# Patient Record
Sex: FEMALE | Race: WHITE | NOT HISPANIC OR LATINO | Employment: OTHER | URBAN - METROPOLITAN AREA
[De-identification: names, ages, dates, MRNs, and addresses within clinical notes are randomized per-mention and may not be internally consistent; named-entity substitution may affect disease eponyms.]

---

## 2017-01-02 ENCOUNTER — TELEPHONE (OUTPATIENT)
Dept: FAMILY MEDICINE | Facility: CLINIC | Age: 67
End: 2017-01-02

## 2017-01-02 DIAGNOSIS — G89.29 CHRONIC MIDLINE LOW BACK PAIN, WITH SCIATICA PRESENCE UNSPECIFIED: ICD-10-CM

## 2017-01-02 DIAGNOSIS — M54.5 CHRONIC MIDLINE LOW BACK PAIN, WITH SCIATICA PRESENCE UNSPECIFIED: ICD-10-CM

## 2017-01-02 DIAGNOSIS — M54.50 MIDLINE LOW BACK PAIN WITHOUT SCIATICA: Primary | Chronic | ICD-10-CM

## 2017-01-02 NOTE — TELEPHONE ENCOUNTER
Per [facility] pt needs appt to complete for.  No mgs left busy when called.    Karo Irwin,TC

## 2017-01-02 NOTE — TELEPHONE ENCOUNTER
Patient called requesting transfer for all her medications to [facility].  Had used [facility] while on [employer]. Now need to change to [facility] b/c on [employer].   Patient was transferred to scheduling to update insurance information .  Nurse called [facility]  to have them request transfer of medication.  Karo pharmacist from [facility] [employer] took message above. [phone]  Message will be sent to provider for controlled substance. Controlled medications will not be transferred.    Controlled Substance Refill Request for traMADol (ULTRAM) 50 MG tablet  Problem List Complete:  No     PROVIDER TO CONSIDER COMPLETION OF PROBLEM LIST AND OVERVIEW/CONTROLLED SUBSTANCE AGREEMENT    Last Written Prescription Date:  11/12/2015  Last Fill Quantity: 240,   # refills: 1    Last Office Visit with [facility] primary care provider: 09/22/2016    Future Office visit:     Controlled substance agreement on file: No.     Processing:  Fax Rx to [facility] pharmacy   checked in past 6 months?  Yes 01/02/2017   [place] reviewed:  reviewed- recommend provider review unable to fine records    [place]  [email or web address]

## 2017-01-04 DIAGNOSIS — I10 ESSENTIAL HYPERTENSION WITH GOAL BLOOD PRESSURE LESS THAN 140/90: Primary | Chronic | ICD-10-CM

## 2017-01-04 DIAGNOSIS — M17.0 PRIMARY OSTEOARTHRITIS OF BOTH KNEES: ICD-10-CM

## 2017-01-04 DIAGNOSIS — M35.00 SJOGREN'S SYNDROME (H): Chronic | ICD-10-CM

## 2017-01-04 DIAGNOSIS — K21.9 GASTROESOPHAGEAL REFLUX DISEASE WITHOUT ESOPHAGITIS: Chronic | ICD-10-CM

## 2017-01-04 DIAGNOSIS — R09.82 PND (POST-NASAL DRIP): Primary | ICD-10-CM

## 2017-01-04 DIAGNOSIS — M54.40 BILATERAL LOW BACK PAIN WITH SCIATICA, SCIATICA LATERALITY UNSPECIFIED, UNSPECIFIED CHRONICITY: Chronic | ICD-10-CM

## 2017-01-04 NOTE — TELEPHONE ENCOUNTER
Flonase 50 mcg       Last Written Prescription Date: 1/8/16  Last Fill Quantity: 1 g,  # refills: 3   Last Office Visit with [facility], [facility] or  [facility] prescribing provider: 9/22/16    Lidocaine 5 % ointment      Last Written Prescription Date: 1/8/16  Last Fill Quantity: 213 g, # refills: 3  Last Office Visit with [facility], [facility] or  [facility] prescribing provider: 9/22/16       CREATININE   Date Value Ref Range Status   09/13/2016 1.25* 0.52 - 1.04 mg/dL Final                                                Gabapentin 100 mg       Last Written Prescription Date:  1/8/16  Last Fill Quantity: 270,   # refills: 3  Last Office Visit with [facility], [facility] or  [facility] prescribing provider: 9/22/16  Future Office visit:       Routing refill request to provider for review/approval because:  Drug not on the [facility], [facility] or  [facility] refill protocol or controlled substance

## 2017-01-04 NOTE — TELEPHONE ENCOUNTER
Losratan/hctz 100/12.5 mg       Last Written Prescription Date: 9/16/16  Last Fill Quantity: 90, # refills: 3  Last Office Visit with [facility], [facility] or [facility] prescribing provider: 9/22/16     Now [facility]         POTASSIUM   Date Value Ref Range Status   09/13/2016 4.0 3.4 - 5.3 mmol/L Final     CREATININE   Date Value Ref Range Status   09/13/2016 1.25* 0.52 - 1.04 mg/dL Final     BP Readings from Last 3 Encounters:   09/22/16 118/64   09/13/16 144/76   05/27/16 130/56     Famotidine 20 mg       Last Written Prescription Date: 9/16/16  Last Fill Quantity: 180,  # refills: 3   [facility] or [facility] [facility] prescribing provider: 9/22/16     Now [facility]                                             Hydroxychloroquine 200 mg       Last Written Prescription Date:  9/16/16  Last Fill Quantity: 180,   # refills: 3  Last Office Visit with [facility], [facility] or [facility] prescribing provider: 9/22/16    Now [facility]   Future Office visit:       Routing refill request to provider for review/approval because:  Drug not on the [facility] or [facility] [facility] refill protocol or controlled substance

## 2017-01-05 RX ORDER — PILOCARPINE HYDROCHLORIDE 5 MG/1
5 TABLET, FILM COATED ORAL
Qty: 360 TABLET | Refills: 2 | Status: SHIPPED | OUTPATIENT
Start: 2017-01-05 | End: 2018-01-09

## 2017-01-05 RX ORDER — LIDOCAINE 50 MG/G
OINTMENT TOPICAL
Qty: 213 G | Refills: 3 | Status: SHIPPED | OUTPATIENT
Start: 2017-01-05 | End: 2019-09-27

## 2017-01-05 RX ORDER — FLUTICASONE PROPIONATE 50 MCG
2 SPRAY, SUSPENSION (ML) NASAL DAILY
Qty: 48 G | Refills: 2 | Status: SHIPPED | OUTPATIENT
Start: 2017-01-05 | End: 2021-09-24

## 2017-01-05 RX ORDER — LOSARTAN POTASSIUM AND HYDROCHLOROTHIAZIDE 12.5; 1 MG/1; MG/1
TABLET ORAL
Qty: 90 TABLET | Refills: 2 | Status: SHIPPED | OUTPATIENT
Start: 2017-01-05 | End: 2017-09-20

## 2017-01-05 RX ORDER — TRAMADOL HYDROCHLORIDE 50 MG/1
100 TABLET ORAL EVERY 6 HOURS PRN
Qty: 240 TABLET | Refills: 1 | Status: SHIPPED | OUTPATIENT
Start: 2017-01-05 | End: 2017-08-29

## 2017-01-05 RX ORDER — FAMOTIDINE 20 MG/1
TABLET, FILM COATED ORAL
Qty: 180 TABLET | Refills: 2 | Status: SHIPPED | OUTPATIENT
Start: 2017-01-05 | End: 2019-09-27

## 2017-01-05 RX ORDER — HYDROXYCHLOROQUINE SULFATE 200 MG/1
TABLET, FILM COATED ORAL
Qty: 180 TABLET | Refills: 2 | Status: SHIPPED | OUTPATIENT
Start: 2017-01-05 | End: 2018-01-11

## 2017-01-05 RX ORDER — METOPROLOL SUCCINATE 50 MG/1
TABLET, EXTENDED RELEASE ORAL
Qty: 90 TABLET | Refills: 2 | Status: SHIPPED | OUTPATIENT
Start: 2017-01-05 | End: 2018-01-09

## 2017-01-05 RX ORDER — GABAPENTIN 100 MG/1
CAPSULE ORAL
Qty: 270 CAPSULE | Refills: 2 | Status: SHIPPED | OUTPATIENT
Start: 2017-01-05 | End: 2019-09-27

## 2017-01-05 RX ORDER — PANTOPRAZOLE SODIUM 40 MG/1
TABLET, DELAYED RELEASE ORAL
Qty: 90 TABLET | Refills: 2 | Status: SHIPPED | OUTPATIENT
Start: 2017-01-05 | End: 2018-01-04

## 2017-01-05 NOTE — TELEPHONE ENCOUNTER
Changing to mail order    Flonase:  Prescription approved per [facility] Refill Protocol.     gabapentin (NEURONTIN) 100 MG capsule -   Last  check - 1/2/17  To primary care provider      Lidocaine ointment:  Prescription approved per [facility] Refill Protocol.

## 2017-01-05 NOTE — TELEPHONE ENCOUNTER
Changing to mail order:    Losartan/HCTZ:  Prescription approved per [facility] Refill Protocol.     Pepcid:  Prescription approved per [facility] Refill Protocol.     Plaquenil:  Routing refill request to provider for review/approval because:  Drug not on the [facility] refill protocol

## 2017-01-14 PROBLEM — M17.0 PRIMARY OSTEOARTHRITIS OF BOTH KNEES: Status: ACTIVE | Noted: 2017-01-14

## 2017-01-14 PROBLEM — M54.40 BILATERAL LOW BACK PAIN WITH SCIATICA, SCIATICA LATERALITY UNSPECIFIED, UNSPECIFIED CHRONICITY: Chronic | Status: ACTIVE | Noted: 2017-01-14

## 2017-03-09 ENCOUNTER — OFFICE VISIT (OUTPATIENT)
Dept: FAMILY MEDICINE | Facility: CLINIC | Age: 67
End: 2017-03-09
Payer: COMMERCIAL

## 2017-03-09 VITALS
RESPIRATION RATE: 16 BRPM | DIASTOLIC BLOOD PRESSURE: 70 MMHG | SYSTOLIC BLOOD PRESSURE: 132 MMHG | WEIGHT: 198 LBS | HEART RATE: 75 BPM | BODY MASS INDEX: 32.95 KG/M2 | TEMPERATURE: 98 F | OXYGEN SATURATION: 97 %

## 2017-03-09 DIAGNOSIS — M35.00 SJOGREN'S SYNDROME (H): Primary | Chronic | ICD-10-CM

## 2017-03-09 DIAGNOSIS — R41.3 POOR MEMORY: ICD-10-CM

## 2017-03-09 DIAGNOSIS — I10 HYPERTENSION GOAL BP (BLOOD PRESSURE) < 140/90: Chronic | ICD-10-CM

## 2017-03-09 DIAGNOSIS — M17.0 PRIMARY OSTEOARTHRITIS OF BOTH KNEES: ICD-10-CM

## 2017-03-09 DIAGNOSIS — E78.5 HYPERLIPIDEMIA WITH TARGET LDL LESS THAN 130: Chronic | ICD-10-CM

## 2017-03-09 DIAGNOSIS — M54.40 BILATERAL LOW BACK PAIN WITH SCIATICA, SCIATICA LATERALITY UNSPECIFIED, UNSPECIFIED CHRONICITY: Chronic | ICD-10-CM

## 2017-03-09 DIAGNOSIS — K21.9 GASTROESOPHAGEAL REFLUX DISEASE WITHOUT ESOPHAGITIS: Chronic | ICD-10-CM

## 2017-03-09 DIAGNOSIS — F32.4 MAJOR DEPRESSIVE DISORDER WITH SINGLE EPISODE, IN PARTIAL REMISSION (H): Chronic | ICD-10-CM

## 2017-03-09 PROCEDURE — 99214 OFFICE O/P EST MOD 30 MIN: CPT | Performed by: FAMILY MEDICINE

## 2017-03-09 NOTE — PATIENT INSTRUCTIONS
(M35.00) Sjogren's syndrome (H)  (primary encounter diagnosis)  Comment:    Plan:      (E78.5) Hyperlipidemia with target LDL less than 130  Comment:    Plan: Policosanol 30 MG TABS             (F32.4) Major depressive disorder with single episode, in partial remission (H)  Comment:    Plan:      (I10) Hypertension goal BP (blood pressure) < 140/90  Comment:    Plan:      (K21.9) Gastroesophageal reflux disease without esophagitis  Comment:    Plan:      (M54.40) Bilateral low back pain with sciatica, sciatica laterality unspecified, unspecified chronicity  Comment:    Plan:      (M17.0) Primary osteoarthritis of both knees  Comment:    Plan:      (R41.3) Poor memory  Comment:    Plan:

## 2017-03-09 NOTE — PROGRESS NOTES
SUBJECTIVE:                                                    Fatou Brian is a 66 year old female who presents to clinic today for the following health issues:      [employer] mobility form      Duration: na    Description (location/character/radiation): na    Intensity:  na    Accompanying signs and symptoms: na    History (similar episodes/previous evaluation): None    Precipitating or alleviating factors: None    Therapies tried and outcome: None       Hyperlipidemia Follow-Up      Rate your low fat/cholesterol diet?: good    Taking statin?  No    Other lipid medications/supplements?:  none       Chronic Pain Follow-Up  OSTEOARTHRITIS KNEES  AND CHRONIC LOWER BACK PAIN with RADICULOPATHY   SJOGREN'S SYNDROME       Type / Location of Pain:  SEE ABOVE   Analgesia/pain control:       Recent changes:  same      Overall control: Tolerable with discomfort  Activity level/function:      Daily activities:  Able to do light housework, cooking    Work:  not applicable  Adverse effects:  No  Adherance    Taking medication as directed?  Yes    Participating in other treatments: yes  Risk Factors:    Sleep:  Fair    Mood/anxiety:  controlled    Recent family or social stressors:  none noted    Other aggravating factor PROLONGED WALKING BENDING SITTING OR STANDING  PROLONGED WALKING BENDING SITTING OR STANDING   PHQ-9 SCORE 5/8/2015 11/12/2015 9/13/2016   Total Score 6 - -   Total Score - 5 11     No flowsheet data found.  Encounter-Level CSA:     There are no encounter-level csa.               .  Current Outpatient Prescriptions   Medication Sig Dispense Refill     Policosanol 30 MG TABS Take 1 tablet by mouth daily (with breakfast) 30 tablet 11     traMADol (ULTRAM) 50 MG tablet Take 2 tablets (100 mg) by mouth every 6 hours as needed for pain 240 tablet 1     metoprolol (TOPROL-XL) 50 MG 24 hr tablet TAKE 1 BY MOUTH DAILY 90 tablet 2     pantoprazole (PROTONIX) 40 MG EC tablet TAKE 1 BY MOUTH DAILY 90 tablet 2      pilocarpine (SALAGEN) 5 MG tablet Take 1 tablet (5 mg) by mouth 4 times daily (before meals and nightly) 360 tablet 2     losartan-hydrochlorothiazide (HYZAAR) 100-12.5 MG per tablet TAKE 1 BY MOUTH DAILY 90 tablet 2     hydroxychloroquine (PLAQUENIL) 200 MG tablet TAKE 2 (400 MG) BY MOUTH AT BEDTIME 180 tablet 2     famotidine (PEPCID) 20 MG tablet TAKE 1 TABLET BY MOUTH TWICE DAILY AS NEEDED 180 tablet 2     fluticasone (FLONASE) 50 MCG/ACT spray Spray 2 sprays into both nostrils daily 48 g 2     gabapentin (NEURONTIN) 100 MG capsule TAKE ONE CAPSULE BY MOUTH THREE TIMES DAILY 270 capsule 2     lidocaine (XYLOCAINE) 5 % ointment One application 4 x daily to affected areas lower back and knees if necessary 213 g 3     cholecalciferol (VITAMIN D3) 5000 UNITS TABS tablet Take 1 tablet (5,000 Units) by mouth At Bedtime 100 tablet 3     calcium carbonate (TUMS) 500 MG chewable tablet Take 1 tablet (500 mg) by mouth At Bedtime 120 tablet 11     diclofenac (VOLTAREN) 1 % GEL Apply 4 grams to knees or 2 grams to hands four times daily using enclosed dosing card. 100 g 1     triamcinolone (KENALOG) 0.1 % cream Apply topically 2 times daily Left axillary dermatitis 30 g 1     senna-docusate (SENOKOT-S;PERICOLACE) 8.6-50 MG per tablet Take 2 tablets by mouth 2 times daily 120 tablet PRN     ORDER FOR DME Equipment being ordered:  LEFT THUMB  METACARPAL AND WRIST SPLINT FOR TRIGGER THUMB  *ONE*  *SIG:* AS DIRECTED 1 Device 1     methocarbamol (ROBAXIN) 750 MG tablet Take 1 tablet (750 mg) by mouth 4 times daily as needed for muscle spasms 120 tablet 1     Acetaminophen 500 MG CHEW Take 1,000 mg by mouth 3 times daily (Patient taking differently: Take 1,300 mg by mouth every 8 hours ) 120 tablet 5     ORDER FOR DME Equipment being ordered: RUBBER THRESHOLD /CARGO WEDGE RAMP  ONE  USE AS DIRECTED FOR THRESHOLD  TO PREVENT FALLING 1 Device 1     zoledronic Acid (RECLAST) 5 MG/100ML SOLN Receive 5 mg injection once yearly  (March)  * Did not receive this year ()            Allergies   Allergen Reactions     Chantix [Varenicline]      suicidal     Influenza Virus Vaccine H5n1      Muscle aches dizzy, nauseated  Light headed     Morphine Sulfate      Sensitivity when in hospital     Omeprazole Magnesium Nausea     Intolerance       Vioxx      Niacin Itching and Rash     Zetia [Ezetimibe] Other (See Comments)     Muscle pain     Zyrtec-D Rash       Immunization History   Administered Date(s) Administered     Influenza (High Dose) 3 valent vaccine 2013     Pneumococcal 23 valent 2013     Tdap (Adacel,Boostrix) 10/14/2010     Zoster vaccine, live 2009         reports that she drinks alcohol.      reports that she does not use illicit drugs.    family history includes Arthritis in her mother; CANCER in her father and mother; CEREBROVASCULAR DISEASE in her mother; HEART DISEASE in her brother and father; OSTEOPOROSIS in her mother; Thyroid Disease in her mother.    indicated that her mother is . She indicated that her father is . She indicated that her brother is .      has a past surgical history that includes colonoscopy; hysterectomy, alexander; aneursym[ (, ); Cholecystectomy; closed rx prox humerus fracture; hc ecp with cataract surgery; and Colonoscopy (2014).     reports that she does not engage in sexual activity.  .  Pediatric History   Patient Guardian Status     Not on file.     Other Topics Concern     Parent/Sibling W/ Cabg, Mi Or Angioplasty Before 65f 55m? Yes     brother     Caffeine Concern No     Exercise No     Seat Belt Yes     Social History Narrative    --------------------------------------------------------------------------------    Surgical History  Return To Top     Status Surgery Time Frame Comment Record Date     Inactive  neck surgery    benign throat growths removed  2007      Inactive  Cataract    bilateral  2007      Inactive   hysterectomy  1981  TAHBSO  11/7/2007      Inactive  Brain surgery  1983;1991  brain aneurysm  11/7/2007      Inactive  cholecsystectomy  1992    11/7/2007          --------------------------------------------------------------------------------    Food Allergy  Return To Top     Allergen Reaction Comment Record Date     * No known food allergies      11/7/2007          --------------------------------------------------------------------------------    Drug Allergy  Return To Top     Allergen Reaction Comment Record Date     MORPHINE SULFATE    sensitivity when in hospital  9/24/2012      Vioxx      10/26/2010      NIACIN PREPARATIONS  itching; rash    10/26/2010      Zyrtec  rash    10/26/2010      OMEPRAZOLE/LANSOPRAZOLE  intolerance; nausea  PRILOSEC OTC ONLY OMEPREZOLE OK AND PREVACID OK; PRILOSEC OTC ONLY OMEPREZOLE OK AND PREVACID OK  4/10/2008          --------------------------------------------------------------------------------    Environment Allergy  Return To Top     Allergen Reaction Comment Record Date     * No known environmental allergies      11/7/2007          --------------------------------------------------------------------------------    Immunization History Return To Top     Funding Source Vaccine Type of Vaccine Date Given Route Site Given Lot#  Exp. Date Date on VIS Date Given VIS Vaccinator     Private  Herpes Zoster (Zostavax) age 60 +  Herpes Zoster  1/29/2009  SQ  RA  [id number]  MRK  04/28/2010 9/11/06 1/29/2009  DALIA Fuentes CMA      Private  Influenza (Adult) Seasonal  Flu Adult  10/14/2010  IM  Left Deltoid; Left Deltoid  [id number]  SP  6/30/2011  8/10/2010  10/14/2010  MAJO [facility]      Private  Tdap (Adacel) Adult  Tdap  10/14/2010  IM  Left Deltoid; Left Deltoid  [id number]  SP; SP  06/03/2012  11/18/2008  10/14/2010  MAJO Corley [facility]          --------------------------------------------------------------------------------    Social History  Return To Top     Question Answer Comment  Record Date     Marital status      9/24/2012      Emotional Abuse  No    9/16/2011      Exercise      4/9/2008      Caffeine  Yes  1 1/2 cups q.d.   4/9/2008      Physical Abuse  No    9/16/2011      Sealtbelts  Yes    4/9/2008      Sexual Abuse  No    9/16/2011      Breast/Testicle Self Check  Yes  Occasional  4/9/2008      Number of children  0    4/9/2008      Tobacco history  Quit this year  10/26/09  11/17/2009      Number of years using tobacco  35    11/11/2008      Number of cigarettes/day      11/11/2008      Alcohol history  Currently drinks alcohol    9/16/2011      Frequency of drinks  1-4 drinks per week    11/7/2007      Assist to Quit Information  Form Given to Patient    11/11/2008          --------------------------------------------------------------------------------    History - Overall Remark: 9/24/2012 Return To Top     MEDICAL HX: Collapsed lung morphine sensitivitiy last hospitalization    --------------------------------------------------------------------------------    Medical History Return To Top     Status Diagnosis Time Frame Comment Record Date     Active (389.18) - C - Sensorineural hearing loss, bilateral      9/24/2012      Active (Q82.62) - I - Laboratory exam as part of general physical exam      9/24/2012      Active (U67.0) - C - Screening, thyroid disorders      9/24/2012      Active (462) - C - Sore throat      9/24/2012      Active (V82.9) - C - Screening, vitamin d deficiency      9/24/2012      Active (466.0) - C - Bronchitis, acute      8/17/2012      Active (372.72) - C - Subconjunctival hemorrhage    RIGHT EYE  11/28/2011      Active (125.16) - C - Osteoarthritis, knee    MANY YEARS BILATERAL  11/28/2011      Active (728.85) - C - Muscle spasm      11/28/2011      Active (V76.12) - C - Screening, mammogram      9/21/2011      Active (724.02) - C - Spinal stenosis, lumbar region    LUMBAR 4-5 AREA  9/21/2011      Active (782.3) - C - Edema, localized, NOS     ANKLES FEET AND CALVES  9/16/2011      Active (455.2) - C - Hemorrhoids, Internal w/Complication    INTERMITTENT BLEEDING  9/16/2011      Active (V76.51) - C - Screening, colon      9/16/2011      Active (719.42) - C - Elbow pain    RIGHT OLECRANON WITH POSSIBLE MOUSE IN BURSA  9/16/2011      Active (724.02) - C - Spinal stenosis, lumbar region      9/16/2011      Active (722.52) - C - Degenerative disc disease, lumbar      9/16/2011      Active (564.00) - C - Constipation      8/19/2011      Active (807.00) - C - Rib fracture      8/19/2011      Active (433.10) - C - CAROTID ART OCC W/O INFARC      4/28/2011      Active (786.52) - C - Chest wall pain    LEFT CHEST WALL  3/25/2011      Active (780.4) - C - Dizziness/vertigo, NOS      1/3/2011      Active (710.2) - C - Sjogren's disease    confirmed will send to rheumatology  10/14/2010      Active (389.10) - C - Hearing loss, sensorineural    bilateral  10/14/2010      Active 528.00 Stomatits, mucositis, unpec., NOS      12/30/2009      Active 523.01 ACUTE GINGIVITIS NONPLAQUE INDUCED      12/30/2009      Active 241.1 NONTOXIC MULTINODUL GOITER      4/29/2009      Active 241.0 Thyroid nodule      4/28/2009      Active 780.79 Fatigue      11/11/2008      Active (401.1) - C - Hypertension, benign      4/10/2008      Active (729.5) - C - limb pain    HUMERUS FRACTURE WITH RESIDUAL PAIN JULY 2008 PLATE AND 10 SCREWS  4/10/2008      Active (719.46) - C - Knee pain    OSTEOARTHRITIS OF KNEES  4/10/2008      Active 728.6 Dupuytren's contracture    right hand  4/10/2008      Active (723.1) - C - Neck pain    INTERMITTENT NECK PAIN  4/10/2008      Active 796.2 Elevated blood pressure- no hypertension dx      4/10/2008      Active (272.4) - C - Hyperlipidemia      4/10/2008      Active (530.81) - C - GERD      4/10/2008      Active (780.52) - C - Insomnia    associated with adjustment  4/10/2008      Active (V70.0) - C - Routine Medical Exam      11/7/2007      Active 461.0  Sinusitis, acute, maxillary      2007      Active 733.00 Osteoporosis, unspec.      2007      Active (305.1) - C - Tobacco abuse      2007      Active 812.20 Humerus, closed, unspec.      2007      Active (V72.83) - C - Preop    humerus fx rt  2007      Inactive  (789.00) - I - Abdominal pain      2010      Inactive  (v06.1) - C - Tdap vaccine      10/14/2010      Inactive  (V72.62) - C - Laboratory exam as part of general physical exam      10/14/2010      Inactive  (V04.81) - C - Influenza vaccination      10/14/2010      Inactive  (V76.51) - C - Screening, colon      10/14/2010      Inactive  (305.1) - C - Tobacco dependence    resolved  2009      Inactive  490 Bronchitis      2009      Inactive  466.0 Bronchitis, acute      2009      Inactive  V05.8 Immunization, other, specified      2009      Inactive  V72.31 Screening, pap      2008      Inactive  Abnormal pap      2008          --------------------------------------------------------------------------------    Medication History Return To Top                 --------------------------------------------------------------------------------    Family History  Return To Top     Status Relationship Disease Comment Record Date     Alive Brother      2007      Alive Sister      2007      Alive Brother      2007      Alive Sister      2007      Alive Sister      2007         Brother  heart    2007         Brother  kidney cancer    2007         Mother  stomach cancer  age 79  2007         Brother  heart    2007         Father  heart;AAA  age 80  2007          --------------------------------------------------------------------------------    Infection History Return To Top     Question Answer Comment Record Date     History of HPV  No    2008      Live with someone with TB or exposed to TB  No    2008       History of Hepatitis B  No    11/11/2008      History of chlamydia  No    11/11/2008      History of gonorrhea  No    11/11/2008      History of syphilis  No    11/11/2008          --------------------------------------------------------------------------------                 reports that she has quit smoking. Her smoking use included Cigarettes. She has never used smokeless tobacco.    Medical, social, surgical, and family histories reviewed.    Problem list, Medication list, Allergies, and Medical/Social/Surgical histories reviewed in [facility] and updated as appropriate.  Labs reviewed in EPIC  Patient Active Problem List   Diagnosis     Sjogren's syndrome (H)     Osteoarthritis of knee     GERD (gastroesophageal reflux disease)     Constipation     Osteoporosis     Irritable bowel syndrome with diarrhea     HL (hearing loss)     Rosacea     Poor memory     Low back pain     Senile keratosis     Spinal stenosis     H/O hysterectomy for benign disease     Bilateral hearing loss     Presbyopia     Knee pain     Hypertension goal BP (blood pressure) < 140/90     Hyperlipidemia with target LDL less than 130     Left arm pain     Arm pain     Major depression in partial remission (H)     Lumbago     Subclavian artery stenosis (H)     ACP (advance care planning)     Gastroesophageal reflux disease without esophagitis     Primary osteoarthritis of both knees     Bilateral low back pain with sciatica, sciatica laterality unspecified, unspecified chronicity     Past Surgical History   Procedure Laterality Date     Colonoscopy       Hysterectomy, alexander       Aneursym[  1983, 1991     Has metal in head     Cholecystectomy       Closed rx prox humerus fracture       10 pins placed     Hc ecp with cataract surgery       both eyes     Colonoscopy  1/24/2014     Procedure: COMBINED COLONOSCOPY, SINGLE BIOPSY/POLYPECTOMY BY BIOPSY;  COLONOSCOPY;  Surgeon: Ranjit Pa MD;  Location:  GI       Social History   Substance  Use Topics     Smoking status: Former Smoker     Types: Cigarettes     Smokeless tobacco: Never Used      Comment: states quit 4 years ago     Alcohol use Yes      Comment: occas.     Family History   Problem Relation Age of Onset     Thyroid Disease Mother      Arthritis Mother      Rheumatoid     OSTEOPOROSIS Mother      CANCER Mother      stomach     CEREBROVASCULAR DISEASE Mother      HEART DISEASE Father      CANCER Father      HEART DISEASE Brother      fibulation         Allergies   Allergen Reactions     Chantix [Varenicline]      suicidal     Influenza Virus Vaccine H5n1      Muscle aches dizzy, nauseated  Light headed     Morphine Sulfate      Sensitivity when in hospital     Omeprazole Magnesium Nausea     Intolerance       Vioxx      Niacin Itching and Rash     Zetia [Ezetimibe] Other (See Comments)     Muscle pain     Zyrtec-D Rash     Recent Labs   Lab Test  09/13/16   1158  01/08/16   1630  09/17/15   0925  09/14/15   0809  06/19/15   1232   A1C  5.4   --    --    --    --    LDL  132*   --    --   98  90   HDL  64   --    --   66  53   TRIG  74   --    --   87  92   ALT  24  23   --   24   --    CR  1.25*  1.04   --   1.00   --    GFRESTIMATED  43*  53*   --   55*   --    GFRESTBLACK  52*  64   --   67   --    POTASSIUM  4.0  3.8   --   3.9   --    TSH  0.96   --   0.69   --    --         BP Readings from Last 6 Encounters:   03/09/17 132/70   09/22/16 118/64   09/13/16 144/76   05/27/16 130/56   01/19/16 146/78   01/08/16 116/62       Wt Readings from Last 3 Encounters:   03/09/17 198 lb (89.8 kg)   09/22/16 190 lb 12.8 oz (86.5 kg)   09/13/16 191 lb 12.8 oz (87 kg)         Positive symptoms or findings indicated by bold designation:     ROS: 10 point ROS neg other than the symptoms noted above in the HPI.except  has Sjogren's syndrome (H); Osteoarthritis of knee; GERD (gastroesophageal reflux disease); Constipation; Osteoporosis; Irritable bowel syndrome with diarrhea; HL (hearing loss); Rosacea;  Poor memory; Low back pain; Senile keratosis; Spinal stenosis; H/O hysterectomy for benign disease; Bilateral hearing loss; Presbyopia; Knee pain; Hypertension goal BP (blood pressure) < 140/90; Hyperlipidemia with target LDL less than 130; Left arm pain; Arm pain; Major depression in partial remission (H); Lumbago; Subclavian artery stenosis (H); ACP (advance care planning); Gastroesophageal reflux disease without esophagitis; Primary osteoarthritis of both knees; and Bilateral low back pain with sciatica, sciatica laterality unspecified, unspecified chronicity on her problem list.   Constitutional: The patient denied fatigue, fever, insomnia, night sweats, recent illness and weight loss.  WEIGHT IS UP  ANOTHER 8  POUNDS     Eyes: The patient denied blindness, eye pain, eye tearing, photophobia, vision change and visual disturbance. NORMAL VISION       Ears/Nose/Throat/Neck: The patient denied dizziness, facial pain, hearing loss, nasal discharge, oral pain, otalgia, postnasal drip, sinus congestion, sore throat, tinnitus and voice change.   PROFOUND HEARING LOSS BILATERAL   POOR BALANCE     Cardiovascular: The patient denied arrhythmia, chest pain/pressure, claudication, edema, exercise intolerance, fatigue, orthopnea, palpitations and syncope.      Respiratory: The patient denied asthma, chest congestion, cough, dyspnea on exertion, dyspnea/shortness of breath, hemoptysis, pedal edema, pleuritic pain, productive sputum, snoring and wheezing.     Gastrointestinal: The patient denied abdominal pain, anorexia, constipation, diarrhea, dysphagia, gastroesophageal reflux, hematochezia, hemorrhoids, melena, nausea and vomiting .     Genitourinary/Nephrology: The patient denied breast complaint, dysuria, nocturia sexual dysfunction, t, urinary frequency, urinary incontinence, urinary urgency    OCCASIONAL INCONTINENCE     Musculoskeletal: CHRONIC LOWER BACK PAIN with RADICULOPATHY IMPROVED    BILATERAL OSTEOARTHRITIS  KNEES   UNABLE TO GET ON BUS   SJOGREN'S     Dermatoligic:: The patient denied acne, dermatitis, ecchymosis, itching, mole change, rash, skin cancer, skin lesion and sores.      Neurologic: The patient denied dizziness, gait abnormality, headache, memory loss, mental status change, paresis, paresthesia, seizure, syncope, tremor and vision change.     Psychiatric: The patient denied anxiety, depression, disturbances of memory, drug abuse, insomnia, mood swings and relationship difficulties.      Endocrine: The patient denied , goiter, obesity, polyuria and thyroid disease.      Hematologic/Lymphatic: The patient denied abnormal bleeding and bruising, abnormal ecchymoses, anemia, lymph node enlargement/mass, petechiae and venous  Thrombosis.      Allergy/Immunology: The patient denied food allergy and  Allergic rhinitis or conjunctivitis.        PE:  /70  Pulse 75  Temp 98  F (36.7  C) (Tympanic)  Resp 16  Wt 198 lb (89.8 kg)  SpO2 97%  BMI 32.95 kg/m2 Body mass index is 32.95 kg/(m^2).    Constitutional: general appearance, well nourished, well developed, in no acute distress, well developed, appears stated age, normal body habitus,      Eyes:; The patient has normal eyelids sclerae and conjunctivae :      Ears/Nose/Throat: external ear, overall: normal appearance; external nose, overall: benign appearance, normal moujth gums and lips  The patient has:      Neck: thyroid, overall: normal size, normal consistency, nontender,      Respiratory:  palpation of chest, overall: normal excursion,    Clear to percussion and auscultation      Tachypnea   Color      Cardiovascular:  Good color with no peripheral edema    Regular sinus rhythm without murmur. Physiologic heart sounds Heart is unelarged  .   Chest/Breast: normal shape       Abdominal exam,  Liver and spleen are  unenlarged        Tenderness    Scars      Urogenital; no renal, flank or bladder  tenderness;      Lymphatic: neck nodes,     Other nodes      Musculoskeletal:  Brief ortho exam normal except:   DECREASE RANGE OF MOTION OF KNEES AND LOWER BACK   NEGATIVE STRAIGHT LEG RAISING TEST     Integument: inspection of skin, no rash, lesions; and, palpation, no induration, no tenderness.      Neurologic mental status, overall: alert and oriented; gait, no ataxia, no unsteadiness; coordination, no tremors; cranial nerves, overall: normal motor, overall: normal bulk, tone.      Psychiatric: orientation/consciousness, overall: oriented to person, place and time; behavior/psychomotor activity, no tics, normal psychomotor activity; mood and affect, overall: normal mood and affect; appearance, overall: well-groomed, good eye contact; speech, overall: normal quality, no aphasia and normal quality, quantity, intact.      Diagnostic Test Results:  Results for orders placed or performed in visit on 09/13/16   Hemoglobin A1c   Result Value Ref Range    Hemoglobin A1C 5.4 4.3 - 6.0 %   Basic metabolic panel   Result Value Ref Range    Sodium 144 133 - 144 mmol/L    Potassium 4.0 3.4 - 5.3 mmol/L    Chloride 104 94 - 109 mmol/L    Carbon Dioxide 29 20 - 32 mmol/L    Anion Gap 11.1 3 - 14 mmol/L    Glucose 95 70 - 99 mg/dL    Urea Nitrogen 23 7 - 30 mg/dL    Creatinine 1.25 (H) 0.52 - 1.04 mg/dL    GFR Estimate 43 (L) >60 mL/min/1.7m2    GFR Estimate If Black 52 (L) >60 mL/min/1.7m2    Calcium 9.7 8.5 - 10.4 mg/dL   Lipid panel reflex to direct LDL   Result Value Ref Range    Cholesterol 211 (H) <200 mg/dL    Triglycerides 74 <150 mg/dL    HDL Cholesterol 64 >49 mg/dL    LDL Cholesterol Calculated 132 (H) <100 mg/dL    Non HDL Cholesterol 147 (H) <130 mg/dL   Microalbumin quantitative random urine   Result Value Ref Range    Creatinine Urine 82 mg/dL    Albumin Urine mg/L <5 mg/L    Albumin Urine mg/g Cr Unable to calculate due to low value 0 - 25 mg/g Cr   ALT   Result Value Ref Range    ALT 24 0 - 50 U/L   CBC with platelets   Result Value Ref Range    WBC 5.8 4.0 - 11.0  10e9/L    RBC Count 4.52 3.8 - 5.2 10e12/L    Hemoglobin 14.0 11.7 - 15.7 g/dL    Hematocrit 42.1 35.0 - 47.0 %    MCV 93 78 - 100 fl    MCH 31.0 26.5 - 33.0 pg    MCHC 33.3 31.5 - 36.5 g/dL    RDW 12.7 10.0 - 15.0 %    Platelet Count 215 150 - 450 10e9/L   Vitamin D Deficiency   Result Value Ref Range    Vitamin D Deficiency screening 84 (H) 20 - 75 ug/L   TSH   Result Value Ref Range    TSH 0.96 0.40 - 5.00 mU/L   UA reflex to Microscopic and Culture   Result Value Ref Range    Color Urine Yellow     Appearance Urine Clear     Glucose Urine Negative NEG mg/dL    Bilirubin Urine Negative NEG    Ketones Urine Negative NEG mg/dL    Specific Gravity Urine 1.025 1.003 - 1.035    Blood Urine Trace (A) NEG    pH Urine 5.0 5.0 - 7.0 pH    Protein Albumin Urine Negative NEG mg/dL    Urobilinogen Urine 0.2 0.2 - 1.0 EU/dL    Nitrite Urine Negative NEG    Leukocyte Esterase Urine Negative NEG    Source Midstream Urine    Urine Microscopic   Result Value Ref Range    WBC Urine O - 2 0 - 2 /HPF    RBC Urine 2-5 (A) 0 - 2 /HPF         ICD-10-CM    1. Sjogren's syndrome (H) M35.00    2. Hyperlipidemia with target LDL less than 130 E78.5 Policosanol 30 MG TABS   3. Major depressive disorder with single episode, in partial remission (H) F32.4    4. Hypertension goal BP (blood pressure) < 140/90 I10    5. Gastroesophageal reflux disease without esophagitis K21.9    6. Bilateral low back pain with sciatica, sciatica laterality unspecified, unspecified chronicity M54.40    7. Primary osteoarthritis of both knees M17.0    8. Poor memory R41.3         .    Side effects benefits and risks thoroughly discussed. .she may come in early if unimproved or getting worse          Importance of adhering to regimen discussed and if medications were dispensed, the importance of taking medications discussed and bringing in the medications after every visit for chronic problems         Please drink 2 glasses of water prior to meals and walk 15-30  minutes after meals    I spent  25 MINUTES SPENT  with patient discussing the following issues   The primary encounter diagnosis was Sjogren's syndrome (H). Diagnoses of Hyperlipidemia with target LDL less than 130, Major depressive disorder with single episode, in partial remission (H), Hypertension goal BP (blood pressure) < 140/90, Gastroesophageal reflux disease without esophagitis, Bilateral low back pain with sciatica, sciatica laterality unspecified, unspecified chronicity, Primary osteoarthritis of both knees, and Poor memory were also pertinent to this visit. over half of which involved counseling and coordination of care.    Patient Instructions   (M35.00) Sjogren's syndrome (H)  (primary encounter diagnosis)  Comment:    Plan:      (E78.5) Hyperlipidemia with target LDL less than 130  Comment:    Plan: Policosanol 30 MG TABS             (F32.4) Major depressive disorder with single episode, in partial remission (H)  Comment:    Plan:      (I10) Hypertension goal BP (blood pressure) < 140/90  Comment:    Plan:      (K21.9) Gastroesophageal reflux disease without esophagitis  Comment:    Plan:      (M54.40) Bilateral low back pain with sciatica, sciatica laterality unspecified, unspecified chronicity  Comment:    Plan:      (M17.0) Primary osteoarthritis of both knees  Comment:    Plan:      (R41.3) Poor memory  Comment:    Plan:                  Diet:  MEDITERRANEAN DIET     Exercise:    Exercises Range of motion, balance, isometric, and strengthening exercises 30 repetitions twice daily of involved joints      .EVY ACOSTA MD 3/9/2017 12:44 PM  March 9, 2017

## 2017-03-09 NOTE — NURSING NOTE
"Chief Complaint   Patient presents with     Forms     metro mobility       Initial /70  Pulse 75  Temp 98  F (36.7  C) (Tympanic)  Resp 16  Wt 198 lb (89.8 kg)  SpO2 97%  BMI 32.95 kg/m2 Estimated body mass index is 32.95 kg/(m^2) as calculated from the following:    Height as of 9/13/16: 5' 5\" (1.651 m).    Weight as of this encounter: 198 lb (89.8 kg).  Medication Reconciliation: complete   Wanda Irizarry CMA    "

## 2017-03-09 NOTE — MR AVS SNAPSHOT
After Visit Summary   3/9/2017    Fatou Brian    MRN: [id number]           Patient Information     Date Of Birth          1950        Visit Information        Provider Department      3/9/2017 11:30 AM Reid Thomas MD [facility]        Today's Diagnoses     Sjogren's syndrome (H)    -  1    Hyperlipidemia with target LDL less than 130        Major depressive disorder with single episode, in partial remission (H)        Hypertension goal BP (blood pressure) < 140/90        Gastroesophageal reflux disease without esophagitis        Bilateral low back pain with sciatica, sciatica laterality unspecified, unspecified chronicity        Primary osteoarthritis of both knees        Poor memory          Care Instructions    (M35.00) Sjogren's syndrome (H)  (primary encounter diagnosis)  Comment:    Plan:      (E78.5) Hyperlipidemia with target LDL less than 130  Comment:    Plan: Policosanol 30 MG TABS             (F32.4) Major depressive disorder with single episode, in partial remission (H)  Comment:    Plan:      (I10) Hypertension goal BP (blood pressure) < 140/90  Comment:    Plan:      (K21.9) Gastroesophageal reflux disease without esophagitis  Comment:    Plan:      (M54.40) Bilateral low back pain with sciatica, sciatica laterality unspecified, unspecified chronicity  Comment:    Plan:      (M17.0) Primary osteoarthritis of both knees  Comment:    Plan:      (R41.3) Poor memory  Comment:    Plan:                    Follow-ups after your visit        Follow-up notes from your care team     Return in about 3 months (around 6/9/2017).      Who to contact     If you have questions or need follow up information about today's clinic visit or your schedule please contact [facility] directly at [phone].  Normal or non-critical lab and imaging results will be communicated to you by MyChart, letter or phone within 4  business days after the clinic has received the results. If you do not hear from us within 7 days, please contact the clinic through [employer] or phone. If you have a critical or abnormal lab result, we will notify you by phone as soon as possible.  Submit refill requests through [employer] or call your pharmacy and they will forward the refill request to us. Please allow 3 business days for your refill to be completed.          Additional Information About Your Visit        [employer] Information     [employer] gives you secure access to your electronic health record. If you see a primary care provider, you can also send messages to your care team and make appointments. If you have questions, please call your primary care clinic.  If you do not have a primary care provider, please call [phone] and they will assist you.        Care EveryWhere ID     This is your Care EveryWhere ID. This could be used by other organizations to access your [facility] medical records  [id number]        Your Vitals Were     Pulse Temperature Respirations Pulse Oximetry BMI (Body Mass Index)       75 98  F (36.7  C) (Tympanic) 16 97% 32.95 kg/m2        Blood Pressure from Last 3 Encounters:   03/09/17 132/70   09/22/16 118/64   09/13/16 144/76    Weight from Last 3 Encounters:   03/09/17 198 lb (89.8 kg)   09/22/16 190 lb 12.8 oz (86.5 kg)   09/13/16 191 lb 12.8 oz (87 kg)              Today, you had the following     No orders found for display         Today's Medication Changes          These changes are accurate as of: 3/9/17 12:07 PM.  If you have any questions, ask your nurse or doctor.               Start taking these medicines.        Dose/Directions    Policosanol 30 MG Tabs   Used for:  Hyperlipidemia with target LDL less than 130   Started by:  Reid Thomas MD        Dose:  1 tablet   Take 1 tablet by mouth daily (with breakfast)   Quantity:  30 tablet   Refills:  11         These medicines have changed or have updated  prescriptions.        Dose/Directions    Acetaminophen 500 MG Chew   This may have changed:    - how much to take  - when to take this   Used for:  LBP (low back pain), Osteoarthritis of knee, Arm pain, anterior, left, Spinal stenosis        Dose:  1000 mg   Take 1,000 mg by mouth 3 times daily   Quantity:  120 tablet   Refills:  5            Where to get your medicines      These medications were sent to [facility] [address] [place] [address] AT [facility] & [address] [place] [id number]    Hours:  24-hours Phone:  [phone]     Policosanol 30 MG Tabs                Primary Care Provider Office Phone # Fax #    Reid Thomas [phone]       [facility] [address] [facility] [address]        Thank you!     Thank you for choosing [facility]  for your care. Our goal is always to provide you with excellent care. Hearing back from our patients is one way we can continue to improve our services. Please take a few minutes to complete the written survey that you may receive in the mail after your visit with us. Thank you!             Your Updated Medication List - Protect others around you: Learn how to safely use, store and throw away your medicines at www.disposemymeds.org.          This list is accurate as of: 3/9/17 12:07 PM.  Always use your most recent med list.                   Brand Name Dispense Instructions for use    Acetaminophen 500 MG Chew     120 tablet    Take 1,000 mg by mouth 3 times daily       calcium carbonate 500 MG chewable tablet    TUMS    120 tablet    Take 1 tablet (500 mg) by mouth At Bedtime       cholecalciferol 5000 UNITS Tabs tablet    vitamin D3    100 tablet    Take 1 tablet (5,000 Units) by mouth At Bedtime       diclofenac 1 % Gel topical gel    VOLTAREN    100 g    Apply 4 grams to knees or 2 grams to hands four times daily using enclosed  dosing card.       famotidine 20 MG tablet    PEPCID    180 tablet    TAKE 1 TABLET BY MOUTH TWICE DAILY AS NEEDED       fluticasone 50 MCG/ACT spray    FLONASE    48 g    Spray 2 sprays into both nostrils daily       gabapentin 100 MG capsule    NEURONTIN    270 capsule    TAKE ONE CAPSULE BY MOUTH THREE TIMES DAILY       hydroxychloroquine 200 MG tablet    PLAQUENIL    180 tablet    TAKE 2 (400 MG) BY MOUTH AT BEDTIME       lidocaine 5 % ointment    XYLOCAINE    213 g    One application 4 x daily to affected areas lower back and knees if necessary       losartan-hydrochlorothiazide 100-12.5 MG per tablet    HYZAAR    90 tablet    TAKE 1 BY MOUTH DAILY       methocarbamol 750 MG tablet    ROBAXIN    120 tablet    Take 1 tablet (750 mg) by mouth 4 times daily as needed for muscle spasms       metoprolol 50 MG 24 hr tablet    TOPROL-XL    90 tablet    TAKE 1 BY MOUTH DAILY       order for DME     1 Device    Equipment being ordered: RUBBER THRESHOLD /CARGO WEDGE RAMP  ONE  USE AS DIRECTED FOR THRESHOLD  TO PREVENT FALLING       order for DME     1 Device    Equipment being ordered:  LEFT THUMB  METACARPAL AND WRIST SPLINT FOR TRIGGER THUMB *ONE* *SIG:* AS DIRECTED       pantoprazole 40 MG EC tablet    PROTONIX    90 tablet    TAKE 1 BY MOUTH DAILY       pilocarpine 5 MG tablet    SALAGEN    360 tablet    Take 1 tablet (5 mg) by mouth 4 times daily (before meals and nightly)       Policosanol 30 MG Tabs     30 tablet    Take 1 tablet by mouth daily (with breakfast)       senna-docusate 8.6-50 MG per tablet    SENOKOT-S;PERICOLACE    120 tablet    Take 2 tablets by mouth 2 times daily       traMADol 50 MG tablet    ULTRAM    240 tablet    Take 2 tablets (100 mg) by mouth every 6 hours as needed for pain       triamcinolone 0.1 % cream    KENALOG    30 g    Apply topically 2 times daily Left axillary dermatitis       zoledronic Acid 5 MG/100ML Soln infusion    RECLAST     Receive 5 mg injection once yearly (March) * Did  not receive this year (2015)

## 2017-03-10 ASSESSMENT — PATIENT HEALTH QUESTIONNAIRE - PHQ9: SUM OF ALL RESPONSES TO PHQ QUESTIONS 1-9: 6

## 2017-03-20 ENCOUNTER — DOCUMENTATION ONLY (OUTPATIENT)
Dept: OTHER | Facility: CLINIC | Age: 67
End: 2017-03-20

## 2017-03-20 DIAGNOSIS — Z71.89 ACP (ADVANCE CARE PLANNING): Chronic | ICD-10-CM

## 2017-04-03 ENCOUNTER — TELEPHONE (OUTPATIENT)
Dept: FAMILY MEDICINE | Facility: CLINIC | Age: 67
End: 2017-04-03

## 2017-04-03 NOTE — TELEPHONE ENCOUNTER
-Pt states is not allergic to pantoprozole  Please call pharmacy to verify [phone]  .Leander CARRILLO

## 2017-04-03 NOTE — TELEPHONE ENCOUNTER
pantoprazole (PROTONIX) 40 MG EC tablet  The patient may have an allergy to this medication.  Our records indicate an allergy to pantoprazole sod dr tabs.  Please review the pt's chart for type and severity of reaction

## 2017-04-03 NOTE — TELEPHONE ENCOUNTER
PATIENT HAS GASTROINTESTINAL SENSITIVITY TO OMEPRAZOLE   CALL PATIENT TO FIND OUT IF IS INDEED ALLERGIC TO PANTOPROZOLE   SWITCH TO ZANTAC 150 MG PO TWICE DAILY IF THIS IS TRUE   EVY ACOSTA JR., MD

## 2017-04-04 NOTE — TELEPHONE ENCOUNTER
Called [facility] to relay information that patient states she is not allergic to the pantoprazole

## 2017-08-04 ENCOUNTER — TRANSFERRED RECORDS (OUTPATIENT)
Dept: HEALTH INFORMATION MANAGEMENT | Facility: CLINIC | Age: 67
End: 2017-08-04

## 2017-08-23 ENCOUNTER — APPOINTMENT (OUTPATIENT)
Dept: CT IMAGING | Facility: CLINIC | Age: 67
End: 2017-08-23
Attending: EMERGENCY MEDICINE
Payer: COMMERCIAL

## 2017-08-23 ENCOUNTER — HOSPITAL ENCOUNTER (EMERGENCY)
Facility: CLINIC | Age: 67
Discharge: HOME | End: 2017-08-23
Attending: EMERGENCY MEDICINE | Admitting: EMERGENCY MEDICINE
Payer: COMMERCIAL

## 2017-08-23 VITALS
RESPIRATION RATE: 18 BRPM | HEART RATE: 77 BPM | WEIGHT: 190 LBS | OXYGEN SATURATION: 99 % | BODY MASS INDEX: 30.53 KG/M2 | HEIGHT: 66 IN | TEMPERATURE: 98.2 F | SYSTOLIC BLOOD PRESSURE: 118 MMHG | DIASTOLIC BLOOD PRESSURE: 58 MMHG

## 2017-08-23 DIAGNOSIS — R11.0 NAUSEA: ICD-10-CM

## 2017-08-23 DIAGNOSIS — R42 VERTIGO: ICD-10-CM

## 2017-08-23 LAB
ALBUMIN SERPL-MCNC: 4.2 G/DL (ref 3.4–5)
ALBUMIN UR-MCNC: NEGATIVE MG/DL
ALP SERPL-CCNC: 95 U/L (ref 40–150)
ALT SERPL W P-5'-P-CCNC: 22 U/L (ref 0–50)
ANION GAP SERPL CALCULATED.3IONS-SCNC: 9 MMOL/L (ref 3–14)
APPEARANCE UR: CLEAR
AST SERPL W P-5'-P-CCNC: 22 U/L (ref 0–45)
BASOPHILS # BLD AUTO: 0 10E9/L (ref 0–0.2)
BASOPHILS NFR BLD AUTO: 0.3 %
BILIRUB SERPL-MCNC: 0.5 MG/DL (ref 0.2–1.3)
BILIRUB UR QL STRIP: NEGATIVE
BUN SERPL-MCNC: 19 MG/DL (ref 7–30)
CALCIUM SERPL-MCNC: 9.6 MG/DL (ref 8.5–10.1)
CHLORIDE SERPL-SCNC: 102 MMOL/L (ref 94–109)
CO2 SERPL-SCNC: 27 MMOL/L (ref 20–32)
COLOR UR AUTO: YELLOW
CREAT SERPL-MCNC: 1.29 MG/DL (ref 0.52–1.04)
DIFFERENTIAL METHOD BLD: NORMAL
EOSINOPHIL # BLD AUTO: 0.1 10E9/L (ref 0–0.7)
EOSINOPHIL NFR BLD AUTO: 0.9 %
ERYTHROCYTE [DISTWIDTH] IN BLOOD BY AUTOMATED COUNT: 13.2 % (ref 10–15)
GFR SERPL CREATININE-BSD FRML MDRD: 41 ML/MIN/1.7M2
GLUCOSE SERPL-MCNC: 100 MG/DL (ref 70–99)
GLUCOSE UR STRIP-MCNC: NEGATIVE MG/DL
HCT VFR BLD AUTO: 41.7 % (ref 35–47)
HGB BLD-MCNC: 14.5 G/DL (ref 11.7–15.7)
HGB UR QL STRIP: NEGATIVE
HYALINE CASTS #/AREA URNS LPF: 2 /LPF (ref 0–2)
IMM GRANULOCYTES # BLD: 0 10E9/L (ref 0–0.4)
IMM GRANULOCYTES NFR BLD: 0.3 %
INTERPRETATION ECG - MUSE: NORMAL
KETONES UR STRIP-MCNC: NEGATIVE MG/DL
LEUKOCYTE ESTERASE UR QL STRIP: ABNORMAL
LYMPHOCYTES # BLD AUTO: 1.2 10E9/L (ref 0.8–5.3)
LYMPHOCYTES NFR BLD AUTO: 19.2 %
MCH RBC QN AUTO: 31.5 PG (ref 26.5–33)
MCHC RBC AUTO-ENTMCNC: 34.8 G/DL (ref 31.5–36.5)
MCV RBC AUTO: 91 FL (ref 78–100)
MONOCYTES # BLD AUTO: 0.5 10E9/L (ref 0–1.3)
MONOCYTES NFR BLD AUTO: 8 %
NEUTROPHILS # BLD AUTO: 4.6 10E9/L (ref 1.6–8.3)
NEUTROPHILS NFR BLD AUTO: 71.3 %
NITRATE UR QL: NEGATIVE
NRBC # BLD AUTO: 0 10*3/UL
NRBC BLD AUTO-RTO: 0 /100
PH UR STRIP: 6.5 PH (ref 5–7)
PLATELET # BLD AUTO: 192 10E9/L (ref 150–450)
POTASSIUM SERPL-SCNC: 3.7 MMOL/L (ref 3.4–5.3)
PROT SERPL-MCNC: 7.6 G/DL (ref 6.8–8.8)
RBC # BLD AUTO: 4.61 10E12/L (ref 3.8–5.2)
RBC #/AREA URNS AUTO: 1 /HPF (ref 0–2)
SODIUM SERPL-SCNC: 138 MMOL/L (ref 133–144)
SOURCE: ABNORMAL
SP GR UR STRIP: 1.01 (ref 1–1.03)
SQUAMOUS #/AREA URNS AUTO: 1 /HPF (ref 0–1)
TROPONIN I SERPL-MCNC: <0.015 UG/L (ref 0–0.04)
UROBILINOGEN UR STRIP-MCNC: NORMAL MG/DL (ref 0–2)
WBC # BLD AUTO: 6.4 10E9/L (ref 4–11)
WBC #/AREA URNS AUTO: 4 /HPF (ref 0–2)

## 2017-08-23 PROCEDURE — 25000131 ZZH RX MED GY IP 250 OP 636 PS 637: Mod: GY | Performed by: EMERGENCY MEDICINE

## 2017-08-23 PROCEDURE — 70498 CT ANGIOGRAPHY NECK: CPT

## 2017-08-23 PROCEDURE — 99285 EMERGENCY DEPT VISIT HI MDM: CPT | Mod: 25

## 2017-08-23 PROCEDURE — 85025 COMPLETE CBC W/AUTO DIFF WBC: CPT | Performed by: EMERGENCY MEDICINE

## 2017-08-23 PROCEDURE — 80053 COMPREHEN METABOLIC PANEL: CPT | Performed by: EMERGENCY MEDICINE

## 2017-08-23 PROCEDURE — 96361 HYDRATE IV INFUSION ADD-ON: CPT

## 2017-08-23 PROCEDURE — 96360 HYDRATION IV INFUSION INIT: CPT | Mod: 59

## 2017-08-23 PROCEDURE — 84484 ASSAY OF TROPONIN QUANT: CPT | Performed by: EMERGENCY MEDICINE

## 2017-08-23 PROCEDURE — 93005 ELECTROCARDIOGRAM TRACING: CPT

## 2017-08-23 PROCEDURE — A9270 NON-COVERED ITEM OR SERVICE: HCPCS | Mod: GY | Performed by: EMERGENCY MEDICINE

## 2017-08-23 PROCEDURE — 25000125 ZZHC RX 250: Performed by: EMERGENCY MEDICINE

## 2017-08-23 PROCEDURE — 25000128 H RX IP 250 OP 636: Performed by: EMERGENCY MEDICINE

## 2017-08-23 PROCEDURE — 70450 CT HEAD/BRAIN W/O DYE: CPT | Mod: XS

## 2017-08-23 PROCEDURE — 81001 URINALYSIS AUTO W/SCOPE: CPT | Performed by: EMERGENCY MEDICINE

## 2017-08-23 RX ORDER — IOPAMIDOL 755 MG/ML
70 INJECTION, SOLUTION INTRAVASCULAR ONCE
Status: COMPLETED | OUTPATIENT
Start: 2017-08-23 | End: 2017-08-23

## 2017-08-23 RX ORDER — MECLIZINE HYDROCHLORIDE 25 MG/1
25 TABLET ORAL ONCE
Status: COMPLETED | OUTPATIENT
Start: 2017-08-23 | End: 2017-08-23

## 2017-08-23 RX ORDER — LIDOCAINE 40 MG/G
CREAM TOPICAL
Status: DISCONTINUED | OUTPATIENT
Start: 2017-08-23 | End: 2017-08-23 | Stop reason: HOSPADM

## 2017-08-23 RX ADMIN — IOPAMIDOL 70 ML: 755 INJECTION, SOLUTION INTRAVENOUS at 11:39

## 2017-08-23 RX ADMIN — MECLIZINE 25 MG: 25 TABLET ORAL at 10:36

## 2017-08-23 RX ADMIN — SODIUM CHLORIDE 100 ML: 9 INJECTION, SOLUTION INTRAVENOUS at 11:39

## 2017-08-23 RX ADMIN — SODIUM CHLORIDE 1000 ML: 9 INJECTION, SOLUTION INTRAVENOUS at 10:36

## 2017-08-23 ASSESSMENT — ENCOUNTER SYMPTOMS
SHORTNESS OF BREATH: 0
HEADACHES: 0
CONSTIPATION: 1
NAUSEA: 1
DIZZINESS: 1
NECK PAIN: 0
NERVOUS/ANXIOUS: 1

## 2017-08-23 NOTE — ED AVS SNAPSHOT
Emergency Department    [address] [place] [id number]    Phone:  [phone]    Fax:  [phone]                                       Fatou Brian   MRN: [id number]    Department:   Emergency Department   Date of Visit:  8/23/2017           After Visit Summary Signature Page     I have received my discharge instructions, and my questions have been answered. I have discussed any challenges I see with this plan with the nurse or doctor.    ..........................................................................................................................................  Patient/Patient Representative Signature      ..........................................................................................................................................  Patient Representative Print Name and Relationship to Patient    ..................................................               ................................................  Date                                            Time    ..........................................................................................................................................  Reviewed by Signature/Title    ...................................................              ..............................................  Date                                                            Time

## 2017-08-23 NOTE — ED AVS SNAPSHOT
Emergency Department    [address] [facility] [address]    Phone:  [phone]    Fax:  [phone]                                       Fatou Brian   MRN: [id number]    Department:   Emergency Department   Date of Visit:  8/23/2017           Patient Information     Date Of Birth          1950        Your diagnoses for this visit were:     Vertigo     Nausea        You were seen by Marivel Cope MD.      Follow-up Information     Follow up with Reid Thomas MD.    Specialty:  Family Practice    Contact information:    [address]  [facility] [id number] [phone]          Follow up with  Emergency Department.    Specialty:  EMERGENCY MEDICINE    Why:  If symptoms worsen    Contact information:    [address] [facility] [address] [phone]        Discharge Instructions         Managing Dizziness (Vertigo) with Medicines    Although medicines can't cure your problem, they can help control symptoms. Your doctor may prescribe medicines for a few weeks and then taper them off. Always take your medicine as prescribed. Never share your medicine with others.  Contact your healthcare provider right away if you have side effects from your medicines.   How medicines can help    Treat infection or inflammation. If you have an infection caused by bacteria, your doctor can prescribe antibiotics.    Limit conflicting balance signals. These medicines are often in pill form.    Ease nausea. Suppositories, pills, or shots can reduce vomiting.    Reduce pressure in the canals. Diuretics can be used to treat Meniere's disease. These medicines help your body get rid of extra fluid.    Ease other symptoms. Other medicines can help ease depression and anxiety caused by living with dizziness or fainting.  Date Last Reviewed: 11/1/2016 2000-2017 [employer]. [address] [place] [address], [place] [address]. All rights reserved. This information  is not intended as a substitute for professional medical care. Always follow your healthcare professional's instructions.          Possible Causes of Dizziness or Fainting    Dizziness and fainting can have many causes. Below are some examples of possible causes your healthcare provider will look to rule out.  Benign paroxysmal positional vertigo (BPPV)  BPPV results when calcium crystals inside the inner ear shift into the wrong position. BPPV causes episodes of vertigo (a spinning sensation). Episodes most often happen when the head is moved in a certain way. This is more common in people 65 and older.   Infection or inflammation  The semicircular canals of the ear may become infected or inflamed. In this case, they can send the wrong balance signals. This can cause vertigo.  Meniere disease  Meniere disease happens when there is too much fluid in the semicircular canals. This can cause vertigo. It also can cause hearing problems and buzzing or ringing in the ears (called tinnitus). You may also have a feeling of pressure or fullness in the ear.  Syncope  Syncope is fainting that happens when the brain doesn t get enough oxygen-rich blood. It can be caused by low heart rate or low blood pressure. This is called vasovagal syncope. It can also be caused by sitting or standing up too quickly. This is called orthostatic hypotension. Syncope may also be due to a heart valve problem, an abnormal heart rhythm, or other heart problems. Dizziness can also happen from stroke, hemorrhage in the brain, or other problems in the brain. Your healthcare provider may do certain tests to rule out these conditions.  Other causes  Other causes include:    Medicines. Certain medicines can cause dizziness and even fainting. In some cases, stopping a medicine too quickly can lead to withdrawal symptoms, including dizziness and fainting.    Anxiety. Being anxious can lead to breathing changes, such as hyperventilation. These can lead to  dizziness and fainting.  Additional causes for dizziness and fainting also exist. Talk to your healthcare provider for more information.     Date Last Reviewed: 10/6/2015    3709-0204 [email or web address]. [address] [place] [address], [place] [address]. All rights reserved. This information is not intended as a substitute for professional medical care. Always follow your healthcare professional's instructions.          Vertigo (Unknown Cause)    In addition to helping with hearing, the inner ear is part of the balance center of your body. Problems with the inner ear can a false feeling of motion. This is called vertigo. Often, it feels as if you or the room is spinning. A vertigo attack may cause sudden nausea, vomiting and heavy sweating. Severe vertigo causes a loss of balance and can cause you to fall. During vertigo, small head movements and changes in body position will often make the symptoms worse. You may also have ringing in the ears called tinnitus.  An episode of vertigo may last seconds, minutes or hours. Once you are over the first episode, it may never come back. However, symptoms may return off and on.  The cause of your vertigo is not yet known. Possible causes of vertigo include:    Inflammation of the inner ear    Disease of the nerves to the inner ear    Movement of calcium particles in the inner ear    Poor blood flow to the balance centers of the brain    Migraine headaches  Home care    If symptoms are severe, rest quietly in bed. Change positions very slowly. There is usually one position that will feel best, such as lying on one side or lying on your back with your head slightly raised on pillows.    Do not drive a car or work with dangerous machinery until symptoms have been gone for at least one week.    Take medicine as prescribed to relieve your symptoms. Unless another medicine was prescribed for symptoms of nausea, vomiting, and dizziness, you may use over-the-counter motion sickness  pills. Ask your pharmacist for suggestions.  Follow-up care  Follow up with your healthcare provider or as directed. If you are referred to a specialist or for testing, make the appointment promptly.  When to seek medical advice  Call your healthcare provider if any of the following occur:    Fever of 100.4 F (38 C) or higher, or as directed by your healthcare provider    Vertigo worsens or is not controlled by prescribed medicine     Repeated vomiting not relieved by prescribed medicine     Severe headache    Confusion    Weakness of an arm or leg or one side of the face    Difficulty with speech or vision    Loss of consciousness     Seizure  Date Last Reviewed: 8/16/2015 2000-2017 [employer]. [address] [place] [address]. All rights reserved. This information is not intended as a substitute for professional medical care. Always follow your healthcare professional's instructions.          24 Hour Appointment Hotline       To make an appointment at any [facility], call [phone] ([phone]). If you don't have a family doctor or clinic, we will help you find one. [facility] are conveniently located to serve the needs of you and your family.             Review of your medicines      Our records show that you are taking the medicines listed below. If these are incorrect, please call your family doctor or clinic.        Dose / Directions Last dose taken    Acetaminophen 500 MG Chew   Dose:  1000 mg   Quantity:  120 tablet        Take 1,000 mg by mouth 3 times daily   Refills:  5        calcium carbonate 500 MG chewable tablet   Commonly known as:  TUMS   Dose:  1 chew tab   Quantity:  120 tablet        Take 1 tablet (500 mg) by mouth At Bedtime   Refills:  11        cholecalciferol 5000 UNITS Tabs tablet   Commonly known as:  vitamin D3   Dose:  5000 Units   Quantity:  100 tablet        Take 1 tablet (5,000 Units) by mouth At Bedtime   Refills:  3        diclofenac 1  % Gel topical gel   Commonly known as:  VOLTAREN   Quantity:  100 g        Apply 4 grams to knees or 2 grams to hands four times daily using enclosed dosing card.   Refills:  1        famotidine 20 MG tablet   Commonly known as:  PEPCID   Quantity:  180 tablet        TAKE 1 TABLET BY MOUTH TWICE DAILY AS NEEDED   Refills:  2        fluticasone 50 MCG/ACT spray   Commonly known as:  FLONASE   Dose:  2 spray   Quantity:  48 g        Spray 2 sprays into both nostrils daily   Refills:  2        gabapentin 100 MG capsule   Commonly known as:  NEURONTIN   Quantity:  270 capsule        TAKE ONE CAPSULE BY MOUTH THREE TIMES DAILY   Refills:  2        hydroxychloroquine 200 MG tablet   Commonly known as:  PLAQUENIL   Quantity:  180 tablet        TAKE 2 (400 MG) BY MOUTH AT BEDTIME   Refills:  2        lidocaine 5 % ointment   Commonly known as:  XYLOCAINE   Quantity:  213 g        One application 4 x daily to affected areas lower back and knees if necessary   Refills:  3        losartan-hydrochlorothiazide 100-12.5 MG per tablet   Commonly known as:  HYZAAR   Quantity:  90 tablet        TAKE 1 BY MOUTH DAILY   Refills:  2        methocarbamol 750 MG tablet   Commonly known as:  ROBAXIN   Dose:  750 mg   Quantity:  120 tablet        Take 1 tablet (750 mg) by mouth 4 times daily as needed for muscle spasms   Refills:  1        metoprolol 50 MG 24 hr tablet   Commonly known as:  TOPROL-XL   Quantity:  90 tablet        TAKE 1 BY MOUTH DAILY   Refills:  2        order for DME   Quantity:  1 Device        Equipment being ordered: RUBBER THRESHOLD /CARGO WEDGE RAMP  ONE  USE AS DIRECTED FOR THRESHOLD  TO PREVENT FALLING   Refills:  1        order for DME   Quantity:  1 Device        Equipment being ordered:  LEFT THUMB  METACARPAL AND WRIST SPLINT FOR TRIGGER THUMB *ONE* *SIG:* AS DIRECTED   Refills:  1        pantoprazole 40 MG EC tablet   Commonly known as:  PROTONIX   Quantity:  90 tablet        TAKE 1 BY MOUTH DAILY   Refills:   2        pilocarpine 5 MG tablet   Commonly known as:  SALAGEN   Dose:  5 mg   Quantity:  360 tablet        Take 1 tablet (5 mg) by mouth 4 times daily (before meals and nightly)   Refills:  2        Policosanol 30 MG Tabs   Dose:  1 tablet   Quantity:  30 tablet        Take 1 tablet by mouth daily (with breakfast)   Refills:  11        senna-docusate 8.6-50 MG per tablet   Commonly known as:  SENOKOT-S;PERICOLACE   Dose:  2 tablet   Quantity:  120 tablet        Take 2 tablets by mouth 2 times daily   Refills:  PRN        traMADol 50 MG tablet   Commonly known as:  ULTRAM   Dose:  100 mg   Quantity:  240 tablet        Take 2 tablets (100 mg) by mouth every 6 hours as needed for pain   Refills:  1        triamcinolone 0.1 % cream   Commonly known as:  KENALOG   Quantity:  30 g        Apply topically 2 times daily Left axillary dermatitis   Refills:  1        zoledronic Acid 5 MG/100ML Soln infusion   Commonly known as:  RECLAST        Receive 5 mg injection once yearly (March) * Did not receive this year (2015)   Refills:  0                Procedures and tests performed during your visit     CBC with platelets differential    CT Head w/o Contrast    Comprehensive metabolic panel    EKG 12-lead, tracing only    Head/neck angiogram CT  w & w/o contrast    Troponin I    UA reflex to Microscopic and Culture      Orders Needing Specimen Collection     None      Pending Results     Date and Time Order Name Status Description    8/23/2017 1031 Head/neck angiogram CT  w & w/o contrast Preliminary     8/23/2017 1031 CT Head w/o Contrast Preliminary             Pending Culture Results     No orders found from 8/21/2017 to 8/24/2017.            Pending Results Instructions     If you had any lab results that were not finalized at the time of your Discharge, you can call the ED Lab Result RN at [phone]. You will be contacted by this team for any positive Lab results or changes in treatment. The nurses are available 7 days a  week from 10A to 6:30P.  You can leave a message 24 hours per day and they will return your call.        Test Results From Your Hospital Stay        8/23/2017 10:41 AM      Component Results     Component Value Ref Range & Units Status    WBC 6.4 4.0 - 11.0 10e9/L Final    RBC Count 4.61 3.8 - 5.2 10e12/L Final    Hemoglobin 14.5 11.7 - 15.7 g/dL Final    Hematocrit 41.7 35.0 - 47.0 % Final    MCV 91 78 - 100 fl Final    MCH 31.5 26.5 - 33.0 pg Final    MCHC 34.8 31.5 - 36.5 g/dL Final    RDW 13.2 10.0 - 15.0 % Final    Platelet Count 192 150 - 450 10e9/L Final    Diff Method Automated Method  Final    % Neutrophils 71.3 % Final    % Lymphocytes 19.2 % Final    % Monocytes 8.0 % Final    % Eosinophils 0.9 % Final    % Basophils 0.3 % Final    % Immature Granulocytes 0.3 % Final    Nucleated RBCs 0 0 /100 Final    Absolute Neutrophil 4.6 1.6 - 8.3 10e9/L Final    Absolute Lymphocytes 1.2 0.8 - 5.3 10e9/L Final    Absolute Monocytes 0.5 0.0 - 1.3 10e9/L Final    Absolute Eosinophils 0.1 0.0 - 0.7 10e9/L Final    Absolute Basophils 0.0 0.0 - 0.2 10e9/L Final    Abs Immature Granulocytes 0.0 0 - 0.4 10e9/L Final    Absolute Nucleated RBC 0.0  Final         8/23/2017 11:09 AM      Component Results     Component Value Ref Range & Units Status    Sodium 138 133 - 144 mmol/L Final    Potassium 3.7 3.4 - 5.3 mmol/L Final    Chloride 102 94 - 109 mmol/L Final    Carbon Dioxide 27 20 - 32 mmol/L Final    Anion Gap 9 3 - 14 mmol/L Final    Glucose 100 (H) 70 - 99 mg/dL Final    Urea Nitrogen 19 7 - 30 mg/dL Final    Creatinine 1.29 (H) 0.52 - 1.04 mg/dL Final    GFR Estimate 41 (L) >60 mL/min/1.7m2 Final    Non  GFR Calc    GFR Estimate If Black 50 (L) >60 mL/min/1.7m2 Final    African American GFR Calc    Calcium 9.6 8.5 - 10.1 mg/dL Final    Bilirubin Total 0.5 0.2 - 1.3 mg/dL Final    Albumin 4.2 3.4 - 5.0 g/dL Final    Protein Total 7.6 6.8 - 8.8 g/dL Final    Alkaline Phosphatase 95 40 - 150 U/L Final     ALT 22 0 - 50 U/L Final    AST 22 0 - 45 U/L Final         8/23/2017 11:09 AM      Component Results     Component Value Ref Range & Units Status    Troponin I ES <0.015 0.000 - 0.045 ug/L Final    The 99th percentile for upper reference range is 0.045 ug/L.  Troponin values   in the range of 0.045 - 0.120 ug/L may be associated with risks of adverse   clinical events.           8/23/2017 12:01 PM      Narrative     CT OF THE HEAD WITHOUT CONTRAST 8/23/2017 11:55 AM     COMPARISON: Head CT 10/26/2009.    HISTORY: Dizziness, history of cerebral aneurysm.    TECHNIQUE: Axial CT images of the head from the skull base to the  vertex were acquired without IV contrast.    FINDINGS: Postoperative changes consisting of a left pterional  craniotomy for placement of a left middle cerebral artery aneurysm  clip again noted. Encephalomalacia of the anterior aspect of the left  temporal lobe and ishmael-opercular aspect of the left frontal lobe again  noted without change. Gray-white differentiation of the brain is  otherwise normal and unchanged.    The ventricles and basal cisterns are within normal limits in  configuration. There is no midline shift. There are no extra-axial  fluid collections. No intracranial hemorrhage, mass or recent infarct.    The visualized paranasal sinuses are well-aerated. There is no  mastoiditis. There are no fractures of the visualized bones.        Impression     IMPRESSION:  1. Postoperative changes for clipping of a left middle cerebral artery  territory aneurysm again noted.  2. Chronic encephalomalacia of portions of the left frontal and left  temporal lobes again noted without change.  3. Otherwise, normal brain MRI. No evidence for acute intercranial  pathology. No change from the comparison study.      Radiation dose for this scan was reduced using automated exposure  control, adjustment of the mA and/or kV according to patient size, or  iterative reconstruction technique.         8/23/2017  12:04 PM      Narrative     CT ANGIOGRAM OF THE HEAD AND NECK WITHOUT AND WITH CONTRAST  8/23/2017  11:56 AM     COMPARISON: CT angiogram of the head and neck 10/26/2009.    HISTORY: Dizziness, history of cerebral aneurysm.    TECHNIQUE: Precontrast localizing scans were followed by CT  angiography with an injection of 70 mL [id number]-370 nonionic intravenous  contrast material with scans through the head and neck. Images were  transferred to a separate 3-D workstation where multiplanar  reformations and 3-D images were created. Estimates of carotid  stenoses are made relative to the distal internal carotid artery  diameters except as noted.      FINDINGS:   Neck CTA: The common carotid arteries bilaterally remain patent  without stenosis. Calcified atherosclerotic plaque at the origins of  the internal carotid arteries on both sides again noted. This plaque  does not result in any significant narrowing of the internal carotid  arteries on either side. The cervical internal carotid arteries  bilaterally remain patent without stenosis. The vertebral arteries  bilaterally remain patent without stenosis.    Head CTA: There is calcified atherosclerotic plaque of the  intracranial distal internal carotid arteries on both sides that does  not result in any significant narrowing on either side. Cerebral  aneurysm clip near the left MCA trifurcation again noted. Marked  narrowing of the mid and distal aspects of the M1 segment of the left  middle cerebral artery again noted without definite change. There is  no evidence for residual or recurrent left middle cerebral artery  aneurysm. Left MCA branches in the left sylvian fissure are opacified.  The basilar, bilateral anterior cerebral, right middle cerebral and  bilateral posterior cerebral arteries remain patent and unremarkable.        Impression     IMPRESSION:  1. Postoperative changes from clipping of a left MCA trifurcation  aneurysm again noted.  2. No evidence for  residual or recurrent aneurysm. No new aneurysms  identified.  3. Calcified atherosclerotic plaque of the proximal and distal  internal carotid arteries on both sides that does not result in  stenosis on either side again noted.  4. Otherwise, normal neck and head CTA. Overall, no change from the  comparison study.    Radiation dose for this scan was reduced using automated exposure  control, adjustment of the mA and/or kV according to patient size, or  iterative reconstruction technique.         8/23/2017 11:09 AM      Component Results     Component Value Ref Range & Units Status    Color Urine Yellow  Final    Appearance Urine Clear  Final    Glucose Urine Negative NEG^Negative mg/dL Final    Bilirubin Urine Negative NEG^Negative Final    Ketones Urine Negative NEG^Negative mg/dL Final    Specific Gravity Urine 1.010 1.003 - 1.035 Final    Blood Urine Negative NEG^Negative Final    pH Urine 6.5 5.0 - 7.0 pH Final    Protein Albumin Urine Negative NEG^Negative mg/dL Final    Urobilinogen mg/dL Normal 0.0 - 2.0 mg/dL Final    Nitrite Urine Negative NEG^Negative Final    Leukocyte Esterase Urine Small (A) NEG^Negative Final    Source Midstream Urine  Final    RBC Urine 1 0 - 2 /HPF Final    WBC Urine 4 (H) 0 - 2 /HPF Final    Squamous Epithelial /HPF Urine 1 0 - 1 /HPF Final    Hyaline Casts 2 0 - 2 /LPF Final                Clinical Quality Measure: Blood Pressure Screening     Your blood pressure was checked while you were in the emergency department today. The last reading we obtained was  BP: 118/58 . Please read the guidelines below about what these numbers mean and what you should do about them.  If your systolic blood pressure (the top number) is less than 120 and your diastolic blood pressure (the bottom number) is less than 80, then your blood pressure is normal. There is nothing more that you need to do about it.  If your systolic blood pressure (the top number) is 120-139 or your diastolic blood pressure  (the bottom number) is 80-89, your blood pressure may be higher than it should be. You should have your blood pressure rechecked within a year by a primary care provider.  If your systolic blood pressure (the top number) is 140 or greater or your diastolic blood pressure (the bottom number) is 90 or greater, you may have high blood pressure. High blood pressure is treatable, but if left untreated over time it can put you at risk for heart attack, stroke, or kidney failure. You should have your blood pressure rechecked by a primary care provider within the next 4 weeks.  If your provider in the emergency department today gave you specific instructions to follow-up with your doctor or provider even sooner than that, you should follow that instruction and not wait for up to 4 weeks for your follow-up visit.        Thank you for choosing [facility]       Thank you for choosing [facility] for your care. Our goal is always to provide you with excellent care. Hearing back from our patients is one way we can continue to improve our services. Please take a few minutes to complete the written survey that you may receive in the mail after you visit with us. Thank you!        [facility] Information     [facility] gives you secure access to your electronic health record. If you see a primary care provider, you can also send messages to your care team and make appointments. If you have questions, please call your primary care clinic.  If you do not have a primary care provider, please call [phone] and they will assist you.        Care EveryWhere ID     This is your Care EveryWhere ID. This could be used by other organizations to access your [facility] medical records  [id number]        Equal Access to Services     LINDSEY MCCARTY : Hadii hannah barono Somiya, waaxda lufarshadadaha, qaybta kaalmabernice alvarenga. So [facility] [place] [phone].    ATENCIÓN: Si habla español, tiene a smith disposición servicios  ian de asistencia lingüística. Logan kirk [phone].    We comply with applicable federal civil rights laws and [place] laws. We do not discriminate on the basis of race, color, national origin, age, disability sex, sexual orientation or gender identity.            After Visit Summary       This is your record. Keep this with you and show to your community pharmacist(s) and doctor(s) at your next visit.

## 2017-08-23 NOTE — DISCHARGE INSTRUCTIONS
Managing Dizziness (Vertigo) with Medicines    Although medicines can't cure your problem, they can help control symptoms. Your doctor may prescribe medicines for a few weeks and then taper them off. Always take your medicine as prescribed. Never share your medicine with others.  Contact your healthcare provider right away if you have side effects from your medicines.   How medicines can help    Treat infection or inflammation. If you have an infection caused by bacteria, your doctor can prescribe antibiotics.    Limit conflicting balance signals. These medicines are often in pill form.    Ease nausea. Suppositories, pills, or shots can reduce vomiting.    Reduce pressure in the canals. Diuretics can be used to treat Meniere's disease. These medicines help your body get rid of extra fluid.    Ease other symptoms. Other medicines can help ease depression and anxiety caused by living with dizziness or fainting.  Date Last Reviewed: 11/1/2016 2000-2017 [employer]. [address] [place] [address]. All rights reserved. This information is not intended as a substitute for professional medical care. Always follow your healthcare professional's instructions.          Possible Causes of Dizziness or Fainting    Dizziness and fainting can have many causes. Below are some examples of possible causes your healthcare provider will look to rule out.  Benign paroxysmal positional vertigo (BPPV)  BPPV results when calcium crystals inside the inner ear shift into the wrong position. BPPV causes episodes of vertigo (a spinning sensation). Episodes most often happen when the head is moved in a certain way. This is more common in people 65 and older.   Infection or inflammation  The semicircular canals of the ear may become infected or inflamed. In this case, they can send the wrong balance signals. This can cause vertigo.  Meniere disease  Meniere disease happens when there is too much fluid in the  semicircular canals. This can cause vertigo. It also can cause hearing problems and buzzing or ringing in the ears (called tinnitus). You may also have a feeling of pressure or fullness in the ear.  Syncope  Syncope is fainting that happens when the brain doesn t get enough oxygen-rich blood. It can be caused by low heart rate or low blood pressure. This is called vasovagal syncope. It can also be caused by sitting or standing up too quickly. This is called orthostatic hypotension. Syncope may also be due to a heart valve problem, an abnormal heart rhythm, or other heart problems. Dizziness can also happen from stroke, hemorrhage in the brain, or other problems in the brain. Your healthcare provider may do certain tests to rule out these conditions.  Other causes  Other causes include:    Medicines. Certain medicines can cause dizziness and even fainting. In some cases, stopping a medicine too quickly can lead to withdrawal symptoms, including dizziness and fainting.    Anxiety. Being anxious can lead to breathing changes, such as hyperventilation. These can lead to dizziness and fainting.  Additional causes for dizziness and fainting also exist. Talk to your healthcare provider for more information.     Date Last Reviewed: 10/6/2015    [id number] [employer]. [address] [place] [id number]. All rights reserved. This information is not intended as a substitute for professional medical care. Always follow your healthcare professional's instructions.          Vertigo (Unknown Cause)    In addition to helping with hearing, the inner ear is part of the balance center of your body. Problems with the inner ear can a false feeling of motion. This is called vertigo. Often, it feels as if you or the room is spinning. A vertigo attack may cause sudden nausea, vomiting and heavy sweating. Severe vertigo causes a loss of balance and can cause you to fall. During vertigo, small head movements and changes  in body position will often make the symptoms worse. You may also have ringing in the ears called tinnitus.  An episode of vertigo may last seconds, minutes or hours. Once you are over the first episode, it may never come back. However, symptoms may return off and on.  The cause of your vertigo is not yet known. Possible causes of vertigo include:    Inflammation of the inner ear    Disease of the nerves to the inner ear    Movement of calcium particles in the inner ear    Poor blood flow to the balance centers of the brain    Migraine headaches  Home care    If symptoms are severe, rest quietly in bed. Change positions very slowly. There is usually one position that will feel best, such as lying on one side or lying on your back with your head slightly raised on pillows.    Do not drive a car or work with dangerous machinery until symptoms have been gone for at least one week.    Take medicine as prescribed to relieve your symptoms. Unless another medicine was prescribed for symptoms of nausea, vomiting, and dizziness, you may use over-the-counter motion sickness pills. Ask your pharmacist for suggestions.  Follow-up care  Follow up with your healthcare provider or as directed. If you are referred to a specialist or for testing, make the appointment promptly.  When to seek medical advice  Call your healthcare provider if any of the following occur:    Fever of 100.4 F (38 C) or higher, or as directed by your healthcare provider    Vertigo worsens or is not controlled by prescribed medicine     Repeated vomiting not relieved by prescribed medicine     Severe headache    Confusion    Weakness of an arm or leg or one side of the face    Difficulty with speech or vision    Loss of consciousness     Seizure  Date Last Reviewed: 8/16/2015 2000-2017 [employer]. [address] [place] [address], [place] [address]. All rights reserved. This information is not intended as a substitute for professional medical care.  Always follow your healthcare professional's instructions.

## 2017-08-23 NOTE — ED PROVIDER NOTES
History     Chief Complaint:  Dizziness       HPI   Fatou Brian is a 67 year old female with a history of hypertension that is well controlled with Metoprolol presents to the emergency department today via EMS for evaluation of dizziness. The patient was sitting on her computer at home when she had a sudden onset of dizziness followed by nausea and and an episode of near syncope. Patient called EMS. After onset of symptoms, patient was able to stand and ambulate without veering to one side. Patient's initial blood pressure per EMS was 204/98. Patient's blood pressure improved to 155/68 after she was loaded onto the gurney. She had a heart rate of 58, blood sugar of 88, and EKG demonstrated sinus rhythm with right bundle branch block. Patient was given 8 mg Zofran en route. Here, the patient arrives feeling nauseous and anxious but she no longer feels dizzy. No chest pain or shortness of breath prior to onset of symptoms. Patient reports dizziness in the past secondary to hypertension but denies any history of vertigo. She reports she normally eats breakfast however she did not eat today because she was in a hurry to go to the grocery store. In addition to the above symptoms, the patient has constipation for the last several weeks. Of note, patient reports history of 2 brain aneurysms in the past. She had associated headache and neck pain during both episodes. No headache or neck pain today. Patient affirms symptoms today do not resemble the symptoms she had with her aneurysms. No other complaints voiced at this time.      Allergies:  Chantix [Varenicline]  Influenza Virus Vaccine H5n1  Morphine Sulfate  Omeprazole Magnesium  Vioxx  Niacin  Zetia [Ezetimibe]  Zyrtec-D    Medications:    Policosanol 30 MG TABS  traMADol (ULTRAM) 50 MG tablet  metoprolol (TOPROL-XL) 50 MG 24 hr tablet  pantoprazole (PROTONIX) 40 MG EC tablet  pilocarpine (SALAGEN) 5 MG tablet  losartan-hydrochlorothiazide (HYZAAR) 100-12.5 MG  "per tablet  hydroxychloroquine (PLAQUENIL) 200 MG tablet  famotidine (PEPCID) 20 MG tablet  fluticasone (FLONASE) 50 MCG/ACT spray  gabapentin (NEURONTIN) 100 MG capsule  lidocaine (XYLOCAINE) 5 % ointment  calcium carbonate (TUMS) 500 MG chewable tablet  diclofenac (VOLTAREN) 1 % GEL  triamcinolone (KENALOG) 0.1 % cream  senna-docusate (SENOKOT-S;PERICOLACE) 8.6-50 MG per tablet  methocarbamol (ROBAXIN) 750 MG tablet  zoledronic Acid (RECLAST) 5 MG/100ML SOLN     Past Medical History:    Arthritis   Atherosclerosis  Aneurysm X2    Emphysema of lung (H)   GERD (gastroesophageal reflux disease)   HLD (hyperlipidaemia)   Hypertension   Sjogren's syndrome (H)   Spinal stenosis   Subclavian artery stenosis (H)     Past Surgical History:    aneursym-Has metal in head  CHOLECYSTECTOMY   CLOSED RX PROX HUMERUS FRACTURE-10 pins placed  COLONOSCOPY  HC ECP WITH CATARACT SURGERY-both eyes  HYSTERECTOMY, JANET    Family History:    MOTHER: THYROID DISEASE, ARTHRITIS, OSTEOPOROSIS, CANCER  FATHER: HEART DISEASE, CANCER  BROTHER: HEART DISEASE     Social History:  The patient was accompanied to the ED by EMS.  Smoking Status: Former smoker  Smokeless Tobacco: Never used  Alcohol Use: Yes  Marital Status:  Single [1]     Review of Systems   Respiratory: Negative for shortness of breath.    Cardiovascular: Negative for chest pain.   Gastrointestinal: Positive for constipation and nausea.   Musculoskeletal: Negative for neck pain.   Neurological: Positive for dizziness (IMPROVED ). Negative for syncope and headaches.   Psychiatric/Behavioral: The patient is nervous/anxious.    All other systems reviewed and are negative.    Physical Exam   Vitals:  Patient Vitals for the past 24 hrs:   BP Temp Temp src Pulse Resp SpO2 Height Weight   08/23/17 1033 134/80 98.2  F (36.8  C) Temporal 63 18 99 % 1.664 m (5' 5.5\") 86.2 kg (190 lb)     Physical Exam  General: Patient is alert and normal appearing.  HEENT: Head atraumatic    Eyes: pupils " equal and reactive. Conjunctiva clear   Nares: patent   Oropharynx: no lesions, uvula midline, no palatal draping, normal voice, no trismus  Neck: Supple without lymphadenopathy, no meningismus  Chest: Heart regular rate and rhythm.   Lungs: Equal clear to auscultation with no wheeze or rales  Abdomen: Soft, non tender, nondistended, normal bowel sounds  Back: No costovertebral angle tenderness, no midline C, T or L spine tenderness  Neuro: Grossly nonfocal, normal speech, strength equal bilaterally, CN 2-12 intact, normal finger to nose, no pronator drift  Extremities: No deformities, equal radial and DP pulses. No clubbing, cyanosis.  No edema  Skin: Warm and dry with no rash.       Emergency Department Course     ECG:  ECG taken at 1031, ECG read at 1040  Sinus rhythm with occasional premature ventricular complexes  RSR or QR pattern in V1 suggests right ventricular conduction delay  Borderline ECG  Rate 60 bpm. UT interval 166. QRS duration 96. QT/QTc 450/450. P-R-T axes 71 67 75.      Imaging:  Radiology findings were communicated with the patient who voiced understanding of the findings.    CT Head w/o Contrast:  1. Postoperative changes for clipping of a left middle cerebral artery  territory aneurysm again noted.  2. Chronic encephalomalacia of portions of the left frontal and left  temporal lobes again noted without change.  3. Otherwise, normal brain MRI. No evidence for acute intercranial  pathology. No change from the comparison study.  Reading per radiology    Head/neck angiogram CT w/ & w/o Contrast:  1. Postoperative changes from clipping of a left MCA trifurcation  aneurysm again noted.  2. No evidence for residual or recurrent aneurysm. No new aneurysms  identified.  3. Calcified atherosclerotic plaque of the proximal and distal  internal carotid arteries on both sides that does not result in  stenosis on either side again noted.  4. Otherwise, normal neck and head CTA. Overall, no change from  the  comparison study.  Reading per radiology    Laboratory:  Laboratory findings were communicated with the patient who voiced understanding of the findings.    UA reflex to Microscopic: Leukocyte esterase urine: Small, WBC: 4(H)    CBC: AWNL. (WBC 6.4, HGB 14.5, )     CMP: Glucose: 100(H), Creatinine: 1.29(H), GFR: 41(L)    Troponin (Collected 1030): <0.015    Interventions:  1036- Antivert 25 mg Oral  1036- NS 1000 mL IV      Emergency Department Course:  Nursing notes and vitals reviewed.  I performed an exam of the patient as documented above.       IV was inserted and blood was drawn for laboratory testing, results above.    The patient provided a urine sample here in the emergency department. This was sent for laboratory testing, findings above.    The patient was sent for a CT while in the emergency department, results above.     I discussed the treatment plan with the patient. They expressed understanding of this plan and consented to discharge.    I personally reviewed the laboratory results with the Patient and answered all related questions prior to discharge.    Impression & Plan      Medical Decision Making:  Fatou Brian is a 67 year old female who presents for evaluation of dizziness. The differential diagnosis of dizziness is broad and includes common etiologies such as menieres disease, labyrinthitis, benign positional vertigo, otitis media, etc.  More serious etiologies considered include central etiologies such as tumor, intracerebral bleed, dissection, ischemic cerebral vascular accident.  The history, physical exam including detailed neurologic exam, and workup in the emergency room suggests a benign cause of vertigo today.  CT head and neck were obtained which were unremarkable. No new aneurysms identified.  Do not suspect SAH, meningitis, CVA based on these studies and improvement in symptoms.  No chest pain, screening EKG and troponin negative and do not feel consistent with  ACS.  Do not feel PE is cause of symptoms. .  Patient feels improved after interventions noted above. Further outpatient management is indicated with vertigo medications.   Vertigo precautions given for home.        Diagnosis:    ICD-10-CM    1. Vertigo R42    2. Nausea R11.0          Disposition:   Discharge     Scribe Disclosure:  Aurea IVAN, am serving as a scribe at 10:39 AM on 8/23/2017 to document services personally performed by Marivel Cope MD, based on my observations and the provider's statements to me.    8/23/2017    EMERGENCY DEPARTMENT       Mairvel Cope MD  08/23/17 [id number]

## 2017-08-23 NOTE — ED NOTES
Bed: ED25  Expected date:   Expected time:   Means of arrival:   Comments:  [facility] [place] - [phone] - [phone] F Dizziness eta 1010

## 2017-08-29 ENCOUNTER — OFFICE VISIT (OUTPATIENT)
Dept: FAMILY MEDICINE | Facility: CLINIC | Age: 67
End: 2017-08-29
Payer: COMMERCIAL

## 2017-08-29 VITALS
BODY MASS INDEX: 32.2 KG/M2 | DIASTOLIC BLOOD PRESSURE: 70 MMHG | SYSTOLIC BLOOD PRESSURE: 132 MMHG | HEART RATE: 81 BPM | RESPIRATION RATE: 16 BRPM | TEMPERATURE: 98.7 F | WEIGHT: 196.5 LBS | OXYGEN SATURATION: 97 %

## 2017-08-29 DIAGNOSIS — M72.2 PLANTAR FASCIITIS: ICD-10-CM

## 2017-08-29 DIAGNOSIS — F43.22 ADJUSTMENT DISORDER WITH ANXIOUS MOOD: ICD-10-CM

## 2017-08-29 DIAGNOSIS — R00.2 PALPITATIONS: ICD-10-CM

## 2017-08-29 DIAGNOSIS — H81.10 BPPV (BENIGN PAROXYSMAL POSITIONAL VERTIGO), UNSPECIFIED LATERALITY: Primary | ICD-10-CM

## 2017-08-29 DIAGNOSIS — I10 BENIGN ESSENTIAL HYPERTENSION: ICD-10-CM

## 2017-08-29 DIAGNOSIS — R73.9 HYPERGLYCEMIA: ICD-10-CM

## 2017-08-29 DIAGNOSIS — E78.5 HYPERLIPIDEMIA LDL GOAL <100: ICD-10-CM

## 2017-08-29 LAB — HBA1C MFR BLD: 5.1 % (ref 4.3–6)

## 2017-08-29 PROCEDURE — 93225 XTRNL ECG REC<48 HRS REC: CPT | Performed by: FAMILY MEDICINE

## 2017-08-29 PROCEDURE — 80048 BASIC METABOLIC PNL TOTAL CA: CPT | Performed by: FAMILY MEDICINE

## 2017-08-29 PROCEDURE — 80061 LIPID PANEL: CPT | Performed by: FAMILY MEDICINE

## 2017-08-29 PROCEDURE — 36415 COLL VENOUS BLD VENIPUNCTURE: CPT | Performed by: FAMILY MEDICINE

## 2017-08-29 PROCEDURE — 83036 HEMOGLOBIN GLYCOSYLATED A1C: CPT | Performed by: FAMILY MEDICINE

## 2017-08-29 PROCEDURE — 93226 XTRNL ECG REC<48 HR SCAN A/R: CPT | Performed by: FAMILY MEDICINE

## 2017-08-29 PROCEDURE — G8856 REF FOR OTO EVAL: HCPCS | Performed by: FAMILY MEDICINE

## 2017-08-29 PROCEDURE — 84443 ASSAY THYROID STIM HORMONE: CPT | Performed by: FAMILY MEDICINE

## 2017-08-29 PROCEDURE — 99214 OFFICE O/P EST MOD 30 MIN: CPT | Performed by: FAMILY MEDICINE

## 2017-08-29 ASSESSMENT — PATIENT HEALTH QUESTIONNAIRE - PHQ9
10. IF YOU CHECKED OFF ANY PROBLEMS, HOW DIFFICULT HAVE THESE PROBLEMS MADE IT FOR YOU TO DO YOUR WORK, TAKE CARE OF THINGS AT HOME, OR GET ALONG WITH OTHER PEOPLE: SOMEWHAT DIFFICULT

## 2017-08-29 NOTE — PROGRESS NOTES
"  SUBJECTIVE:   Fatou Brian is a 67 year old female who presents to clinic today for the following health issues:      ED/UC Followup:    Facility:  [facility]  Date of visit: 8/22/2017  Reason for visit: dizzyness, high BP  Current Status: still some dizziness, feels rapid heart beat at times     CAT SCAN OF BRAIN UNCHANGED ANEURYSMS    DIZZINESS AND PALPITATIONS PERSISTING    NO CHANGE OF MEDICATIONS     NEGATIVE HALLPIKE MANEUVER TODAY    TRANSIENT HYPERTENSION CAUSE UNCERTAIN    KNOWN ANXIETY DISORDER AS MOST LIKELY SOURCE    KNOWN WEIGHT GAIN     SJOGREN'S SYNDROME ONGOING SYMPTOMS    KNOWN SPINAL STENOSIS  AND OSTEOARTHRITIS KNEES    HYPERTENSION WITH GOAL OF LESS THAN 140/80 CONTROLLED FOR THE MOST PART CURRENT REGIMEN    TOPROL XL 50MG AND LOSARTAN HYDROCHLORTHIAZIDE     LDL OR \"BAD\" CHOLESTEROL  GOAL < 100 MEDITERRANEAN DIET AND INTOLERANT OF STATINS     GASTROESOPHAGEAL REFLUX DISEASE TREATMENT WITH PANTOP     DRY MOUTH SALAGEN TREATMENT     MORBID OBESITY              .Hyperlipidemia Follow-Up      Rate your low fat/cholesterol diet?: good    Taking statin?  No MYALGIAS    Other lipid medications/supplements?:  none    Hypertension Follow-up      Outpatient blood pressures are not being checked.    Low Salt Diet: no added salt    Depression and Anxiety Follow-Up    Status since last visit: Worsened     Other associated symptoms:None    Complicating factors:     Significant life event: No     Current substance abuse: None    PHQ-9 SCORE 9/13/2016 3/9/2017 8/29/2017   Total Score - - -   Total Score MyChart - - Incomplete   Total Score 11 6 -     No flowsheet data found.    PHQ-9  English  PHQ-9   Any Language  GAD7  Back Pain Follow Up      Description:   Location of pain:  center  Character of pain: sharp and gnawing  Pain radiation: radiates into the right buttocks and radiates into the left buttocks  Since last visit, pain is:  unchanged  New numbness or weakness in legs, not attributed to " pain:  no     Intensity: Currently 4/10, mild    History:   Pain interferes with job: Not applicable  Therapies tried without relief: acetaminophen (Tylenol)  Therapies tried with relief:  TRAMADOL             Accompanying Signs & Symptoms:  Risk of Fracture:  None  Risk of Cauda Equina:  None  Risk of Infection:  None  Risk of Cancer:  None               .Current Outpatient Prescriptions   Medication Sig Dispense Refill     order for DME One pair longitudinal arch supports  One pair   Use as directed 1 each 1     metoprolol (TOPROL-XL) 50 MG 24 hr tablet TAKE 1 BY MOUTH DAILY 90 tablet 2     pantoprazole (PROTONIX) 40 MG EC tablet TAKE 1 BY MOUTH DAILY 90 tablet 2     pilocarpine (SALAGEN) 5 MG tablet Take 1 tablet (5 mg) by mouth 4 times daily (before meals and nightly) 360 tablet 2     losartan-hydrochlorothiazide (HYZAAR) 100-12.5 MG per tablet TAKE 1 BY MOUTH DAILY 90 tablet 2     hydroxychloroquine (PLAQUENIL) 200 MG tablet TAKE 2 (400 MG) BY MOUTH AT BEDTIME 180 tablet 2     famotidine (PEPCID) 20 MG tablet TAKE 1 TABLET BY MOUTH TWICE DAILY AS NEEDED 180 tablet 2     lidocaine (XYLOCAINE) 5 % ointment One application 4 x daily to affected areas lower back and knees if necessary 213 g 3     cholecalciferol (VITAMIN D3) 5000 UNITS TABS tablet Take 1 tablet (5,000 Units) by mouth At Bedtime 100 tablet 3     calcium carbonate (TUMS) 500 MG chewable tablet Take 1 tablet (500 mg) by mouth At Bedtime 120 tablet 11     senna-docusate (SENOKOT-S;PERICOLACE) 8.6-50 MG per tablet Take 2 tablets by mouth 2 times daily 120 tablet PRN     ORDER FOR DME Equipment being ordered:  LEFT THUMB  METACARPAL AND WRIST SPLINT FOR TRIGGER THUMB  *ONE*  *SIG:* AS DIRECTED 1 Device 1     ORDER FOR DME Equipment being ordered: RUBBER THRESHOLD /CARGO WEDGE RAMP  ONE  USE AS DIRECTED FOR THRESHOLD  TO PREVENT FALLING 1 Device 1     fluticasone (FLONASE) 50 MCG/ACT spray Spray 2 sprays into both nostrils daily (Patient not taking:  Reported on 2017) 48 g 2     gabapentin (NEURONTIN) 100 MG capsule TAKE ONE CAPSULE BY MOUTH THREE TIMES DAILY (Patient not taking: Reported on 2017) 270 capsule 2     diclofenac (VOLTAREN) 1 % GEL Apply 4 grams to knees or 2 grams to hands four times daily using enclosed dosing card. (Patient not taking: Reported on 2017) 100 g 1     Acetaminophen 500 MG CHEW Take 1,000 mg by mouth 3 times daily (Patient not taking: Reported on 2017) 120 tablet 5          Allergies   Allergen Reactions     Chantix [Varenicline]      suicidal     Influenza Virus Vaccine H5n1      Muscle aches dizzy, nauseated  Light headed     Morphine Sulfate      Sensitivity when in hospital     Omeprazole Magnesium Nausea     Intolerance       Vioxx      Niacin Itching and Rash     Zetia [Ezetimibe] Other (See Comments)     Muscle pain     Zyrtec-D Rash       Immunization History   Administered Date(s) Administered     Influenza (High Dose) 3 valent vaccine 2013     Pneumococcal 23 valent 2013     Tdap (Adacel,Boostrix) 10/14/2010     Zoster vaccine, live 2009         reports that she drinks alcohol.      reports that she does not use illicit drugs.    family history includes Arthritis in her mother; CANCER in her father and mother; CEREBROVASCULAR DISEASE in her mother; HEART DISEASE in her brother and father; OSTEOPOROSIS in her mother; Thyroid Disease in her mother.    indicated that her mother is . She indicated that her father is . She indicated that her brother is .      has a past surgical history that includes colonoscopy; hysterectomy, alexander; aneursym[ (, ); Cholecystectomy; closed rx prox humerus fracture; hc ecp with cataract surgery; and Colonoscopy (2014).     reports that she does not engage in sexual activity.  .  Pediatric History   Patient Guardian Status     Not on file.     Other Topics Concern     Parent/Sibling W/ Cabg, Mi Or Angioplasty Before 65f 55m?  Yes     brother     Caffeine Concern No     Exercise No     Seat Belt Yes     Social History Narrative    --------------------------------------------------------------------------------    Surgical History  Return To Top     Status Surgery Time Frame Comment Record Date     Inactive  neck surgery  2000  benign throat growths removed  11/7/2007      Inactive  Cataract  1980's  bilateral  11/7/2007      Inactive  hysterectomy  1981  TAHBSO  11/7/2007      Inactive  Brain surgery  1983;1991  brain aneurysm  11/7/2007      Inactive  cholecsystectomy  1992    11/7/2007          --------------------------------------------------------------------------------    Food Allergy  Return To Top     Allergen Reaction Comment Record Date     * No known food allergies      11/7/2007          --------------------------------------------------------------------------------    Drug Allergy  Return To Top     Allergen Reaction Comment Record Date     MORPHINE SULFATE    sensitivity when in hospital  9/24/2012      Vioxx      10/26/2010      NIACIN PREPARATIONS  itching; rash    10/26/2010      Zyrtec  rash    10/26/2010      OMEPRAZOLE/LANSOPRAZOLE  intolerance; nausea  PRILOSEC OTC ONLY OMEPREZOLE OK AND PREVACID OK; PRILOSEC OTC ONLY OMEPREZOLE OK AND PREVACID OK  4/10/2008          --------------------------------------------------------------------------------    Environment Allergy  Return To Top     Allergen Reaction Comment Record Date     * No known environmental allergies      11/7/2007          --------------------------------------------------------------------------------    Immunization History Return To Top     Funding Source Vaccine Type of Vaccine Date Given Route Site Given Lot#  Exp. Date Date on VIS Date Given VIS Vaccinator     Private  Herpes Zoster (Zostavax) age 60 +  Herpes Zoster  1/29/2009  SQ  RA  [id number]  MRK  04/28/2010 9/11/06 1/29/2009  DALIA Fuentes CMA      Private  Influenza (Adult) Seasonal   Flu Adult  10/14/2010  IM  Left Deltoid; Left Deltoid  [id number]  SP  6/30/2011  8/10/2010  10/14/2010  MAJO Corley CMA      Private  Tdap (Adacel) Adult  Tdap  10/14/2010  IM  Left Deltoid; Left Deltoid  [id number]  SP; SP  06/03/2012  11/18/2008  10/14/2010  MAJO Corley [facility]          --------------------------------------------------------------------------------    Social History  Return To Top     Question Answer Comment Record Date     Marital status      9/24/2012      Emotional Abuse  No    9/16/2011      Exercise      4/9/2008      Caffeine  Yes  1 1/2 cups q.d.   4/9/2008      Physical Abuse  No    9/16/2011      Sealtbelts  Yes    4/9/2008      Sexual Abuse  No    9/16/2011      Breast/Testicle Self Check  Yes  Occasional  4/9/2008      Number of children  0    4/9/2008      Tobacco history  Quit this year  10/26/09  11/17/2009      Number of years using tobacco  35    11/11/2008      Number of cigarettes/day      11/11/2008      Alcohol history  Currently drinks alcohol    9/16/2011      Frequency of drinks  1-4 drinks per week    11/7/2007      Assist to Quit Information  Form Given to Patient    11/11/2008          --------------------------------------------------------------------------------    History - Overall Remark: 9/24/2012 Return To Top     MEDICAL HX: Collapsed lung morphine sensitivitiy last hospitalization    --------------------------------------------------------------------------------    Medical History Return To Top     Status Diagnosis Time Frame Comment Record Date     Active (389.18) - C - Sensorineural hearing loss, bilateral      9/24/2012      Active (V72.62) - I - Laboratory exam as part of general physical exam      9/24/2012      Active (V77.0) - C - Screening, thyroid disorders      9/24/2012      Active (462) - C - Sore throat      9/24/2012      Active (V82.9) - C - Screening, vitamin d deficiency      9/24/2012      Active (466.0) - C - Bronchitis, acute      8/17/2012       Active (372.72) - C - Subconjunctival hemorrhage    RIGHT EYE  11/28/2011      Active (715.16) - C - Osteoarthritis, knee    MANY YEARS BILATERAL  11/28/2011      Active (728.85) - C - Muscle spasm      11/28/2011      Active (V76.12) - C - Screening, mammogram      9/21/2011      Active (724.02) - C - Spinal stenosis, lumbar region    LUMBAR 4-5 AREA  9/21/2011      Active (782.3) - C - Edema, localized, NOS    ANKLES FEET AND CALVES  9/16/2011      Active (455.2) - C - Hemorrhoids, Internal w/Complication    INTERMITTENT BLEEDING  9/16/2011      Active (V76.51) - C - Screening, colon      9/16/2011      Active (719.42) - C - Elbow pain    RIGHT OLECRANON WITH POSSIBLE MOUSE IN BURSA  9/16/2011      Active (724.02) - C - Spinal stenosis, lumbar region      9/16/2011      Active (722.52) - C - Degenerative disc disease, lumbar      9/16/2011      Active (564.00) - C - Constipation      8/19/2011      Active (807.00) - C - Rib fracture      8/19/2011      Active (433.10) - C - CAROTID ART OCC W/O INFARC      4/28/2011      Active (786.52) - C - Chest wall pain    LEFT CHEST WALL  3/25/2011      Active (780.4) - C - Dizziness/vertigo, NOS      1/3/2011      Active (710.2) - C - Sjogren's disease    confirmed will send to rheumatology  10/14/2010      Active (389.10) - C - Hearing loss, sensorineural    bilateral  10/14/2010      Active 528.00 Stomatits, mucositis, unpec., NOS      12/30/2009      Active 523.01 ACUTE GINGIVITIS NONPLAQUE INDUCED      12/30/2009      Active 241.1 NONTOXIC MULTINODUL GOITER      4/29/2009      Active 241.0 Thyroid nodule      4/28/2009      Active 780.79 Fatigue      11/11/2008      Active (401.1) - C - Hypertension, benign      4/10/2008      Active (729.5) - C - limb pain    HUMERUS FRACTURE WITH RESIDUAL PAIN JULY 2008 PLATE AND 10 SCREWS  4/10/2008      Active (719.46) - C - Knee pain    OSTEOARTHRITIS OF KNEES  4/10/2008      Active 728.6 Dupuytren's contracture    right hand   4/10/2008      Active (723.1) - C - Neck pain    INTERMITTENT NECK PAIN  4/10/2008      Active 796.2 Elevated blood pressure- no hypertension dx      4/10/2008      Active (272.4) - C - Hyperlipidemia      4/10/2008      Active (530.81) - C - GERD      4/10/2008      Active (780.52) - C - Insomnia    associated with adjustment  4/10/2008      Active (V70.0) - C - Routine Medical Exam      2007      Active 461.0 Sinusitis, acute, maxillary      2007      Active 733.00 Osteoporosis, unspec.      2007      Active (305.1) - C - Tobacco abuse      2007      Active 812.20 Humerus, closed, unspec.      2007      Active (V72.83) - C - Preop    humerus fx rt  2007      Inactive  (789.00) - I - Abdominal pain      2010      Inactive  (v06.1) - C - Tdap vaccine      10/14/2010      Inactive  (V72.62) - C - Laboratory exam as part of general physical exam      10/14/2010      Inactive  (V04.81) - C - Influenza vaccination      10/14/2010      Inactive  (V76.51) - C - Screening, colon      10/14/2010      Inactive  (305.1) - C - Tobacco dependence    resolved  2009      Inactive  490 Bronchitis      2009      Inactive  466.0 Bronchitis, acute      2009      Inactive  V05.8 Immunization, other, specified      2009      Inactive  V72.31 Screening, pap      2008      Inactive  Abnormal pap      2008          --------------------------------------------------------------------------------    Medication History Return To Top                 --------------------------------------------------------------------------------    Family History  Return To Top     Status Relationship Disease Comment Record Date     Alive Brother      2007      Alive Sister      2007      Alive Brother      2007      Alive Sister      2007      Alive Sister      2007         Brother  heart    2007         Brother  kidney cancer    2007          Mother  stomach cancer  age 79  2007         Brother  heart    2007         Father  heart;AAA  age 80  2007          --------------------------------------------------------------------------------    Infection History Return To Top     Question Answer Comment Record Date     History of HPV  No    2008      Live with someone with TB or exposed to TB  No    2008      History of Hepatitis B  No    2008      History of chlamydia  No    2008      History of gonorrhea  No    2008      History of syphilis  No    2008          --------------------------------------------------------------------------------                 reports that she has quit smoking. Her smoking use included Cigarettes. She has never used smokeless tobacco.    Medical, social, surgical, and family histories reviewed.    Labs reviewed in EPIC  Patient Active Problem List   Diagnosis     Sjogren's syndrome (H)     Osteoarthritis of knee     GERD (gastroesophageal reflux disease)     Constipation     Osteoporosis     Irritable bowel syndrome with diarrhea     HL (hearing loss)     Rosacea     Poor memory     Low back pain     Senile keratosis     Spinal stenosis     H/O hysterectomy for benign disease     Bilateral hearing loss     Presbyopia     Knee pain     Hypertension goal BP (blood pressure) < 140/90     Hyperlipidemia with target LDL less than 130     Left arm pain     Arm pain     Major depression in partial remission (H)     Lumbago     Subclavian artery stenosis (H)     ACP (advance care planning)     Gastroesophageal reflux disease without esophagitis     Primary osteoarthritis of both knees     Bilateral low back pain with sciatica, sciatica laterality unspecified, unspecified chronicity     Past Surgical History:   Procedure Laterality Date     aneursym[  1991    Has metal in head     CHOLECYSTECTOMY       CLOSED RX PROX HUMERUS FRACTURE      10 pins placed      COLONOSCOPY       COLONOSCOPY  1/24/2014    Procedure: COMBINED COLONOSCOPY, SINGLE BIOPSY/POLYPECTOMY BY BIOPSY;  COLONOSCOPY;  Surgeon: Ranjit Pa MD;  Location:  GI     HC ECP WITH CATARACT SURGERY      both eyes     HYSTERECTOMY, JANET         Social History   Substance Use Topics     Smoking status: Former Smoker     Types: Cigarettes     Smokeless tobacco: Never Used      Comment: states quit 4 years ago     Alcohol use Yes      Comment: occas.     Family History   Problem Relation Age of Onset     Thyroid Disease Mother      Arthritis Mother      Rheumatoid     OSTEOPOROSIS Mother      CANCER Mother      stomach     CEREBROVASCULAR DISEASE Mother      HEART DISEASE Father      CANCER Father      HEART DISEASE Brother      fibulation         Allergies   Allergen Reactions     Chantix [Varenicline]      suicidal     Influenza Virus Vaccine H5n1      Muscle aches dizzy, nauseated  Light headed     Morphine Sulfate      Sensitivity when in hospital     Omeprazole Magnesium Nausea     Intolerance       Vioxx      Niacin Itching and Rash     Zetia [Ezetimibe] Other (See Comments)     Muscle pain     Zyrtec-D Rash     Recent Labs   Lab Test  08/29/17   1148  08/23/17   1030  09/13/16   1158  01/08/16   1630  09/17/15   0925  09/14/15   0809  06/19/15   1232   A1C  5.1   --   5.4   --    --    --    --    LDL   --    --   132*   --    --   98  90   HDL   --    --   64   --    --   66  53   TRIG   --    --   74   --    --   87  92   ALT   --   22  24  23   --   24   --    CR   --   1.29*  1.25*  1.04   --   1.00   --    GFRESTIMATED   --   41*  43*  53*   --   55*   --    GFRESTBLACK   --   50*  52*  64   --   67   --    POTASSIUM   --   3.7  4.0  3.8   --   3.9   --    TSH   --    --   0.96   --   0.69   --    --         BP Readings from Last 6 Encounters:   08/29/17 132/70   08/23/17 118/58   03/09/17 132/70   09/22/16 118/64   09/13/16 144/76   05/27/16 130/56       Wt Readings from Last 3  Encounters:   08/29/17 196 lb 8 oz (89.1 kg)   08/23/17 190 lb (86.2 kg)   03/09/17 198 lb (89.8 kg)         Positive symptoms or findings indicated by bold designation:     ROS: 10 point ROS neg other than the symptoms noted above in the HPI.except  has Sjogren's syndrome (H); Osteoarthritis of knee; GERD (gastroesophageal reflux disease); Constipation; Osteoporosis; Irritable bowel syndrome with diarrhea; HL (hearing loss); Rosacea; Poor memory; Low back pain; Senile keratosis; Spinal stenosis; H/O hysterectomy for benign disease; Bilateral hearing loss; Presbyopia; Knee pain; Hypertension goal BP (blood pressure) < 140/90; Hyperlipidemia with target LDL less than 130; Left arm pain; Arm pain; Major depression in partial remission (H); Lumbago; Subclavian artery stenosis (H); ACP (advance care planning); Gastroesophageal reflux disease without esophagitis; Primary osteoarthritis of both knees; and Bilateral low back pain with sciatica, sciatica laterality unspecified, unspecified chronicity on her problem list.   Constitutional: The patient denied fatigue, fever, insomnia, night sweats, recent illness and weight loss.  OBESITY ONGOING BUT OVERALL IMPROVEMENT     Eyes: The patient denied blindness, eye pain, eye tearing, photophobia, vision change and visual disturbance. DRY EYES AND GLASSES       Ears/Nose/Throat/Neck: The patient denied dizziness, facial pain, hearing loss, nasal discharge, oral pain, otalgia, postnasal drip, sinus congestion, sore throat, tinnitus and voice change.   DRY MOUTH     Cardiovascular: The patient denied arrhythmia, chest pain/pressure, claudication, edema, exercise intolerance, fatigue, orthopnea, palpitations and syncope.  PALPITATIONS AND TRANSIENT HYPERTENSION AND RAPID HEART RATE     Respiratory: The patient denied asthma, chest congestion, cough, dyspnea on exertion, dyspnea/shortness of breath, hemoptysis, pedal edema, pleuritic pain, productive sputum, snoring and wheezing.       Gastrointestinal: The patient denied abdominal pain, anorexia, constipation, diarrhea, dysphagia, gastroesophageal reflux, hematochezia, hemorrhoids, melena, nausea and vomiting  GASTROESOPHAGEAL REFLUX DISEASE WITHOUT ESOPHAGITIS     Genitourinary/Nephrology: The patient denied breast complaint, dysuria, nocturia sexual dysfunction, t, urinary frequency, urinary incontinence, urinary urgency        Musculoskeletal: The patient denied arthralgia(s), back pain, joint complaint, muscle weakness, myalgias, osteoporosis, sciatica, stiffness and swelling.  SEE HISTORY OF PRESENT ILLNESS   LOWER BACK PAIN AND OSTEOARTHRITIS KNEES   SJOGREN'S  SYNDROME     Dermatoligic:: The patient denied acne, dermatitis, ecchymosis, itching, mole change, rash, skin cancer, skin lesion and sores.      Neurologic: The patient denied dizziness, gait abnormality, headache, memory loss, mental status change, paresis, paresthesia, seizure, syncope, tremor and vision change.     Psychiatric: The patient denied anxiety, depression, disturbances of memory, drug abuse, insomnia, mood swings and relationship difficulties.  ANXIETY     Endocrine: The patient denied , goiter, obesity, polyuria and thyroid disease.  PRE DIABETES     Hematologic/Lymphatic: The patient denied abnormal bleeding and bruising, abnormal ecchymoses, anemia, lymph node enlargement/mass, petechiae and venous  Thrombosis.      Allergy/Immunology: The patient denied food allergy and  Allergic rhinitis or conjunctivitis.        PE:  /70  Pulse 81  Temp 98.7  F (37.1  C) (Tympanic)  Resp 16  Wt 196 lb 8 oz (89.1 kg)  SpO2 97%  BMI 32.2 kg/m2 Body mass index is 32.2 kg/(m^2).    Constitutional: general appearance, well nourished, well developed, in no acute distress, well developed, appears stated age, normal body habitus,      Eyes:; The patient has normal eyelids sclerae and conjunctivae :      Ears/Nose/Throat: external ear, overall: normal appearance; external  nose, overall: benign appearance, normal moujth gums and lips  The patient has:      Neck: thyroid, overall: normal size, normal consistency, nontender,      Respiratory:  palpation of chest, overall: normal excursion,    Clear to percussion and auscultation      Tachypnea NORMAL   Color  WITHIN NORMAL LIMITS     Cardiovascular:  Good color with no peripheral edema  NORMAL   Regular sinus rhythm without murmur. Physiologic heart sounds Heart is unelarged  .   Chest/Breast: normal shape       Abdominal exam,  Liver and spleen are  unenlarged        Tenderness    Scars  NOT APPLICABLE     Urogenital; no renal, flank or bladder  tenderness;  NORMAL     Lymphatic: neck nodes,  NORMAL    Other nodes NORMAL     Musculoskeletal:  Brief ortho exam normal except:   DECREASE RANGE OF MOTION OF BACK     Integument: inspection of skin, no rash, lesions; and, palpation, no induration, no tenderness.      Neurologic mental status, overall: alert and oriented; gait, no ataxia, no unsteadiness; coordination, no tremors; cranial nerves, overall: normal motor, overall: normal bulk, tone.  QUESTIONABLE HALLPIKE MANEUVER NEGATIVE   EPPLEY MANEUVER DISCUSSION     Psychiatric: orientation/consciousness, overall: oriented to person, place and time; behavior/psychomotor activity, no tics, normal psychomotor activity; mood and affect, overall: normal mood and affect; appearance, overall: well-groomed, good eye contact; speech, overall: normal quality, no aphasia and normal quality, quantity, intact.  LONG LIST OF COMPLAINTS   ANXIOUS    Diagnostic Test Results:  Results for orders placed or performed in visit on 08/29/17   Hemoglobin A1c   Result Value Ref Range    Hemoglobin A1C 5.1 4.3 - 6.0 %         ICD-10-CM    1. BPPV (benign paroxysmal positional vertigo), unspecified laterality H81.10    2. Plantar fasciitis M72.2 order for DME   3. Palpitations R00.2 Holter Monitor 24 hour - Adult     Lipid panel reflex to direct LDL     TSH   4.  Hyperlipidemia LDL goal <100 E78.5 Lipid panel reflex to direct LDL   5. Adjustment disorder with anxious mood F43.22    6. Benign essential hypertension I10 Lipid panel reflex to direct LDL     Basic metabolic panel   7. Hyperglycemia R73.9 Hemoglobin A1c        .    Side effects benefits and risks thoroughly discussed. .she may come in early if unimproved or getting worse          Importance of adhering to regimen discussed and if medications were dispensed, the importance of taking medications discussed and bringing in the medications after every visit for chronic problems         Please drink 2 glasses of water prior to meals and walk 15-30 minutes after meals    I spent 25 MINUTES SPENT  with patient discussing the following issues   The primary encounter diagnosis was BPPV (benign paroxysmal positional vertigo), unspecified laterality. Diagnoses of Plantar fasciitis, Palpitations, Hyperlipidemia LDL goal <100, Adjustment disorder with anxious mood, Benign essential hypertension, and Hyperglycemia were also pertinent to this visit. over half of which involved counseling and coordination of care.    Patient Instructions   [facility]  Address: [address] [place] [address]  [phone]  Hours:  Open today   8:00 AM - 5:00 PM    HEAT OR ICE WHICHEVER WORKS X 5 MINUTES    FOOT STRETCHES AGAINST WALL    HEEL RAISES X 30 SECONDS     LONGITUDINAL SUPPORTS IN SHOES    THICKER THE BETTER    TENNIS BALL CAN OR SOUP CAN RUB BACK AND FORTH     PHYSICAL THERAPY     NIGHT SPLINTS    NSAIDS IBUPROFEN OR ALEVE 2 TABS TWICE DAILY     STEROIDS ORALLY    INJECTION     SURGERY LAST RESORT     (H81.10) BPPV (benign paroxysmal positional vertigo), unspecified laterality  (primary encounter diagnosis)  Comment:    Plan:      (M72.2) Plantar fasciitis  Comment:    Plan: order for DME             (R00.2) Palpitations  Comment:    Plan: Holter Monitor 24 hour - Adult, Lipid panel         reflex to direct LDL,  TSH             (E78.5) Hyperlipidemia LDL goal <100  Comment:    Plan: Lipid panel reflex to direct LDL             (F43.22) Adjustment disorder with anxious mood  Comment:    Plan:      (I10) Benign essential hypertension  Comment:    Plan: Lipid panel reflex to direct LDL, Basic         metabolic panel             (R73.9) Hyperglycemia  Comment:    Plan: Hemoglobin A1c                            ALL THE ABOVE PROBLEMS ARE STABLE AND MED CHANGES AS NOTED    Diet:  MEDITERRANEAN DIET AND DIABETES 1200 CALORY AND WEIGHT LOSS RECOMMENDED U    Exercise:  FIT BIT TWITCH RECOMMENDED  AND WALKING   Exercises Range of motion, balance, isometric, and strengthening exercises 30 repetitions twice daily of involved joints      .EVY ACOSTA MD 8/29/2017 6:15 AM  August 30, 2017                      Answers for HPI/ROS submitted by the patient on 8/29/2017   If you checked off any problems, how difficult have these problems made it for you to do your work, take care of things at home, or get along with other people?: Somewhat difficult  PHQ9 TOTAL SCORE: Incomplete

## 2017-08-29 NOTE — PATIENT INSTRUCTIONS
[facility]  Address: [address] [place] [address]  [phone]  Hours:  Open today   8:00 AM - 5:00 PM    HEAT OR ICE WHICHEVER WORKS X 5 MINUTES    FOOT STRETCHES AGAINST WALL    HEEL RAISES X 30 SECONDS     LONGITUDINAL SUPPORTS IN SHOES    THICKER THE BETTER    TENNIS BALL CAN OR SOUP CAN RUB BACK AND FORTH     PHYSICAL THERAPY     NIGHT SPLINTS    NSAIDS IBUPROFEN OR ALEVE 2 TABS TWICE DAILY     STEROIDS ORALLY    INJECTION     SURGERY LAST RESORT     (H81.10) BPPV (benign paroxysmal positional vertigo), unspecified laterality  (primary encounter diagnosis)  Comment:    Plan:      (M72.2) Plantar fasciitis  Comment:    Plan: order for DME             (R00.2) Palpitations  Comment:    Plan: Holter Monitor 24 hour - Adult, Lipid panel         reflex to direct LDL, TSH             (E78.5) Hyperlipidemia LDL goal <100  Comment:    Plan: Lipid panel reflex to direct LDL             (F43.22) Adjustment disorder with anxious mood  Comment:    Plan:      (I10) Benign essential hypertension  Comment:    Plan: Lipid panel reflex to direct LDL, Basic         metabolic panel             (R73.9) Hyperglycemia  Comment:    Plan: Hemoglobin A1c

## 2017-08-29 NOTE — MR AVS SNAPSHOT
After Visit Summary   8/29/2017    Fatou Brian    MRN: [id number]           Patient Information     Date Of Birth          1950        Visit Information        Provider Department      8/29/2017 11:00 AM Reid Thomas MD [facility]        Today's Diagnoses     BPPV (benign paroxysmal positional vertigo), unspecified laterality    -  1    Plantar fasciitis        Palpitations        Hyperlipidemia LDL goal <100        Adjustment disorder with anxious mood        Benign essential hypertension        Hyperglycemia          Care Instructions    [facility]  Address: [address] [place] [address]  [phone]  Hours:  Open today   8:00 AM - 5:00 PM    HEAT OR ICE WHICHEVER WORKS X 5 MINUTES    FOOT STRETCHES AGAINST WALL    HEEL RAISES X 30 SECONDS     LONGITUDINAL SUPPORTS IN SHOES    THICKER THE BETTER    TENNIS BALL CAN OR SOUP CAN RUB BACK AND FORTH     PHYSICAL THERAPY     NIGHT SPLINTS    NSAIDS IBUPROFEN OR ALEVE 2 TABS TWICE DAILY     STEROIDS ORALLY    INJECTION     SURGERY LAST RESORT     (H81.10) BPPV (benign paroxysmal positional vertigo), unspecified laterality  (primary encounter diagnosis)  Comment:    Plan:      (M72.2) Plantar fasciitis  Comment:    Plan: order for DME             (R00.2) Palpitations  Comment:    Plan: Holter Monitor 24 hour - Adult, Lipid panel         reflex to direct LDL, TSH             (E78.5) Hyperlipidemia LDL goal <100  Comment:    Plan: Lipid panel reflex to direct LDL             (F43.22) Adjustment disorder with anxious mood  Comment:    Plan:      (I10) Benign essential hypertension  Comment:    Plan: Lipid panel reflex to direct LDL, Basic         metabolic panel             (R73.9) Hyperglycemia  Comment:    Plan: Hemoglobin A1c                              Follow-ups after your visit        Future tests that were ordered for you today     Open Future Orders        Priority  Expected Expires Ordered    Holter Monitor 24 hour - Adult Routine  10/13/2017 8/29/2017            Who to contact     If you have questions or need follow up information about today's clinic visit or your schedule please contact [facility] directly at [phone].  Normal or non-critical lab and imaging results will be communicated to you by MyChart, letter or phone within 4 business days after the clinic has received the results. If you do not hear from us within 7 days, please contact the clinic through [employer] or phone. If you have a critical or abnormal lab result, we will notify you by phone as soon as possible.  Submit refill requests through [employer] or call your pharmacy and they will forward the refill request to us. Please allow 3 business days for your refill to be completed.          Additional Information About Your Visit        MyChart Information     [employer] gives you secure access to your electronic health record. If you see a primary care provider, you can also send messages to your care team and make appointments. If you have questions, please call your primary care clinic.  If you do not have a primary care provider, please call [phone] and they will assist you.        Care EveryWhere ID     This is your Care EveryWhere ID. This could be used by other organizations to access your [facility] medical records  [id number]        Your Vitals Were     Pulse Temperature Respirations Pulse Oximetry BMI (Body Mass Index)       81 98.7  F (37.1  C) (Tympanic) 16 97% 32.2 kg/m2        Blood Pressure from Last 3 Encounters:   08/29/17 132/70   08/23/17 118/58   03/09/17 132/70    Weight from Last 3 Encounters:   08/29/17 196 lb 8 oz (89.1 kg)   08/23/17 190 lb (86.2 kg)   03/09/17 198 lb (89.8 kg)              We Performed the Following     Basic metabolic panel     DEPRESSION ACTION PLAN (DAP)     : C CHRONIC DIZZINESS - REFERRAL     Hemoglobin A1c     Lipid  panel reflex to direct LDL     TSH          Today's Medication Changes          These changes are accurate as of: 8/29/17 11:45 AM.  If you have any questions, ask your nurse or doctor.               These medicines have changed or have updated prescriptions.        Dose/Directions    * order for DME   This may have changed:  Another medication with the same name was added. Make sure you understand how and when to take each.   Used for:  Trigger thumb, acquired   Changed by:  eRid Thomas MD        Equipment being ordered:  LEFT THUMB  METACARPAL AND WRIST SPLINT FOR TRIGGER THUMB *ONE* *SIG:* AS DIRECTED   Quantity:  1 Device   Refills:  1       * order for DME   This may have changed:  You were already taking a medication with the same name, and this prescription was added. Make sure you understand how and when to take each.   Used for:  Plantar fasciitis   Changed by:  Reid Thomas MD        One pair longitudinal arch supports One pair  Use as directed   Quantity:  1 each   Refills:  1       * Notice:  This list has 2 medication(s) that are the same as other medications prescribed for you. Read the directions carefully, and ask your doctor or other care provider to review them with you.         Where to get your medicines      Some of these will need a paper prescription and others can be bought over the counter.  Ask your nurse if you have questions.     Bring a paper prescription for each of these medications     order for DME                Primary Care Provider Office Phone # Fax #    Reid Thomas [phone]       [address] [facility] [address]        Equal Access to Services     [facility] AH: Nabil quiles Somiya, waaxda luqadaha, qaybta kaalmada berince vitale. So [facility] [phone].    ATENCIÓN: Si habla español, tiene a smith disposición servicios gratuitos de asistencia lingüística. Llame al  [phone].    We comply with applicable federal civil rights laws and [place] laws. We do not discriminate on the basis of race, color, national origin, age, disability sex, sexual orientation or gender identity.            Thank you!     Thank you for choosing [facility]  for your care. Our goal is always to provide you with excellent care. Hearing back from our patients is one way we can continue to improve our services. Please take a few minutes to complete the written survey that you may receive in the mail after your visit with us. Thank you!             Your Updated Medication List - Protect others around you: Learn how to safely use, store and throw away your medicines at www.disposemymeds.org.          This list is accurate as of: 8/29/17 11:45 AM.  Always use your most recent med list.                   Brand Name Dispense Instructions for use Diagnosis    Acetaminophen 500 MG Chew     120 tablet    Take 1,000 mg by mouth 3 times daily    LBP (low back pain), Osteoarthritis of knee, Arm pain, anterior, left, Spinal stenosis       calcium carbonate 500 MG chewable tablet    TUMS    120 tablet    Take 1 tablet (500 mg) by mouth At Bedtime    Vitamin D insufficiency       cholecalciferol 5000 UNITS Tabs tablet    vitamin D3    100 tablet    Take 1 tablet (5,000 Units) by mouth At Bedtime    Vitamin D insufficiency       diclofenac 1 % Gel topical gel    VOLTAREN    100 g    Apply 4 grams to knees or 2 grams to hands four times daily using enclosed dosing card.    Midline low back pain without sciatica       famotidine 20 MG tablet    PEPCID    180 tablet    TAKE 1 TABLET BY MOUTH TWICE DAILY AS NEEDED    Gastroesophageal reflux disease without esophagitis       fluticasone 50 MCG/ACT spray    FLONASE    48 g    Spray 2 sprays into both nostrils daily    PND (post-nasal drip)       gabapentin 100 MG capsule    NEURONTIN    270 capsule    TAKE ONE CAPSULE BY MOUTH THREE  TIMES DAILY    Bilateral low back pain with sciatica, sciatica laterality unspecified, unspecified chronicity       hydroxychloroquine 200 MG tablet    PLAQUENIL    180 tablet    TAKE 2 (400 MG) BY MOUTH AT BEDTIME    Sjogren's syndrome (H)       lidocaine 5 % ointment    XYLOCAINE    213 g    One application 4 x daily to affected areas lower back and knees if necessary    Primary osteoarthritis of both knees       losartan-hydrochlorothiazide 100-12.5 MG per tablet    HYZAAR    90 tablet    TAKE 1 BY MOUTH DAILY    Essential hypertension with goal blood pressure less than 140/90       metoprolol 50 MG 24 hr tablet    TOPROL-XL    90 tablet    TAKE 1 BY MOUTH DAILY    Essential hypertension with goal blood pressure less than 140/90       order for DME     1 Device    Equipment being ordered: RUBBER THRESHOLD /CARGO WEDGE RAMP  ONE  USE AS DIRECTED FOR THRESHOLD  TO PREVENT FALLING    Thoracic or lumbosacral neuritis or radiculitis, unspecified, OA (osteoarthritis) of knee       * order for DME     1 Device    Equipment being ordered:  LEFT THUMB  METACARPAL AND WRIST SPLINT FOR TRIGGER THUMB *ONE* *SIG:* AS DIRECTED    Trigger thumb, acquired       * order for DME     1 each    One pair longitudinal arch supports One pair  Use as directed    Plantar fasciitis       pantoprazole 40 MG EC tablet    PROTONIX    90 tablet    TAKE 1 BY MOUTH DAILY    Gastroesophageal reflux disease without esophagitis       pilocarpine 5 MG tablet    SALAGEN    360 tablet    Take 1 tablet (5 mg) by mouth 4 times daily (before meals and nightly)    Sjogren's syndrome (H)       senna-docusate 8.6-50 MG per tablet    SENOKOT-S;PERICOLACE    120 tablet    Take 2 tablets by mouth 2 times daily    Other constipation       * Notice:  This list has 2 medication(s) that are the same as other medications prescribed for you. Read the directions carefully, and ask your doctor or other care provider to review them with you.

## 2017-08-29 NOTE — LETTER
"August 30, 2017      Fatou Brian  [address]  [facility] [address]        Dear ,    We are writing to inform you of your test results.    NORMAL TOTAL CHOLESTEROL   NORMAL TRIGLYCERIDES   NORMAL HDL OR \"GOOD\" CHOLESTEROL   BORDERLINE  LDL OR \"BAD\" CHOLESTEROL   BORDERLINE  VERY LOW DENSITY CHOLESTEROL   NORMAL THYROID STIMULATING HORMONE TEST   BORDERLINE  OR STAGE CHRONIC KIDNEY DISEASE   NORMAL BLOOD SALTS AND GLUCOSE   THREE MONTH GLUCOSE AVERAGE DEFINITE NORMAL RANGE   KEEP UP DIET AND EXERCISE FOR PRE DIABETES   DOING A GREAT JOB     Resulted Orders   Lipid panel reflex to direct LDL   Result Value Ref Range    Cholesterol 191 <200 mg/dL    Triglycerides 93 <150 mg/dL      Comment:      Non Fasting    HDL Cholesterol 57 >49 mg/dL    LDL Cholesterol Calculated 115 (H) <100 mg/dL      Comment:      Above desirable:  100-129 mg/dl  Borderline High:  130-159 mg/dL  High:             160-189 mg/dL  Very high:       >189 mg/dl      Non HDL Cholesterol 134 (H) <130 mg/dL      Comment:      Above Desirable:  130-159 mg/dl  Borderline high:  160-189 mg/dl  High:             190-219 mg/dl  Very high:       >219 mg/dl     Basic metabolic panel   Result Value Ref Range    Sodium 139 133 - 144 mmol/L    Potassium 3.9 3.4 - 5.3 mmol/L    Chloride 103 94 - 109 mmol/L    Carbon Dioxide 28 20 - 32 mmol/L    Anion Gap 8 3 - 14 mmol/L    Glucose 94 70 - 99 mg/dL      Comment:      Non Fasting    Urea Nitrogen 22 7 - 30 mg/dL    Creatinine 1.20 (H) 0.52 - 1.04 mg/dL    GFR Estimate 44 (L) >60 mL/min/1.7m2      Comment:      Non  GFR Calc    GFR Estimate If Black 54 (L) >60 mL/min/1.7m2      Comment:       GFR Calc    Calcium 9.7 8.5 - 10.1 mg/dL   Hemoglobin A1c   Result Value Ref Range    Hemoglobin A1C 5.1 4.3 - 6.0 %   TSH   Result Value Ref Range    TSH 1.43 0.40 - 4.00 mU/L       If you have any questions or concerns, please call the clinic at the number listed above. "       Sincerely,        EVY ACOSTA MD

## 2017-08-29 NOTE — LETTER
My Depression Action Plan  Name: Fatou Brian   Date of Birth 1950  Date: 8/29/2017    My doctor: Reid Thomas   My clinic: [address]  [facility] [address] [phone]          GREEN    ZONE   Good Control    What it looks like:     Things are going generally well. You have normal up s and down s. You may even feel depressed from time to time, but bad moods usually last less than a day.   What you need to do:  1. Continue to care for yourself (see self care plan)  2. Check your depression survival kit and update it as needed  3. Follow your physician s recommendations including any medication.  4. Do not stop taking medication unless you consult with your physician first.           YELLOW         ZONE Getting Worse    What it looks like:     Depression is starting to interfere with your life.     It may be hard to get out of bed; you may be starting to isolate yourself from others.    Symptoms of depression are starting to last most all day and this has happened for several days.     You may have suicidal thoughts but they are not constant.   What you need to do:     1. Call your care team, your response to treatment will improve if you keep your care team informed of your progress. Yellow periods are signs an adjustment may need to be made.     2. Continue your self-care, even if you have to fake it!    3. Talk to someone in your support network    4. Open up your depression survival kit           RED    ZONE Medical Alert - Get Help    What it looks like:     Depression is seriously interfering with your life.     You may experience these or other symptoms: You can t get out of bed most days, can t work or engage in other necessary activities, you have trouble taking care of basic hygiene, or basic responsibilities, thoughts of suicide or death that will not go away, self-injurious behavior.     What you  need to do:  1. Call your care team and request a same-day appointment. If they are not available (weekends or after hours) call your local crisis line, emergency room or 911.      Electronically signed by: Wanda Irizarry, August 29, 2017    Depression Self Care Plan / Survival Kit    Self-Care for Depression  Here s the deal. Your body and mind are really not as separate as most people think.  What you do and think affects how you feel and how you feel influences what you do and think. This means if you do things that people who feel good do, it will help you feel better.  Sometimes this is all it takes.  There is also a place for medication and therapy depending on how severe your depression is, so be sure to consult with your medical provider and/ or Behavioral Health Consultant if your symptoms are worsening or not improving.     In order to better manage my stress, I will:    Exercise  Get some form of exercise, every day. This will help reduce pain and release endorphins, the  feel good  chemicals in your brain. This is almost as good as taking antidepressants!  This is not the same as joining a gym and then never going! (they count on that by the way ) It can be as simple as just going for a walk or doing some gardening, anything that will get you moving.      Hygiene   Maintain good hygiene (Get out of bed in the morning, Make your bed, Brush your teeth, Take a shower, and Get dressed like you were going to work, even if you are unemployed).  If your clothes don't fit try to get ones that do.    Diet  I will strive to eat foods that are good for me, drink plenty of water, and avoid excessive sugar, caffeine, alcohol, and other mood-altering substances.  Some foods that are helpful in depression are: complex carbohydrates, B vitamins, flaxseed, fish or fish oil, fresh fruits and vegetables.    Psychotherapy  I agree to participate in Individual Therapy (if recommended).    Medication  If prescribed  medications, I agree to take them.  Missing doses can result in serious side effects.  I understand that drinking alcohol, or other illicit drug use, may cause potential side effects.  I will not stop my medication abruptly without first discussing it with my provider.    Staying Connected With Others  I will stay in touch with my friends, family members, and my primary care provider/team.    Use your imagination  Be creative.  We all have a creative side; it doesn t matter if it s oil painting, sand castles, or mud pies! This will also kick up the endorphins.    Witness Beauty  (AKA stop and smell the roses) Take a look outside, even in mid-winter. Notice colors, textures. Watch the squirrels and birds.     Service to others  Be of service to others.  There is always someone else in need.  By helping others we can  get out of ourselves  and remember the really important things.  This also provides opportunities for practicing all the other parts of the program.    Humor  Laugh and be silly!  Adjust your TV habits for less news and crime-drama and more comedy.    Control your stress  Try breathing deep, massage therapy, biofeedback, and meditation. Find time to relax each day.     My support system    Clinic Contact:  Phone number:    Contact 1:  Phone number:    Contact 2:  Phone number:    Latter day/:  Phone number:    Therapist:  Phone number:    [facility] crisis center:    Phone number:    Other community support:  Phone number:

## 2017-08-29 NOTE — LETTER
"September 21, 2017      Fatou Brian  [address]  [facility] [address]        Dear ,    We are writing to inform you of your test results.    HOLTER MONITOR SHOWS PREMATURE VENTRICULAR CONTRACTIONS   IRREGULARLY   DON'T CORRELATE with YOUR SYMPTOMS   HOWEVER I WOULD SUGGEST YOU HAVE A CARDIOLOGY CONSULT   NORMAL TOTAL CHOLESTEROL   NORMAL TRIGLYCERIDES   NORMAL HDL OR \"GOOD\" CHOLESTEROL   BORDERLINE  LDL OR \"BAD\" CHOLESTEROL   BORDERLINE  VERY LOW DENSITY CHOLESTEROL   NORMAL THYROID STIMULATING HORMONE TEST   BORDERLINE  OR STAGE CHRONIC KIDNEY DISEASE   NORMAL BLOOD SALTS AND GLUCOSE   THREE MONTH GLUCOSE AVERAGE DEFINITE NORMAL RANGE   KEEP UP DIET AND EXERCISE FOR PRE DIABETES   DOING A GREAT JOB     Resulted Orders   Lipid panel reflex to direct LDL   Result Value Ref Range    Cholesterol 191 <200 mg/dL    Triglycerides 93 <150 mg/dL      Comment:      Non Fasting    HDL Cholesterol 57 >49 mg/dL    LDL Cholesterol Calculated 115 (H) <100 mg/dL      Comment:      Above desirable:  100-129 mg/dl  Borderline High:  130-159 mg/dL  High:             160-189 mg/dL  Very high:       >189 mg/dl      Non HDL Cholesterol 134 (H) <130 mg/dL      Comment:      Above Desirable:  130-159 mg/dl  Borderline high:  160-189 mg/dl  High:             190-219 mg/dl  Very high:       >219 mg/dl     Basic metabolic panel   Result Value Ref Range    Sodium 139 133 - 144 mmol/L    Potassium 3.9 3.4 - 5.3 mmol/L    Chloride 103 94 - 109 mmol/L    Carbon Dioxide 28 20 - 32 mmol/L    Anion Gap 8 3 - 14 mmol/L    Glucose 94 70 - 99 mg/dL      Comment:      Non Fasting    Urea Nitrogen 22 7 - 30 mg/dL    Creatinine 1.20 (H) 0.52 - 1.04 mg/dL    GFR Estimate 44 (L) >60 mL/min/1.7m2      Comment:      Non  GFR Calc    GFR Estimate If Black 54 (L) >60 mL/min/1.7m2      Comment:       GFR Calc    Calcium 9.7 8.5 - 10.1 mg/dL   Hemoglobin A1c   Result Value Ref Range    Hemoglobin A1C 5.1 " 4.3 - 6.0 %   TSH   Result Value Ref Range    TSH 1.43 0.40 - 4.00 mU/L       If you have any questions or concerns, please call the clinic at the number listed above.       Sincerely,        EVY ACOSTA MD

## 2017-08-30 LAB
ANION GAP SERPL CALCULATED.3IONS-SCNC: 8 MMOL/L (ref 3–14)
BUN SERPL-MCNC: 22 MG/DL (ref 7–30)
CALCIUM SERPL-MCNC: 9.7 MG/DL (ref 8.5–10.1)
CHLORIDE SERPL-SCNC: 103 MMOL/L (ref 94–109)
CHOLEST SERPL-MCNC: 191 MG/DL
CO2 SERPL-SCNC: 28 MMOL/L (ref 20–32)
CREAT SERPL-MCNC: 1.2 MG/DL (ref 0.52–1.04)
GFR SERPL CREATININE-BSD FRML MDRD: 44 ML/MIN/1.7M2
GLUCOSE SERPL-MCNC: 94 MG/DL (ref 70–99)
HDLC SERPL-MCNC: 57 MG/DL
LDLC SERPL CALC-MCNC: 115 MG/DL
NONHDLC SERPL-MCNC: 134 MG/DL
POTASSIUM SERPL-SCNC: 3.9 MMOL/L (ref 3.4–5.3)
SODIUM SERPL-SCNC: 139 MMOL/L (ref 133–144)
TRIGL SERPL-MCNC: 93 MG/DL
TSH SERPL DL<=0.005 MIU/L-ACNC: 1.43 MU/L (ref 0.4–4)

## 2017-08-30 NOTE — PROGRESS NOTES
"Please send normal lab letter when labs are complete  NORMAL TOTAL CHOLESTEROL   NORMAL TRIGLYCERIDES   NORMAL HDL OR \"GOOD\" CHOLESTEROL   BORDERLINE  LDL OR \"BAD\" CHOLESTEROL   BORDERLINE  VERY LOW DENSITY CHOLESTEROL   NORMAL THYROID STIMULATING HORMONE TEST   BORDERLINE  OR STAGE CHRONIC KIDNEY DISEASE   NORMAL BLOOD SALTS AND GLUCOSE  THREE MONTH GLUCOSE AVERAGE DEFINITE NORMAL RANGE  KEEP UP DIET AND EXERCISE FOR PRE DIABETES   DOING A GREAT JOB   EVY ACOSTA JR., MD"

## 2017-09-08 ENCOUNTER — TELEPHONE (OUTPATIENT)
Dept: FAMILY MEDICINE | Facility: CLINIC | Age: 67
End: 2017-09-08

## 2017-09-08 NOTE — TELEPHONE ENCOUNTER
[facility]) - Declined; Per patient does not run in the family.    Outreach ,  Holly Pimentel

## 2017-09-19 ENCOUNTER — OFFICE VISIT (OUTPATIENT)
Dept: FAMILY MEDICINE | Facility: CLINIC | Age: 67
End: 2017-09-19
Payer: COMMERCIAL

## 2017-09-19 VITALS
WEIGHT: 195 LBS | DIASTOLIC BLOOD PRESSURE: 70 MMHG | HEART RATE: 73 BPM | OXYGEN SATURATION: 96 % | TEMPERATURE: 99 F | HEIGHT: 65 IN | BODY MASS INDEX: 32.49 KG/M2 | RESPIRATION RATE: 16 BRPM | SYSTOLIC BLOOD PRESSURE: 122 MMHG

## 2017-09-19 DIAGNOSIS — K21.9 GASTROESOPHAGEAL REFLUX DISEASE WITHOUT ESOPHAGITIS: Chronic | ICD-10-CM

## 2017-09-19 DIAGNOSIS — I10 HYPERTENSION GOAL BP (BLOOD PRESSURE) < 140/90: Chronic | ICD-10-CM

## 2017-09-19 DIAGNOSIS — R42 DIZZINESS: ICD-10-CM

## 2017-09-19 DIAGNOSIS — F32.4 MAJOR DEPRESSIVE DISORDER WITH SINGLE EPISODE, IN PARTIAL REMISSION (H): Chronic | ICD-10-CM

## 2017-09-19 DIAGNOSIS — Z90.710 H/O HYSTERECTOMY FOR BENIGN DISEASE: ICD-10-CM

## 2017-09-19 DIAGNOSIS — M35.00 SJOGREN'S SYNDROME, WITH UNSPECIFIED ORGAN INVOLVEMENT (H): Chronic | ICD-10-CM

## 2017-09-19 DIAGNOSIS — Z00.00 ENCOUNTER FOR ROUTINE ADULT HEALTH EXAMINATION WITHOUT ABNORMAL FINDINGS: Primary | ICD-10-CM

## 2017-09-19 DIAGNOSIS — R41.3 POOR MEMORY: ICD-10-CM

## 2017-09-19 DIAGNOSIS — L57.0 SENILE KERATOSIS: ICD-10-CM

## 2017-09-19 DIAGNOSIS — M54.40 BILATERAL LOW BACK PAIN WITH SCIATICA, SCIATICA LATERALITY UNSPECIFIED, UNSPECIFIED CHRONICITY: Chronic | ICD-10-CM

## 2017-09-19 DIAGNOSIS — E78.5 HYPERLIPIDEMIA WITH TARGET LDL LESS THAN 130: Chronic | ICD-10-CM

## 2017-09-19 DIAGNOSIS — H90.3 SENSORINEURAL HEARING LOSS (SNHL) OF BOTH EARS: ICD-10-CM

## 2017-09-19 PROCEDURE — 99207 ZZC FOOT EXAM  NO CHARGE: CPT | Mod: 25 | Performed by: FAMILY MEDICINE

## 2017-09-19 PROCEDURE — 99397 PER PM REEVAL EST PAT 65+ YR: CPT | Performed by: FAMILY MEDICINE

## 2017-09-19 ASSESSMENT — PATIENT HEALTH QUESTIONNAIRE - PHQ9: SUM OF ALL RESPONSES TO PHQ QUESTIONS 1-9: 14

## 2017-09-19 NOTE — MR AVS SNAPSHOT
After Visit Summary   9/19/2017    Fatou Brian    MRN: [id number]           Patient Information     Date Of Birth          1950        Visit Information        Provider Department      9/19/2017 10:00 AM Reid Thomas MD [facility]        Care Instructions      Preventive Health Recommendations    Female Ages 65 +    Yearly exam:     See your health care provider every year in order to  o Review health changes.   o Discuss preventive care.    o Review your medicines if your doctor has prescribed any.      You no longer need a yearly Pap test unless you've had an abnormal Pap test in the past 10 years. If you have vaginal symptoms, such as bleeding or discharge, be sure to talk with your provider about a Pap test.      Every 1 to 2 years, have a mammogram.  If you are over 69, talk with your health care provider about whether or not you want to continue having screening mammograms.      Every 10 years, have a colonoscopy. Or, have a yearly FIT test (stool test). These exams will check for colon cancer.       Have a cholesterol test every 5 years, or more often if your doctor advises it.       Have a diabetes test (fasting glucose) every three years. If you are at risk for diabetes, you should have this test more often.       At age 65, have a bone density scan (DEXA) to check for osteoporosis (brittle bone disease).    Shots:    Get a flu shot each year.    Get a tetanus shot every 10 years.    Talk to your doctor about your pneumonia vaccines. There are now two you should receive - Pneumovax (PPSV 23) and Prevnar (PCV 13).    Talk to your doctor about the shingles vaccine.    Talk to your doctor about the hepatitis B vaccine.    Nutrition:     Eat at least 5 servings of fruits and vegetables each day.      Eat whole-grain bread, whole-wheat pasta and brown rice instead of white grains and rice.      Talk to your provider about Calcium and  Vitamin D.     Lifestyle    Exercise at least 150 minutes a week (30 minutes a day, 5 days a week). This will help you control your weight and prevent disease.      Limit alcohol to one drink per day.      No smoking.       Wear sunscreen to prevent skin cancer.       See your dentist twice a year for an exam and cleaning.      See your eye doctor every 1 to 2 years to screen for conditions such as glaucoma, macular degeneration and cataracts.  1. MODIFIED FAST 250 CALORIES TWICE DAILY FEMALES  300 CALORIES TWICE DAILY FOR MALES   OATMEAL AND COTTAGE CHEESE WORK NICELY   COPIOUS WATER BETWEEN   5/2 PLAN DR [facility]GUYEN [facility]  NORMAL DIET 5 DAYS PER WEEK  SEMIFAST 2 DAYS PER WEEK  LOOK UP 5-2 PLAN ON THE INTERNET    Weight Loss Tips  1. Do not eat after 6 hrs before your expected bedtime  2. Have your heaviest meal for breakfast, a slightly lighter meal at lunch and a snack 6 hrs before bed  3. No sugar/calorie drinks except milk ie no fruit juice, pop, alcohol.  4. Drink milk OR WATER  30min before meals to decrease your hunger. Also it is excellent as part of your last meal of the day snack  5. Drink lots of water  6. Increase fiber in diet: all bran cereal, salads, popcorn etc  7. Have only one small serving of fruit a day about 1/2 cup (as this is high in sugar)  8. EXERCISE is the bottom line. Without it, you will gain weight even on a low calorie diet. Best if done 2-3X a day as can    2. The only way known to prevent diabetes or keep it from getting worse is exercise, 20-40 minutes 3 times a day around the time of meals    SLEEP 8 HOURS PER DAY  BLACK AND BLUEBERRIES EVERY DAY ANTINFLAMMATORY BENEFIT   PLAIN YOGURT SEVERAL TIMES DAILY AS TOLERATED IF NOT ALLERGIC   AVOID HIGH FRUCTOSE SYRUP AND OLENE IN YOUR DIET   GREEN TEA EXTRACT  PROBIOTIC CAPSULE DAILY  HIGH QUALITY   DON'T EAT LATE AT NIGHT  CARALLUMA FIMBRIATA HELPS WITH APPETITE  KEEP A BLECherwell SoftwareINGS JOURNAL  888 BREATHER WHEN STRESSED OR COUNT BACK  "FROM 100 WITH SLOW BREATHING  POSITIVE AFFIRMATIONS       FIT BIT OR PEDOMETER 10,000 STEPS PER DAY   FIT MUSA BARRON RECOMMENDED          Diabetes: Exchange List    What are the exchange lists?     The exchange lists show you portions of food that equal 1 exchange. Foods are divided into food lists. The foods on each list are called exchanges because they have a similar number of calories, protein, carbohydrate and fat content. Foods from each list can be traded or \"exchanged\" for any other food on the same list because they all have a similar exchange value. A dietitian will help you plan how much food your child should eat at each meal and from what lists the foods should come from. At first you should measure your food until you are able to make good estimates about serving sizes. The following list is a sample of foods found on the exchange lists. For more information, you can buy the Exchange Lists for Meal Planning from: The American Diabetes Association [address] [place] [address] [phone] http://www.diabetes.org    Carbohydrate group     Starch List: One starch exchange contains about 15 grams of carbohydrate, 3 grams of protein, 0 to 1 grams of fat, and 80 calories. A starch exchange is sometimes called a carb exchange. Examples of one starch (carb) exchange are:     one slice of bread     1/2 hamburger or hot dog bun     3/4 cup of unsweetened cereal     1/3 cup pasta     3 cups popcorn     crackers (6 small saltines, 3 squares of carrie crackers, 3 of most other crackers)     1 pancake or waffle (5 inch)     15 to 20 fat-free or baked potato or corn chips.     The vegetables included in the starch exchanges include:     corn (1/2 cup or 1/2 cob)     white potato (1/4 large baked with skin or 1/2 cup mashed)     yam or sweet potato (1/2 cup)     green peas (1/2 cup)     squash, winter (1 cup)     lima beans (2/3 cup).     Fruit List: 1 fruit exchange contains about 15 grams of carbohydrate and " "60 calories. Examples of one fruit exchange are:     grape juice (1/3 cup)     apple or pineapple juice (1/2 cup)     orange or grapefruit juice (1/2 cup)     1 small apple     orange or peach     1/2 banana     1 cup raspberries     1/3 of a small cantaloupe     1 slice of watermelon.     Milk List: 1 milk exchange contains about 8 grams of protein and 12 grams of carbohydrate. Items on the milk list are divided into fat-free, reduced fat, and whole milk depending on the number of fat grams in the exchange. Examples of one milk exchange are: Fat-Free (0 to 3 grams of fat)     1 cup of skim or non-fat milk     1 cup of 1% milk (also includes 1/2 fat exchange)     6 ounces flavored fat-free yogurt     Reduced-Fat (5 grams of fat)     6 ounces of plain, low-fat yogurt     1 cup 2% milk (also includes one fat exchange).     Whole Milk (8 grams of fat)     8 ounces of plain yogurt (made from whole milk)     1 cup whole milk.     Vegetable List: One vegetable exchange has 5 grams of carbohydrate, 2 grams of protein, no fat, and 25 calories. One-half cup of cooked or a cup of raw vegetables is a good measure for 1 exchange of most vegetables. Raw lettuce may be taken in larger quantities, but salad dressing usually equals 1 fat exchange. Other Carbohydrates List: One \"other carbohydrate\" exchange has 15 grams of carbohydrate. Many of these foods count as a starch exchange and one or more fat exchanges.     brownie (2 inch square) = 1 carb, 1 fat exchange     fruit snack roll = 1 carb exchange     granola bar = 1 and 1/2 carb exchanges     ice cream (1/2 cup) = 1 carb, 2 fat exchanges     frozen yogurt (1/2 cup) = 1 carb, 0 to 1 fat exchanges     tortilla chips (6-12) = 1 carb, 2 fat exchanges.     Meat and Meat Substitute Group     Meats are divided into very lean meats, lean meats, medium-fat meats, and high-fat meats. People with diabetes should try to eat more lean and medium fat meats and stay away from the high fat " choices. The Very Lean meat group includes foods that contain 7 grams of protein, 0 to 1 gram of fat, and 35 calories for 1 exchange. Examples include:     1 ounce chicken or turkey (white meat, no skin)     1 ounce fresh fish     1 ounce fat-free cheese     2 egg whites     The Lean meat group includes foods that contain 7 grams of protein, 3 grams of fat, and 55 calories for 1 meat exchange. Examples include:     1 ounce chicken or turkey (dark meat, no skin)     1 ounce fish     1 ounce lean pork     1 ounce USDA Select or Choice grades of lean beef     1 ounce cheese (with 3 grams of fat or less per ounce).     The Medium-Fat group includes foods that have 7 grams of protein, 5 grams of fat, and 75 calories for 1 meat exchange. Examples include:     1 ounce of ground beef, most cuts of beef, pork, lamb or veal     1 ounce of cheese (5 grams of fat per ounce or less)     1 egg     1 ounce fried fish.     The High-Fat foods have 7 grams of protein, 8 grams of fat, and 100 calories for 1 meat exchange. This group includes:     1 ounce of pork sausage     1 ounce of spare ribs     1 oz of regular cheese (American, Swiss etc.)     1 oz of lunch meat     1 hot dog (turkey or chicken).     Fat Group     Fat List: Fat is necessary for the body and is particularly important during periods of fasting (overnight), when it is very slowly absorbed. 1 fat exchange contains 5 grams of fat and 45 calories. The monounsaturated fats and polyunsaturated fats are better for us than saturated fats. The fat list includes: 1 exchange of monounsaturated fats equals:     1/2 Tbsp peanut butter     6 almonds     1 tsp of oil (olive, peanut, canola).     1 exchange of polyunsaturated fats equals:     1 tsp margarine     1 tsp of any vegetable oil (except coconut).     1 exchange of saturated fat includes:     1 tsp butter     1 strip of ha     2 Tbsp of cream (half and half).     Free Foods     A free food contains less than 20  calories or less than 5 grams of carbohydrate per serving. If the food has a serving size listed on its package, it should be limited to 3 servings spread throughout the day. Examples of free foods include:     4 Tbsp fat-free margarine     1 Tbsp fat-free Miracle Whip     sugar-free gelatin     diet soft drinks     catsup     soy sauce     spices.     Combination Foods     Many foods, such as casseroles, are mixed together. Your dietitian can help you figure out how many exchanges to count for combination foods. For example:     lasagna (1 cup) = 2 carb exchanges and 2 medium-fat meat exchanges     spaghetti with meatballs (1 cup) = 2 carb exchanges and 2 medium-fat meat exchanges     pizza, cheese (1/4 of 12 in.) = 2 carbs, 2 medium-fat meats     chicken noodle soup (1 cup) = 1 carb exchange     frozen entrée (less than 300 calories) = 2 carbs, 3 lean meat exchanges     macaroni and cheese (1 cup) = 2 carb exchanges and 2 medium-fat meat exchanges.         EVY ACOSTA JR., MD           Follow-ups after your visit        Follow-up notes from your care team     Return in about 3 months (around 12/19/2017).      Who to contact     If you have questions or need follow up information about today's clinic visit or your schedule please contact [facility] directly at [phone].  Normal or non-critical lab and imaging results will be communicated to you by MyChart, letter or phone within 4 business days after the clinic has received the results. If you do not hear from us within 7 days, please contact the clinic through [employer] or phone. If you have a critical or abnormal lab result, we will notify you by phone as soon as possible.  Submit refill requests through [employer] or call your pharmacy and they will forward the refill request to us. Please allow 3 business days for your refill to be completed.          Additional Information About Your Visit        MyChart Information      "[employer] gives you secure access to your electronic health record. If you see a primary care provider, you can also send messages to your care team and make appointments. If you have questions, please call your primary care clinic.  If you do not have a primary care provider, please call [phone] and they will assist you.        Care EveryWhere ID     This is your Care EveryWhere ID. This could be used by other organizations to access your [facility] medical records  [id number]        Your Vitals Were     Pulse Temperature Respirations Height Pulse Oximetry BMI (Body Mass Index)    73 99  F (37.2  C) (Tympanic) 16 5' 5\" (1.651 m) 96% 32.45 kg/m2       Blood Pressure from Last 3 Encounters:   09/19/17 122/70   08/29/17 132/70   08/23/17 118/58    Weight from Last 3 Encounters:   09/19/17 195 lb (88.5 kg)   08/29/17 196 lb 8 oz (89.1 kg)   08/23/17 190 lb (86.2 kg)              Today, you had the following     No orders found for display       Primary Care Provider Office Phone # Fax #    Reid Nyasia Thomas [phone] [address] [facility] [address]        Equal Access to Services     LINDSEY MCCARTY : Hadii aad ku hadasho Soomaali, waaxda luqadaha, qaybta kaalmada adeegyada, waxay idiin hayaan adekaren post la'miguel . So [facility] [phone].    ATENCIÓN: Si habla español, tiene a smith disposición servicios gratuitos de asistencia lingüística. Llame al [phone].    We comply with applicable federal civil rights laws and [place] laws. We do not discriminate on the basis of race, color, national origin, age, disability sex, sexual orientation or gender identity.            Thank you!     Thank you for choosing [facility]  for your care. Our goal is always to provide you with excellent care. Hearing back from our patients is one way we can continue to improve our services. Please take a few minutes to complete the written survey that you may receive " in the mail after your visit with us. Thank you!             Your Updated Medication List - Protect others around you: Learn how to safely use, store and throw away your medicines at www.disposemymeds.org.          This list is accurate as of: 9/19/17 10:43 AM.  Always use your most recent med list.                   Brand Name Dispense Instructions for use Diagnosis    Acetaminophen 500 MG Chew     120 tablet    Take 1,000 mg by mouth 3 times daily    LBP (low back pain), Osteoarthritis of knee, Arm pain, anterior, left, Spinal stenosis       calcium carbonate 500 MG chewable tablet    TUMS    120 tablet    Take 1 tablet (500 mg) by mouth At Bedtime    Vitamin D insufficiency       cholecalciferol 5000 UNITS Tabs tablet    vitamin D3    100 tablet    Take 1 tablet (5,000 Units) by mouth At Bedtime    Vitamin D insufficiency       diclofenac 1 % Gel topical gel    VOLTAREN    100 g    Apply 4 grams to knees or 2 grams to hands four times daily using enclosed dosing card.    Midline low back pain without sciatica       famotidine 20 MG tablet    PEPCID    180 tablet    TAKE 1 TABLET BY MOUTH TWICE DAILY AS NEEDED    Gastroesophageal reflux disease without esophagitis       fluticasone 50 MCG/ACT spray    FLONASE    48 g    Spray 2 sprays into both nostrils daily    PND (post-nasal drip)       gabapentin 100 MG capsule    NEURONTIN    270 capsule    TAKE ONE CAPSULE BY MOUTH THREE TIMES DAILY    Bilateral low back pain with sciatica, sciatica laterality unspecified, unspecified chronicity       hydroxychloroquine 200 MG tablet    PLAQUENIL    180 tablet    TAKE 2 (400 MG) BY MOUTH AT BEDTIME    Sjogren's syndrome (H)       lidocaine 5 % ointment    XYLOCAINE    213 g    One application 4 x daily to affected areas lower back and knees if necessary    Primary osteoarthritis of both knees       losartan-hydrochlorothiazide 100-12.5 MG per tablet    HYZAAR    90 tablet    TAKE 1 BY MOUTH DAILY    Essential hypertension  with goal blood pressure less than 140/90       metoprolol 50 MG 24 hr tablet    TOPROL-XL    90 tablet    TAKE 1 BY MOUTH DAILY    Essential hypertension with goal blood pressure less than 140/90       order for DME     1 Device    Equipment being ordered: RUBBER THRESHOLD /CARGO WEDGE RAMP  ONE  USE AS DIRECTED FOR THRESHOLD  TO PREVENT FALLING    Thoracic or lumbosacral neuritis or radiculitis, unspecified, OA (osteoarthritis) of knee       * order for DME     1 Device    Equipment being ordered:  LEFT THUMB  METACARPAL AND WRIST SPLINT FOR TRIGGER THUMB *ONE* *SIG:* AS DIRECTED    Trigger thumb, acquired       * order for DME     1 each    One pair longitudinal arch supports One pair  Use as directed    Plantar fasciitis       pantoprazole 40 MG EC tablet    PROTONIX    90 tablet    TAKE 1 BY MOUTH DAILY    Gastroesophageal reflux disease without esophagitis       pilocarpine 5 MG tablet    SALAGEN    360 tablet    Take 1 tablet (5 mg) by mouth 4 times daily (before meals and nightly)    Sjogren's syndrome (H)       senna-docusate 8.6-50 MG per tablet    SENOKOT-S;PERICOLACE    120 tablet    Take 2 tablets by mouth 2 times daily    Other constipation       * Notice:  This list has 2 medication(s) that are the same as other medications prescribed for you. Read the directions carefully, and ask your doctor or other care provider to review them with you.

## 2017-09-19 NOTE — PROGRESS NOTES
"  SUBJECTIVE:   Fatou Brian is a 67 year old female who presents for Preventive Visit.  Are you in the first 12 months of your Medicare Part B coverage?  No    Healthy Habits:    Do you get at least three servings of calcium containing foods daily (dairy, green leafy vegetables, etc.)? no, taking calcium and/or vitamin D supplement: yes -     Amount of exercise or daily activities, outside of work: 1 day(s) per week    Problems taking medications regularly No    Medication side effects: No    Have you had an eye exam in the past two years? yes    Do you see a dentist twice per year? yes    Do you have sleep apnea, excessive snoring or daytime drowsiness?drowsiness    COGNITIVE SCREEN  1) Repeat 3 items (Banana, Sunrise, Chair)    2) Clock draw: NORMAL  3) 3 item recall: Recalls 3 objects  Results: 3 items recalled: COGNITIVE IMPAIRMENT LESS LIKELY    SJOGREN'S SYNDROME DRY MOUTH AND EYES     OBJECTIVE:  DRY MOUTH AND EYES      ASSESSMENT: SJOGREN'S WELL CONTROLLED     PLAN SUPPORTIVE  AND TREATMENT with MEDICATIONS CONTINUED DRY MOUTH MEDICATION             PRESSURE FOR CROWN'S AND BRIDGES     GAPS     OSTEOARTHRITIS KNEE PAIN LEFT KNEE PAIN     INTERMITTENT CONSTIPATION     GASTROESOPHAGEAL REFLUX DISEASE WITHOUT ESOPHAGITIS  INTERMITTENT    WORSE EATING LATE AT NIGHT     AVOIDING TOMATO PASTE    INTERMITTENT DIARRHEA     ROSACEA LOOKS     OBJECTIVE  MINIMAL RASH      ASSESSMENT: SAME TREATMENT AND SUPPORTIVE     PLAN:  CONTINUE SAME MEDICINES        MEMORY OK MILD INTERMITTENT MEMORY     CHRONIC LOWER BACK PAIN     OBJECTIVE DECREASE RANGE OF MOTION OF LOWER BACK  NEGS STRAIGHT LEG RAISING TEST  NORMAL REFLEXES      ASSESSMENT: MECHANICAL LOWER BACKPAIN     PLAN: FIT BIT TWITCH RECOMMENDED  AND NORMAL EXERCISES FOR BACK         SENILE KERATOSES NOT TOO BAD    MILD TO MODERATE  SPINAL STENOSISIN PAST     HYPERTENSION WITH GOAL OF LESS THAN 140/80 CONTROLLED     LDL OR \"BAD\" CHOLESTEROL  GOAL < 100     RIGHT " ARM AND SHOULDER PAIN  THUMB UP EXERCISES         MILD TO MODERATE DEPRESSION    OBJECTIVE  DEPRESSION WELL CONTROLLED AND SHE HAS A POSITIVE ATTITIUDE      ASSESSMENT:  WELL CONTROLLED DEPRESSION      PLAN: SUPPORTIVE TREATMENT      L  Patient Active Problem List   Diagnosis     Sjogren's syndrome (H)     Osteoarthritis of knee     GERD (gastroesophageal reflux disease)     Constipation     Osteoporosis     Irritable bowel syndrome with diarrhea     HL (hearing loss)     Rosacea     Poor memory     Low back pain     Senile keratosis     Spinal stenosis     H/O hysterectomy for benign disease     Bilateral hearing loss     Presbyopia     Knee pain     Hypertension goal BP (blood pressure) < 140/90     Hyperlipidemia with target LDL less than 130     Left arm pain     Arm pain     Major depression in partial remission (H)     Lumbago     Subclavian artery stenosis (H)     ACP (advance care planning)     Gastroesophageal reflux disease without esophagitis     Primary osteoarthritis of both knees     Bilateral low back pain with sciatica, sciatica laterality unspecified, unspecified chronicity             Heart, emotions    Reviewed and updated as needed this visit by clinical staffTobacco  Allergies  Meds  Problems  Med Hx  Surg Hx  Fam Hx  Soc Hx          Reviewed and updated as needed this visit by Provider  Allergies  Meds  Problems        Social History   Substance Use Topics     Smoking status: Former Smoker     Types: Cigarettes     Smokeless tobacco: Never Used      Comment: states quit 4 years ago     Alcohol use Yes      Comment: occas.       The patient does not drink >3 drinks per day nor >7 drinks per week.    Today's PHQ-2 Score:   PHQ-2 ( 1999 [employer]) 9/19/2017 9/11/2016   Q1: Little interest or pleasure in doing things 1 -   Q2: Feeling down, depressed or hopeless 3 -   PHQ-2 Score 4 -   Q1: Little interest or pleasure in doing things Several days Several days   Q2: Feeling down, depressed or  hopeless Nearly every day Several days   PHQ-2 Score - 2         Do you feel safe in your environment - Yes    Do you have a Health Care Directive?: No: Advance care planning reviewed with patient; information given to patient to review.      Current providers sharing in care for this patient include: Patient Care Team:  Reid Thomas MD as PCP - General (Family Practice)      Hearing impairment: Yes, Difficulty following a conversation in a noisy restaurant or crowded room.    Ability to successfully perform activities of daily living: Yes, no assistance needed     Fall risk:         Home safety:  none identified  click delete button to remove this line now    The following health maintenance items are reviewed in Epic and correct as of today:  Health Maintenance   Topic Date Due     HEPATITIS C SCREENING  04/10/1968     PNEUMOCOCCAL (2 of 2 - PCV13) 09/24/2014     MAMMO SCREEN Q2 YR (SYSTEM ASSIGNED)  12/20/2015     INFLUENZA VACCINE (SYSTEM ASSIGNED)  09/01/2017     FALL RISK ASSESSMENT  09/13/2017     PHQ-9 Q6 MONTHS  03/19/2018     DEPRESSION ACTION PLAN Q1 YR  08/29/2018     TETANUS IMMUNIZATION (SYSTEM ASSIGNED)  10/14/2020     ADVANCE DIRECTIVE PLANNING Q5 YRS  03/20/2022     LIPID SCREEN Q5 YR FEMALE (SYSTEM ASSIGNED)  08/29/2022     COLON CANCER SCREEN (SYSTEM ASSIGNED)  01/24/2024     DEXA SCAN SCREENING (SYSTEM ASSIGNED)  Completed     Labs reviewed in EPIC  Patient Active Problem List   Diagnosis     Sjogren's syndrome (H)     Osteoarthritis of knee     GERD (gastroesophageal reflux disease)     Constipation     Osteoporosis     Irritable bowel syndrome with diarrhea     HL (hearing loss)     Rosacea     Poor memory     Low back pain     Senile keratosis     Spinal stenosis     H/O hysterectomy for benign disease     Bilateral hearing loss     Presbyopia     Knee pain     Hypertension goal BP (blood pressure) < 140/90     Hyperlipidemia with target LDL less than 130     Left arm pain     Arm  pain     Major depression in partial remission (H)     Lumbago     Subclavian artery stenosis (H)     ACP (advance care planning)     Gastroesophageal reflux disease without esophagitis     Primary osteoarthritis of both knees     Bilateral low back pain with sciatica, sciatica laterality unspecified, unspecified chronicity     Past Surgical History:   Procedure Laterality Date     aneursym[  1983, 1991    Has metal in head     CHOLECYSTECTOMY       CLOSED RX PROX HUMERUS FRACTURE      10 pins placed     COLONOSCOPY       COLONOSCOPY  1/24/2014    Procedure: COMBINED COLONOSCOPY, SINGLE BIOPSY/POLYPECTOMY BY BIOPSY;  COLONOSCOPY;  Surgeon: Ranjit Pa MD;  Location:  GI     [facility] ECP WITH CATARACT SURGERY      both eyes     HYSTERECTOMY, JANET         Social History   Substance Use Topics     Smoking status: Former Smoker     Types: Cigarettes     Smokeless tobacco: Never Used      Comment: states quit 4 years ago     Alcohol use Yes      Comment: occas.     Family History   Problem Relation Age of Onset     Thyroid Disease Mother      Arthritis Mother      Rheumatoid     OSTEOPOROSIS Mother      CANCER Mother      stomach     CEREBROVASCULAR DISEASE Mother      HEART DISEASE Father      CANCER Father      HEART DISEASE Brother      fibulation         Allergies   Allergen Reactions     Chantix [Varenicline]      suicidal     Influenza Virus Vaccine H5n1      Muscle aches dizzy, nauseated  Light headed     Morphine Sulfate      Sensitivity when in hospital     Omeprazole Magnesium Nausea     Intolerance       Vioxx      Niacin Itching and Rash     Zetia [Ezetimibe] Other (See Comments)     Muscle pain     Zyrtec-D Rash     Recent Labs   Lab Test  08/29/17   1148  08/23/17   1030  09/13/16   1158  01/08/16   1630   09/14/15   0809   A1C  5.1   --   5.4   --    --    --    LDL  115*   --   132*   --    --   98   HDL  57   --   64   --    --   66   TRIG  93   --   74   --    --   87   ALT   --   22 24 23   --  "  24   CR  1.20*  1.29*  1.25*  1.04   --   1.00   GFRESTIMATED  44*  41*  43*  53*   --   55*   GFRESTBLACK  54*  50*  52*  64   --   67   POTASSIUM  3.9  3.7  4.0  3.8   --   3.9   TSH  1.43   --   0.96   --    < >   --     < > = values in this interval not displayed.            RECOMMENDED MAMMOGRAM BUT REFUSES      COLONOSCOPY UP TO DATE INFORMATION GIVEN     WORKUP ANEURYSMS DOING WELL     SEVERE HEADACHE AT TIMES ANYWAY     FRECKLE PAIN LEFT FOREARM    DEPRESSION SYMPTOMS ONGOING AND FINANCIAL    ROS:  C: NEGATIVE for fever, chills, change in weight  I: NEGATIVE for worrisome rashes, moles or lesions  E: NEGATIVE for vision changes or irritation  E/M: NEGATIVE for ear, mouth and throat problems  R: NEGATIVE for significant cough or SOB  B: NEGATIVE for masses, tenderness or discharge  CV: NEGATIVE for chest pain, palpitations or peripheral edema  GI: NEGATIVE for nausea, abdominal pain, heartburn, or change in bowel habits  : NEGATIVE for frequency, dysuria, or hematuria  M: NEGATIVE for significant arthralgias or myalgia  N: NEGATIVE for weakness, dizziness or paresthesias  E: NEGATIVE for temperature intolerance, skin/hair changes  H: NEGATIVE for bleeding problems  P: NEGATIVE for changes in mood or affect  PSYCHIATRIC:  DEPRESSION ONGOING AND ANXIETY   SLEEPING FAIR     OBJECTIVE:   /70  Pulse 73  Temp 99  F (37.2  C) (Tympanic)  Resp 16  Ht 5' 5\" (1.651 m)  Wt 195 lb (88.5 kg)  SpO2 96%  BMI 32.45 kg/m2 Estimated body mass index is 32.45 kg/(m^2) as calculated from the following:    Height as of this encounter: 5' 5\" (1.651 m).    Weight as of this encounter: 195 lb (88.5 kg).  EXAM:   GENERAL: healthy, alert and no distress  EYES: Eyes grossly normal to inspection, PERRL and conjunctivae and sclerae normal  HENT: ear canals and TM's normal, nose and mouth without ulcers or lesions  NECK: no adenopathy, no asymmetry, masses, or scars and thyroid normal to palpation  RESP: lungs clear to " auscultation - no rales, rhonchi or wheezes  BREAST: normal without masses, tenderness or nipple discharge and no palpable axillary masses or adenopathy  CV: regular rate and rhythm, normal S1 S2, no S3 or S4, no murmur, click or rub, no peripheral edema and peripheral pulses strong  ABDOMEN: soft, nontender, no hepatosplenomegaly, no masses and bowel sounds normal  MS: no gross musculoskeletal defects noted, no edema  OSTEOARTHRITIS KNEES BILATERAL AND DECREASE RANGE OF MOTION OF BACK   SKIN: no suspicious lesions or rashes  NEURO: Normal strength and tone, mentation intact and speech normal  PSYCH: mentation appears normal, affect normal/bright  LYMPH: no cervical, supraclavicular, axillary, or inguinal adenopathy  Diabetic foot exam: normal DP and PT pulses, no trophic changes or ulcerative lesions, normal sensory exam and normal monofilament exam   RECTAL EXAM REFUSED   PELVIC EXAM REFUSED     ASSESSMENT / PLAN:       ICD-10-CM    1. Encounter for routine adult health examination without abnormal findings Z00.00    2. Dizziness R42 NEUROLOGY ADULT REFERRAL   3. Bilateral low back pain with sciatica, sciatica laterality unspecified, unspecified chronicity M54.40    4. Major depressive disorder with single episode, in partial remission (H) F32.4 sertraline (ZOLOFT) 50 MG tablet   5. Hyperlipidemia with target LDL less than 130 E78.5    6. Hypertension goal BP (blood pressure) < 140/90 I10    7. Sjogren's syndrome, with unspecified organ involvement (H) M35.00    8. Gastroesophageal reflux disease without esophagitis K21.9    9. Poor memory R41.3    10. Senile keratosis L57.0    11. H/O hysterectomy for benign disease Z90.710    12. Sensorineural hearing loss (SNHL) of both ears H90.3        End of Life Planning:  Patient currently has an advanced directive: Yes.  Practitioner is supportive of decision.    COUNSELING:  Reviewed preventive health counseling, as reflected in patient instructions       Regular  "exercise       Healthy diet/nutrition       Vision screening       Hearing screening       Dental care        Estimated body mass index is 32.45 kg/(m^2) as calculated from the following:    Height as of this encounter: 5' 5\" (1.651 m).    Weight as of this encounter: 195 lb (88.5 kg).  Weight management plan: Specific weight management program called  EXERCISE AND WT LOSS discussed and follow up in 3 months in clinic to re-evaluate.   reports that she has quit smoking. Her smoking use included Cigarettes. She has never used smokeless tobacco.        Appropriate preventive services were discussed with this patient, including applicable screening as appropriate for cardiovascular disease, diabetes, osteopenia/osteoporosis, and glaucoma.  As appropriate for age/gender, discussed screening for colorectal cancer, prostate cancer, breast cancer, and cervical cancer. Checklist reviewing preventive services available has been given to the patient.    Reviewed patients plan of care and provided an AVS. The Basic Care Plan (routine screening as documented in Health Maintenance) for Fatou meets the Care Plan requirement. This Care Plan has been established and reviewed with the Patient.      SPENT  60 MINUTES DISCUSSION DIZZINESS    CEREBRAL ARTERY ANEURYSM    LAUNDRY LIST OF CONCERNS    SJOGREN SYNDROME     OSTEOARTHRITIS BOTH KNEES     GASTROESOPHAGEAL REFLUX DISEASE WITHOUT ESOPHAGITIS     IRRITABLE BOWEL SYNDROME SYMPTOMS     ONGOING WORSENING HEARING LOSS     ROSACEA     WORSENING MEMORY     CHRONIC LOWER BACK PAIN     RELATIONSHIP WITH NEIGHBORS     HYPERTENSION WITH GOAL OF LESS THAN 140/80             Counseling Resources:  ATP IV Guidelines  Pooled Cohorts Equation Calculator  Breast Cancer Risk Calculator  FRAX Risk Assessment  ICSI Preventive Guidelines  Dietary Guidelines for Americans, 2010  USDA's MyPlate  ASA Prophylaxis  Lung CA Screening    EVY ACOSTA MD  [facility] " [place]

## 2017-09-19 NOTE — NURSING NOTE
"Chief Complaint   Patient presents with     Wellness Visit       Initial /70  Pulse 73  Temp 99  F (37.2  C) (Tympanic)  Resp 16  Ht 5' 5\" (1.651 m)  Wt 195 lb (88.5 kg)  SpO2 96%  BMI 32.45 kg/m2 Estimated body mass index is 32.45 kg/(m^2) as calculated from the following:    Height as of this encounter: 5' 5\" (1.651 m).    Weight as of this encounter: 195 lb (88.5 kg).  Medication Reconciliation: complete   Wanda Irizarry CMA    "

## 2017-09-19 NOTE — PATIENT INSTRUCTIONS
Preventive Health Recommendations    Female Ages 65 +    Yearly exam:     See your health care provider every year in order to  o Review health changes.   o Discuss preventive care.    o Review your medicines if your doctor has prescribed any.      You no longer need a yearly Pap test unless you've had an abnormal Pap test in the past 10 years. If you have vaginal symptoms, such as bleeding or discharge, be sure to talk with your provider about a Pap test.      Every 1 to 2 years, have a mammogram.  If you are over 69, talk with your health care provider about whether or not you want to continue having screening mammograms.      Every 10 years, have a colonoscopy. Or, have a yearly FIT test (stool test). These exams will check for colon cancer.       Have a cholesterol test every 5 years, or more often if your doctor advises it.       Have a diabetes test (fasting glucose) every three years. If you are at risk for diabetes, you should have this test more often.       At age 65, have a bone density scan (DEXA) to check for osteoporosis (brittle bone disease).    Shots:    Get a flu shot each year.    Get a tetanus shot every 10 years.    Talk to your doctor about your pneumonia vaccines. There are now two you should receive - Pneumovax (PPSV 23) and Prevnar (PCV 13).    Talk to your doctor about the shingles vaccine.    Talk to your doctor about the hepatitis B vaccine.    Nutrition:     Eat at least 5 servings of fruits and vegetables each day.      Eat whole-grain bread, whole-wheat pasta and brown rice instead of white grains and rice.      Talk to your provider about Calcium and Vitamin D.     Lifestyle    Exercise at least 150 minutes a week (30 minutes a day, 5 days a week). This will help you control your weight and prevent disease.      Limit alcohol to one drink per day.      No smoking.       Wear sunscreen to prevent skin cancer.       See your dentist twice a year for an exam and cleaning.      See your  eye doctor every 1 to 2 years to screen for conditions such as glaucoma, macular degeneration and cataracts.  1. MODIFIED FAST 250 CALORIES TWICE DAILY FEMALES  300 CALORIES TWICE DAILY FOR MALES   OATMEAL AND COTTAGE CHEESE WORK NICELY   COPIOUS WATER BETWEEN   5/2 PLAN DR NGUYEN [facility]  NORMAL DIET 5 DAYS PER WEEK  SEMIFAST 2 DAYS PER WEEK  LOOK UP 5-2 PLAN ON THE INTERNET    Weight Loss Tips  1. Do not eat after 6 hrs before your expected bedtime  2. Have your heaviest meal for breakfast, a slightly lighter meal at lunch and a snack 6 hrs before bed  3. No sugar/calorie drinks except milk ie no fruit juice, pop, alcohol.  4. Drink milk OR WATER  30min before meals to decrease your hunger. Also it is excellent as part of your last meal of the day snack  5. Drink lots of water  6. Increase fiber in diet: all bran cereal, salads, popcorn etc  7. Have only one small serving of fruit a day about 1/2 cup (as this is high in sugar)  8. EXERCISE is the bottom line. Without it, you will gain weight even on a low calorie diet. Best if done 2-3X a day as can    2. The only way known to prevent diabetes or keep it from getting worse is exercise, 20-40 minutes 3 times a day around the time of meals    SLEEP 8 HOURS PER DAY  BLACK AND BLUEBERRIES EVERY DAY ANTINFLAMMATORY BENEFIT   PLAIN YOGURT SEVERAL TIMES DAILY AS TOLERATED IF NOT ALLERGIC   AVOID HIGH FRUCTOSE SYRUP AND OLENE IN YOUR DIET   GREEN TEA EXTRACT  PROBIOTIC CAPSULE DAILY  HIGH QUALITY   DON'T EAT LATE AT NIGHT  CARALLUMA FIMBRIATA HELPS WITH APPETITE  KEEP A BLESSINGS JOURNAL  888 BREATHER WHEN STRESSED OR COUNT BACK FROM 100 WITH SLOW BREATHING  POSITIVE AFFIRMATIONS       FIT BIT OR PEDOMETER 10,000 STEPS PER DAY   FIT MUSA BARRON RECOMMENDED          Diabetes: Exchange List    What are the exchange lists?     The exchange lists show you portions of food that equal 1 exchange. Foods are divided into food lists. The foods on each list are called  "exchanges because they have a similar number of calories, protein, carbohydrate and fat content. Foods from each list can be traded or \"exchanged\" for any other food on the same list because they all have a similar exchange value. A dietitian will help you plan how much food your child should eat at each meal and from what lists the foods should come from. At first you should measure your food until you are able to make good estimates about serving sizes. The following list is a sample of foods found on the exchange lists. For more information, you can buy the Exchange Lists for Meal Planning from: The American Diabetes Association [address] [place] [address] 1-143.905.7341 http://www.diabetes.org    Carbohydrate group     Starch List: One starch exchange contains about 15 grams of carbohydrate, 3 grams of protein, 0 to 1 grams of fat, and 80 calories. A starch exchange is sometimes called a carb exchange. Examples of one starch (carb) exchange are:     one slice of bread     1/2 hamburger or hot dog bun     3/4 cup of unsweetened cereal     1/3 cup pasta     3 cups popcorn     crackers (6 small saltines, 3 squares of carrie crackers, 3 of most other crackers)     1 pancake or waffle (5 inch)     15 to 20 fat-free or baked potato or corn chips.     The vegetables included in the starch exchanges include:     corn (1/2 cup or 1/2 cob)     white potato (1/4 large baked with skin or 1/2 cup mashed)     yam or sweet potato (1/2 cup)     green peas (1/2 cup)     squash, winter (1 cup)     lima beans (2/3 cup).     Fruit List: 1 fruit exchange contains about 15 grams of carbohydrate and 60 calories. Examples of one fruit exchange are:     grape juice (1/3 cup)     apple or pineapple juice (1/2 cup)     orange or grapefruit juice (1/2 cup)     1 small apple     orange or peach     1/2 banana     1 cup raspberries     1/3 of a small cantaloupe     1 slice of watermelon.     Milk List: 1 milk exchange contains about 8 " "grams of protein and 12 grams of carbohydrate. Items on the milk list are divided into fat-free, reduced fat, and whole milk depending on the number of fat grams in the exchange. Examples of one milk exchange are: Fat-Free (0 to 3 grams of fat)     1 cup of skim or non-fat milk     1 cup of 1% milk (also includes 1/2 fat exchange)     6 ounces flavored fat-free yogurt     Reduced-Fat (5 grams of fat)     6 ounces of plain, low-fat yogurt     1 cup 2% milk (also includes one fat exchange).     Whole Milk (8 grams of fat)     8 ounces of plain yogurt (made from whole milk)     1 cup whole milk.     Vegetable List: One vegetable exchange has 5 grams of carbohydrate, 2 grams of protein, no fat, and 25 calories. One-half cup of cooked or a cup of raw vegetables is a good measure for 1 exchange of most vegetables. Raw lettuce may be taken in larger quantities, but salad dressing usually equals 1 fat exchange. Other Carbohydrates List: One \"other carbohydrate\" exchange has 15 grams of carbohydrate. Many of these foods count as a starch exchange and one or more fat exchanges.     brownie (2 inch square) = 1 carb, 1 fat exchange     fruit snack roll = 1 carb exchange     granola bar = 1 and 1/2 carb exchanges     ice cream (1/2 cup) = 1 carb, 2 fat exchanges     frozen yogurt (1/2 cup) = 1 carb, 0 to 1 fat exchanges     tortilla chips (6-12) = 1 carb, 2 fat exchanges.     Meat and Meat Substitute Group     Meats are divided into very lean meats, lean meats, medium-fat meats, and high-fat meats. People with diabetes should try to eat more lean and medium fat meats and stay away from the high fat choices. The Very Lean meat group includes foods that contain 7 grams of protein, 0 to 1 gram of fat, and 35 calories for 1 exchange. Examples include:     1 ounce chicken or turkey (white meat, no skin)     1 ounce fresh fish     1 ounce fat-free cheese     2 egg whites     The Lean meat group includes foods that contain 7 grams of " protein, 3 grams of fat, and 55 calories for 1 meat exchange. Examples include:     1 ounce chicken or turkey (dark meat, no skin)     1 ounce fish     1 ounce lean pork     1 ounce USDA Select or Choice grades of lean beef     1 ounce cheese (with 3 grams of fat or less per ounce).     The Medium-Fat group includes foods that have 7 grams of protein, 5 grams of fat, and 75 calories for 1 meat exchange. Examples include:     1 ounce of ground beef, most cuts of beef, pork, lamb or veal     1 ounce of cheese (5 grams of fat per ounce or less)     1 egg     1 ounce fried fish.     The High-Fat foods have 7 grams of protein, 8 grams of fat, and 100 calories for 1 meat exchange. This group includes:     1 ounce of pork sausage     1 ounce of spare ribs     1 oz of regular cheese (American, Swiss etc.)     1 oz of lunch meat     1 hot dog (turkey or chicken).     Fat Group     Fat List: Fat is necessary for the body and is particularly important during periods of fasting (overnight), when it is very slowly absorbed. 1 fat exchange contains 5 grams of fat and 45 calories. The monounsaturated fats and polyunsaturated fats are better for us than saturated fats. The fat list includes: 1 exchange of monounsaturated fats equals:     1/2 Tbsp peanut butter     6 almonds     1 tsp of oil (olive, peanut, canola).     1 exchange of polyunsaturated fats equals:     1 tsp margarine     1 tsp of any vegetable oil (except coconut).     1 exchange of saturated fat includes:     1 tsp butter     1 strip of ha     2 Tbsp of cream (half and half).     Free Foods     A free food contains less than 20 calories or less than 5 grams of carbohydrate per serving. If the food has a serving size listed on its package, it should be limited to 3 servings spread throughout the day. Examples of free foods include:     4 Tbsp fat-free margarine     1 Tbsp fat-free Miracle Whip     sugar-free gelatin     diet soft drinks     catsup     soy sauce      spices.     Combination Foods     Many foods, such as casseroles, are mixed together. Your dietitian can help you figure out how many exchanges to count for combination foods. For example:     lasagna (1 cup) = 2 carb exchanges and 2 medium-fat meat exchanges     spaghetti with meatballs (1 cup) = 2 carb exchanges and 2 medium-fat meat exchanges     pizza, cheese (1/4 of 12 in.) = 2 carbs, 2 medium-fat meats     chicken noodle soup (1 cup) = 1 carb exchange     frozen entrée (less than 300 calories) = 2 carbs, 3 lean meat exchanges     macaroni and cheese (1 cup) = 2 carb exchanges and 2 medium-fat meat exchanges.         EVY ACOSTA JR., MD

## 2017-09-20 DIAGNOSIS — I10 ESSENTIAL HYPERTENSION WITH GOAL BLOOD PRESSURE LESS THAN 140/90: Chronic | ICD-10-CM

## 2017-09-20 NOTE — TELEPHONE ENCOUNTER
Losartan-hctz 100-12.5 mg      Last Written Prescription Date: 1/5/17  Last Fill Quantity: 90, # refills: 2  Last Office Visit with G, P or [facility] prescribing provider: 9/19/17       Potassium   Date Value Ref Range Status   08/29/2017 3.9 3.4 - 5.3 mmol/L Final     Creatinine   Date Value Ref Range Status   08/29/2017 1.20 (H) 0.52 - 1.04 mg/dL Final     BP Readings from Last 3 Encounters:   09/19/17 122/70   08/29/17 132/70   08/23/17 118/58

## 2017-09-20 NOTE — TELEPHONE ENCOUNTER
Routing refill request to provider for review/approval because:  Creatinine out of range  Aarti Duran RN- Triage [employer]

## 2017-09-21 NOTE — PROGRESS NOTES
"Please send normal lab letter when labs are complete  HOLTER MONITOR SHOWS PREMATURE VENTRICULAR CONTRACTIONS   IRREGULARLY   DON'T CORRELATE with YOUR SYMPTOMS   HOWEVER I WOULD SUGGEST YOU HAVE A CARDIOLOGY CONSULT   NORMAL TOTAL CHOLESTEROL   NORMAL TRIGLYCERIDES   NORMAL HDL OR \"GOOD\" CHOLESTEROL   BORDERLINE  LDL OR \"BAD\" CHOLESTEROL   BORDERLINE  VERY LOW DENSITY CHOLESTEROL   NORMAL THYROID STIMULATING HORMONE TEST   BORDERLINE  OR STAGE CHRONIC KIDNEY DISEASE   NORMAL BLOOD SALTS AND GLUCOSE  THREE MONTH GLUCOSE AVERAGE DEFINITE NORMAL RANGE  KEEP UP DIET AND EXERCISE FOR PRE DIABETES   DOING A GREAT JOB   EVY ACOSTA JR., MD"

## 2017-09-22 RX ORDER — LOSARTAN POTASSIUM AND HYDROCHLOROTHIAZIDE 12.5; 1 MG/1; MG/1
TABLET ORAL
Qty: 90 TABLET | Refills: 2 | Status: SHIPPED | OUTPATIENT
Start: 2017-09-22 | End: 2018-01-09

## 2017-09-25 ENCOUNTER — TELEPHONE (OUTPATIENT)
Dept: FAMILY MEDICINE | Facility: CLINIC | Age: 67
End: 2017-09-25

## 2017-09-25 DIAGNOSIS — I49.3 PVC'S (PREMATURE VENTRICULAR CONTRACTIONS): Primary | ICD-10-CM

## 2017-09-25 NOTE — TELEPHONE ENCOUNTER
Patient was called.  Patient did the Holter monitor testing and felt the monitoring was not accurate.  Did you want her to go to cardiology?      Occasional premature ventricular contractions     Patient not satisfied and would like to see cardiology    Ok by me    Sent over order    Notify patient     Give phone number    And arrange cardiology consult    1-2 weeks     Reid Thomas Jr., MD

## 2017-09-25 NOTE — TELEPHONE ENCOUNTER
Reason for Call:  Other     Detailed comments: has a question about a letter she received    Phone Number Patient can be reached at: Home number on file [phone] (home)    Best Time: any    Can we leave a detailed message on this number? YES    Call taken on 9/25/2017 at 10:36 AM by GEORGIE CANO

## 2017-09-27 NOTE — TELEPHONE ENCOUNTER
Pt is going to wait and see how she feels possibly try her meds that she just received, will call back if new symptoms arise   .Leander CARRILLO

## 2017-12-26 ENCOUNTER — TRANSFERRED RECORDS (OUTPATIENT)
Dept: HEALTH INFORMATION MANAGEMENT | Facility: CLINIC | Age: 67
End: 2017-12-26

## 2018-01-04 DIAGNOSIS — K21.9 GASTROESOPHAGEAL REFLUX DISEASE WITHOUT ESOPHAGITIS: Chronic | ICD-10-CM

## 2018-01-04 RX ORDER — PANTOPRAZOLE SODIUM 40 MG/1
TABLET, DELAYED RELEASE ORAL
Qty: 90 TABLET | Refills: 2 | Status: SHIPPED | OUTPATIENT
Start: 2018-01-04 | End: 2018-10-13

## 2018-01-04 NOTE — TELEPHONE ENCOUNTER
Requested Prescriptions   Pending Prescriptions Disp Refills     pantoprazole (PROTONIX) 40 MG EC tablet 90 tablet 2     Sig: TAKE 1 BY MOUTH DAILY    There is no refill protocol information for this order      Last Written Prescription Date:  1/5/17  Last Fill Quantity: 90,  # refills: 2   Last Office Visit with [facility]ERNABE, CATHERINEP or [facility] prescribing provider:  9/19/2017   Future Office Visit:

## 2018-01-09 DIAGNOSIS — I10 ESSENTIAL HYPERTENSION WITH GOAL BLOOD PRESSURE LESS THAN 140/90: Chronic | ICD-10-CM

## 2018-01-09 DIAGNOSIS — M35.00 SJOGREN'S SYNDROME (H): Chronic | ICD-10-CM

## 2018-01-09 RX ORDER — LOSARTAN POTASSIUM AND HYDROCHLOROTHIAZIDE 12.5; 1 MG/1; MG/1
1 TABLET ORAL DAILY
Qty: 90 TABLET | Refills: 2 | Status: CANCELLED | OUTPATIENT
Start: 2018-01-09

## 2018-01-09 RX ORDER — HYDROXYCHLOROQUINE SULFATE 200 MG/1
TABLET, FILM COATED ORAL
Qty: 180 TABLET | Refills: 2 | Status: CANCELLED | OUTPATIENT
Start: 2018-01-09

## 2018-01-09 NOTE — TELEPHONE ENCOUNTER
Reason for Call:  Medication or medication refill:    Do you use a [facility]?  Name of the pharmacy and phone number for the current request:  [facility] mail order    Name of the medication requested: Plaquenil 200MG, HCTZ 12.5, Metoprolo 50MG, Pilocarpine 5MG also 2 faxed over     Other request: gave me fax # [phone]   Or phone 4     Can we leave a detailed message on this number? YES    Phone number patient can be reached at: Home number on file [phone] (home)    Best Time: any    Call taken on 1/9/2018 at 11:53 AM by GEORGIE CANO

## 2018-01-11 RX ORDER — PILOCARPINE HYDROCHLORIDE 5 MG/1
5 TABLET, FILM COATED ORAL
Qty: 360 TABLET | Refills: 2 | Status: SHIPPED | OUTPATIENT
Start: 2018-01-11 | End: 2019-01-03

## 2018-01-11 RX ORDER — HYDROXYCHLOROQUINE SULFATE 200 MG/1
TABLET, FILM COATED ORAL
Qty: 180 TABLET | Refills: 2 | Status: SHIPPED | OUTPATIENT
Start: 2018-01-11 | End: 2018-08-23

## 2018-01-11 RX ORDER — LOSARTAN POTASSIUM AND HYDROCHLOROTHIAZIDE 12.5; 1 MG/1; MG/1
1 TABLET ORAL DAILY
Qty: 90 TABLET | Refills: 1 | Status: SHIPPED | OUTPATIENT
Start: 2018-01-11 | End: 2018-06-05

## 2018-01-11 RX ORDER — METOPROLOL SUCCINATE 50 MG/1
TABLET, EXTENDED RELEASE ORAL
Qty: 90 TABLET | Refills: 2 | Status: SHIPPED | OUTPATIENT
Start: 2018-01-11 | End: 2018-08-23

## 2018-01-11 NOTE — TELEPHONE ENCOUNTER
"Requested Prescriptions   Pending Prescriptions Disp Refills     losartan-hydrochlorothiazide (HYZAAR) 100-12.5 MG per tablet  Last Written Prescription Date:  9/22/2017  Last Fill Quantity: 90 tablet,  # refills: 2   Last Office Visit  9/19/2017        with  [facility], [facility] or [facility] prescribing provider:     Future Office Visit:  90 tablet 2     Sig: Take 1 tablet by mouth daily    Angiotensin-II Receptors Failed    1/9/2018  9:19 AM       Failed - Normal serum creatinine on file in past 12 months    Recent Labs   Lab Test  08/29/17   1148   CR  1.20*          Passed - Blood pressure under 140/90 in past 12 months.    BP Readings from Last 3 Encounters:   09/19/17 122/70   08/29/17 132/70   08/23/17 118/58          Passed - Recent or future visit with authorizing provider's specialty    Patient had office visit in the last year or has a visit in the next 30 days with authorizing provider.  See \"Patient Info\" tab in inbasket, or \"Choose Columns\" in Meds & Orders section of the refill encounter.        Passed - Patient is age 18 or older       Passed - No active pregnancy on record       Passed - Normal serum potassium on file in past 12 months    Recent Labs   Lab Test  08/29/17   1148   POTASSIUM  3.9           Passed - No positive pregnancy test in past 12 months        Prescription approved per [facility] Refill Protocol.    "

## 2018-06-05 DIAGNOSIS — I10 ESSENTIAL HYPERTENSION WITH GOAL BLOOD PRESSURE LESS THAN 140/90: Chronic | ICD-10-CM

## 2018-06-05 NOTE — TELEPHONE ENCOUNTER
"Requested Prescriptions   Pending Prescriptions Disp Refills     losartan-hydrochlorothiazide (HYZAAR) 100-12.5 MG per tablet  Last Written Prescription Date:  1/11/18  Last Fill Quantity: 90,  # refills: 1   Last office visit: 9/19/2017 with prescribing provider:  Martha   Future Office Visit:     90 tablet 1     Sig: Take 1 tablet by mouth daily    Angiotensin-II Receptors Failed    6/5/2018  2:37 PM       Failed - Normal serum creatinine on file in past 12 months    Recent Labs   Lab Test  08/29/17   1148   CR  1.20*            Passed - Blood pressure under 140/90 in past 12 months    BP Readings from Last 3 Encounters:   09/19/17 122/70   08/29/17 132/70   08/23/17 118/58                Passed - Recent (12 mo) or future (30 days) visit within the authorizing provider's specialty    Patient had office visit in the last 12 months or has a visit in the next 30 days with authorizing provider or within the authorizing provider's specialty.  See \"Patient Info\" tab in inbasket, or \"Choose Columns\" in Meds & Orders section of the refill encounter.           Passed - Patient is age 18 or older       Passed - No active pregnancy on record       Passed - Normal serum potassium on file in past 12 months    Recent Labs   Lab Test  08/29/17   1148   POTASSIUM  3.9                   Passed - No positive pregnancy test in past 12 months          "

## 2018-06-06 RX ORDER — LOSARTAN POTASSIUM AND HYDROCHLOROTHIAZIDE 12.5; 1 MG/1; MG/1
1 TABLET ORAL DAILY
Qty: 90 TABLET | Refills: 0 | Status: SHIPPED | OUTPATIENT
Start: 2018-06-06 | End: 2018-08-23

## 2018-06-14 ENCOUNTER — TELEPHONE (OUTPATIENT)
Dept: FAMILY MEDICINE | Facility: CLINIC | Age: 68
End: 2018-06-14

## 2018-06-14 NOTE — TELEPHONE ENCOUNTER
Panel Management Review      Composite cancer screening  Chart review shows that this patient is due/due soon for the following Mammogram  Summary:    Patient is due/failing the following:   MAMMOGRAM    Action needed:   Patient needs office visit for mammgram.    Type of outreach:    Sent letter.    Questions for provider review:    None                                                                                                                                    Maribel THORNTON

## 2018-06-14 NOTE — LETTER
June 14, 2018    Fatou Brian  [address]  [place] [address]    Dear Danielle Lainez cares about your health and your health plan.  I have reviewed your medical conditions, medication list and lab results, and am making recommendations based on this review to better manage your health.    You are in particular need of attention regarding:  -Breast Cancer Screening  I am recommending that you:     -schedule a MAMMOGRAM which is due.  1 in 8 women will develop invasive breast cancer during her lifetime and it is the most common non-skin cancer in American women.  EARLY detection, new treatments, and a better understanding of the disease have increased survival rates - the 5 year survival rate in the 1960s was 63% and today it is close to 90%.    If you are under/uninsured, we recommend you contact the [facility]. They offer mammograms at no charge or on a sliding fee charge. You can schedule with them at [phone]. Please have them send us the results.      Please disregard this reminder if you have had this exam elsewhere within the last year.  It would be helpful for us to have a copy of your mammogram report in your file so that we can best coordinate your care - please contact us with when your test was done so we can update your record.             Please call us at the [place] location:  [phone] or use [employer] to address the above recommendations.     Thank you for trusting [facility].  We appreciate the opportunity to serve you and look forward to supporting your healthcare in the future.    If you have (or plan to have) any of these tests done at a facility other than a [facility] or a [facility], please have the results sent to the [facility] location noted above.      Best Regards,    Reid Thomas Jr MD

## 2018-06-28 ENCOUNTER — TELEPHONE (OUTPATIENT)
Dept: FAMILY MEDICINE | Facility: CLINIC | Age: 68
End: 2018-06-28

## 2018-07-31 ENCOUNTER — OFFICE VISIT (OUTPATIENT)
Dept: FAMILY MEDICINE | Facility: CLINIC | Age: 68
End: 2018-07-31
Payer: COMMERCIAL

## 2018-07-31 VITALS
TEMPERATURE: 98.3 F | SYSTOLIC BLOOD PRESSURE: 138 MMHG | OXYGEN SATURATION: 99 % | HEART RATE: 74 BPM | BODY MASS INDEX: 30.95 KG/M2 | DIASTOLIC BLOOD PRESSURE: 72 MMHG | RESPIRATION RATE: 16 BRPM | WEIGHT: 186 LBS

## 2018-07-31 DIAGNOSIS — M17.0 PRIMARY OSTEOARTHRITIS OF BOTH KNEES: ICD-10-CM

## 2018-07-31 DIAGNOSIS — M25.569 CHRONIC KNEE PAIN, UNSPECIFIED LATERALITY: Primary | ICD-10-CM

## 2018-07-31 DIAGNOSIS — G89.29 CHRONIC KNEE PAIN, UNSPECIFIED LATERALITY: Primary | ICD-10-CM

## 2018-07-31 DIAGNOSIS — M54.40 BILATERAL LOW BACK PAIN WITH SCIATICA, SCIATICA LATERALITY UNSPECIFIED, UNSPECIFIED CHRONICITY: Chronic | ICD-10-CM

## 2018-07-31 DIAGNOSIS — M48.061 SPINAL STENOSIS OF LUMBAR REGION WITHOUT NEUROGENIC CLAUDICATION: Chronic | ICD-10-CM

## 2018-07-31 DIAGNOSIS — L29.9 PRURITIC DISORDER: ICD-10-CM

## 2018-07-31 DIAGNOSIS — I10 HYPERTENSION GOAL BP (BLOOD PRESSURE) < 140/90: Chronic | ICD-10-CM

## 2018-07-31 DIAGNOSIS — E78.5 HYPERLIPIDEMIA WITH TARGET LDL LESS THAN 130: Chronic | ICD-10-CM

## 2018-07-31 PROCEDURE — 99214 OFFICE O/P EST MOD 30 MIN: CPT | Performed by: FAMILY MEDICINE

## 2018-07-31 RX ORDER — LORATADINE 10 MG/1
10 TABLET ORAL DAILY
Qty: 30 TABLET | Refills: 11 | Status: SHIPPED | OUTPATIENT
Start: 2018-07-31 | End: 2018-08-08

## 2018-07-31 RX ORDER — LANOLIN ALCOHOL/MO/W.PET/CERES
CREAM (GRAM) TOPICAL
Qty: 454 G | Refills: 0 | Status: SHIPPED | OUTPATIENT
Start: 2018-07-31 | End: 2020-09-24

## 2018-07-31 NOTE — MR AVS SNAPSHOT
After Visit Summary   7/31/2018    Fatou Brian    MRN: [id number]           Patient Information     Date Of Birth          1950        Visit Information        Provider Department      7/31/2018 11:30 AM Reid Thomas MD [facility]        Today's Diagnoses     Chronic knee pain, unspecified laterality    -  1    Primary osteoarthritis of both knees        Pruritic disorder        Spinal stenosis of lumbar region without neurogenic claudication        Hypertension goal BP (blood pressure) < 140/90        Hyperlipidemia with target LDL less than 130        Bilateral low back pain with sciatica, sciatica laterality unspecified, unspecified chronicity          Care Instructions    (M25.569,  G89.29) Chronic knee pain, unspecified laterality  (primary encounter diagnosis)  Comment:    Plan: order for DME             (M17.0) Primary osteoarthritis of both knees  Comment:    Plan:      (L29.9) Pruritic disorder  Comment:    Plan: loratadine (KP LORATADINE) 10 MG tablet,         mineral oil-white petrolatum (MINERIN/EUCERIN)         CREA cream             (M48.061) Spinal stenosis of lumbar region without neurogenic claudication  Comment:    Plan:      (I10) Hypertension goal BP (blood pressure) < 140/90  Comment:    Plan:      (E78.5) Hyperlipidemia with target LDL less than 130  Comment:    Plan:      (M54.40) Bilateral low back pain with sciatica, sciatica laterality unspecified, unspecified chronicity  Comment:    Plan:                    Follow-ups after your visit        Who to contact     If you have questions or need follow up information about today's clinic visit or your schedule please contact [facility] directly at [phone].  Normal or non-critical lab and imaging results will be communicated to you by MyChart, letter or phone within 4 business days after the clinic has received the results. If you do  not hear from us within 7 days, please contact the clinic through [facility] or phone. If you have a critical or abnormal lab result, we will notify you by phone as soon as possible.  Submit refill requests through [facility] or call your pharmacy and they will forward the refill request to us. Please allow 3 business days for your refill to be completed.          Additional Information About Your Visit        [facility] Information     [facility] gives you secure access to your electronic health record. If you see a primary care provider, you can also send messages to your care team and make appointments. If you have questions, please call your primary care clinic.  If you do not have a primary care provider, please call [phone] and they will assist you.        Care EveryWhere ID     This is your Care EveryWhere ID. This could be used by other organizations to access your [place] medical records  [id number]        Your Vitals Were     Pulse Temperature Respirations Pulse Oximetry BMI (Body Mass Index)       74 98.3  F (36.8  C) (Tympanic) 16 99% 30.95 kg/m2        Blood Pressure from Last 3 Encounters:   07/31/18 138/72   09/19/17 122/70   08/29/17 132/70    Weight from Last 3 Encounters:   07/31/18 186 lb (84.4 kg)   09/19/17 195 lb (88.5 kg)   08/29/17 196 lb 8 oz (89.1 kg)              We Performed the Following     DEPRESSION ACTION PLAN (DAP)          Today's Medication Changes          These changes are accurate as of 7/31/18 12:08 PM.  If you have any questions, ask your nurse or doctor.               Start taking these medicines.        Dose/Directions    loratadine 10 MG tablet   Commonly known as:  KP LORATADINE   Used for:  Pruritic disorder   Started by:  Reid Thomas MD        Dose:  10 mg   Take 1 tablet (10 mg) by mouth daily   Quantity:  30 tablet   Refills:  11       mineral oil-white petrolatum Crea cream   Used for:  Pruritic disorder   Started by:  eRid Thomas MD         Apply topically 2 times daily   Quantity:  454 g   Refills:  0       order for DME   Used for:  Chronic knee pain, unspecified laterality   Started by:  Reid Thomas MD        Equipment being ordered:  .seat lift mechanism  LIFT CHAIR MECHANISM EVALUATION  PATIENT PRESENT TODAY FOR EVALUATION OF LIFT CHAIR MECHANISM HAS SIGNIFICANT DISABILITY  WHICH PUT PATIENT AT RISK FOR FALLING  WHEN GETTING OUT OF A NORMAL CHAIR  PATIENT CAN AMBULATE WHEN UP AND WALKING ONCE IN A STANDING POSITION THIS CONDITION IS NOT REVERSIBLE WITH FURTHER PHYSICAL THERAPY   SIGNIFICANT NEUROMUSCULAR IMPAIRMENT?   Yes advanced  OSTEOARTH   Quantity:  1 each   Refills:  0            Where to get your medicines      These medications were sent to [facility] [address] [facility] [address] AT [address] [address]    Hours:  24-hours Phone:  [phone]     loratadine 10 MG tablet    mineral oil-white petrolatum Crea cream         Some of these will need a paper prescription and others can be bought over the counter.  Ask your nurse if you have questions.     Bring a paper prescription for each of these medications     order for DME                Primary Care Provider Office Phone # Fax #    Reid Thomas [phone] [address] [facility] [address]        Equal Access to Services     LINDSEY MCCARTY AH: Hadii hannah ku hadasho Soomaali, waaxda luqadaha, qaybta kaalmada adeegyada, waxay charlie noyola. So [facility] [phone].    ATENCIÓN: Si habla español, tiene a smith disposición servicios gratuitos de asistencia lingüística. Llame al [phone].    We comply with applicable federal civil rights laws and [place] laws. We do not discriminate on the basis of race, color, national origin, age, disability, sex, sexual orientation, or gender identity.            Thank you!     Thank you for choosing [place]  [facility]  for your care. Our goal is always to provide you with excellent care. Hearing back from our patients is one way we can continue to improve our services. Please take a few minutes to complete the written survey that you may receive in the mail after your visit with us. Thank you!             Your Updated Medication List - Protect others around you: Learn how to safely use, store and throw away your medicines at www.disposemymeds.org.          This list is accurate as of 7/31/18 12:08 PM.  Always use your most recent med list.                   Brand Name Dispense Instructions for use Diagnosis    Acetaminophen 500 MG Chew     120 tablet    Take 1,000 mg by mouth 3 times daily    LBP (low back pain), Osteoarthritis of knee, Arm pain, anterior, left, Spinal stenosis       calcium carbonate 500 MG chewable tablet    TUMS    120 tablet    Take 1 tablet (500 mg) by mouth At Bedtime    Vitamin D insufficiency       cholecalciferol 5000 units Tabs tablet    vitamin D3    100 tablet    Take 1 tablet (5,000 Units) by mouth At Bedtime    Vitamin D insufficiency       diclofenac 1 % Gel topical gel    VOLTAREN    100 g    Apply 4 grams to knees or 2 grams to hands four times daily using enclosed dosing card.    Midline low back pain without sciatica       famotidine 20 MG tablet    PEPCID    180 tablet    TAKE 1 TABLET BY MOUTH TWICE DAILY AS NEEDED    Gastroesophageal reflux disease without esophagitis       fluticasone 50 MCG/ACT spray    FLONASE    48 g    Spray 2 sprays into both nostrils daily    PND (post-nasal drip)       gabapentin 100 MG capsule    NEURONTIN    270 capsule    TAKE ONE CAPSULE BY MOUTH THREE TIMES DAILY    Bilateral low back pain with sciatica, sciatica laterality unspecified, unspecified chronicity       hydroxychloroquine 200 MG tablet    PLAQUENIL    180 tablet    TAKE 2 (400 MG) BY MOUTH AT BEDTIME    Sjogren's syndrome (H)       lidocaine 5 % ointment     XYLOCAINE    213 g    One application 4 x daily to affected areas lower back and knees if necessary    Primary osteoarthritis of both knees       loratadine 10 MG tablet    KP LORATADINE    30 tablet    Take 1 tablet (10 mg) by mouth daily    Pruritic disorder       losartan-hydrochlorothiazide 100-12.5 MG per tablet    HYZAAR    90 tablet    Take 1 tablet by mouth daily    Essential hypertension with goal blood pressure less than 140/90       metoprolol succinate 50 MG 24 hr tablet    TOPROL-XL    90 tablet    TAKE 1 BY MOUTH DAILY    Essential hypertension with goal blood pressure less than 140/90       mineral oil-white petrolatum Crea cream     454 g    Apply topically 2 times daily    Pruritic disorder       order for DME     1 Device    Equipment being ordered: RUBBER THRESHOLD /CARGO WEDGE RAMP  ONE  USE AS DIRECTED FOR THRESHOLD  TO PREVENT FALLING    Thoracic or lumbosacral neuritis or radiculitis, unspecified, OA (osteoarthritis) of knee       order for DME     1 Device    Equipment being ordered:  LEFT THUMB  METACARPAL AND WRIST SPLINT FOR TRIGGER THUMB *ONE* *SIG:* AS DIRECTED    Trigger thumb, acquired       order for DME     1 each    One pair longitudinal arch supports One pair  Use as directed    Plantar fasciitis       order for DME     1 each    Equipment being ordered:  .seat lift mechanism  LIFT CHAIR MECHANISM EVALUATION  PATIENT PRESENT TODAY FOR EVALUATION OF LIFT CHAIR MECHANISM HAS SIGNIFICANT DISABILITY  WHICH PUT PATIENT AT RISK FOR FALLING  WHEN GETTING OUT OF A NORMAL CHAIR  PATIENT CAN AMBULATE WHEN UP AND WALKING ONCE IN A STANDING POSITION THIS CONDITION IS NOT REVERSIBLE WITH FURTHER PHYSICAL THERAPY   SIGNIFICANT NEUROMUSCULAR IMPAIRMENT?   Yes advanced  OSTEOARTH    Chronic knee pain, unspecified laterality       pantoprazole 40 MG EC tablet    PROTONIX    90 tablet    TAKE 1 BY MOUTH DAILY    Gastroesophageal reflux disease without esophagitis       pilocarpine 5 MG tablet     SALAGEN    360 tablet    Take 1 tablet (5 mg) by mouth 4 times daily (before meals and nightly)    Sjogren's syndrome (H)       senna-docusate 8.6-50 MG per tablet    SENOKOT-S;PERICOLACE    120 tablet    Take 2 tablets by mouth 2 times daily    Other constipation       sertraline 50 MG tablet    ZOLOFT    90 tablet    Take 1/2 tablet (25 mg) for 1-2 weeks, then increase to 1 tablet orally daily    Major depressive disorder with single episode, in partial remission (H)

## 2018-07-31 NOTE — LETTER
My Depression Action Plan  Name: Fatou Brian   Date of Birth 1950  Date: 7/31/2018    My doctor: Reid Thomas   My clinic: [address]  [facility] [address] [phone]          GREEN    ZONE   Good Control    What it looks like:     Things are going generally well. You have normal up s and down s. You may even feel depressed from time to time, but bad moods usually last less than a day.   What you need to do:  1. Continue to care for yourself (see self care plan)  2. Check your depression survival kit and update it as needed  3. Follow your physician s recommendations including any medication.  4. Do not stop taking medication unless you consult with your physician first.           YELLOW         ZONE Getting Worse    What it looks like:     Depression is starting to interfere with your life.     It may be hard to get out of bed; you may be starting to isolate yourself from others.    Symptoms of depression are starting to last most all day and this has happened for several days.     You may have suicidal thoughts but they are not constant.   What you need to do:     1. Call your care team, your response to treatment will improve if you keep your care team informed of your progress. Yellow periods are signs an adjustment may need to be made.     2. Continue your self-care, even if you have to fake it!    3. Talk to someone in your support network    4. Open up your depression survival kit           RED    ZONE Medical Alert - Get Help    What it looks like:     Depression is seriously interfering with your life.     You may experience these or other symptoms: You can t get out of bed most days, can t work or engage in other necessary activities, you have trouble taking care of basic hygiene, or basic responsibilities, thoughts of suicide or death that will not go away, self-injurious behavior.     What you  need to do:  1. Call your care team and request a same-day appointment. If they are not available (weekends or after hours) call your local crisis line, emergency room or 911.            Depression Self Care Plan / Survival Kit    Self-Care for Depression  Here s the deal. Your body and mind are really not as separate as most people think.  What you do and think affects how you feel and how you feel influences what you do and think. This means if you do things that people who feel good do, it will help you feel better.  Sometimes this is all it takes.  There is also a place for medication and therapy depending on how severe your depression is, so be sure to consult with your medical provider and/ or Behavioral Health Consultant if your symptoms are worsening or not improving.     In order to better manage my stress, I will:    Exercise  Get some form of exercise, every day. This will help reduce pain and release endorphins, the  feel good  chemicals in your brain. This is almost as good as taking antidepressants!  This is not the same as joining a gym and then never going! (they count on that by the way ) It can be as simple as just going for a walk or doing some gardening, anything that will get you moving.      Hygiene   Maintain good hygiene (Get out of bed in the morning, Make your bed, Brush your teeth, Take a shower, and Get dressed like you were going to work, even if you are unemployed).  If your clothes don't fit try to get ones that do.    Diet  I will strive to eat foods that are good for me, drink plenty of water, and avoid excessive sugar, caffeine, alcohol, and other mood-altering substances.  Some foods that are helpful in depression are: complex carbohydrates, B vitamins, flaxseed, fish or fish oil, fresh fruits and vegetables.    Psychotherapy  I agree to participate in Individual Therapy (if recommended).    Medication  If prescribed medications, I agree to take them.  Missing doses can result in  serious side effects.  I understand that drinking alcohol, or other illicit drug use, may cause potential side effects.  I will not stop my medication abruptly without first discussing it with my provider.    Staying Connected With Others  I will stay in touch with my friends, family members, and my primary care provider/team.    Use your imagination  Be creative.  We all have a creative side; it doesn t matter if it s oil painting, sand castles, or mud pies! This will also kick up the endorphins.    Witness Beauty  (AKA stop and smell the roses) Take a look outside, even in mid-winter. Notice colors, textures. Watch the squirrels and birds.     Service to others  Be of service to others.  There is always someone else in need.  By helping others we can  get out of ourselves  and remember the really important things.  This also provides opportunities for practicing all the other parts of the program.    Humor  Laugh and be silly!  Adjust your TV habits for less news and crime-drama and more comedy.    Control your stress  Try breathing deep, massage therapy, biofeedback, and meditation. Find time to relax each day.     My support system    Clinic Contact:  Phone number:    Contact 1:  Phone number:    Contact 2:  Phone number:    [facility]/:  Phone number:    Therapist:  Phone number:    Local crisis center:    Phone number:    Other community support:  Phone number:

## 2018-07-31 NOTE — PROGRESS NOTES
SUBJECTIVE:   Fatou Brian is a 68 year old female who presents to clinic today for the following health issues:      itching      Duration: ongoing issue    Description (location/character/radiation): all over back    Intensity:  severe    Accompanying signs and symptoms: none, no rash    History (similar episodes/previous evaluation): has been an issue for long time    Precipitating or alleviating factors: None    Therapies tried and outcome: None       Request for a lift chair      .EVY ACOSTA MD .7/31/2018 1:06 PM .July 31, 2018        Fatou Brian is a 68 year old female who is who presents with FOLLOW UP      OSTEOARTHRITIS KNEES AND SJOGREN'S SYNDROME    SENILE KERATOSES CAUSING ITCHING ON HER BACK    CHRONIC LOWER BACK PAIN WITHOUT RADICULOPATHY     Onset : ONGOING      Severity: MODERATE       Home treatments LOWER BACK PAIN AND KNEE PAIN EXERCISES  Additional Symptoms:  AS WELL  DESCRIBED ABOVE      Course   IMPROVED                   Topic Date Due     MAMMO SCREEN Q2 YR (SYSTEM ASSIGNED)  12/20/2015     PHQ-9 Q6 MONTHS  03/19/2018     DEPRESSION ACTION PLAN Q1 YR  08/29/2018               .  Current Outpatient Prescriptions   Medication Sig Dispense Refill     Acetaminophen 500 MG CHEW Take 1,000 mg by mouth 3 times daily 120 tablet 5     calcium carbonate (TUMS) 500 MG chewable tablet Take 1 tablet (500 mg) by mouth At Bedtime 120 tablet 11     cholecalciferol (VITAMIN D3) 5000 UNITS TABS tablet Take 1 tablet (5,000 Units) by mouth At Bedtime 100 tablet 3     famotidine (PEPCID) 20 MG tablet TAKE 1 TABLET BY MOUTH TWICE DAILY AS NEEDED 180 tablet 2     hydroxychloroquine (PLAQUENIL) 200 MG tablet TAKE 2 (400 MG) BY MOUTH AT BEDTIME 180 tablet 2     lidocaine (XYLOCAINE) 5 % ointment One application 4 x daily to affected areas lower back and knees if necessary 213 g 3     loratadine (KP LORATADINE) 10 MG tablet Take 1 tablet (10 mg) by mouth daily 30 tablet 11      losartan-hydrochlorothiazide (HYZAAR) 100-12.5 MG per tablet Take 1 tablet by mouth daily 90 tablet 0     metoprolol (TOPROL-XL) 50 MG 24 hr tablet TAKE 1 BY MOUTH DAILY 90 tablet 2     mineral oil-white petrolatum (MINERIN/EUCERIN) CREA cream Apply topically 2 times daily 454 g 0     order for DME Equipment being ordered:   .seat lift mechanism   LIFT CHAIR MECHANISM EVALUATION    PATIENT PRESENT TODAY FOR EVALUATION OF LIFT CHAIR MECHANISM  HAS SIGNIFICANT DISABILITY  WHICH PUT PATIENT AT RISK FOR FALLING  WHEN GETTING OUT OF A NORMAL CHAIR   PATIENT CAN AMBULATE WHEN UP AND WALKING ONCE IN A STANDING POSITION  THIS CONDITION IS NOT REVERSIBLE WITH FURTHER PHYSICAL THERAPY    SIGNIFICANT NEUROMUSCULAR IMPAIRMENT?   Yes advanced  OSTEOARTH 1 each 0     order for DME One pair longitudinal arch supports  One pair   Use as directed 1 each 1     pantoprazole (PROTONIX) 40 MG EC tablet TAKE 1 BY MOUTH DAILY 90 tablet 2     pilocarpine (SALAGEN) 5 MG tablet Take 1 tablet (5 mg) by mouth 4 times daily (before meals and nightly) 360 tablet 2     senna-docusate (SENOKOT-S;PERICOLACE) 8.6-50 MG per tablet Take 2 tablets by mouth 2 times daily 120 tablet PRN     diclofenac (VOLTAREN) 1 % GEL Apply 4 grams to knees or 2 grams to hands four times daily using enclosed dosing card. (Patient not taking: Reported on 8/29/2017) 100 g 1     fluticasone (FLONASE) 50 MCG/ACT spray Spray 2 sprays into both nostrils daily (Patient not taking: Reported on 8/29/2017) 48 g 2     gabapentin (NEURONTIN) 100 MG capsule TAKE ONE CAPSULE BY MOUTH THREE TIMES DAILY (Patient not taking: Reported on 8/29/2017) 270 capsule 2     ORDER FOR DME Equipment being ordered:  LEFT THUMB  METACARPAL AND WRIST SPLINT FOR TRIGGER THUMB  *ONE*  *SIG:* AS DIRECTED (Patient not taking: Reported on 7/31/2018) 1 Device 1     ORDER FOR DME Equipment being ordered: RUBBER THRESHOLD /CARGO WEDGE RAMP  ONE  USE AS DIRECTED FOR THRESHOLD  TO PREVENT FALLING (Patient not  taking: Reported on 2018) 1 Device 1     sertraline (ZOLOFT) 50 MG tablet Take 1/2 tablet (25 mg) for 1-2 weeks, then increase to 1 tablet orally daily (Patient not taking: Reported on 2018) 90 tablet 3              Allergies   Allergen Reactions     Chantix [Varenicline]      suicidal     Influenza Virus Vaccine H5n1      Muscle aches dizzy, nauseated  Light headed     Morphine Sulfate      Sensitivity when in hospital     Omeprazole Magnesium Nausea     Intolerance       Vioxx      Niacin Itching and Rash     Zetia [Ezetimibe] Other (See Comments)     Muscle pain     Zyrtec-D Rash           Immunization History   Administered Date(s) Administered     Influenza (High Dose) 3 valent vaccine 2013     Pneumococcal 23 valent 2013     Tdap (Adacel,Boostrix) 10/14/2010     Zoster vaccine, live 2009               reports that she drinks alcohol.          reports that she does not use illicit drugs.        family history includes Arthritis in her mother; Cancer in her father and mother; Cerebrovascular Disease in her mother; HEART DISEASE in her brother and father; Osteoperosis in her mother; Thyroid Disease in her mother.        indicated that her mother is . She indicated that her father is . She indicated that her brother is .          has a past surgical history that includes colonoscopy; hysterectomy, alexander; aneursym[ (, ); Cholecystectomy; closed rx prox humerus fracture; hc ecp with cataract surgery; and Colonoscopy (2014).         reports that she does not engage in sexual activity.    .  Pediatric History   Patient Guardian Status     Not on file.     Other Topics Concern     Parent/Sibling W/ Cabg, Mi Or Angioplasty Before 65f 55m? Yes     brother     Caffeine Concern No     Exercise No     Seat Belt Yes     Social History Narrative    --------------------------------------------------------------------------------    Surgical History  Return To Top      Status Surgery Time Frame Comment Record Date     Inactive  neck surgery  2000  benign throat growths removed  11/7/2007      Inactive  Cataract  1980's  bilateral  11/7/2007      Inactive  hysterectomy  1981  TAHBSO  11/7/2007      Inactive  Brain surgery  1983;1991  brain aneurysm  11/7/2007      Inactive  cholecsystectomy  1992    11/7/2007          --------------------------------------------------------------------------------    Food Allergy  Return To Top     Allergen Reaction Comment Record Date     * No known food allergies      11/7/2007          --------------------------------------------------------------------------------    Drug Allergy  Return To Top     Allergen Reaction Comment Record Date     MORPHINE SULFATE    sensitivity when in hospital  9/24/2012      Vioxx      10/26/2010      NIACIN PREPARATIONS  itching; rash    10/26/2010      Zyrtec  rash    10/26/2010      OMEPRAZOLE/LANSOPRAZOLE  intolerance; nausea  PRILOSEC OTC ONLY OMEPREZOLE OK AND PREVACID OK; PRILOSEC OTC ONLY OMEPREZOLE OK AND PREVACID OK  4/10/2008          --------------------------------------------------------------------------------    Environment Allergy  Return To Top     Allergen Reaction Comment Record Date     * No known environmental allergies      11/7/2007          --------------------------------------------------------------------------------    Immunization History Return To Top     Funding Source Vaccine Type of Vaccine Date Given Route Site Given Lot#  Exp. Date Date on VIS Date Given VIS Vaccinator     Private  Herpes Zoster (Zostavax) age 60 +  Herpes Zoster  1/29/2009  SQ  RA  [id number]  MRK  04/28/2010 [facility]11[facility]06 [facility]      [facility]  Influenza (Adult) Seasonal  Flu Adult  10/14/2010  IM  Left Deltoid; Left Deltoid  [id number]  SP  6/30/2011  8/10/2010  10/14/2010  [facility]      Private  Tdap (Adacel) Adult  Tdap  10/14/2010  IM  Left Deltoid; Left Deltoid  [id number]  SP; SP   06/03/2012  11/18/2008  10/14/2010  MAJO Miamiflakita CORDERO          --------------------------------------------------------------------------------    Social History  Return To Top     Question Answer Comment Record Date     Marital status      9/24/2012      Emotional Abuse  No    9/16/2011      Exercise      4/9/2008      Caffeine  Yes  1 1/2 cups q.d.   4/9/2008      Physical Abuse  No    9/16/2011      Sealtbelts  Yes    4/9/2008      Sexual Abuse  No    9/16/2011      Breast/Testicle Self Check  Yes  Occasional  4/9/2008      Number of children  0    4/9/2008      Tobacco history  Quit this year  10/26/09  11/17/2009      Number of years using tobacco  35    11/11/2008      Number of cigarettes/day      11/11/2008      Alcohol history  Currently drinks alcohol    9/16/2011      Frequency of drinks  1-4 drinks per week    11/7/2007      Assist to Quit Information  Form Given to Patient    11/11/2008          --------------------------------------------------------------------------------    History - Overall Remark: 9/24/2012 Return To Top     MEDICAL HX: Collapsed lung morphine sensitivitiy last hospitalization    --------------------------------------------------------------------------------    Medical History Return To Top     Status Diagnosis Time Frame Comment Record Date     Active (389.18) - C - Sensorineural hearing loss, bilateral      9/24/2012      Active (V72.62) - I - Laboratory exam as part of general physical exam      9/24/2012      Active (V77.0) - C - Screening, thyroid disorders      9/24/2012      Active (462) - C - Sore throat      9/24/2012      Active (V82.9) - C - Screening, vitamin d deficiency      9/24/2012      Active (466.0) - C - Bronchitis, acute      8/17/2012      Active (372.72) - C - Subconjunctival hemorrhage    RIGHT EYE  11/28/2011      Active (715.16) - C - Osteoarthritis, knee    MANY YEARS BILATERAL  11/28/2011      Active (728.85) - C - Muscle spasm      11/28/2011       Active (V76.12) - C - Screening, mammogram      9/21/2011      Active (724.02) - C - Spinal stenosis, lumbar region    LUMBAR 4-5 AREA  9/21/2011      Active (782.3) - C - Edema, localized, NOS    ANKLES FEET AND CALVES  9/16/2011      Active (455.2) - C - Hemorrhoids, Internal w/Complication    INTERMITTENT BLEEDING  9/16/2011      Active (V76.51) - C - Screening, colon      9/16/2011      Active (719.42) - C - Elbow pain    RIGHT OLECRANON WITH POSSIBLE MOUSE IN BURSA  9/16/2011      Active (724.02) - C - Spinal stenosis, lumbar region      9/16/2011      Active (722.52) - C - Degenerative disc disease, lumbar      9/16/2011      Active (564.00) - C - Constipation      8/19/2011      Active (807.00) - C - Rib fracture      8/19/2011      Active (433.10) - C - CAROTID ART OCC W/O INFARC      4/28/2011      Active (786.52) - C - Chest wall pain    LEFT CHEST WALL  3/25/2011      Active (780.4) - C - Dizziness/vertigo, NOS      1/3/2011      Active (710.2) - C - Sjogren's disease    confirmed will send to rheumatology  10/14/2010      Active (389.10) - C - Hearing loss, sensorineural    bilateral  10/14/2010      Active 528.00 Stomatits, mucositis, unpec., NOS      12/30/2009      Active 523.01 ACUTE GINGIVITIS NONPLAQUE INDUCED      12/30/2009      Active 241.1 NONTOXIC MULTINODUL GOITER      4/29/2009      Active 241.0 Thyroid nodule      4/28/2009      Active 780.79 Fatigue      11/11/2008      Active (401.1) - C - Hypertension, benign      4/10/2008      Active (729.5) - C - limb pain    HUMERUS FRACTURE WITH RESIDUAL PAIN JULY 2008 PLATE AND 10 SCREWS  4/10/2008      Active (719.46) - C - Knee pain    OSTEOARTHRITIS OF KNEES  4/10/2008      Active 728.6 Dupuytren's contracture    right hand  4/10/2008      Active (723.1) - C - Neck pain    INTERMITTENT NECK PAIN  4/10/2008      Active 796.2 Elevated blood pressure- no hypertension dx      4/10/2008      Active (272.4) - C - Hyperlipidemia      4/10/2008       Active (530.81) - C - GERD      4/10/2008      Active (780.52) - C - Insomnia    associated with adjustment  4/10/2008      Active (V70.0) - C - Routine Medical Exam      2007      Active 461.0 Sinusitis, acute, maxillary      2007      Active 733.00 Osteoporosis, unspec.      2007      Active (305.1) - C - Tobacco abuse      2007      Active 812.20 Humerus, closed, unspec.      2007      Active (V72.83) - C - Preop    humerus fx rt  2007      Inactive  (789.00) - I - Abdominal pain      2010      Inactive  (v06.1) - C - Tdap vaccine      10/14/2010      Inactive  (V72.62) - C - Laboratory exam as part of general physical exam      10/14/2010      Inactive  (V04.81) - C - Influenza vaccination      10/14/2010      Inactive  (V76.51) - C - Screening, colon      10/14/2010      Inactive  (305.1) - C - Tobacco dependence    resolved  2009      Inactive  490 Bronchitis      2009      Inactive  466.0 Bronchitis, acute      2009      Inactive  V05.8 Immunization, other, specified      2009      Inactive  V72.31 Screening, pap      2008      Inactive  Abnormal pap      2008          --------------------------------------------------------------------------------    Medication History Return To Top                 --------------------------------------------------------------------------------    Family History  Return To Top     Status Relationship Disease Comment Record Date     Alive Brother      2007      Alive Sister      2007      Alive Brother      2007      Alive Sister      2007      Alive Sister      2007         Brother  heart    2007         Brother  kidney cancer    2007         Mother  stomach cancer  age 79  2007         Brother  heart    2007         Father  heart;AAA  age 80  2007           --------------------------------------------------------------------------------    Infection History Return To Top     Question Answer Comment Record Date     History of HPV  No    11/11/2008      Live with someone with TB or exposed to TB  No    11/11/2008      History of Hepatitis B  No    11/11/2008      History of chlamydia  No    11/11/2008      History of gonorrhea  No    11/11/2008      History of syphilis  No    11/11/2008          --------------------------------------------------------------------------------                       reports that she has quit smoking. Her smoking use included Cigarettes. She has never used smokeless tobacco.        Medical, social, surgical, and family histories reviewed.        Labs reviewed in EPIC  Patient Active Problem List   Diagnosis     Sjogren's syndrome (H)     Osteoarthritis of knee     GERD (gastroesophageal reflux disease)     Constipation     Osteoporosis     Irritable bowel syndrome with diarrhea     HL (hearing loss)     Rosacea     Poor memory     Low back pain     Senile keratosis     Spinal stenosis     H/O hysterectomy for benign disease     Bilateral hearing loss     Presbyopia     Knee pain     Hypertension goal BP (blood pressure) < 140/90     Hyperlipidemia with target LDL less than 130     Left arm pain     Arm pain     Major depression in partial remission (H)     Lumbago     Subclavian artery stenosis (H)     ACP (advance care planning)     Gastroesophageal reflux disease without esophagitis     Primary osteoarthritis of both knees     Bilateral low back pain with sciatica, sciatica laterality unspecified, unspecified chronicity       Past Surgical History:   Procedure Laterality Date     aneursym[  1983, 1991    Has metal in head     CHOLECYSTECTOMY       CLOSED RX PROX HUMERUS FRACTURE      10 pins placed     COLONOSCOPY       COLONOSCOPY  1/24/2014    Procedure: COMBINED COLONOSCOPY, SINGLE BIOPSY/POLYPECTOMY BY BIOPSY;  COLONOSCOPY;  Surgeon:  Ranjit Pa MD;  Location:  GI     HC ECP WITH CATARACT SURGERY      both eyes     HYSTERECTOMY, JANET           Social History   Substance Use Topics     Smoking status: Former Smoker     Types: Cigarettes     Smokeless tobacco: Never Used      Comment: states quit 4 years ago     Alcohol use Yes      Comment: occas.       Family History   Problem Relation Age of Onset     Thyroid Disease Mother      Arthritis Mother      Rheumatoid     Osteoperosis Mother      Cancer Mother      stomach     Cerebrovascular Disease Mother      HEART DISEASE Father      Cancer Father      HEART DISEASE Brother      fibulation             Current Outpatient Prescriptions   Medication Sig Dispense Refill     Acetaminophen 500 MG CHEW Take 1,000 mg by mouth 3 times daily 120 tablet 5     calcium carbonate (TUMS) 500 MG chewable tablet Take 1 tablet (500 mg) by mouth At Bedtime 120 tablet 11     cholecalciferol (VITAMIN D3) 5000 UNITS TABS tablet Take 1 tablet (5,000 Units) by mouth At Bedtime 100 tablet 3     famotidine (PEPCID) 20 MG tablet TAKE 1 TABLET BY MOUTH TWICE DAILY AS NEEDED 180 tablet 2     hydroxychloroquine (PLAQUENIL) 200 MG tablet TAKE 2 (400 MG) BY MOUTH AT BEDTIME 180 tablet 2     lidocaine (XYLOCAINE) 5 % ointment One application 4 x daily to affected areas lower back and knees if necessary 213 g 3     loratadine (KP LORATADINE) 10 MG tablet Take 1 tablet (10 mg) by mouth daily 30 tablet 11     losartan-hydrochlorothiazide (HYZAAR) 100-12.5 MG per tablet Take 1 tablet by mouth daily 90 tablet 0     metoprolol (TOPROL-XL) 50 MG 24 hr tablet TAKE 1 BY MOUTH DAILY 90 tablet 2     mineral oil-white petrolatum (MINERIN/EUCERIN) CREA cream Apply topically 2 times daily 454 g 0     order for DME Equipment being ordered:   .seat lift mechanism   LIFT CHAIR MECHANISM EVALUATION    PATIENT PRESENT TODAY FOR EVALUATION OF LIFT CHAIR MECHANISM  HAS SIGNIFICANT DISABILITY  WHICH PUT PATIENT AT RISK FOR FALLING  WHEN  GETTING OUT OF A NORMAL CHAIR   PATIENT CAN AMBULATE WHEN UP AND WALKING ONCE IN A STANDING POSITION  THIS CONDITION IS NOT REVERSIBLE WITH FURTHER PHYSICAL THERAPY    SIGNIFICANT NEUROMUSCULAR IMPAIRMENT?   Yes advanced  OSTEOARTH 1 each 0     order for DME One pair longitudinal arch supports  One pair   Use as directed 1 each 1     pantoprazole (PROTONIX) 40 MG EC tablet TAKE 1 BY MOUTH DAILY 90 tablet 2     pilocarpine (SALAGEN) 5 MG tablet Take 1 tablet (5 mg) by mouth 4 times daily (before meals and nightly) 360 tablet 2     senna-docusate (SENOKOT-S;PERICOLACE) 8.6-50 MG per tablet Take 2 tablets by mouth 2 times daily 120 tablet PRN     diclofenac (VOLTAREN) 1 % GEL Apply 4 grams to knees or 2 grams to hands four times daily using enclosed dosing card. (Patient not taking: Reported on 8/29/2017) 100 g 1     fluticasone (FLONASE) 50 MCG/ACT spray Spray 2 sprays into both nostrils daily (Patient not taking: Reported on 8/29/2017) 48 g 2     gabapentin (NEURONTIN) 100 MG capsule TAKE ONE CAPSULE BY MOUTH THREE TIMES DAILY (Patient not taking: Reported on 8/29/2017) 270 capsule 2     ORDER FOR DME Equipment being ordered:  LEFT THUMB  METACARPAL AND WRIST SPLINT FOR TRIGGER THUMB  *ONE*  *SIG:* AS DIRECTED (Patient not taking: Reported on 7/31/2018) 1 Device 1     ORDER FOR DME Equipment being ordered: RUBBER THRESHOLD /CARGO WEDGE RAMP  ONE  USE AS DIRECTED FOR THRESHOLD  TO PREVENT FALLING (Patient not taking: Reported on 7/31/2018) 1 Device 1     sertraline (ZOLOFT) 50 MG tablet Take 1/2 tablet (25 mg) for 1-2 weeks, then increase to 1 tablet orally daily (Patient not taking: Reported on 7/31/2018) 90 tablet 3           Recent Labs   Lab Test  08/29/17   1148  08/23/17   1030  09/13/16   1158  01/08/16   1630   09/14/15   0809   A1C  5.1   --   5.4   --    --    --    LDL  115*   --   132*   --    --   98   HDL  57   --   64   --    --   66   TRIG  93   --   74   --    --   87   ALT   --   22  24  23   --   24    CR  1.20*  1.29*  1.25*  1.04   --   1.00   GFRESTIMATED  44*  41*  43*  53*   --   55*   GFRESTBLACK  54*  50*  52*  64   --   67   POTASSIUM  3.9  3.7  4.0  3.8   --   3.9   TSH  1.43   --   0.96   --    < >   --     < > = values in this interval not displayed.            BP Readings from Last 6 Encounters:   07/31/18 138/72   09/19/17 122/70   08/29/17 132/70   08/23/17 118/58   03/09/17 132/70   09/22/16 118/64           Wt Readings from Last 3 Encounters:   07/31/18 186 lb (84.4 kg)   09/19/17 195 lb (88.5 kg)   08/29/17 196 lb 8 oz (89.1 kg)                 Positive symptoms or findings indicated by bold designation:         ROS: 10 point ROS neg other than the symptoms noted above in the HPI.except  has Sjogren's syndrome (H); Osteoarthritis of knee; GERD (gastroesophageal reflux disease); Constipation; Osteoporosis; Irritable bowel syndrome with diarrhea; HL (hearing loss); Rosacea; Poor memory; Low back pain; Senile keratosis; Spinal stenosis; H/O hysterectomy for benign disease; Bilateral hearing loss; Presbyopia; Knee pain; Hypertension goal BP (blood pressure) < 140/90; Hyperlipidemia with target LDL less than 130; Left arm pain; Arm pain; Major depression in partial remission (H); Lumbago; Subclavian artery stenosis (H); ACP (advance care planning); Gastroesophageal reflux disease without esophagitis; Primary osteoarthritis of both knees; and Bilateral low back pain with sciatica, sciatica laterality unspecified, unspecified chronicity on her problem list.  Review Of Systems    Skin: negative    Eyes: negative DRY EYES      Ears/Nose/Throat: negative DRY MOUTH    Respiratory: No shortness of breath, dyspnea on exertion, cough, or hemoptysis    Cardiovascular: negative    Gastrointestinal: negative    Genitourinary: negative    Musculoskeletal: negative KNEE PAIN AND LOWER BACK PAIN     SJOGREN'S SYNDROME    Neurologic: negative    Psychiatric: negative    Hematologic/Lymphatic/Immunologic:  negative    Endocrine: negative                PE:  /72 (Cuff Size: Adult Regular)  Pulse 74  Temp 98.3  F (36.8  C) (Tympanic)  Resp 16  Wt 186 lb (84.4 kg)  SpO2 99%  BMI 30.95 kg/m2 Body mass index is 30.95 kg/(m^2).        Constitutional: general appearance, well nourished, well developed, in no acute distress, well developed, appears stated age, normal body habitus,          Eyes:; The patient has normal eyelids sclerae and conjunctivae :          Ears/Nose/Throat: external ear, overall: normal appearance; external nose, overall: benign appearance, normal moujth gums and lips           Neck: thyroid, overall: normal size, normal consistency, nontender,          Respiratory:  palpation of chest, overall: normal excursion,    Clear to percussion and auscultation     NO Tachypnea    NORMAL  Color           Cardiovascular:  Good color with no peripheral edema    Regular sinus rhythm without murmur.  Physiologic heart sounds   Heart is unelarged    .     Chest/Breast: normal shape           Abdominal exam,  Liver and spleen are  unenlarged        Tenderness    Scars              Urogenital; no renal, flank or bladder  tenderness;          Lymphatic: neck nodes,     Other nodes         Musculoskeletal:  Brief ortho exam normal except:   OSTEOARTHRITIS KNEES           Integument: inspection of skin, no rash, lesions; and, palpation, no induration, no tenderness.          Neurologic mental status, overall: alert and oriented; gait, no ataxia, no unsteadiness; coordination, no tremors; cranial nerves, overall: normal motor, overall: normal bulk, tone.          Psychiatric: orientation/consciousness, overall: oriented to person, place and time; behavior/psychomotor activity, no tics, normal psychomotor activity; mood and affect, overall: normal mood and affect; appearance, overall: well-groomed, good eye contact; speech, overall: normal quality, no aphasia and normal quality, quantity, intact.         Diagnostic Test Results:  Results for orders placed or performed in visit on 08/29/17   Lipid panel reflex to direct LDL   Result Value Ref Range    Cholesterol 191 <200 mg/dL    Triglycerides 93 <150 mg/dL    HDL Cholesterol 57 >49 mg/dL    LDL Cholesterol Calculated 115 (H) <100 mg/dL    Non HDL Cholesterol 134 (H) <130 mg/dL   Basic metabolic panel   Result Value Ref Range    Sodium 139 133 - 144 mmol/L    Potassium 3.9 3.4 - 5.3 mmol/L    Chloride 103 94 - 109 mmol/L    Carbon Dioxide 28 20 - 32 mmol/L    Anion Gap 8 3 - 14 mmol/L    Glucose 94 70 - 99 mg/dL    Urea Nitrogen 22 7 - 30 mg/dL    Creatinine 1.20 (H) 0.52 - 1.04 mg/dL    GFR Estimate 44 (L) >60 mL/min/1.7m2    GFR Estimate If Black 54 (L) >60 mL/min/1.7m2    Calcium 9.7 8.5 - 10.1 mg/dL   Hemoglobin A1c   Result Value Ref Range    Hemoglobin A1C 5.1 4.3 - 6.0 %   TSH   Result Value Ref Range    TSH 1.43 0.40 - 4.00 mU/L           ICD-10-CM    1. Chronic knee pain, unspecified laterality M25.569 order for DME    G89.29    2. Primary osteoarthritis of both knees M17.0    3. Pruritic disorder L29.9 loratadine ( LORATADINE) 10 MG tablet     mineral oil-white petrolatum (MINERIN/EUCERIN) CREA cream   4. Spinal stenosis of lumbar region without neurogenic claudication M48.061    5. Hypertension goal BP (blood pressure) < 140/90 I10    6. Hyperlipidemia with target LDL less than 130 E78.5    7. Bilateral low back pain with sciatica, sciatica laterality unspecified, unspecified chronicity M54.40               .    Side effects benefits and risks thoroughly discussed. .she may come in early if unimproved or getting worse          Please drink 2 glasses of water prior to meals and walk 15-30 minutes after meals        I spent 25 MINUTES SPENT  with patient discussing the following issues   The primary encounter diagnosis was Chronic knee pain, unspecified laterality. Diagnoses of Primary osteoarthritis of both knees, Pruritic disorder, Spinal stenosis  of lumbar region without neurogenic claudication, Hypertension goal BP (blood pressure) < 140/90, Hyperlipidemia with target LDL less than 130, and Bilateral low back pain with sciatica, sciatica laterality unspecified, unspecified chronicity were also pertinent to this visit. over half of which involved counseling and coordination of care.      Patient Instructions   (M25.569,  G89.29) Chronic knee pain, unspecified laterality  (primary encounter diagnosis)  Comment:    Plan: order for DME             (M17.0) Primary osteoarthritis of both knees  Comment:    Plan:      (L29.9) Pruritic disorder  Comment:    Plan: loratadine ( LORATADINE) 10 MG tablet,         mineral oil-white petrolatum (MINERIN/EUCERIN)         CREA cream             (M48.061) Spinal stenosis of lumbar region without neurogenic claudication  Comment:    Plan:      (I10) Hypertension goal BP (blood pressure) < 140/90  Comment:    Plan:      (E78.5) Hyperlipidemia with target LDL less than 130  Comment:    Plan:      (M54.40) Bilateral low back pain with sciatica, sciatica laterality unspecified, unspecified chronicity  Comment:    Plan:                      ALL THE ABOVE PROBLEMS ARE STABLE AND MED CHANGES AS NOTED        Diet:  MEDITERRANEAN DIET         Exercise:  WALKING AND KNEE PAIN   Exercises Range of motion, balance, isometric, and strengthening exercises 30 repetitions twice daily of involved joints            .EVY ACOSTA MD 7/31/2018 1:06 PM  July 31, 2018

## 2018-07-31 NOTE — PATIENT INSTRUCTIONS
(M25.569,  G89.29) Chronic knee pain, unspecified laterality  (primary encounter diagnosis)  Comment:    Plan: order for DME             (M17.0) Primary osteoarthritis of both knees  Comment:    Plan:      (L29.9) Pruritic disorder  Comment:    Plan: loratadine ( LORATADINE) 10 MG tablet,         mineral oil-white petrolatum (MINERIN/EUCERIN)         CREA cream             (M48.061) Spinal stenosis of lumbar region without neurogenic claudication  Comment:    Plan:      (I10) Hypertension goal BP (blood pressure) < 140/90  Comment:    Plan:      (E78.5) Hyperlipidemia with target LDL less than 130  Comment:    Plan:      (M54.40) Bilateral low back pain with sciatica, sciatica laterality unspecified, unspecified chronicity  Comment:    Plan:

## 2018-08-01 ASSESSMENT — PATIENT HEALTH QUESTIONNAIRE - PHQ9: SUM OF ALL RESPONSES TO PHQ QUESTIONS 1-9: 0

## 2018-08-08 DIAGNOSIS — L29.9 PRURITIC DISORDER: ICD-10-CM

## 2018-08-08 NOTE — TELEPHONE ENCOUNTER
"Requested Prescriptions   Pending Prescriptions Disp Refills     loratadine ( LORATADINE) 10 MG tablet  Last Written Prescription Date:  7-31-18  Last Fill Quantity: 30tab,  # refills: 11   Last office visit: 7/31/2018 with prescribing provider:     Future Office Visit:     30 tablet 11     Sig: Take 1 tablet (10 mg) by mouth daily    Antihistamines Protocol Failed    8/8/2018 12:58 PM       Failed - Patient is 3-64 years of age    Apply weight-based dosing for peds patients age 3 - 12 years of age.    Forward request to provider for patients under the age of 3 or over the age of 64.         Passed - Recent (12 mo) or future (30 days) visit within the authorizing provider's specialty    Patient had office visit in the last 12 months or has a visit in the next 30 days with authorizing provider or within the authorizing provider's specialty.  See \"Patient Info\" tab in inbasket, or \"Choose Columns\" in Meds & Orders section of the refill encounter.              "

## 2018-08-08 NOTE — TELEPHONE ENCOUNTER
Reason for Call:  Medication or medication refill:    Do you use a [facility]?  Name of the pharmacy and phone number for the current request:  [facility]    Name of the medication requested: Loratadine 10 mg    Other request: Patient states she had increased to 20 mg as Dr Thomas told her that was ok. She states that was working for the most part, but was wondering if it was possible to increase to 30 mg. Also patient wants a 90 day supply. Please call to discuss before sending to the pharmacy. Patient only has enough for 9 days    Can we leave a detailed message on this number? NO    Phone number patient can be reached at: Home number on file [phone] (home)    Best Time: any    Call taken on 8/8/2018 at 12:55 PM by HECTOR FROST    .

## 2018-08-09 RX ORDER — LORATADINE 10 MG/1
10 TABLET ORAL DAILY
Qty: 30 TABLET | Refills: 11 | Status: SHIPPED | OUTPATIENT
Start: 2018-08-09 | End: 2019-09-27

## 2018-08-23 DIAGNOSIS — M35.03 SJOGREN'S SYNDROME WITH MYOPATHY (H): ICD-10-CM

## 2018-08-23 DIAGNOSIS — L29.9 PRURITIC DISORDER: ICD-10-CM

## 2018-08-23 DIAGNOSIS — M35.00 SJOGREN'S SYNDROME (H): Chronic | ICD-10-CM

## 2018-08-23 DIAGNOSIS — I10 ESSENTIAL HYPERTENSION WITH GOAL BLOOD PRESSURE LESS THAN 140/90: Chronic | ICD-10-CM

## 2018-08-23 RX ORDER — METOPROLOL SUCCINATE 50 MG/1
TABLET, EXTENDED RELEASE ORAL
Qty: 90 TABLET | Refills: 2 | Status: SHIPPED | OUTPATIENT
Start: 2018-08-23 | End: 2019-01-03

## 2018-08-23 NOTE — TELEPHONE ENCOUNTER
"Requested Prescriptions   Pending Prescriptions Disp Refills     metoprolol succinate (TOPROL-XL) 50 MG 24 hr tablet  Last Written Prescription Date:  1/11/18  Last Fill Quantity: 90,  # refills: 2   Last office visit: 7/31/2018 with prescribing provider:  [place]   Future Office Visit:   Next 5 appointments (look out 90 days)     Sep 25, 2018 10:15 AM CDT   [facility]    [facility] [address] [address] [phone]                  90 tablet 2     Sig: TAKE 1 BY MOUTH DAILY    Beta-Blockers Protocol Passed    8/23/2018  3:35 PM       Passed - Blood pressure under 140/90 in past 12 months    BP Readings from Last 3 Encounters:   07/31/18 138/72   09/19/17 122/70   08/29/17 132/70                Passed - Patient is age 6 or older       Passed - Recent (12 mo) or future (30 days) visit within the authorizing provider's specialty    Patient had office visit in the last 12 months or has a visit in the next 30 days with authorizing provider or within the authorizing provider's specialty.  See \"Patient Info\" tab in inbasket, or \"Choose Columns\" in Meds & Orders section of the refill encounter.            losartan-hydrochlorothiazide (HYZAAR) 100-12.5 MG per tablet  Last Written Prescription Date:  6/6/18  Last Fill Quantity: 90,  # refills: 0   Last office visit: 7/31/2018 with prescribing provider:  [place]   Future Office Visit:   Next 5 appointments (look out 90 days)     Sep 25, 2018 10:15 AM CDT   [facility]   [facility]    [address]  [facility] [address] [phone]                  90 tablet 0     Sig: Take 1 tablet by mouth daily    Angiotensin-II Receptors Failed    8/23/2018  3:35 PM       Failed - Normal serum creatinine " "on file in past 12 months    Recent Labs   Lab Test  08/29/17   1148   CR  1.20*            Passed - Blood pressure under 140/90 in past 12 months    BP Readings from Last 3 Encounters:   07/31/18 138/72   09/19/17 122/70   08/29/17 132/70                Passed - Recent (12 mo) or future (30 days) visit within the authorizing provider's specialty    Patient had office visit in the last 12 months or has a visit in the next 30 days with authorizing provider or within the authorizing provider's specialty.  See \"Patient Info\" tab in inbasket, or \"Choose Columns\" in Meds & Orders section of the refill encounter.           Passed - Patient is age 18 or older       Passed - No active pregnancy on record       Passed - Normal serum potassium on file in past 12 months    Recent Labs   Lab Test  08/29/17   1148   POTASSIUM  3.9                   Passed - No positive pregnancy test in past 12 months        hydroxychloroquine (PLAQUENIL) 200 MG tablet      Last Written Prescription Date:  1/11/18  Last Fill Quantity: 180,   # refills: 2  Last Office Visit: 7/31/18 Martha  Future Office visit:    Next 5 appointments (look out 90 days)     Sep 25, 2018 10:15 AM CDT   [facility]    [facility] [address] [address] [phone]                   Routing refill request to provider for review/approval because:  Drug not on the [facility], P or  Health refill protocol or controlled substance   180 tablet 2     Sig: TAKE 2 (400 MG) BY MOUTH AT BEDTIME    There is no refill protocol information for this order          "

## 2018-08-23 NOTE — TELEPHONE ENCOUNTER
Reason for Call:  Medication or medication refill:    Do you use a [facility]?  Name of the pharmacy and phone number for the current request:  [facility]    Name of the medication requested: metoprolol, losartan, hydroxychloroquine    Other request: 90 day supply     Can we leave a detailed message on this number? YES    Phone number patient can be reached at: Home number on file [phone] (home)    Best Time: any    Call taken on 8/23/2018 at 3:34 PM by MAYUR GALEAS

## 2018-08-23 NOTE — TELEPHONE ENCOUNTER
Routing refill request to provider for review/approval because:  Drug not on the [facility] refill protocol   Labs out of range:  CR

## 2018-08-25 RX ORDER — HYDROXYCHLOROQUINE SULFATE 200 MG/1
TABLET, FILM COATED ORAL
Qty: 180 TABLET | Refills: 2 | Status: SHIPPED | OUTPATIENT
Start: 2018-08-25 | End: 2019-01-03

## 2018-08-25 RX ORDER — LOSARTAN POTASSIUM AND HYDROCHLOROTHIAZIDE 12.5; 1 MG/1; MG/1
1 TABLET ORAL DAILY
Qty: 90 TABLET | Refills: 0 | Status: SHIPPED | OUTPATIENT
Start: 2018-08-25 | End: 2018-11-17

## 2018-09-25 ENCOUNTER — OFFICE VISIT (OUTPATIENT)
Dept: FAMILY MEDICINE | Facility: CLINIC | Age: 68
End: 2018-09-25
Payer: COMMERCIAL

## 2018-09-25 VITALS
WEIGHT: 192 LBS | BODY MASS INDEX: 31.99 KG/M2 | RESPIRATION RATE: 20 BRPM | HEIGHT: 65 IN | SYSTOLIC BLOOD PRESSURE: 144 MMHG | DIASTOLIC BLOOD PRESSURE: 64 MMHG | TEMPERATURE: 98.2 F | OXYGEN SATURATION: 97 % | HEART RATE: 83 BPM

## 2018-09-25 DIAGNOSIS — Z23 NEED FOR PROPHYLACTIC VACCINATION AGAINST STREPTOCOCCUS PNEUMONIAE (PNEUMOCOCCUS): ICD-10-CM

## 2018-09-25 DIAGNOSIS — F32.4 MAJOR DEPRESSIVE DISORDER WITH SINGLE EPISODE, IN PARTIAL REMISSION (H): Chronic | ICD-10-CM

## 2018-09-25 DIAGNOSIS — Z23 NEED FOR PROPHYLACTIC VACCINATION AND INOCULATION AGAINST INFLUENZA: ICD-10-CM

## 2018-09-25 DIAGNOSIS — E78.5 HYPERLIPIDEMIA WITH TARGET LDL LESS THAN 130: Chronic | ICD-10-CM

## 2018-09-25 DIAGNOSIS — Z11.59 NEED FOR HEPATITIS C SCREENING TEST: ICD-10-CM

## 2018-09-25 DIAGNOSIS — M48.061 SPINAL STENOSIS OF LUMBAR REGION WITHOUT NEUROGENIC CLAUDICATION: Chronic | ICD-10-CM

## 2018-09-25 DIAGNOSIS — Z00.00 ROUTINE GENERAL MEDICAL EXAMINATION AT A HEALTH CARE FACILITY: Primary | ICD-10-CM

## 2018-09-25 DIAGNOSIS — M54.40 BILATERAL LOW BACK PAIN WITH SCIATICA, SCIATICA LATERALITY UNSPECIFIED, UNSPECIFIED CHRONICITY: Chronic | ICD-10-CM

## 2018-09-25 DIAGNOSIS — K21.9 GASTROESOPHAGEAL REFLUX DISEASE WITHOUT ESOPHAGITIS: Chronic | ICD-10-CM

## 2018-09-25 DIAGNOSIS — I10 HYPERTENSION GOAL BP (BLOOD PRESSURE) < 140/90: Chronic | ICD-10-CM

## 2018-09-25 DIAGNOSIS — Z12.31 VISIT FOR SCREENING MAMMOGRAM: ICD-10-CM

## 2018-09-25 DIAGNOSIS — M35.03 SJOGREN'S SYNDROME WITH MYOPATHY (H): ICD-10-CM

## 2018-09-25 LAB
ERYTHROCYTE [DISTWIDTH] IN BLOOD BY AUTOMATED COUNT: 12.9 % (ref 10–15)
ERYTHROCYTE [SEDIMENTATION RATE] IN BLOOD BY WESTERGREN METHOD: 31 MM/H (ref 0–30)
HCT VFR BLD AUTO: 41.2 % (ref 35–47)
HGB BLD-MCNC: 13.6 G/DL (ref 11.7–15.7)
MCH RBC QN AUTO: 31.2 PG (ref 26.5–33)
MCHC RBC AUTO-ENTMCNC: 33 G/DL (ref 31.5–36.5)
MCV RBC AUTO: 95 FL (ref 78–100)
PLATELET # BLD AUTO: 205 10E9/L (ref 150–450)
RBC # BLD AUTO: 4.36 10E12/L (ref 3.8–5.2)
WBC # BLD AUTO: 6.2 10E9/L (ref 4–11)

## 2018-09-25 PROCEDURE — G0439 PPPS, SUBSEQ VISIT: HCPCS | Performed by: FAMILY MEDICINE

## 2018-09-25 PROCEDURE — 82043 UR ALBUMIN QUANTITATIVE: CPT | Performed by: FAMILY MEDICINE

## 2018-09-25 PROCEDURE — 85027 COMPLETE CBC AUTOMATED: CPT | Performed by: FAMILY MEDICINE

## 2018-09-25 PROCEDURE — 85652 RBC SED RATE AUTOMATED: CPT | Performed by: FAMILY MEDICINE

## 2018-09-25 PROCEDURE — 36415 COLL VENOUS BLD VENIPUNCTURE: CPT | Performed by: FAMILY MEDICINE

## 2018-09-25 PROCEDURE — 80061 LIPID PANEL: CPT | Performed by: FAMILY MEDICINE

## 2018-09-25 PROCEDURE — 80053 COMPREHEN METABOLIC PANEL: CPT | Performed by: FAMILY MEDICINE

## 2018-09-25 ASSESSMENT — ACTIVITIES OF DAILY LIVING (ADL)
CURRENT_FUNCTION: TRANSPORTATION REQUIRES ASSISTANCE
I_NEED_ASSISTANCE_FOR_THE_FOLLOWING_DAILY_ACTIVITIES:: TRANSPORTATION

## 2018-09-25 NOTE — PROGRESS NOTES
SUBJECTIVE:   Fatou Brian is a 68 year old female who presents for Preventive Visit.  Are you in the first 12 months of your Medicare coverage?  No    Physical   Annual:     Getting at least 3 servings of Calcium per day:  Yes    Bi-annual eye exam:  Yes    Dental care twice a year:  NO    Sleep apnea or symptoms of sleep apnea:  Daytime drowsiness and Excessive snoring    Diet:  Low salt    Taking medications regularly:  Yes    Medication side effects:  Not applicable    Additional concerns today:  YES    Ability to successfully perform activities of daily living: transportation requires assistance    Home Safety:  No safety concerns identified    Hearing Impairment: difficulty following a conversation in a noisy restaurant or crowded room, feel that people are mumbling or not speaking clearly, difficulty following dialogue in the theater, difficult to understand a speaker at a public meeting or [employer] service and need to ask people to speak up or repeat themselves        Fall risk:  Fallen 2 or more times in the past year?: No  Any fall with injury in the past year?: No  click delete button to remove this line now  COGNITIVE SCREEN  1) Repeat 3 items (Leader, Season, Table)    2) Clock draw: NORMAL  3) 3 item recall: Recalls 3 objects  Results: 3 items recalled: COGNITIVE IMPAIRMENT LESS LIKELY    Mini-CogTM Copyright S [employer]. Licensed by the author for use in [facility]; reprinted with permission (soob@[facility]). All rights reserved.        Reviewed and updated as needed this visit by clinical staff  Tobacco  Allergies  Meds  Med Hx  Surg Hx  Fam Hx  Soc Hx        Reviewed and updated as needed this visit by Provider        Social History   Substance Use Topics     Smoking status: Former Smoker     Types: Cigarettes     Smokeless tobacco: Never Used      Comment: states quit 4 years ago     Alcohol use Yes      Comment: occas.       Alcohol Use 9/25/2018   If you drink alcohol do you  typically have greater than 3 drinks per day OR greater than 7 drinks per week? No           OTC meds, itching    Today's PHQ-2 Score:   PHQ-2 ( 1999 [employer]) 9/25/2018   Q1: Little interest or pleasure in doing things 0   Q2: Feeling down, depressed or hopeless 0   PHQ-2 Score 0   Q1: Little interest or pleasure in doing things Not at all   Q2: Feeling down, depressed or hopeless Not at all   PHQ-2 Score 0       Do you feel safe in your environment - Yes    Do you have a Health Care Directive?: No: Advance care planning reviewed with patient; information given to patient to review.    Current providers sharing in care for this patient include:   Patient Care Team:  Reid Thomas MD as PCP - General (Family Practice)    The following health maintenance items are reviewed in Epic and correct as of today:  Health Maintenance   Topic Date Due     HEPATITIS C SCREENING  04/10/1966     PNEUMOCOCCAL (2 of 2 - PCV13) 09/24/2014     MAMMO SCREEN Q2 YR (SYSTEM ASSIGNED)  12/20/2015     FALL RISK ASSESSMENT  09/13/2017     INFLUENZA VACCINE (1) 09/01/2018     PHQ-9 Q6 MONTHS  01/31/2019     DEPRESSION ACTION PLAN Q1 YR  07/31/2019     TETANUS IMMUNIZATION (SYSTEM ASSIGNED)  10/14/2020     ADVANCE DIRECTIVE PLANNING Q5 YRS  03/20/2022     LIPID SCREEN Q5 YR FEMALE (SYSTEM ASSIGNED)  08/29/2022     COLON CANCER SCREEN (SYSTEM ASSIGNED)  01/24/2024     DEXA SCAN SCREENING (SYSTEM ASSIGNED)  Completed       Labs reviewed in EPIC  BP Readings from Last 3 Encounters:   09/25/18 144/64   07/31/18 138/72   09/19/17 122/70    Wt Readings from Last 3 Encounters:   09/25/18 192 lb (87.1 kg)   07/31/18 186 lb (84.4 kg)   09/19/17 195 lb (88.5 kg)                  Patient Active Problem List   Diagnosis     Sjogren's syndrome with myopathy (H)     Osteoarthritis of knee     GERD (gastroesophageal reflux disease)     Constipation     Osteoporosis     Irritable bowel syndrome with diarrhea     HL (hearing loss)     Rosacea      Poor memory     Low back pain     Senile keratosis     Spinal stenosis     H/O hysterectomy for benign disease     Bilateral hearing loss     Presbyopia     Knee pain     Hypertension goal BP (blood pressure) < 140/90     Hyperlipidemia with target LDL less than 130     Left arm pain     Arm pain     Major depression in partial remission (H)     Subclavian artery stenosis (H)     ACP (advance care planning)     Gastroesophageal reflux disease without esophagitis     Primary osteoarthritis of both knees     Bilateral low back pain with sciatica, sciatica laterality unspecified, unspecified chronicity     Past Surgical History:   Procedure Laterality Date     aneursym[  1983, 1991    Has metal in head     CHOLECYSTECTOMY       CLOSED RX PROX HUMERUS FRACTURE      10 pins placed     COLONOSCOPY       COLONOSCOPY  1/24/2014    Procedure: COMBINED COLONOSCOPY, SINGLE BIOPSY/POLYPECTOMY BY BIOPSY;  COLONOSCOPY;  Surgeon: Ranjit Pa MD;  Location:  GI     [facility] ECP WITH CATARACT SURGERY      both eyes     HYSTERECTOMY, JANET         Social History   Substance Use Topics     Smoking status: Former Smoker     Types: Cigarettes     Smokeless tobacco: Never Used      Comment: states quit 4 years ago     Alcohol use Yes      Comment: occas.     Family History   Problem Relation Age of Onset     Thyroid Disease Mother      Arthritis Mother      Rheumatoid     Osteoporosis Mother      Cancer Mother      stomach     Cerebrovascular Disease Mother      HEART DISEASE Father      Cancer Father      HEART DISEASE Brother      fibulation         Current Outpatient Prescriptions   Medication Sig Dispense Refill     Acetaminophen 500 MG CHEW Take 1,000 mg by mouth 3 times daily 120 tablet 5     calcium carbonate (TUMS) 500 MG chewable tablet Take 1 tablet (500 mg) by mouth At Bedtime 120 tablet 11     cholecalciferol (VITAMIN D3) 5000 UNITS TABS tablet Take 1 tablet (5,000 Units) by mouth At Bedtime 100 tablet 3     diclofenac  (VOLTAREN) 1 % GEL Apply 4 grams to knees or 2 grams to hands four times daily using enclosed dosing card. 100 g 1     famotidine (PEPCID) 20 MG tablet TAKE 1 TABLET BY MOUTH TWICE DAILY AS NEEDED 180 tablet 2     fluticasone (FLONASE) 50 MCG/ACT spray Spray 2 sprays into both nostrils daily 48 g 2     gabapentin (NEURONTIN) 100 MG capsule TAKE ONE CAPSULE BY MOUTH THREE TIMES DAILY 270 capsule 2     hydroxychloroquine (PLAQUENIL) 200 MG tablet TAKE 2 (400 MG) BY MOUTH AT BEDTIME 180 tablet 2     lidocaine (XYLOCAINE) 5 % ointment One application 4 x daily to affected areas lower back and knees if necessary 213 g 3     loratadine (KP LORATADINE) 10 MG tablet Take 1 tablet (10 mg) by mouth daily 30 tablet 11     losartan-hydrochlorothiazide (HYZAAR) 100-12.5 MG per tablet Take 1 tablet by mouth daily 90 tablet 0     metoprolol succinate (TOPROL-XL) 50 MG 24 hr tablet TAKE 1 BY MOUTH DAILY 90 tablet 2     mineral oil-white petrolatum (MINERIN/EUCERIN) CREA cream Apply topically 2 times daily 454 g 0     order for DME Equipment being ordered:   .seat lift mechanism   LIFT CHAIR MECHANISM EVALUATION    PATIENT PRESENT TODAY FOR EVALUATION OF LIFT CHAIR MECHANISM  HAS SIGNIFICANT DISABILITY  WHICH PUT PATIENT AT RISK FOR FALLING  WHEN GETTING OUT OF A NORMAL CHAIR   PATIENT CAN AMBULATE WHEN UP AND WALKING ONCE IN A STANDING POSITION  THIS CONDITION IS NOT REVERSIBLE WITH FURTHER PHYSICAL THERAPY    SIGNIFICANT NEUROMUSCULAR IMPAIRMENT?   Yes advanced  OSTEOARTH 1 each 0     order for DME One pair longitudinal arch supports  One pair   Use as directed 1 each 1     ORDER FOR DME Equipment being ordered:  LEFT THUMB  METACARPAL AND WRIST SPLINT FOR TRIGGER THUMB  *ONE*  *SIG:* AS DIRECTED 1 Device 1     ORDER FOR DME Equipment being ordered: RUBBER THRESHOLD /CARGO WEDGE RAMP  ONE  USE AS DIRECTED FOR THRESHOLD  TO PREVENT FALLING 1 Device 1     pantoprazole (PROTONIX) 40 MG EC tablet TAKE 1 BY MOUTH DAILY 90 tablet 2      pilocarpine (SALAGEN) 5 MG tablet Take 1 tablet (5 mg) by mouth 4 times daily (before meals and nightly) 360 tablet 2     senna-docusate (SENOKOT-S;PERICOLACE) 8.6-50 MG per tablet Take 2 tablets by mouth 2 times daily 120 tablet PRN     sertraline (ZOLOFT) 50 MG tablet Take 1/2 tablet (25 mg) for 1-2 weeks, then increase to 1 tablet orally daily 90 tablet 3     Allergies   Allergen Reactions     Chantix [Varenicline]      suicidal     Influenza Virus Vaccine H5n1      Muscle aches dizzy, nauseated  Light headed     Morphine Sulfate      Sensitivity when in hospital     Omeprazole Magnesium Nausea     Intolerance       Vioxx      Niacin Itching and Rash     Zetia [Ezetimibe] Other (See Comments)     Muscle pain     Zyrtec-D Rash     Recent Labs   Lab Test  08/29/17   1148  08/23/17   1030  09/13/16   1158  01/08/16   1630   09/14/15   0809   A1C  5.1   --   5.4   --    --    --    LDL  115*   --   132*   --    --   98   HDL  57   --   64   --    --   66   TRIG  93   --   74   --    --   87   ALT   --   22  24  23   --   24   CR  1.20*  1.29*  1.25*  1.04   --   1.00   GFRESTIMATED  44*  41*  43*  53*   --   55*   GFRESTBLACK  54*  50*  52*  64   --   67   POTASSIUM  3.9  3.7  4.0  3.8   --   3.9   TSH  1.43   --   0.96   --    < >   --     < > = values in this interval not displayed.        (Z00.00) Routine general medical examination at a health care facility  (primary encounter diagnosis)    (Z12.31) Visit for screening mammogram    Plan: MA SCREENING DIGITAL BILAT - Future  (s+30)        FUTURE       (E78.5) Hyperlipidemia with target LDL less than 130  Comment:  LIPID PANEL MEDICATIONS   SIDE EFFECTS BENEFITS AND RISKS DISCUSSED    TREATMENT PROGNOSIS BENEFITS AND RISKS DISCUSSED       (I10) Hypertension goal BP (blood pressure) < 140/90  Comment:  SAME TREATMENT   Plan: Lipid panel reflex to direct LDL Fasting             (M48.061) Spinal stenosis of lumbar region without neurogenic claudication  Comment:   IMPROVED WITH TWICE DAILY EXERCISES       (M35.03) Sjogren's syndrome with myopathy (H)  Comment:    Plan: Comprehensive metabolic panel, Albumin Random         Urine Quantitative with Creat Ratio, CBC with         platelets, Erythrocyte sedimentation rate auto          (F32.4) Major depressive disorder with single episode, in partial remission (H)  IMPROVED OFF serotonin specific reuptake inhibitor   MODERATE OSTEOARTHRITIS KNEES BILATERAL SIGNIFICANT PAIN   AND DECREASE RANGE OF MOTION OF BACK   RIGHT SUBCLAVIAN STENOSIS   IMROVED MEMORY   HISTORY OF HYSTERECTOMY WITHOUT COMPLICATION         Review of Systemshas Sjogren's syndrome with myopathy (H); Osteoarthritis of knee; GERD (gastroesophageal reflux disease); Constipation; Osteoporosis; Irritable bowel syndrome with diarrhea; HL (hearing loss); Rosacea; Poor memory; Low back pain; Senile keratosis; Spinal stenosis; H/O hysterectomy for benign disease; Bilateral hearing loss; Presbyopia; Knee pain; Hypertension goal BP (blood pressure) < 140/90; Hyperlipidemia with target LDL less than 130; Left arm pain; Arm pain; Major depression in partial remission (H); Subclavian artery stenosis (H); ACP (advance care planning); Gastroesophageal reflux disease without esophagitis; Primary osteoarthritis of both knees; and Bilateral low back pain with sciatica, sciatica laterality unspecified, unspecified chronicity on her problem list.    CONSTITUTIONAL: NEGATIVE for fever, chills, change in weight  INTEGUMENTARY/SKIN: NEGATIVE for worrisome rashes, moles or lesions  EYES: NEGATIVE for vision changes or irritation DRY EYES   ENT/MOUTH: ABSENT RIGHT UPPER CANINE  DRY MOUTH   RESP: NEGATIVE for significant cough or SOB  BREAST: NEGATIVE for masses, tenderness or discharge  CV: NEGATIVE for chest pain, palpitations or peripheral edema  GI: GASTROESOPHAGEAL REFLUX DISEASE WITHOUT ESOPHAGITIS CONTROLLED   : NEGATIVE for frequency, dysuria, or hematuria  MUSCULOSKELETAL:  "CHRONIC LOWER BACK PAIN AND BILATERAL OSTEOARTHRITIS  SJOGREN'S SYNDROME   NEURO: NEGATIVE for weakness, dizziness or paresthesias  ENDOCRINE: NEGATIVE for temperature intolerance, skin/hair changes  HEME: NEGATIVE for bleeding problems  PSYCHIATRIC: NEGATIVE for changes in mood or affect    OBJECTIVE:   /64 (Cuff Size: Adult Regular)  Pulse 83  Temp 98.2  F (36.8  C) (Tympanic)  Resp 20  Ht 5' 4.75\" (1.645 m)  Wt 192 lb (87.1 kg)  SpO2 97%  BMI 32.2 kg/m2 Estimated body mass index is 32.2 kg/(m^2) as calculated from the following:    Height as of this encounter: 5' 4.75\" (1.645 m).    Weight as of this encounter: 192 lb (87.1 kg).  Physical Exam  GENERAL APPEARANCE: healthy, alert and no distress  EYES: Eyes grossly normal to inspection, PERRL and conjunctivae and sclerae normal  HENT: ear canals and TM's normal, nose and mouth without ulcers or lesions, oropharynx clear, oral mucous membranes moist and BILATERAL DECREASE HEARING YELL TO HEAR  NECK: no adenopathy, no asymmetry, masses, or scars and thyroid normal to palpation  RESP: lungs clear to auscultation - no rales, rhonchi or wheezes  BREAST: normal without masses, tenderness or nipple discharge and no palpable axillary masses or adenopathy  CV: regular rate and rhythm, normal S1 S2, no S3 or S4, no murmur, click or rub, no peripheral edema and peripheral pulses strong  ABDOMEN: soft, nontender, no hepatosplenomegaly, no masses and bowel sounds normal   (female): normal female external genitalia, normal urethral meatus, vaginal mucosal atrophy noted, normal cervix, adnexae, and uterus without masses or abnormal discharge  MS:  BILATERAL OSTEOARTHRITIS KNEE PAIN   DECREASE EXTENSION BILATERAL AND FLEXION   DECREASE RANGE OF MOTION OF BACK   NEGATIVE STRAIGHT LEG RAISING TEST   SKIN: no suspicious lesions or rashes  NEURO: Normal strength and tone, sensory exam grossly normal, mentation intact and speech normal  PSYCH: mentation appears normal " and affect normal/bright    Diagnostic Test Results:  Results for orders placed or performed in visit on 09/25/18   CBC with platelets   Result Value Ref Range    WBC 6.2 4.0 - 11.0 10e9/L    RBC Count 4.36 3.8 - 5.2 10e12/L    Hemoglobin 13.6 11.7 - 15.7 g/dL    Hematocrit 41.2 35.0 - 47.0 %    MCV 95 78 - 100 fl    MCH 31.2 26.5 - 33.0 pg    MCHC 33.0 31.5 - 36.5 g/dL    RDW 12.9 10.0 - 15.0 %    Platelet Count 205 150 - 450 10e9/L   Erythrocyte sedimentation rate auto   Result Value Ref Range    Sed Rate 31 (H) 0 - 30 mm/h       ASSESSMENT / PLAN:       ICD-10-CM    1. Routine general medical examination at a health care facility Z00.00    2. Visit for screening mammogram Z12.31 MA SCREENING DIGITAL BILAT - Future  (s+30)   3. Need for hepatitis C screening test Z11.59    4. Need for prophylactic vaccination and inoculation against influenza Z23    5. Need for prophylactic vaccination against Streptococcus pneumoniae (pneumococcus) Z23    6. Hyperlipidemia with target LDL less than 130 E78.5    7. Hypertension goal BP (blood pressure) < 140/90 I10 Lipid panel reflex to direct LDL Fasting   8. Spinal stenosis of lumbar region without neurogenic claudication M48.061    9. Sjogren's syndrome with myopathy (H) M35.03 Comprehensive metabolic panel     Albumin Random Urine Quantitative with Creat Ratio     CBC with platelets     Erythrocyte sedimentation rate auto   10. Bilateral low back pain with sciatica, sciatica laterality unspecified, unspecified chronicity M54.40    11. Gastroesophageal reflux disease without esophagitis K21.9    12. Major depressive disorder with single episode, in partial remission (H) F32.4        End of Life Planning:  Patient currently has an advanced directive: NOT DONE TODAY     COUNSELING:  Reviewed preventive health counseling, as reflected in patient instructions       Regular exercise       Healthy diet/nutrition       Vision screening       Hearing screening       Dental care    BP  "Readings from Last 1 Encounters:   09/25/18 144/64     Estimated body mass index is 32.2 kg/(m^2) as calculated from the following:    Height as of this encounter: 5' 4.75\" (1.645 m).    Weight as of this encounter: 192 lb (87.1 kg).      Weight management plan: Discussed healthy diet and exercise guidelines and patient will follow up in 6 months in clinic to re-evaluate.     reports that she has quit smoking. Her smoking use included Cigarettes. She has never used smokeless tobacco.      Appropriate preventive services were discussed with this patient, including applicable screening as appropriate for cardiovascular disease, diabetes, osteopenia/osteoporosis, and glaucoma.  As appropriate for age/gender, discussed screening for colorectal cancer, prostate cancer, breast cancer, and cervical cancer. Checklist reviewing preventive services available has been given to the patient.    Reviewed patients plan of care and provided an AVS. The Basic Care Plan (routine screening as documented in Health Maintenance) for Fatou meets the Care Plan requirement. This Care Plan has been established and reviewed with the Patient.    Counseling Resources:  ATP IV Guidelines  Pooled Cohorts Equation Calculator  Breast Cancer Risk Calculator  FRAX Risk Assessment  ICSI Preventive Guidelines  Dietary Guidelines for Americans, 2010  [employer]'s MyPlate  ASA Prophylaxis  Lung CA Screening    EVY ACOSTA MD  [facility]  Answers for HPI/ROS submitted by the patient on 9/25/2018   PHQ-2 Score: 0    "

## 2018-09-25 NOTE — LETTER
"September 27, 2018      Fatou Brian  [address]  [facility] [id number]        Dear ,    We are writing to inform you of your test results.    NORMAL COMPLETE BLOOD PANEL WBCS RBCS AND PLTS   NORMAL DIABETES URINE PROTEIN TEST   BORDERLINE  RENAL FUNCTION   REST OF COMPLETE METABOLIC PROFILE RENAL BLOOD SALTS LIVER GLUCOSE TEST WITHIN NORMAL LIMITS   RENAL IMPROVED FROM ONE YEAR AGO   NORMAL TOTAL CHOLESTEROL   NORMAL TRIGLYCERIDES   NORMAL HDL OR \"GOOD\" CHOLESTEROL   BORDERLINE  LDL OR \"BAD\" CHOLESTEROL   NORMAL VERY LOW DENSITY CHOLESTEROL   BORDERLINE  ERTHROCYTE SEDIMENTATION RATE IE INFLAMMATORY MARKER   INFLAMMATION  RELATED TO SJOGREN'S     Resulted Orders   Comprehensive metabolic panel   Result Value Ref Range    Sodium 138 133 - 144 mmol/L    Potassium 4.3 3.4 - 5.3 mmol/L    Chloride 102 94 - 109 mmol/L    Carbon Dioxide 27 20 - 32 mmol/L    Anion Gap 9 3 - 14 mmol/L    Glucose 84 70 - 99 mg/dL      Comment:      Fasting specimen    Urea Nitrogen 14 7 - 30 mg/dL    Creatinine 1.07 (H) 0.52 - 1.04 mg/dL    GFR Estimate 51 (L) >60 mL/min/1.7m2      Comment:      Non  GFR Calc    GFR Estimate If Black 61 >60 mL/min/1.7m2      Comment:       GFR Calc    Calcium 9.2 8.5 - 10.1 mg/dL    Bilirubin Total 0.5 0.2 - 1.3 mg/dL    Albumin 4.1 3.4 - 5.0 g/dL    Protein Total 7.2 6.8 - 8.8 g/dL    Alkaline Phosphatase 81 40 - 150 U/L    ALT 22 0 - 50 U/L    AST 24 0 - 45 U/L   Lipid panel reflex to direct LDL Fasting   Result Value Ref Range    Cholesterol 186 <200 mg/dL    Triglycerides 83 <150 mg/dL      Comment:      Fasting specimen    HDL Cholesterol 62 >49 mg/dL    LDL Cholesterol Calculated 107 (H) <100 mg/dL      Comment:      Above desirable:  100-129 mg/dl  Borderline High:  130-159 mg/dL  High:             160-189 mg/dL  Very high:       >189 mg/dl      Non HDL Cholesterol 124 <130 mg/dL   Albumin Random Urine Quantitative with Creat Ratio   Result " Value Ref Range    Creatinine Urine 31 mg/dL    Albumin Urine mg/L <5 mg/L    Albumin Urine mg/g Cr Unable to calculate due to low value 0 - 25 mg/g Cr   CBC with platelets   Result Value Ref Range    WBC 6.2 4.0 - 11.0 10e9/L    RBC Count 4.36 3.8 - 5.2 10e12/L    Hemoglobin 13.6 11.7 - 15.7 g/dL    Hematocrit 41.2 35.0 - 47.0 %    MCV 95 78 - 100 fl    MCH 31.2 26.5 - 33.0 pg    MCHC 33.0 31.5 - 36.5 g/dL    RDW 12.9 10.0 - 15.0 %    Platelet Count 205 150 - 450 10e9/L   Erythrocyte sedimentation rate auto   Result Value Ref Range    Sed Rate 31 (H) 0 - 30 mm/h       If you have any questions or concerns, please call the clinic at the number listed above.       Sincerely,        EVY ACOSTA MD

## 2018-09-25 NOTE — MR AVS SNAPSHOT
After Visit Summary   9/25/2018    Fatou Brian    MRN: [id number]           Patient Information     Date Of Birth          1950        Visit Information        Provider Department      9/25/2018 10:15 AM Reid Thomas MD [facility]        Today's Diagnoses     Hyperlipidemia with target LDL less than 130    -  1    Routine general medical examination at a health care facility        Visit for screening mammogram        Need for hepatitis C screening test        Need for prophylactic vaccination and inoculation against influenza        Need for prophylactic vaccination against Streptococcus pneumoniae (pneumococcus)        Hypertension goal BP (blood pressure) < 140/90        Spinal stenosis of lumbar region without neurogenic claudication        Sjogren's syndrome with myopathy (H)        Bilateral low back pain with sciatica, sciatica laterality unspecified, unspecified chronicity        Gastroesophageal reflux disease without esophagitis        Major depressive disorder with single episode, in partial remission (H)          Care Instructions    (E78.5) Hyperlipidemia with target LDL less than 130  (primary encounter diagnosis)  Comment:    Plan:      (Z00.00) Routine general medical examination at a health care facility  Comment:    Plan:      (Z12.31) Visit for screening mammogram  Comment:    Plan: MA SCREENING DIGITAL BILAT - Future  (s+30)             (Z11.59) Need for hepatitis C screening test  Comment:    Plan:      (Z23) Need for prophylactic vaccination and inoculation against influenza  Comment:    Plan:      (Z23) Need for prophylactic vaccination against Streptococcus pneumoniae (pneumococcus)  Comment:    Plan:      (I10) Hypertension goal BP (blood pressure) < 140/90  Comment:    Plan: Lipid panel reflex to direct LDL Fasting             (M48.061) Spinal stenosis of lumbar region without neurogenic claudication  Comment:    Plan:       (M35.03) Sjogren's syndrome with myopathy (H)  Comment:    Plan: Comprehensive metabolic panel, Albumin Random         Urine Quantitative with Creat Ratio, CBC with         platelets, Erythrocyte sedimentation rate auto             (M54.40) Bilateral low back pain with sciatica, sciatica laterality unspecified, unspecified chronicity  Comment:    Plan:      (K21.9) Gastroesophageal reflux disease without esophagitis  Comment:    Plan:      (F32.4) Major depressive disorder with single episode, in partial remission (H)  Comment:    Plan:       Bilateral hearing loss                     Follow-ups after your visit        Follow-up notes from your care team     Return in about 6 months (around 3/25/2019) for Lab Work, BASIC METABOLIC PANEL, PAIN VISIT.      Future tests that were ordered for you today     Open Future Orders        Priority Expected Expires Ordered    MA SCREENING DIGITAL BILAT - Future  (s+30) Routine  9/25/2019 9/25/2018            Who to contact     If you have questions or need follow up information about today's clinic visit or your schedule please contact [facility] directly at [phone].  Normal or non-critical lab and imaging results will be communicated to you by [employer], letter or phone within 4 business days after the clinic has received the results. If you do not hear from us within 7 days, please contact the clinic through [employer] or phone. If you have a critical or abnormal lab result, we will notify you by phone as soon as possible.  Submit refill requests through [facility] or call your pharmacy and they will forward the refill request to us. Please allow 3 business days for your refill to be completed.          Additional Information About Your Visit        [facility] Information     [facility] gives you secure access to your electronic health record. If you see a primary care provider, you can also send messages to your care team and make appointments.  "If you have questions, please call your primary care clinic.  If you do not have a primary care provider, please call [phone] and they will assist you.        Care EveryWhere ID     This is your Care EveryWhere ID. This could be used by other organizations to access your [place] medical records  [id number]        Your Vitals Were     Pulse Temperature Respirations Height Pulse Oximetry BMI (Body Mass Index)    83 98.2  F (36.8  C) (Tympanic) 20 5' 4.75\" (1.645 m) 97% 32.2 kg/m2       Blood Pressure from Last 3 Encounters:   09/25/18 144/64   07/31/18 138/72   09/19/17 122/70    Weight from Last 3 Encounters:   09/25/18 192 lb (87.1 kg)   07/31/18 186 lb (84.4 kg)   09/19/17 195 lb (88.5 kg)              We Performed the Following     Albumin Random Urine Quantitative with Creat Ratio     CBC with platelets     Comprehensive metabolic panel     Erythrocyte sedimentation rate auto     Lipid panel reflex to direct LDL Fasting        Primary Care Provider Office Phone # Fax #    Reid Nyasia Thomas [phone] [address] [facility] [address]        Equal Access to Services     LINDSEY MCCARTY : Hadii aad ku hadasho Soomaali, waaxda luqadaha, qaybta kaalmada adeegyada, bernice bustamanten claudia post laramirez noyola. So [facility] [phone].    ATENCIÓN: Si habla español, tiene a smith disposición servicios gratuitos de asistencia lingüística. Llame al [phone].    We comply with applicable federal civil rights laws and [place] laws. We do not discriminate on the basis of race, color, national origin, age, disability, sex, sexual orientation, or gender identity.            Thank you!     Thank you for choosing [facility]  for your care. Our goal is always to provide you with excellent care. Hearing back from our patients is one way we can continue to improve our services. Please take a few minutes to complete the written survey that you may receive in " the mail after your visit with us. Thank you!             Your Updated Medication List - Protect others around you: Learn how to safely use, store and throw away your medicines at www.disposemymeds.org.          This list is accurate as of 9/25/18 11:11 AM.  Always use your most recent med list.                   Brand Name Dispense Instructions for use Diagnosis    Acetaminophen 500 MG Chew     120 tablet    Take 1,000 mg by mouth 3 times daily    LBP (low back pain), Osteoarthritis of knee, Arm pain, anterior, left, Spinal stenosis       calcium carbonate 500 MG chewable tablet    TUMS    120 tablet    Take 1 tablet (500 mg) by mouth At Bedtime    Vitamin D insufficiency       cholecalciferol 5000 units Tabs tablet    vitamin D3    100 tablet    Take 1 tablet (5,000 Units) by mouth At Bedtime    Vitamin D insufficiency       diclofenac 1 % Gel topical gel    VOLTAREN    100 g    Apply 4 grams to knees or 2 grams to hands four times daily using enclosed dosing card.    Midline low back pain without sciatica       famotidine 20 MG tablet    PEPCID    180 tablet    TAKE 1 TABLET BY MOUTH TWICE DAILY AS NEEDED    Gastroesophageal reflux disease without esophagitis       fluticasone 50 MCG/ACT spray    FLONASE    48 g    Spray 2 sprays into both nostrils daily    PND (post-nasal drip)       gabapentin 100 MG capsule    NEURONTIN    270 capsule    TAKE ONE CAPSULE BY MOUTH THREE TIMES DAILY    Bilateral low back pain with sciatica, sciatica laterality unspecified, unspecified chronicity       hydroxychloroquine 200 MG tablet    PLAQUENIL    180 tablet    TAKE 2 (400 MG) BY MOUTH AT BEDTIME    Sjogren's syndrome with myopathy (H)       lidocaine 5 % ointment    XYLOCAINE    213 g    One application 4 x daily to affected areas lower back and knees if necessary    Primary osteoarthritis of both knees       loratadine 10 MG tablet    KP LORATADINE    30 tablet    Take 1 tablet (10 mg) by mouth daily    Pruritic disorder        losartan-hydrochlorothiazide 100-12.5 MG per tablet    HYZAAR    90 tablet    Take 1 tablet by mouth daily    Essential hypertension with goal blood pressure less than 140/90       metoprolol succinate 50 MG 24 hr tablet    TOPROL-XL    90 tablet    TAKE 1 BY MOUTH DAILY    Essential hypertension with goal blood pressure less than 140/90       mineral oil-white petrolatum Crea cream     454 g    Apply topically 2 times daily    Pruritic disorder       order for DME     1 Device    Equipment being ordered: RUBBER THRESHOLD /CARGO WEDGE RAMP  ONE  USE AS DIRECTED FOR THRESHOLD  TO PREVENT FALLING    Thoracic or lumbosacral neuritis or radiculitis, unspecified, OA (osteoarthritis) of knee       order for DME     1 Device    Equipment being ordered:  LEFT THUMB  METACARPAL AND WRIST SPLINT FOR TRIGGER THUMB *ONE* *SIG:* AS DIRECTED    Trigger thumb, acquired       order for DME     1 each    One pair longitudinal arch supports One pair  Use as directed    Plantar fasciitis       order for DME     1 each    Equipment being ordered:  .seat lift mechanism  LIFT CHAIR MECHANISM EVALUATION  PATIENT PRESENT TODAY FOR EVALUATION OF LIFT CHAIR MECHANISM HAS SIGNIFICANT DISABILITY  WHICH PUT PATIENT AT RISK FOR FALLING  WHEN GETTING OUT OF A NORMAL CHAIR  PATIENT CAN AMBULATE WHEN UP AND WALKING ONCE IN A STANDING POSITION THIS CONDITION IS NOT REVERSIBLE WITH FURTHER PHYSICAL THERAPY   SIGNIFICANT NEUROMUSCULAR IMPAIRMENT?   Yes advanced  OSTEOARTH    Chronic knee pain, unspecified laterality       pantoprazole 40 MG EC tablet    PROTONIX    90 tablet    TAKE 1 BY MOUTH DAILY    Gastroesophageal reflux disease without esophagitis       pilocarpine 5 MG tablet    SALAGEN    360 tablet    Take 1 tablet (5 mg) by mouth 4 times daily (before meals and nightly)    Sjogren's syndrome (H)       senna-docusate 8.6-50 MG per tablet    SENOKOT-S;PERICOLACE    120 tablet    Take 2 tablets by mouth 2 times daily    Other constipation        sertraline 50 MG tablet    ZOLOFT    90 tablet    Take 1/2 tablet (25 mg) for 1-2 weeks, then increase to 1 tablet orally daily    Major depressive disorder with single episode, in partial remission (H)

## 2018-09-25 NOTE — PATIENT INSTRUCTIONS
(E78.5) Hyperlipidemia with target LDL less than 130  (primary encounter diagnosis)  Comment:    Plan:      (Z00.00) Routine general medical examination at a health care facility  Comment:    Plan:      (Z12.31) Visit for screening mammogram  Comment:    Plan: MA SCREENING DIGITAL BILAT - Future  (s+30)             (Z11.59) Need for hepatitis C screening test  Comment:    Plan:      (Z23) Need for prophylactic vaccination and inoculation against influenza  Comment:    Plan:      (Z23) Need for prophylactic vaccination against Streptococcus pneumoniae (pneumococcus)  Comment:    Plan:      (I10) Hypertension goal BP (blood pressure) < 140/90  Comment:    Plan: Lipid panel reflex to direct LDL Fasting             (M48.061) Spinal stenosis of lumbar region without neurogenic claudication  Comment:    Plan:      (M35.03) Sjogren's syndrome with myopathy (H)  Comment:    Plan: Comprehensive metabolic panel, Albumin Random         Urine Quantitative with Creat Ratio, CBC with         platelets, Erythrocyte sedimentation rate auto             (M54.40) Bilateral low back pain with sciatica, sciatica laterality unspecified, unspecified chronicity  Comment:    Plan:      (K21.9) Gastroesophageal reflux disease without esophagitis  Comment:    Plan:      (F32.4) Major depressive disorder with single episode, in partial remission (H)  Comment:    Plan:       Bilateral hearing loss             [employer]     Medical Supply Store     [facility] [place] [address] [phone]     Open today   8AM-5PM

## 2018-09-26 LAB
ALBUMIN SERPL-MCNC: 4.1 G/DL (ref 3.4–5)
ALP SERPL-CCNC: 81 U/L (ref 40–150)
ALT SERPL W P-5'-P-CCNC: 22 U/L (ref 0–50)
ANION GAP SERPL CALCULATED.3IONS-SCNC: 9 MMOL/L (ref 3–14)
AST SERPL W P-5'-P-CCNC: 24 U/L (ref 0–45)
BILIRUB SERPL-MCNC: 0.5 MG/DL (ref 0.2–1.3)
BUN SERPL-MCNC: 14 MG/DL (ref 7–30)
CALCIUM SERPL-MCNC: 9.2 MG/DL (ref 8.5–10.1)
CHLORIDE SERPL-SCNC: 102 MMOL/L (ref 94–109)
CHOLEST SERPL-MCNC: 186 MG/DL
CO2 SERPL-SCNC: 27 MMOL/L (ref 20–32)
CREAT SERPL-MCNC: 1.07 MG/DL (ref 0.52–1.04)
CREAT UR-MCNC: 31 MG/DL
GFR SERPL CREATININE-BSD FRML MDRD: 51 ML/MIN/1.7M2
GLUCOSE SERPL-MCNC: 84 MG/DL (ref 70–99)
HDLC SERPL-MCNC: 62 MG/DL
LDLC SERPL CALC-MCNC: 107 MG/DL
MICROALBUMIN UR-MCNC: <5 MG/L
MICROALBUMIN/CREAT UR: NORMAL MG/G CR (ref 0–25)
NONHDLC SERPL-MCNC: 124 MG/DL
POTASSIUM SERPL-SCNC: 4.3 MMOL/L (ref 3.4–5.3)
PROT SERPL-MCNC: 7.2 G/DL (ref 6.8–8.8)
SODIUM SERPL-SCNC: 138 MMOL/L (ref 133–144)
TRIGL SERPL-MCNC: 83 MG/DL

## 2018-10-13 DIAGNOSIS — K21.9 GASTROESOPHAGEAL REFLUX DISEASE WITHOUT ESOPHAGITIS: Chronic | ICD-10-CM

## 2018-10-15 RX ORDER — PANTOPRAZOLE SODIUM 40 MG/1
TABLET, DELAYED RELEASE ORAL
Qty: 90 TABLET | Refills: 3 | Status: SHIPPED | OUTPATIENT
Start: 2018-10-15 | End: 2019-01-03

## 2018-10-15 NOTE — TELEPHONE ENCOUNTER
Routing refill request to provider for review/approval because:  Patient has a diagnosis of osteoporosis on file, RN unable to fill.    Heena VIRK RN  [employer] [facility]

## 2018-10-15 NOTE — TELEPHONE ENCOUNTER
"Requested Prescriptions   Pending Prescriptions Disp Refills     pantoprazole (PROTONIX) 40 MG EC tablet 90 tablet 2      Last Written Prescription Date:  1/4/18  Last Fill Quantity: 90,  # refills: 2   Last office visit: 9/25/2018 with prescribing provider:     Future Office Visit:     Sig: TAKE 1 BY MOUTH DAILY    PPI Protocol Failed    10/13/2018 12:03 PM       Failed - No diagnosis of osteoporosis on record       Passed - Not on Clopidogrel (unless Pantoprazole ordered)       Passed - Recent (12 mo) or future (30 days) visit within the authorizing provider's specialty    Patient had office visit in the last 12 months or has a visit in the next 30 days with authorizing provider or within the authorizing provider's specialty.  See \"Patient Info\" tab in inbasket, or \"Choose Columns\" in Meds & Orders section of the refill encounter.           Passed - Patient is age 18 or older       Passed - No active pregnacy on record       Passed - No positive pregnancy test in past 12 months          "

## 2018-10-29 ENCOUNTER — OFFICE VISIT (OUTPATIENT)
Dept: FAMILY MEDICINE | Facility: CLINIC | Age: 68
End: 2018-10-29
Payer: COMMERCIAL

## 2018-10-29 VITALS
WEIGHT: 198 LBS | RESPIRATION RATE: 16 BRPM | DIASTOLIC BLOOD PRESSURE: 62 MMHG | OXYGEN SATURATION: 96 % | BODY MASS INDEX: 33.2 KG/M2 | HEART RATE: 82 BPM | SYSTOLIC BLOOD PRESSURE: 134 MMHG | TEMPERATURE: 99 F

## 2018-10-29 DIAGNOSIS — E55.9 VITAMIN D DEFICIENCY: ICD-10-CM

## 2018-10-29 DIAGNOSIS — J01.01 ACUTE RECURRENT MAXILLARY SINUSITIS: Primary | ICD-10-CM

## 2018-10-29 PROCEDURE — 36415 COLL VENOUS BLD VENIPUNCTURE: CPT | Performed by: PHYSICIAN ASSISTANT

## 2018-10-29 PROCEDURE — 82306 VITAMIN D 25 HYDROXY: CPT | Performed by: PHYSICIAN ASSISTANT

## 2018-10-29 PROCEDURE — 99214 OFFICE O/P EST MOD 30 MIN: CPT | Performed by: PHYSICIAN ASSISTANT

## 2018-10-29 RX ORDER — AMOXICILLIN 500 MG/1
500 CAPSULE ORAL 3 TIMES DAILY
Qty: 21 CAPSULE | Refills: 0 | Status: SHIPPED | OUTPATIENT
Start: 2018-10-29 | End: 2018-11-05

## 2018-10-29 NOTE — MR AVS SNAPSHOT
After Visit Summary   10/29/2018    Fatou Brian    MRN: [id number]           Patient Information     Date Of Birth          1950        Visit Information        Provider Department      10/29/2018 10:30 AM Dominique Woods PA-C [facility]        Today's Diagnoses     Acute recurrent maxillary sinusitis    -  1    Vitamin D deficiency          Care Instructions      Sinusitis (Antibiotic Treatment)    The sinuses are air-filled spaces within the bones of the face. They connect to the inside of the nose. Sinusitis is an inflammation of the tissue that lines the sinuses. Sinusitis can occur during a cold. It can also happen due to allergies to pollens and other particles in the air. Sinusitis can cause symptoms of sinus congestion and a feeling of fullness. A sinus infection causes fever, headache, and facial pain. There is often green or yellow fluid draining from the nose or into the back of the throat (post-nasal drip). You have been given antibiotics to treat this condition.  Home care    Take the full course of antibiotics as instructed. Do not stop taking them, even when you feel better.    Drink plenty of water, hot tea, and other liquids. This may help thin nasal mucus. It also may help your sinuses drain fluids.    Heat may help soothe painful areas of your face. Use a towel soaked in hot water. Or,  the shower and direct the warm spray onto your face. Using a vaporizer along with a menthol rub at night may also help soothe symptoms.     An expectorant with guaifenesin may help thin nasal mucus and help your sinuses drain fluids.    You can use an over-the-counter decongestant, unless a similar medicine was prescribed to you. Nasal sprays work the fastest. Use one that contains phenylephrine or oxymetazoline. First blow your nose gently. Then use the spray. Do not use these medicines more often than directed on the label. If you do, your  symptoms may get worse. You may also take pills that contain pseudoephedrine. Don t use products that combine multiple medicines. This is because side effects may be increased. Read labels. You can also ask the pharmacist for help. (People with high blood pressure should not use decongestants. They can raise blood pressure.)    Over-the-counter antihistamines may help if allergies contributed to your sinusitis.      Do not use nasal rinses or irrigation during an acute sinus infection, unless your healthcare provider tells you to. Rinsing may spread the infection to other areas in your sinuses.    Use acetaminophen or ibuprofen to control pain, unless another pain medicine was prescribed to you. If you have chronic liver or kidney disease or ever had a stomach ulcer, talk with your healthcare provider before using these medicines. (Aspirin should never be taken by anyone under age 18 who is ill with a fever. It may cause severe liver damage.)    Don't smoke. This can make symptoms worse.  Follow-up care  Follow up with your healthcare provider or our staff if you are better in 1 week.  When to seek medical advice  Call your healthcare provider if any of these occur:    Facial pain or headache that gets worse    Stiff neck    Unusual drowsiness or confusion    Swelling of your forehead or eyelids    Vision problems, such as blurred or double vision    Fever of 100.4 F (38 C) or higher, or as directed by your healthcare provider    Seizure    Breathing problems    Symptoms don't go away in 10 days  Prevention  Here are steps you can take to help prevent an infection:    Keep good hand washing habits.    Don t have close contact with people who have sore throats, colds, or other upper respiratory infections.    Don t smoke, and stay away from secondhand smoke.    Stay up to date with of your vaccines.  Date Last Reviewed: 11/1/2017 2000-2017 [employer]. [address] [place] [address], [place] [address]. All  rights reserved. This information is not intended as a substitute for professional medical care. Always follow your healthcare professional's instructions.                Follow-ups after your visit        Who to contact     If you have questions or need follow up information about today's clinic visit or your schedule please contact [facility] directly at [phone].  Normal or non-critical lab and imaging results will be communicated to you by MyChart, letter or phone within 4 business days after the clinic has received the results. If you do not hear from us within 7 days, please contact the clinic through [employer] or phone. If you have a critical or abnormal lab result, we will notify you by phone as soon as possible.  Submit refill requests through [employer] or call your pharmacy and they will forward the refill request to us. Please allow 3 business days for your refill to be completed.          Additional Information About Your Visit        MyChart Information     [employer] gives you secure access to your electronic health record. If you see a primary care provider, you can also send messages to your care team and make appointments. If you have questions, please call your primary care clinic.  If you do not have a primary care provider, please call [phone] and they will assist you.        Care EveryWhere ID     This is your Care EveryWhere ID. This could be used by other organizations to access your [facility] records  [id number]        Your Vitals Were     Pulse Temperature Respirations Pulse Oximetry BMI (Body Mass Index)       82 99  F (37.2  C) 16 96% 33.2 kg/m2        Blood Pressure from Last 3 Encounters:   10/29/18 134/62   09/25/18 144/64   07/31/18 138/72    Weight from Last 3 Encounters:   10/29/18 198 lb (89.8 kg)   09/25/18 192 lb (87.1 kg)   07/31/18 186 lb (84.4 kg)              We Performed the Following     Vitamin D Deficiency          Today's  Medication Changes          These changes are accurate as of 10/29/18 10:58 AM.  If you have any questions, ask your nurse or doctor.               Start taking these medicines.        Dose/Directions    amoxicillin 500 MG capsule   Commonly known as:  AMOXIL   Used for:  Acute recurrent maxillary sinusitis   Started by:  Dominique Woods PA-C        Dose:  500 mg   Take 1 capsule (500 mg) by mouth 3 times daily for 7 days   Quantity:  21 capsule   Refills:  0            Where to get your medicines      These medications were sent to [facility] [address] [place] [address] AT [address] [address]     Phone:  [phone]     amoxicillin 500 MG capsule                Primary Care Provider Office Phone # Fax #    Reid Thomas [phone] [address] [facility] [address]        Equal Access to Services     LINDSEY MCCARTY AH: Hadii hannah ku hadasho Soomaali, waaxda luqadaha, qaybta kaalmada adeegyada, waxay aramisin lulú iglesias . So [facility] [phone].    ATENCIÓN: Si habla español, tiene a smith disposición servicios gratuitos de asistencia lingüística. Llame al [phone].    We comply with applicable federal civil rights laws and [place] laws. We do not discriminate on the basis of race, color, national origin, age, disability, sex, sexual orientation, or gender identity.            Thank you!     Thank you for choosing [facility]  for your care. Our goal is always to provide you with excellent care. Hearing back from our patients is one way we can continue to improve our services. Please take a few minutes to complete the written survey that you may receive in the mail after your visit with us. Thank you!             Your Updated Medication List - Protect others around you: Learn how to safely use, store and throw away your medicines at  www.disposemymeds.org.          This list is accurate as of 10/29/18 10:58 AM.  Always use your most recent med list.                   Brand Name Dispense Instructions for use Diagnosis    Acetaminophen 500 MG Chew     120 tablet    Take 1,000 mg by mouth 3 times daily    LBP (low back pain), Osteoarthritis of knee, Arm pain, anterior, left, Spinal stenosis       amoxicillin 500 MG capsule    AMOXIL    21 capsule    Take 1 capsule (500 mg) by mouth 3 times daily for 7 days    Acute recurrent maxillary sinusitis       calcium carbonate 500 MG chewable tablet    TUMS    120 tablet    Take 1 tablet (500 mg) by mouth At Bedtime    Vitamin D insufficiency       cholecalciferol 5000 units Tabs tablet    vitamin D3    100 tablet    Take 1 tablet (5,000 Units) by mouth At Bedtime    Vitamin D insufficiency       diclofenac 1 % Gel topical gel    VOLTAREN    100 g    Apply 4 grams to knees or 2 grams to hands four times daily using enclosed dosing card.    Midline low back pain without sciatica       famotidine 20 MG tablet    PEPCID    180 tablet    TAKE 1 TABLET BY MOUTH TWICE DAILY AS NEEDED    Gastroesophageal reflux disease without esophagitis       fluticasone 50 MCG/ACT spray    FLONASE    48 g    Spray 2 sprays into both nostrils daily    PND (post-nasal drip)       gabapentin 100 MG capsule    NEURONTIN    270 capsule    TAKE ONE CAPSULE BY MOUTH THREE TIMES DAILY    Bilateral low back pain with sciatica, sciatica laterality unspecified, unspecified chronicity       hydroxychloroquine 200 MG tablet    PLAQUENIL    180 tablet    TAKE 2 (400 MG) BY MOUTH AT BEDTIME    Sjogren's syndrome with myopathy (H)       lidocaine 5 % ointment    XYLOCAINE    213 g    One application 4 x daily to affected areas lower back and knees if necessary    Primary osteoarthritis of both knees       loratadine 10 MG tablet    KP LORATADINE    30 tablet    Take 1 tablet (10 mg) by mouth daily    Pruritic disorder        losartan-hydrochlorothiazide 100-12.5 MG per tablet    HYZAAR    90 tablet    Take 1 tablet by mouth daily    Essential hypertension with goal blood pressure less than 140/90       metoprolol succinate 50 MG 24 hr tablet    TOPROL-XL    90 tablet    TAKE 1 BY MOUTH DAILY    Essential hypertension with goal blood pressure less than 140/90       mineral oil-white petrolatum Crea cream     454 g    Apply topically 2 times daily    Pruritic disorder       order for DME     1 Device    Equipment being ordered: RUBBER THRESHOLD /CARGO WEDGE RAMP  ONE  USE AS DIRECTED FOR THRESHOLD  TO PREVENT FALLING    Thoracic or lumbosacral neuritis or radiculitis, unspecified, OA (osteoarthritis) of knee       order for DME     1 Device    Equipment being ordered:  LEFT THUMB  METACARPAL AND WRIST SPLINT FOR TRIGGER THUMB *ONE* *SIG:* AS DIRECTED    Trigger thumb, acquired       order for DME     1 each    One pair longitudinal arch supports One pair  Use as directed    Plantar fasciitis       order for DME     1 each    Equipment being ordered:  .seat lift mechanism  LIFT CHAIR MECHANISM EVALUATION  PATIENT PRESENT TODAY FOR EVALUATION OF LIFT CHAIR MECHANISM HAS SIGNIFICANT DISABILITY  WHICH PUT PATIENT AT RISK FOR FALLING  WHEN GETTING OUT OF A NORMAL CHAIR  PATIENT CAN AMBULATE WHEN UP AND WALKING ONCE IN A STANDING POSITION THIS CONDITION IS NOT REVERSIBLE WITH FURTHER PHYSICAL THERAPY   SIGNIFICANT NEUROMUSCULAR IMPAIRMENT?   Yes advanced  OSTEOARTH    Chronic knee pain, unspecified laterality       pantoprazole 40 MG EC tablet    PROTONIX    90 tablet    TAKE 1 BY MOUTH DAILY    Gastroesophageal reflux disease without esophagitis       pilocarpine 5 MG tablet    SALAGEN    360 tablet    Take 1 tablet (5 mg) by mouth 4 times daily (before meals and nightly)    Sjogren's syndrome (H)       senna-docusate 8.6-50 MG per tablet    SENOKOT-S;PERICOLACE    120 tablet    Take 2 tablets by mouth 2 times daily    Other constipation        sertraline 50 MG tablet    ZOLOFT    90 tablet    Take 1/2 tablet (25 mg) for 1-2 weeks, then increase to 1 tablet orally daily    Major depressive disorder with single episode, in partial remission (H)

## 2018-10-29 NOTE — PROGRESS NOTES
SUBJECTIVE:   Fatou Brian is a 68 year old female who presents to clinic today for the following health issues:      RESPIRATORY SYMPTOMS      Duration: about 4 days    Description  ear pain right. Also pressure in right eye    Severity: moderate    Accompanying signs and symptoms: None    History (predisposing factors):  none    Precipitating or alleviating factors: None    Therapies tried and outcome:  none      HPI additional notes:    Chief Complaint   Patient presents with     Ear Problem     Fatou presents today with multiple concerns:    - Patient c/o Rt ear pain and pressure x4 days. Also notes pressure behind Rt eye intermittently and ST. Using Tylenol and salt water gargles without relief. Placed Vicks in ear without relief.    - Can she get vitamin D level checked? Has hx vitamin D deficiency, but level ended up going too high. Has upcoming appointment with rheumatologist and orthopedist, so would like it recheck prior to these appointments.     ROS:    A Comprehensive greater than 10 system review of systems was carried out.    Pertinent positives and negatives are noted above.  Otherwise negative for contributory information.      Chart Review:  History   Smoking Status     Former Smoker     Types: Cigarettes   Smokeless Tobacco     Never Used     Comment: states quit 4 years ago       Patient Active Problem List   Diagnosis     Sjogren's syndrome with myopathy (H)     Osteoarthritis of knee     GERD (gastroesophageal reflux disease)     Constipation     Osteoporosis     Irritable bowel syndrome with diarrhea     HL (hearing loss)     Rosacea     Poor memory     Low back pain     Senile keratosis     Spinal stenosis     H/O hysterectomy for benign disease     Bilateral hearing loss     Presbyopia     Knee pain     Hypertension goal BP (blood pressure) < 140/90     Hyperlipidemia with target LDL less than 130     Left arm pain     Arm pain     Major depression in partial remission (H)      Subclavian artery stenosis (H)     ACP (advance care planning)     Gastroesophageal reflux disease without esophagitis     Primary osteoarthritis of both knees     Bilateral low back pain with sciatica, sciatica laterality unspecified, unspecified chronicity     Past Surgical History:   Procedure Laterality Date     aneursym[  1983, 1991    Has metal in head     CHOLECYSTECTOMY       CLOSED RX PROX HUMERUS FRACTURE      10 pins placed     COLONOSCOPY       COLONOSCOPY  1/24/2014    Procedure: COMBINED COLONOSCOPY, SINGLE BIOPSY/POLYPECTOMY BY BIOPSY;  COLONOSCOPY;  Surgeon: Ranjit Pa MD;  Location:  GI      ECP WITH CATARACT SURGERY      both eyes     HYSTERECTOMY, JANET       Problem list, Medication list, Allergies, Medical/Social/Surg hx reviewed in [facility], updated as appropriate.   OBJECTIVE:                                                    /62  Pulse 82  Temp 99  F (37.2  C)  Resp 16  Wt 198 lb (89.8 kg)  SpO2 96%  BMI 33.2 kg/m2  Body mass index is 33.2 kg/(m^2).  GENERAL:  Appears stated age, no acute distress  EYES: no discharge, no injection  HENT:  Normocephalic. Moist mucus membranes. Very [place]. TMs pearly gray with bilat purulent effusion. Nasal mucosa non-edematous, no rhinorrhea. Oropharynx pink w/o tonsillar hypertrophy or exudate, uvula midline, purulent PND.  NECK:  Supple, symmetric, no EDITH  LUNGS:  Clear to auscultation bilaterally without rhonchi, rales, or wheeze. Chest rise symmetric and no tenderness to palpation.  HEART:  Regular rate & rhythm. No murmur, gallop, or rub.  EXTREMITIES:  No gross deformities, moves all 4 limbs spontaneously  SKIN:  Warm and dry, no rash or suspicious lesions    NEUROLOGIC: alert, sensation grossly intact.    Diagnostic test results: none     ASSESSMENT/PLAN:                                                          ICD-10-CM    1. Acute recurrent maxillary sinusitis J01.01 amoxicillin (AMOXIL) 500 MG capsule   2. Vitamin D deficiency  E55.9 Vitamin D Deficiency     S/s consistent with sinusitis; take entire course of antibx as prescribed. Patient has trouble swallowing large pills and too sharp if they are split, so will treat with 500 mg capsules amoxicillin TID x7 days. Take entire course as prescribed, even if sx improve. Get plenty of rest and fluids. Continue Tylenol prn pain. Avoid placing anything in ear canal; may apply (external) warm compress to ear to help with pain.    Vitamin D blood work ordered as requested.    Please see patient instructions for treatment details.  25 minutes total spent during this visit, >50% spent in counseling and coordination of patient care.    Follow up in 7-10 days if not improving as anticipated, sooner PRN.    Dominique Woods PA-C  [facility]

## 2018-10-29 NOTE — PATIENT INSTRUCTIONS

## 2018-10-30 LAB — DEPRECATED CALCIDIOL+CALCIFEROL SERPL-MC: 66 UG/L (ref 20–75)

## 2018-10-31 NOTE — PROGRESS NOTES
Ap Lainez,    I just wanted to let you know that your lab results have been reviewed and are attached.    - Your Vitamin D level is normal.    Please let me know if you have any questions.    Sincerely,    Dominique Woods PA-C    [place] [id number] [address] [address] [phone] (p)

## 2018-11-17 DIAGNOSIS — I10 ESSENTIAL HYPERTENSION WITH GOAL BLOOD PRESSURE LESS THAN 140/90: Chronic | ICD-10-CM

## 2018-11-17 NOTE — TELEPHONE ENCOUNTER
"Requested Prescriptions   Pending Prescriptions Disp Refills     losartan-hydrochlorothiazide (HYZAAR) 100-12.5 MG per tablet  Last Written Prescription Date:  08/25/2018  Last Fill Quantity: 90,  # refills: 0   Last office visit: 10/29/2018 with prescribing provider:   HIMANSHU  Future Office Visit:     90 tablet 0     Sig: Take 1 tablet by mouth daily    Angiotensin-II Receptors Failed    11/17/2018  9:20 AM       Failed - Normal serum creatinine on file in past 12 months    Recent Labs   Lab Test  09/25/18   1119   CR  1.07*            Passed - Blood pressure under 140/90 in past 12 months    BP Readings from Last 3 Encounters:   10/29/18 134/62   09/25/18 144/64   07/31/18 138/72                Passed - Recent (12 mo) or future (30 days) visit within the authorizing provider's specialty    Patient had office visit in the last 12 months or has a visit in the next 30 days with authorizing provider or within the authorizing provider's specialty.  See \"Patient Info\" tab in inbasket, or \"Choose Columns\" in Meds & Orders section of the refill encounter.             Passed - Patient is age 18 or older       Passed - No active pregnancy on record       Passed - Normal serum potassium on file in past 12 months    Recent Labs   Lab Test  09/25/18   1119   POTASSIUM  4.3                   Passed - No positive pregnancy test in past 12 months          "

## 2018-11-19 RX ORDER — LOSARTAN POTASSIUM AND HYDROCHLOROTHIAZIDE 12.5; 1 MG/1; MG/1
1 TABLET ORAL DAILY
Qty: 90 TABLET | Refills: 3 | Status: SHIPPED | OUTPATIENT
Start: 2018-11-19 | End: 2019-01-03

## 2018-11-19 NOTE — TELEPHONE ENCOUNTER
Routing refill request to provider for review/approval because:  Labs out of range:  FARZANA NielsonN, RN  [facility]

## 2019-01-03 ENCOUNTER — TELEPHONE (OUTPATIENT)
Dept: FAMILY MEDICINE | Facility: CLINIC | Age: 69
End: 2019-01-03

## 2019-01-03 DIAGNOSIS — M35.00 SJOGREN'S SYNDROME (H): Chronic | ICD-10-CM

## 2019-01-03 DIAGNOSIS — I10 ESSENTIAL HYPERTENSION WITH GOAL BLOOD PRESSURE LESS THAN 140/90: Chronic | ICD-10-CM

## 2019-01-03 DIAGNOSIS — M35.03 SJOGREN'S SYNDROME WITH MYOPATHY (H): ICD-10-CM

## 2019-01-03 DIAGNOSIS — K21.9 GASTROESOPHAGEAL REFLUX DISEASE WITHOUT ESOPHAGITIS: Chronic | ICD-10-CM

## 2019-01-03 RX ORDER — PANTOPRAZOLE SODIUM 40 MG/1
TABLET, DELAYED RELEASE ORAL
Qty: 90 TABLET | Refills: 3 | Status: SHIPPED | OUTPATIENT
Start: 2019-01-03 | End: 2019-09-27

## 2019-01-03 RX ORDER — LOSARTAN POTASSIUM AND HYDROCHLOROTHIAZIDE 12.5; 1 MG/1; MG/1
1 TABLET ORAL DAILY
Qty: 90 TABLET | Refills: 3 | Status: SHIPPED | OUTPATIENT
Start: 2019-01-03 | End: 2019-12-15

## 2019-01-03 RX ORDER — METOPROLOL SUCCINATE 50 MG/1
TABLET, EXTENDED RELEASE ORAL
Qty: 90 TABLET | Refills: 2 | Status: SHIPPED | OUTPATIENT
Start: 2019-01-03 | End: 2019-09-27

## 2019-01-03 RX ORDER — HYDROXYCHLOROQUINE SULFATE 200 MG/1
TABLET, FILM COATED ORAL
Qty: 180 TABLET | Refills: 2 | Status: SHIPPED | OUTPATIENT
Start: 2019-01-03 | End: 2019-09-27

## 2019-01-03 RX ORDER — PILOCARPINE HYDROCHLORIDE 5 MG/1
5 TABLET, FILM COATED ORAL
Qty: 360 TABLET | Refills: 2 | Status: SHIPPED | OUTPATIENT
Start: 2019-01-03 | End: 2019-09-27

## 2019-01-03 NOTE — TELEPHONE ENCOUNTER
Reason for Call:  Medication or medication refill:    Do you use a [facility]?  Name of the pharmacy and phone number for the current request:     [facility]     Name of the medication requested: hydroxychloroquine (PLAQUENIL) 200 MG tablet, losartan-hydrochlorothiazide (HYZAAR) 100-12.5 MG per tablet, metoprolol succinate (TOPROL-XL) 50 MG 24 hr tablet , pantoprazole (PROTONIX) 40 MG EC tablet, pilocarpine (SALAGEN) 5 MG tablet    Other request: [facility], please send 90 day supplies of medication. Patient wants them on file at [facility]. Does not need RX filled now. Ph.  [phone]    Can we leave a detailed message on this number? YES    Phone number patient can be reached at: Home number on file [phone] (home)    Best Time: anytime     Call taken on 1/3/2019 at 3:36 PM by Dian Angela

## 2019-08-20 ENCOUNTER — TRANSFERRED RECORDS (OUTPATIENT)
Dept: HEALTH INFORMATION MANAGEMENT | Facility: CLINIC | Age: 69
End: 2019-08-20

## 2019-09-27 ENCOUNTER — OFFICE VISIT (OUTPATIENT)
Dept: FAMILY MEDICINE | Facility: CLINIC | Age: 69
End: 2019-09-27
Payer: COMMERCIAL

## 2019-09-27 VITALS
OXYGEN SATURATION: 98 % | TEMPERATURE: 97.3 F | HEIGHT: 65 IN | SYSTOLIC BLOOD PRESSURE: 144 MMHG | WEIGHT: 206 LBS | HEART RATE: 74 BPM | BODY MASS INDEX: 34.32 KG/M2 | RESPIRATION RATE: 16 BRPM | DIASTOLIC BLOOD PRESSURE: 80 MMHG

## 2019-09-27 DIAGNOSIS — N18.30 CKD (CHRONIC KIDNEY DISEASE) STAGE 3, GFR 30-59 ML/MIN (H): ICD-10-CM

## 2019-09-27 DIAGNOSIS — Z00.00 ENCOUNTER FOR MEDICARE ANNUAL WELLNESS EXAM: ICD-10-CM

## 2019-09-27 DIAGNOSIS — K21.9 GASTROESOPHAGEAL REFLUX DISEASE WITHOUT ESOPHAGITIS: Chronic | ICD-10-CM

## 2019-09-27 DIAGNOSIS — I10 ESSENTIAL HYPERTENSION WITH GOAL BLOOD PRESSURE LESS THAN 140/90: Chronic | ICD-10-CM

## 2019-09-27 DIAGNOSIS — E78.5 HYPERLIPIDEMIA WITH TARGET LDL LESS THAN 130: Primary | Chronic | ICD-10-CM

## 2019-09-27 DIAGNOSIS — M35.09 SJOGREN'S SYNDROME WITH OTHER ORGAN INVOLVEMENT (H): ICD-10-CM

## 2019-09-27 DIAGNOSIS — L85.3 DRY SKIN: ICD-10-CM

## 2019-09-27 DIAGNOSIS — M35.03 SJOGREN'S SYNDROME WITH MYOPATHY (H): ICD-10-CM

## 2019-09-27 DIAGNOSIS — K59.09 OTHER CONSTIPATION: ICD-10-CM

## 2019-09-27 DIAGNOSIS — E55.9 VITAMIN D INSUFFICIENCY: ICD-10-CM

## 2019-09-27 DIAGNOSIS — L29.9 PRURITIC DISORDER: ICD-10-CM

## 2019-09-27 LAB
ALBUMIN SERPL-MCNC: 4.2 G/DL (ref 3.4–5)
ALP SERPL-CCNC: 93 U/L (ref 40–150)
ALT SERPL W P-5'-P-CCNC: 40 U/L (ref 0–50)
ANION GAP SERPL CALCULATED.3IONS-SCNC: 9 MMOL/L (ref 3–14)
AST SERPL W P-5'-P-CCNC: 33 U/L (ref 0–45)
BASOPHILS # BLD AUTO: 0 10E9/L (ref 0–0.2)
BASOPHILS NFR BLD AUTO: 0.4 %
BILIRUB SERPL-MCNC: 0.6 MG/DL (ref 0.2–1.3)
BUN SERPL-MCNC: 13 MG/DL (ref 7–30)
CALCIUM SERPL-MCNC: 9.1 MG/DL (ref 8.5–10.1)
CHLORIDE SERPL-SCNC: 105 MMOL/L (ref 94–109)
CHOLEST SERPL-MCNC: 208 MG/DL
CO2 SERPL-SCNC: 26 MMOL/L (ref 20–32)
CREAT SERPL-MCNC: 1.06 MG/DL (ref 0.52–1.04)
CRP SERPL-MCNC: <2.9 MG/L (ref 0–8)
DIFFERENTIAL METHOD BLD: NORMAL
EOSINOPHIL # BLD AUTO: 0.1 10E9/L (ref 0–0.7)
EOSINOPHIL NFR BLD AUTO: 1.3 %
ERYTHROCYTE [DISTWIDTH] IN BLOOD BY AUTOMATED COUNT: 13.1 % (ref 10–15)
ERYTHROCYTE [SEDIMENTATION RATE] IN BLOOD BY WESTERGREN METHOD: 26 MM/H (ref 0–30)
GFR SERPL CREATININE-BSD FRML MDRD: 53 ML/MIN/{1.73_M2}
GLUCOSE SERPL-MCNC: 91 MG/DL (ref 70–99)
HCT VFR BLD AUTO: 43.3 % (ref 35–47)
HDLC SERPL-MCNC: 63 MG/DL
HGB BLD-MCNC: 14.3 G/DL (ref 11.7–15.7)
LDLC SERPL CALC-MCNC: 125 MG/DL
LYMPHOCYTES # BLD AUTO: 1.2 10E9/L (ref 0.8–5.3)
LYMPHOCYTES NFR BLD AUTO: 21.2 %
MCH RBC QN AUTO: 31.5 PG (ref 26.5–33)
MCHC RBC AUTO-ENTMCNC: 33 G/DL (ref 31.5–36.5)
MCV RBC AUTO: 95 FL (ref 78–100)
MONOCYTES # BLD AUTO: 0.5 10E9/L (ref 0–1.3)
MONOCYTES NFR BLD AUTO: 9.2 %
NEUTROPHILS # BLD AUTO: 3.8 10E9/L (ref 1.6–8.3)
NEUTROPHILS NFR BLD AUTO: 67.9 %
NONHDLC SERPL-MCNC: 145 MG/DL
PLATELET # BLD AUTO: 217 10E9/L (ref 150–450)
POTASSIUM SERPL-SCNC: 3.8 MMOL/L (ref 3.4–5.3)
PROT SERPL-MCNC: 7.3 G/DL (ref 6.8–8.8)
RBC # BLD AUTO: 4.54 10E12/L (ref 3.8–5.2)
SODIUM SERPL-SCNC: 140 MMOL/L (ref 133–144)
TRIGL SERPL-MCNC: 99 MG/DL
WBC # BLD AUTO: 5.5 10E9/L (ref 4–11)

## 2019-09-27 PROCEDURE — 80053 COMPREHEN METABOLIC PANEL: CPT | Performed by: FAMILY MEDICINE

## 2019-09-27 PROCEDURE — 99214 OFFICE O/P EST MOD 30 MIN: CPT | Mod: 25 | Performed by: FAMILY MEDICINE

## 2019-09-27 PROCEDURE — 80061 LIPID PANEL: CPT | Performed by: FAMILY MEDICINE

## 2019-09-27 PROCEDURE — 82043 UR ALBUMIN QUANTITATIVE: CPT | Performed by: FAMILY MEDICINE

## 2019-09-27 PROCEDURE — 85652 RBC SED RATE AUTOMATED: CPT | Performed by: FAMILY MEDICINE

## 2019-09-27 PROCEDURE — 85025 COMPLETE CBC W/AUTO DIFF WBC: CPT | Performed by: FAMILY MEDICINE

## 2019-09-27 PROCEDURE — 82306 VITAMIN D 25 HYDROXY: CPT | Performed by: FAMILY MEDICINE

## 2019-09-27 PROCEDURE — 99397 PER PM REEVAL EST PAT 65+ YR: CPT | Performed by: FAMILY MEDICINE

## 2019-09-27 PROCEDURE — 86140 C-REACTIVE PROTEIN: CPT | Performed by: FAMILY MEDICINE

## 2019-09-27 PROCEDURE — 36415 COLL VENOUS BLD VENIPUNCTURE: CPT | Performed by: FAMILY MEDICINE

## 2019-09-27 RX ORDER — FEXOFENADINE HCL 180 MG/1
180 TABLET ORAL DAILY
Qty: 30 TABLET | Refills: 11 | Status: SHIPPED | OUTPATIENT
Start: 2019-09-27 | End: 2021-11-02

## 2019-09-27 RX ORDER — LORATADINE 10 MG/1
10 TABLET ORAL DAILY
Qty: 30 TABLET | Refills: 11 | Status: SHIPPED | OUTPATIENT
Start: 2019-09-27 | End: 2019-09-27

## 2019-09-27 RX ORDER — FAMOTIDINE 20 MG/1
TABLET, FILM COATED ORAL
Qty: 180 TABLET | Refills: 3 | Status: SHIPPED | OUTPATIENT
Start: 2019-09-27 | End: 2019-10-02

## 2019-09-27 RX ORDER — AMOXICILLIN 250 MG
2 CAPSULE ORAL 2 TIMES DAILY
Qty: 120 TABLET | Status: SHIPPED | OUTPATIENT
Start: 2019-09-27 | End: 2021-11-02

## 2019-09-27 RX ORDER — PILOCARPINE HYDROCHLORIDE 5 MG/1
5 TABLET, FILM COATED ORAL
Qty: 360 TABLET | Refills: 2 | Status: SHIPPED | OUTPATIENT
Start: 2019-09-27 | End: 2019-10-02

## 2019-09-27 RX ORDER — HYDROXYCHLOROQUINE SULFATE 200 MG/1
TABLET, FILM COATED ORAL
Qty: 180 TABLET | Refills: 2 | Status: SHIPPED | OUTPATIENT
Start: 2019-09-27 | End: 2019-10-01

## 2019-09-27 RX ORDER — PANTOPRAZOLE SODIUM 40 MG/1
TABLET, DELAYED RELEASE ORAL
Qty: 90 TABLET | Refills: 3 | Status: SHIPPED | OUTPATIENT
Start: 2019-09-27 | End: 2019-12-16

## 2019-09-27 RX ORDER — METOPROLOL SUCCINATE 50 MG/1
TABLET, EXTENDED RELEASE ORAL
Qty: 90 TABLET | Refills: 2 | Status: SHIPPED | OUTPATIENT
Start: 2019-09-27 | End: 2019-10-02

## 2019-09-27 ASSESSMENT — PATIENT HEALTH QUESTIONNAIRE - PHQ9: SUM OF ALL RESPONSES TO PHQ QUESTIONS 1-9: 10

## 2019-09-27 ASSESSMENT — MIFFLIN-ST. JEOR: SCORE: 1450.29

## 2019-09-27 NOTE — PROGRESS NOTES
"2SUBJECTIVE:   Fatou Brian is a 69 year old female who presents for Preventive Visit.  Are you in the first 12 months of your Medicare Part B coverage?  No    Physical Health:    In general, how would you rate your overall physical health? Fair    Outside of work, how many days during the week do you exercise? 2-3 days/week    Outside of work, approximately how many minutes a day do you exercise?greater than 60 minutes    If you drink alcohol do you typically have >3 drinks per day or >7 drinks per week? No    Do you usually eat at least 4 servings of fruit and vegetables a day, include whole grains & fiber and avoid regularly eating high fat or \"junk\" foods? NO    Do you have any problems taking medications regularly?  YES    Do you have any side effects from medications? none    Needs assistance for the following daily activities: no assistance needed    Which of the following safety concerns are present in your home?  lack of grab bars in the bathroom, grab bar that is there is not safe    Hearing impairment: Yes, did not like having the hearing aids    In the past 6 months, have you been bothered by leaking of urine? yes    Mental Health:    In general, how would you rate your overall mental or emotional health? Fair, issues with family  PHQ-2 Score:      Do you feel safe in your environment? Yes  Feels safe in the house, unsure about the neighborhood      Patient is having considerable itching in her skin is diffuse taking 3 Claritin per day without much benefit    She has not redness of her outer left fifth toe on the left side slight thickening of the nails noted skin itself looks good there is no evidence of secondary infection or yeast    She wants a diabetes check today    She takes Tylenol 652/day and I suggest that she be on the 500 mg tablets    Takes a vitamin D pill of 5005 days/week she wants a vitamin D level    Is a blood test for Sjogren's disease I will be doing sed rate CMP and CRP she is " worried that she might have multiple sclerosis or multiple occult dystrophy I do not think that so likely scenario she does not happy with Dr. Shelton wants referral to a different rheumatologist but I went over all the available once she does not want go to the [facility] medicine soda or out to [place]    She thinks she might of had a mild mini stroke    She is concerned that she might have an aneurysm she was having pain in the left temple she had aneurysm in the past    She keeps missing her pills I would suggest mid binders    She has some moles that she is questioning    She is concerned about fibromyalgia    Concerned about IBS arthrosis osteoarthritis depression difficulties walking the Sjogren's syndrome dry mouth syndrome but most of these   are probably due to the Sjogren's disease    Patient with chronic intermittent low back pain with sciatica    Bilateral neurosensory hearing loss    Bilateral osteoarthritis of the knee improved left arm pain    Gastroesophageal reflux disease it still bothers her    Intermittent constipation    Intermittent irritable bowel syndrome diarrhea    Personal history of hypertension and    Personal history of subclavian artery stenosis    Personal history of osteoporosis    She has chronic poor memory    She has a history of hysterectomy for benign disease    And she has presbyopia as well severe presbycusis      Do you have a Health Care Directive? No: Advance care planning was reviewed with patient; patient declined at this time.    Additional concerns to address?  No    Fall risk:  Fallen 2 or more times in the past year?: No  Any fall with injury in the past year?: No    Cognitive Screenin) Repeat 3 items (Leader, Season, Table)    2) Clock draw: ABNORMAL hands are wrong  3) 3 item recall: Recalls 3 objects  Results: 3 items recalled: COGNITIVE IMPAIRMENT LESS LIKELY    Mini-CogTM Copyright SULLY Stout. Licensed by the author for use in [facility]  Services; reprinted with permission (soob@.[email or web address]). All rights reserved.      Do you have sleep apnea, excessive snoring or daytime drowsiness?: no        Discuss med side affects    Reviewed and updated as needed this visit by clinical staff  Tobacco  Allergies  Meds  Med Hx  Surg Hx  Fam Hx  Soc Hx        Reviewed and updated as needed this visit by Provider        Social History     Tobacco Use     Smoking status: Former Smoker     Packs/day: 0.00     Types: Cigarettes     Smokeless tobacco: Never Used     Tobacco comment: states quit 4 years ago   Substance Use Topics     Alcohol use: Yes     Comment: occas.                           Current providers sharing in care for this patient include:   Patient Care Team:  Reid Thomas MD as PCP - General (Family Practice)  Reid Thomas MD as Assigned PCP    The following health maintenance items are reviewed in Epic and correct as of today:  Health Maintenance   Topic Date Due     HEPATITIS C SCREENING  1950     ZOSTER IMMUNIZATION (2 of 3) 03/26/2009     PNEUMOCOCCAL IMMUNIZATION 65+ LOW/MEDIUM RISK (2 of 2 - PCV13) 09/24/2014     MAMMO SCREENING  12/20/2015     PHQ-9  01/31/2019     INFLUENZA VACCINE (1) 09/01/2019     FALL RISK ASSESSMENT  09/25/2019     MEDICARE ANNUAL WELLNESS VISIT  09/25/2019     DTAP/TDAP/TD IMMUNIZATION (2 - Td) 10/14/2020     ADVANCE CARE PLANNING  03/20/2022     LIPID  09/25/2023     COLONOSCOPY  01/24/2024     DEXA  Completed     DEPRESSION ACTION PLAN  Completed     IPV IMMUNIZATION  Aged Out     MENINGITIS IMMUNIZATION  Aged Out       Lab work is in process  Labs reviewed in EPIC  BP Readings from Last 3 Encounters:   09/27/19 (!) 144/80   10/29/18 134/62   09/25/18 144/64    Wt Readings from Last 3 Encounters:   09/27/19 93.4 kg (206 lb)   10/29/18 89.8 kg (198 lb)   09/25/18 87.1 kg (192 lb)                  Patient Active Problem List   Diagnosis     Sjogren's syndrome with myopathy (H)      Osteoarthritis of knee     GERD (gastroesophageal reflux disease)     Constipation     Osteoporosis     Irritable bowel syndrome with diarrhea     HL (hearing loss)     Rosacea     Poor memory     Low back pain     Senile keratosis     Spinal stenosis     H/O hysterectomy for benign disease     Bilateral hearing loss     Presbyopia     Knee pain     Hypertension goal BP (blood pressure) < 140/90     Hyperlipidemia with target LDL less than 130     Left arm pain     Arm pain     Major depression in partial remission (H)     Subclavian artery stenosis (H)     ACP (advance care planning)     Gastroesophageal reflux disease without esophagitis     Primary osteoarthritis of both knees     Bilateral low back pain with sciatica, sciatica laterality unspecified, unspecified chronicity     CKD (chronic kidney disease) stage 3, GFR 30-59 ml/min (H)     Past Surgical History:   Procedure Laterality Date     aneursym[  1983, 1991    Has metal in head     CHOLECYSTECTOMY       CLOSED RX PROX HUMERUS FRACTURE      10 pins placed     COLONOSCOPY       COLONOSCOPY  1/24/2014    Procedure: COMBINED COLONOSCOPY, SINGLE BIOPSY/POLYPECTOMY BY BIOPSY;  COLONOSCOPY;  Surgeon: Ranjit Pa MD;  Location:  GI     HC ECP WITH CATARACT SURGERY      both eyes     HYSTERECTOMY, JANET         Social History     Tobacco Use     Smoking status: Former Smoker     Packs/day: 0.00     Types: Cigarettes     Smokeless tobacco: Never Used     Tobacco comment: states quit 4 years ago   Substance Use Topics     Alcohol use: Yes     Comment: occas.     Family History   Problem Relation Age of Onset     Thyroid Disease Mother      Arthritis Mother         Rheumatoid     Osteoporosis Mother      Cancer Mother         stomach     Cerebrovascular Disease Mother      Heart Disease Father      Cancer Father      Heart Disease Brother         fibulation         Current Outpatient Medications   Medication Sig Dispense Refill     Acetaminophen 500 MG  CHEW Take 1,000 mg by mouth 3 times daily (Patient taking differently: Take 650 mg by mouth as needed ) 120 tablet 5     calcium carbonate (TUMS) 500 MG chewable tablet Take 1 tablet (500 mg) by mouth At Bedtime 120 tablet 11     cholecalciferol (VITAMIN D3) 5000 units TABS tablet Take 1 tablet (5,000 Units) by mouth At Bedtime 100 tablet 3     famotidine (PEPCID) 20 MG tablet TAKE 1 TABLET BY MOUTH TWICE DAILY AS NEEDED 180 tablet 3     fexofenadine (ALLEGRA) 180 MG tablet Take 1 tablet (180 mg) by mouth daily 30 tablet 11     fluticasone (FLONASE) 50 MCG/ACT spray Spray 2 sprays into both nostrils daily 48 g 2     hydroxychloroquine (PLAQUENIL) 200 MG tablet TAKE 2 (400 MG) BY MOUTH AT BEDTIME 180 tablet 2     losartan-hydrochlorothiazide (HYZAAR) 100-12.5 MG tablet Take 1 tablet by mouth daily 90 tablet 3     metoprolol succinate ER (TOPROL-XL) 50 MG 24 hr tablet TAKE 1 BY MOUTH DAILY 90 tablet 2     order for DME One pair longitudinal arch supports  One pair   Use as directed 1 each 1     ORDER FOR DME Equipment being ordered: RUBBER THRESHOLD /CARGO WEDGE RAMP  ONE  USE AS DIRECTED FOR THRESHOLD  TO PREVENT FALLING 1 Device 1     pantoprazole (PROTONIX) 40 MG EC tablet TAKE 1 BY MOUTH DAILY 90 tablet 3     pilocarpine (SALAGEN) 5 MG tablet Take 1 tablet (5 mg) by mouth 4 times daily (before meals and nightly) 360 tablet 2     senna-docusate (SENOKOT-S/PERICOLACE) 8.6-50 MG tablet Take 2 tablets by mouth 2 times daily 120 tablet PRN     Skin Protectants, Misc. (EUCERIN) cream Apply topically as needed for dry skin 454 g 3     diclofenac (VOLTAREN) 1 % GEL Apply 4 grams to knees or 2 grams to hands four times daily using enclosed dosing card. (Patient not taking: Reported on 9/27/2019) 100 g 1     mineral oil-white petrolatum (MINERIN/EUCERIN) CREA cream Apply topically 2 times daily (Patient not taking: Reported on 9/27/2019) 454 g 0     order for DME Equipment being ordered:   .seat lift mechanism   LIFT CHAIR  MECHANISM EVALUATION    PATIENT PRESENT TODAY FOR EVALUATION OF LIFT CHAIR MECHANISM  HAS SIGNIFICANT DISABILITY  WHICH PUT PATIENT AT RISK FOR FALLING  WHEN GETTING OUT OF A NORMAL CHAIR   PATIENT CAN AMBULATE WHEN UP AND WALKING ONCE IN A STANDING POSITION  THIS CONDITION IS NOT REVERSIBLE WITH FURTHER PHYSICAL THERAPY    SIGNIFICANT NEUROMUSCULAR IMPAIRMENT?   Yes advanced  OSTEOARTH (Patient not taking: Reported on 9/27/2019) 1 each 0     ORDER FOR DME Equipment being ordered:  LEFT THUMB  METACARPAL AND WRIST SPLINT FOR TRIGGER THUMB  *ONE*  *SIG:* AS DIRECTED (Patient not taking: Reported on 9/27/2019) 1 Device 1     sertraline (ZOLOFT) 50 MG tablet Take 1/2 tablet (25 mg) for 1-2 weeks, then increase to 1 tablet orally daily (Patient not taking: Reported on 9/27/2019) 90 tablet 3     Allergies   Allergen Reactions     Chantix [Varenicline]      suicidal     Influenza Virus Vaccine H5n1      Muscle aches dizzy, nauseated  Light headed     Morphine Sulfate      Sensitivity when in hospital     Omeprazole Magnesium Nausea     Intolerance       Vioxx      Niacin Itching and Rash     Zetia [Ezetimibe] Other (See Comments)     Muscle pain     Zyrtec-D Rash     Recent Labs   Lab Test 09/25/18  1119 08/29/17  1148 08/23/17  1030 09/13/16  1158   A1C  --  5.1  --  5.4   * 115*  --  132*   HDL 62 57  --  64   TRIG 83 93  --  74   ALT 22  --  22 24   CR 1.07* 1.20* 1.29* 1.25*   GFRESTIMATED 51* 44* 41* 43*   GFRESTBLACK 61 54* 50* 52*   POTASSIUM 4.3 3.9 3.7 4.0   TSH  --  1.43  --  0.96        Patient refuses mammogram patient refuses pneumococcal vaccine and influenza vaccine I strongly recommend these but she does not want to do the  ROS:  has Sjogren's syndrome with myopathy (H); Osteoarthritis of knee; GERD (gastroesophageal reflux disease); Constipation; Osteoporosis; Irritable bowel syndrome with diarrhea; HL (hearing loss); Rosacea; Poor memory; Low back pain; Senile keratosis; Spinal stenosis; H/O  "hysterectomy for benign disease; Bilateral hearing loss; Presbyopia; Knee pain; Hypertension goal BP (blood pressure) < 140/90; Hyperlipidemia with target LDL less than 130; Left arm pain; Arm pain; Major depression in partial remission (H); Subclavian artery stenosis (H); ACP (advance care planning); Gastroesophageal reflux disease without esophagitis; Primary osteoarthritis of both knees; Bilateral low back pain with sciatica, sciatica laterality unspecified, unspecified chronicity; and CKD (chronic kidney disease) stage 3, GFR 30-59 ml/min (H) on their problem list.    Review Of Systems  Skin: Left outer toe discomfort thickening of the left outer fifth nail  .  That for her  Eyes: negative  Ears/Nose/Throat: Bilateral neurosensory hearing loss which is severe  She has tried hearing aids x2 without success  Respiratory: No shortness of breath, dyspnea on exertion, cough, or hemoptysis  Cardiovascular: negative  Gastrointestinal: heartburn  Genitourinary: Stage III chronic kidney disease combination of hypertension and Sjogren's  Musculoskeletal: back pain, arthritis and joint pain  Bilateral lateral knee pain  Also some myalgias  Sjogren's syndrome  Neurologic: History of cerebral aneurysms  These have been clipped  Also she had intermittent memory issues  Left temple pain uncomfortability the left spontaneously  Psychiatric: Patient is anxious  Hematologic/Lymphatic/Immunologic: negative  Endocrine: He has had type PRE diabetes in the past        OBJECTIVE:   BP (!) 144/80   Pulse 74   Temp 97.3  F (36.3  C) (Tympanic)   Resp 16   Ht 1.651 m (5' 5\")   Wt 93.4 kg (206 lb)   SpO2 98%   BMI 34.28 kg/m   Estimated body mass index is 34.28 kg/m  as calculated from the following:    Height as of this encounter: 1.651 m (5' 5\").    Weight as of this encounter: 93.4 kg (206 lb).  EXAM:   GENERAL: healthy, alert and no distress  EYES: Eyes grossly normal to inspection, PERRL and conjunctivae and sclerae " normal  HENT: ear canals and TM's normal, nose and mouth without ulcers or lesions  NECK: no adenopathy, no asymmetry, masses, or scars and thyroid normal to palpation  RESP: lungs clear to auscultation - no rales, rhonchi or wheezes  BREAST: normal without masses, tenderness or nipple discharge and no palpable axillary masses or adenopathy  CV: regular rate and rhythm, normal S1 S2, no S3 or S4, no murmur, click or rub, no peripheral edema and peripheral pulses strong  ABDOMEN: soft, nontender, no hepatosplenomegaly, no masses and bowel sounds normal  MS: no gross musculoskeletal defects noted, no edema  SKIN: no suspicious lesions or rashes  NEURO: Normal strength and tone, mentation intact and speech normal  PSYCH: mentation appears normal, affect normal/bright  LYMPH: no cervical, supraclavicular, axillary, or inguinal adenopathy  Diabetic foot exam: normal DP and PT pulses, no trophic changes or ulcerative lesions, normal sensory exam and normal monofilament exam    Diagnostic Test Results:  Labs reviewed in Epic    ASSESSMENT / PLAN:       ICD-10-CM    1. Hyperlipidemia with target LDL less than 130 E78.5 Lipid panel reflex to direct LDL Fasting   2. Vitamin D insufficiency E55.9 cholecalciferol (VITAMIN D3) 5000 units TABS tablet     Vitamin D Deficiency   3. CKD (chronic kidney disease) stage 3, GFR 30-59 ml/min (H) N18.3 Comprehensive metabolic panel     Albumin Random Urine Quantitative with Creat Ratio     CBC with platelets differential   4. Gastroesophageal reflux disease without esophagitis K21.9 famotidine (PEPCID) 20 MG tablet     pantoprazole (PROTONIX) 40 MG EC tablet   5. Pruritic disorder L29.9 DISCONTINUED: loratadine ( LORATADINE) 10 MG tablet   6. Essential hypertension with goal blood pressure less than 140/90 I10 metoprolol succinate ER (TOPROL-XL) 50 MG 24 hr tablet     Comprehensive metabolic panel   7. Sjogren's syndrome with other organ involvement (H) M35.09 pilocarpine (SALAGEN) 5 MG  "tablet   8. Other constipation K59.09 senna-docusate (SENOKOT-S/PERICOLACE) 8.6-50 MG tablet   9. Sjogren's syndrome with myopathy (H) M35.03 hydroxychloroquine (PLAQUENIL) 200 MG tablet     Comprehensive metabolic panel     CRP inflammation     Erythrocyte sedimentation rate auto   10. Dry skin L85.3 Skin Protectants, Misc. (EUCERIN) cream     fexofenadine (ALLEGRA) 180 MG tablet       End of Life Planning:  Patient currently has an advanced directive: DNR/DNI.  Practitioner is supportive of decision.    COUNSELING:  Reviewed preventive health counseling, as reflected in patient instructions       Regular exercise       Healthy diet/nutrition       Vision screening       Hearing screening       Dental care       Bladder control       Fall risk prevention       Advanced Planning     Estimated body mass index is 34.28 kg/m  as calculated from the following:    Height as of this encounter: 1.651 m (5' 5\").    Weight as of this encounter: 93.4 kg (206 lb).    Weight management plan: Discussed healthy diet and exercise guidelines     reports that she has quit smoking. Her smoking use included cigarettes. She smoked 0.00 packs per day. She has never used smokeless tobacco.      Appropriate preventive services were discussed with this patient, including applicable screening as appropriate for cardiovascular disease, diabetes, osteopenia/osteoporosis, and glaucoma.  As appropriate for age/gender, discussed screening for colorectal cancer, prostate cancer, breast cancer, and cervical cancer. Checklist reviewing preventive services available has been given to the patient.    Reviewed patients plan of care and provided an AVS. The Basic Care Plan (routine screening as documented in Health Maintenance) for Fatou meets the Care Plan requirement. This Care Plan has been established and reviewed with the Patient.    Counseling Resources:  ATP IV Guidelines  Pooled Cohorts Equation Calculator  Breast Cancer Risk " Calculator  FRAX Risk Assessment  ICSI Preventive Guidelines  Dietary Guidelines for Americans, 2010  USDA's MyPlate  ASA Prophylaxis  Lung CA Screening    EVY ACOSTA MD  [facility]    The patient s PHQ-9 score is consistent with moderate depression. She was provided with information regarding depression and was advised to schedule a follow up appointment in  53 weeks to further address this issue.

## 2019-09-27 NOTE — PATIENT INSTRUCTIONS
" 1950  HEPATITIS C SCREENING     03/26/2009  ZOSTER IMMUNIZATION (2 of 3)     09/24/2014  PNEUMOCOCCAL IMMUNIZATION 65+ LOW/MEDIUM RISK (2 of 2 - PCV13)     12/20/2015  MAMMO SCREENING     01/31/2019  PHQ-9     09/01/2019  INFLUENZA VACCINE (1)     09/25/2019  FALL RISK ASSESSMENT    SIDE EFFECTS BENEFITS AND RISKS DISCUSSED    TREATMENT PROGNOSIS BENEFITS AND RISKS DISCUSSED   CRYOTHERAPY TO INVOLVED AREAS  FOR 60 SECONDS  Left neck senile keratosis 5 milimeters   KEEP COOL UNTIL FOLLOWING MORNING  NO SHOWERING OR BATHING   EXPECT BLISTERING AND POSSIBLE LOSS OF PIGMENTATION FOR UP TO ONE YEAR AFTER   RARELY DOES THIS BECOME INFECTED   PUT   VASELINE ON BLISTER TWICE DAILY AS NEEDED   IF SEVERE PAIN OR REDNESS CALL OR COME IN   RARELY NEEDS ANTIBIOTIC   EVY ACOSTA JR., MD     POMEGRANATE JUICE INFLAMMATION  COLLAGEN TYPE 2  INTERNET PHARM[facility]Y  CHICKEN BONE BROTH   FISH OIL ONE DAILY   PAPAYA  AND PINEAPPLE SCAR HEALING   BLUEBERRIES FOR INFLAMMATION  SPINACH FOR INFLAMMATION   BOSWELLIA FLORINA  CURCUMIN  TUMERIC   PINE BARK EXTRACT  AVOID   AVOID NIGHT SHADES   POTATOES   PEPPERS   TOMATOES  \"LEAKY GUT SYNDROME\"    EVY ACOSTA JR., MD      NW5 WCC NW6 SoapO NW    My NoteIncomplete(Auto-Saved)  10:59 AM      You can't edit this note because you have it open on workstation [id number] since 9/27/2019 11:05 AM CDT.    NoteWriter selections for this note haven't been saved.        SUBJECTIVE:   Fatou Brian is a 69 year old female who presents for Preventive Visit.  Are you in the first 12 months of your Medicare Part B coverage?  No     Physical Health:    In general, how would you rate your overall physical health? Fair    Outside of work, how many days during the week do you exercise? 2-3 days/week    Outside of work, approximately how many minutes a day do you exercise?greater than 60 minutes    If you drink alcohol do you typically have >3 drinks per day or >7 drinks per week? No    Do you " "usually eat at least 4 servings of fruit and vegetables a day, include whole grains & fiber and avoid regularly eating high fat or \"junk\" foods? NO    Do you have any problems taking medications regularly?  YES    Do you have any side effects from medications? none    Needs assistance for the following daily activities: no assistance needed    Which of the following safety concerns are present in your home?  lack of grab bars in the bathroom, grab bar that is there is not safe    Hearing impairment: Yes, did not like having the hearing aids    In the past 6 months, have you been bothered by leaking of urine? yes     Mental Health:    In general, how would you rate your overall mental or emotional health? Fair, issues with family    PHQ-2 Score:       Do you feel safe in your environment? Yes  Feels safe in the house, unsure about the neighborhood       Patient is having considerable itching in her skin is diffuse taking 3 Claritin per day without much benefit    She has not redness of her outer left fifth toe on the left side slight thickening of the nails noted skin itself looks good there is no evidence of secondary infection or yeast    She wants a diabetes check today    She takes Tylenol 652/day and I suggest that she be on the 500 mg tablets    Takes a vitamin D pill of 5005 days/week she wants a vitamin D level    Is a blood test for Sjogren's disease I will be doing sed rate CMP and CRP she is worried that she might have multiple sclerosis or multiple occult dystrophy I do not think that so likely scenario she does not happy with Dr. Shelton wants referral to a different rheumatologist but I went over all the available once she does not want go to the [facility] or out to [place]    She thinks she might of had a mild mini stroke    She is concerned that she might have an aneurysm she was having pain in the left temple she had aneurysm in the past    She keeps missing her pills I " would suggest mid binders    She has some moles that she is questioning    She is concerned about fibromyalgia    Concerned about IBS arthrosis osteoarthritis depression difficulties walking the Sjogren's syndrome dry mouth syndrome but most of these   are probably due to the Sjogren's disease    Patient with chronic intermittent low back pain with sciatica    Bilateral neurosensory hearing loss                          Bilateral osteoarthritis of the knee improved left arm pain    Gastroesophageal reflux disease it still bothers her    Intermittent constipation    Intermittent irritable bowel syndrome diarrhea    Personal history of hypertension and    Personal history of subclavian artery stenosis    Personal history of osteoporosis    She has chronic poor memory    She has a history of hysterectomy for benign disease    And she has presbyopia as well severe presbycusis      (E78.5) Hyperlipidemia with target LDL less than 130  (primary encounter diagnosis)  Comment:    Plan: Lipid panel reflex to direct LDL Fasting             (E55.9) Vitamin D insufficiency  Comment:    Plan: cholecalciferol (VITAMIN D3) 5000 units TABS         tablet, Vitamin D Deficiency             (N18.3) CKD (chronic kidney disease) stage 3, GFR 30-59 ml/min (H)  Comment:    Plan: Comprehensive metabolic panel, Albumin Random         Urine Quantitative with Creat Ratio, CBC with         platelets differential             (K21.9) Gastroesophageal reflux disease without esophagitis  Comment:    Plan: famotidine (PEPCID) 20 MG tablet, pantoprazole         (PROTONIX) 40 MG EC tablet             (L29.9) Pruritic disorder  Comment:    Plan: DISCONTINUED: loratadine (KP LORATADINE) 10 MG         tablet             (I10) Essential hypertension with goal blood pressure less than 140/90  Comment:    Plan: metoprolol succinate ER (TOPROL-XL) 50 MG 24 hr        tablet, Comprehensive metabolic panel             (M35.09) Sjogren's syndrome with other  organ involvement (H)  Comment:     Plan: pilocarpine (SALAGEN) 5 MG tablet                     (K59.09) Other constipation  Comment:     Plan: senna-docusate (SENOKOT-S/PERICOLACE) 8.6-50 MG        tablet             (M35.03) Sjogren's syndrome with myopathy (H)  Comment:    Plan: hydroxychloroquine (PLAQUENIL) 200 MG tablet,         Comprehensive metabolic panel, CRP         inflammation, Erythrocyte sedimentation rate         auto             (L85.3) Dry skin  Comment:    Plan: Skin Protectants, Misc. (EUCERIN) cream,         fexofenadine (ALLEGRA) 180 MG tablet                   Patient Education   Personalized Prevention Plan  You are due for the preventive services outlined below.  Your care team is available to assist you in scheduling these services.  If you have already completed any of these items, please share that information with your care team to update in your medical record.  Health Maintenance Due   Topic Date Due     Hepatitis C Screening  1950     Zoster (Shingles) Vaccine (2 of 3) 03/26/2009     Pneumococcal Vaccine (2 of 2 - PCV13) 09/24/2014     Mammogram  12/20/2015     Depression Assessment  01/31/2019     Flu Vaccine (1) 09/01/2019     FALL RISK ASSESSMENT  09/25/2019     Annual Wellness Visit  09/25/2019       Depression and Suicide in Older Adults    Nearly 2 million older Americans have some type of depression. Sadly, some of them even take their own lives. Yet depression among older adults is often ignored. Learn the warning signs. You may help spare a loved one needless pain. You may also save a life.  What is depression?  Depression is a serious illness that affects the way you think and feel. It is not a normal part of aging, nor is it a sign of weakness, a character flaw, or something you can snap out of. Most people with depression need treatment to get better. The most common symptom is a feeling of deep sadness. People who are depressed also may seem tired and listless. And  nothing seems to give them pleasure. It s normal to grieve or be sad sometimes. But sadness lessens or passes with time. Depression rarely goes away or improves on its own. Other symptoms of depression are:  Sleeping more or less than normal  Eating more or less than normal  Having headaches, stomachaches, or other pains that don t go away  Feeling nervous,  empty,  or worthless  Crying a great deal  Thinking or talking about suicide or death  Feeling confused or forgetful  What causes it?  The causes of depression aren t fully known. But, it is thought to result from a complex interaction of biochemistry, genetics, environmental factors, and personality. Certain chemicals in the brain play a role. Depression does run in families. And life stresses can also trigger depression in some people. Older adults often face many stressors, such as death of friends or a spouse, health problems, and financial concerns.  How you can help  Often, depressed people may not want to ask for help. When they do, they may be ignored. Or, they may receive the wrong treatment. You can help by showing parents and older friends love and support. If they seem depressed, help them find the right treatment. Talk to your doctor. Or contact a local mental health center, social service agency, or hospital. With modern treatment, no one has to suffer from depression.  If your older friend or family member agrees, you can be an advocate for him or her in the healthcare setting. Many times, older adults have other chronic illnesses such as diabetes, heart disease, or cancer that can cause symptoms of depression. Medicine side effects can also contribute to certain behaviors and feelings. It is important that the older adult's healthcare provider listens and sorts out the causes of any symptoms of depression and makes referrals to mental health specialists when needed. Untreated depression can result in misdiagnosis, including brain disorders such  as dementia and Alzheimer's. If the health professional does not take the issue of depression seriously, ask your family member or friend to consider finding another provider.  Resources  National Suicide Prevention Lifeline (crisis hotline)773-433-ZYWL (5286)  National [place] of Mental Kjdwwq651-671-3465vod.[facility].nih.gov  National [place] on Mental Buoyent549-481-3956lqx.mohit.org  Mental Health Zkccnit119-354-2410gas.[facility].org  National Suicide Uznkzvx682-084-1011 (800-SUICIDE)   Date Last Reviewed: 2/1/2017 2000-2018 [employer]. [address] [place] [address], [place] [address]. All rights reserved. This information is not intended as a substitute for professional medical care. Always follow your healthcare professional's instructions.

## 2019-09-27 NOTE — LETTER
"October 1, 2019      Fatou Brian  [address]  [facility] [address]        Dear ,    We are writing to inform you of your test results.    NORMAL COMPLETE BLOOD PANEL WBCS RBCS AND PLATELETS   NORMAL ERTHROCYTE SEDIMENTATION RATE IE INFLAMMATORY MARKER   NORMAL C REACTIVE PROTEIN INFLAMMATORY MARKER   NORMAL DIABETES URINE PROTEIN TEST   NORMAL  VITAMIN D LEVEL   IMPROVED RENAL FUNCTION   BORDERLINE  STAGE 3   NORMAL LIVER FUNCTION TESTS   BORDERLINE  TOTAL CHOLESTEROL   NORMAL TRIGLYCERIDES   NORMAL HDL OR \"GOOD\" CHOLESTEROL   BORDERLINE  HIGH LDL OR \"BAD\" CHOLESTEROL     Resulted Orders   Comprehensive metabolic panel   Result Value Ref Range    Sodium 140 133 - 144 mmol/L    Potassium 3.8 3.4 - 5.3 mmol/L    Chloride 105 94 - 109 mmol/L    Carbon Dioxide 26 20 - 32 mmol/L    Anion Gap 9 3 - 14 mmol/L    Glucose 91 70 - 99 mg/dL    Urea Nitrogen 13 7 - 30 mg/dL    Creatinine 1.06 (H) 0.52 - 1.04 mg/dL    GFR Estimate 53 (L) >60 mL/min/[1.73_m2]      Comment:      Non  GFR Calc  Starting 12/18/2018, serum creatinine based estimated GFR (eGFR) will be   calculated using the Chronic Kidney Disease Epidemiology Collaboration   (CKD-EPI) equation.      GFR Estimate If Black 61 >60 mL/min/[1.73_m2]      Comment:       GFR Calc  Starting 12/18/2018, serum creatinine based estimated GFR (eGFR) will be   calculated using the Chronic Kidney Disease Epidemiology Collaboration   (CKD-EPI) equation.      Calcium 9.1 8.5 - 10.1 mg/dL    Bilirubin Total 0.6 0.2 - 1.3 mg/dL    Albumin 4.2 3.4 - 5.0 g/dL    Protein Total 7.3 6.8 - 8.8 g/dL    Alkaline Phosphatase 93 40 - 150 U/L    ALT 40 0 - 50 U/L    AST 33 0 - 45 U/L   Lipid panel reflex to direct LDL Fasting   Result Value Ref Range    Cholesterol 208 (H) <200 mg/dL      Comment:      Desirable:       <200 mg/dl    Triglycerides 99 <150 mg/dL    HDL Cholesterol 63 >49 mg/dL    LDL Cholesterol Calculated 125 (H) <100 " mg/dL      Comment:      Above desirable:  100-129 mg/dl  Borderline High:  130-159 mg/dL  High:             160-189 mg/dL  Very high:       >189 mg/dl      Non HDL Cholesterol 145 (H) <130 mg/dL      Comment:      Above Desirable:  130-159 mg/dl  Borderline high:  160-189 mg/dl  High:             190-219 mg/dl  Very high:       >219 mg/dl     Albumin Random Urine Quantitative with Creat Ratio   Result Value Ref Range    Creatinine Urine 31 mg/dL    Albumin Urine mg/L <5 mg/L    Albumin Urine mg/g Cr Unable to calculate due to low value 0 - 25 mg/g Cr   CBC with platelets differential   Result Value Ref Range    WBC 5.5 4.0 - 11.0 10e9/L    RBC Count 4.54 3.8 - 5.2 10e12/L    Hemoglobin 14.3 11.7 - 15.7 g/dL    Hematocrit 43.3 35.0 - 47.0 %    MCV 95 78 - 100 fl    MCH 31.5 26.5 - 33.0 pg    MCHC 33.0 31.5 - 36.5 g/dL    RDW 13.1 10.0 - 15.0 %    Platelet Count 217 150 - 450 10e9/L    % Neutrophils 67.9 %    % Lymphocytes 21.2 %    % Monocytes 9.2 %    % Eosinophils 1.3 %    % Basophils 0.4 %    Absolute Neutrophil 3.8 1.6 - 8.3 10e9/L    Absolute Lymphocytes 1.2 0.8 - 5.3 10e9/L    Absolute Monocytes 0.5 0.0 - 1.3 10e9/L    Absolute Eosinophils 0.1 0.0 - 0.7 10e9/L    Absolute Basophils 0.0 0.0 - 0.2 10e9/L    Diff Method Automated Method    CRP inflammation   Result Value Ref Range    CRP Inflammation <2.9 0.0 - 8.0 mg/L   Erythrocyte sedimentation rate auto   Result Value Ref Range    Sed Rate 26 0 - 30 mm/h   Vitamin D Deficiency   Result Value Ref Range    Vitamin D Deficiency screening 58 20 - 75 ug/L      Comment:      Season, race, dietary intake, and treatment affect the concentration of   25-hydroxy-Vitamin D. Values may decrease during winter months and increase   during summer months. Values 20-29 ug/L may indicate Vitamin D insufficiency   and values <20 ug/L may indicate Vitamin D deficiency.  Vitamin D determination is routinely performed by an immunoassay specific for   25 hydroxyvitamin D3.  If an  individual is on vitamin D2 (ergocalciferol)   supplementation, please specify 25 OH vitamin D2 and D3 level determination by   LCMSMS test [id number].         If you have any questions or concerns, please call the clinic at the number listed above.       Sincerely,        EVY ACOSTA MD

## 2019-09-27 NOTE — LETTER
My Depression Action Plan  Name: Fatou Brian   Date of Birth 1950  Date: 9/27/2019    My doctor: Reid Thomas   My clinic: [address]  [facility] [address] [phone]          GREEN    ZONE   Good Control    What it looks like:     Things are going generally well. You have normal up s and down s. You may even feel depressed from time to time, but bad moods usually last less than a day.   What you need to do:  1. Continue to care for yourself (see self care plan)  2. Check your depression survival kit and update it as needed  3. Follow your physician s recommendations including any medication.  4. Do not stop taking medication unless you consult with your physician first.           YELLOW         ZONE Getting Worse    What it looks like:     Depression is starting to interfere with your life.     It may be hard to get out of bed; you may be starting to isolate yourself from others.    Symptoms of depression are starting to last most all day and this has happened for several days.     You may have suicidal thoughts but they are not constant.   What you need to do:     1. Call your care team, your response to treatment will improve if you keep your care team informed of your progress. Yellow periods are signs an adjustment may need to be made.     2. Continue your self-care, even if you have to fake it!    3. Talk to someone in your support network    4. Open up your depression survival kit           RED    ZONE Medical Alert - Get Help    What it looks like:     Depression is seriously interfering with your life.     You may experience these or other symptoms: You can t get out of bed most days, can t work or engage in other necessary activities, you have trouble taking care of basic hygiene, or basic responsibilities, thoughts of suicide or death that will not go away, self-injurious behavior.     What you  need to do:  1. Call your care team and request a same-day appointment. If they are not available (weekends or after hours) call your local crisis line, emergency room or 911.            Depression Self Care Plan / Survival Kit    Self-Care for Depression  Here s the deal. Your body and mind are really not as separate as most people think.  What you do and think affects how you feel and how you feel influences what you do and think. This means if you do things that people who feel good do, it will help you feel better.  Sometimes this is all it takes.  There is also a place for medication and therapy depending on how severe your depression is, so be sure to consult with your medical provider and/ or Behavioral Health Consultant if your symptoms are worsening or not improving.     In order to better manage my stress, I will:    Exercise  Get some form of exercise, every day. This will help reduce pain and release endorphins, the  feel good  chemicals in your brain. This is almost as good as taking antidepressants!  This is not the same as joining a gym and then never going! (they count on that by the way ) It can be as simple as just going for a walk or doing some gardening, anything that will get you moving.      Hygiene   Maintain good hygiene (Get out of bed in the morning, Make your bed, Brush your teeth, Take a shower, and Get dressed like you were going to work, even if you are unemployed).  If your clothes don't fit try to get ones that do.    Diet  I will strive to eat foods that are good for me, drink plenty of water, and avoid excessive sugar, caffeine, alcohol, and other mood-altering substances.  Some foods that are helpful in depression are: complex carbohydrates, B vitamins, flaxseed, fish or fish oil, fresh fruits and vegetables.    Psychotherapy  I agree to participate in Individual Therapy (if recommended).    Medication  If prescribed medications, I agree to take them.  Missing doses can result in  serious side effects.  I understand that drinking alcohol, or other illicit drug use, may cause potential side effects.  I will not stop my medication abruptly without first discussing it with my provider.    Staying Connected With Others  I will stay in touch with my friends, family members, and my primary care provider/team.    Use your imagination  Be creative.  We all have a creative side; it doesn t matter if it s oil painting, sand castles, or mud pies! This will also kick up the endorphins.    Witness Beauty  (AKA stop and smell the roses) Take a look outside, even in mid-winter. Notice colors, textures. Watch the squirrels and birds.     Service to others  Be of service to others.  There is always someone else in need.  By helping others we can  get out of ourselves  and remember the really important things.  This also provides opportunities for practicing all the other parts of the program.    Humor  Laugh and be silly!  Adjust your TV habits for less news and crime-drama and more comedy.    Control your stress  Try breathing deep, massage therapy, biofeedback, and meditation. Find time to relax each day.     My support system    Clinic Contact:  Phone number:    Contact 1:  Phone number:    Contact 2:  Phone number:    [employer]/:  Phone number:    Therapist:  Phone number:    Local crisis center:    Phone number:    Other community support:  Phone number:

## 2019-09-28 ENCOUNTER — HEALTH MAINTENANCE LETTER (OUTPATIENT)
Age: 69
End: 2019-09-28

## 2019-09-28 LAB
CREAT UR-MCNC: 31 MG/DL
MICROALBUMIN UR-MCNC: <5 MG/L
MICROALBUMIN/CREAT UR: NORMAL MG/G CR (ref 0–25)

## 2019-09-30 ENCOUNTER — TELEPHONE (OUTPATIENT)
Dept: FAMILY MEDICINE | Facility: CLINIC | Age: 69
End: 2019-09-30

## 2019-09-30 DIAGNOSIS — M17.0 PRIMARY OSTEOARTHRITIS OF BOTH KNEES: ICD-10-CM

## 2019-09-30 DIAGNOSIS — M54.40 BILATERAL LOW BACK PAIN WITH SCIATICA, SCIATICA LATERALITY UNSPECIFIED, UNSPECIFIED CHRONICITY: Primary | Chronic | ICD-10-CM

## 2019-09-30 DIAGNOSIS — E78.5 HYPERLIPIDEMIA LDL GOAL <100: ICD-10-CM

## 2019-09-30 LAB — DEPRECATED CALCIDIOL+CALCIFEROL SERPL-MC: 58 UG/L (ref 20–75)

## 2019-09-30 RX ORDER — SENNOSIDES 8.6 MG
1300 CAPSULE ORAL EVERY 8 HOURS PRN
Qty: 270 TABLET | Refills: 3 | Status: SHIPPED | OUTPATIENT
Start: 2019-09-30 | End: 2022-10-26

## 2019-09-30 NOTE — TELEPHONE ENCOUNTER
Reason for Call:  Request for results:    Name of test or procedure: labs     Date of test of procedure: sept 27    Location of the test or procedure: [facility]     OK to leave the result message on voice mail or with a family member? YES    Phone number Patient can be reached at:  Home number on file [phone] (home)    Additional comments: pt has mychart but wants a written letter with results.  Also wants to talk about med refills. Wants sent to mail order instead.  Pt would like to speak to a nurse regarding her med list. Pt has multiple questions regarding after after visit summary.    Call taken on 9/30/2019 at 1:20 PM by MAYUR GALEAS

## 2019-10-01 DIAGNOSIS — M35.09 SJOGREN'S SYNDROME WITH OTHER ORGAN INVOLVEMENT (H): ICD-10-CM

## 2019-10-01 DIAGNOSIS — K21.9 GASTROESOPHAGEAL REFLUX DISEASE WITHOUT ESOPHAGITIS: Chronic | ICD-10-CM

## 2019-10-01 DIAGNOSIS — M35.03 SJOGREN'S SYNDROME WITH MYOPATHY (H): ICD-10-CM

## 2019-10-01 DIAGNOSIS — I10 ESSENTIAL HYPERTENSION WITH GOAL BLOOD PRESSURE LESS THAN 140/90: Chronic | ICD-10-CM

## 2019-10-01 RX ORDER — CHOLESTYRAMINE 4 G/9G
1 POWDER, FOR SUSPENSION ORAL
Qty: 60 PACKET | Refills: 11 | Status: SHIPPED | OUTPATIENT
Start: 2019-10-01 | End: 2021-09-24

## 2019-10-01 RX ORDER — ATORVASTATIN CALCIUM 10 MG/1
10 TABLET, FILM COATED ORAL DAILY
Qty: 90 TABLET | Refills: 3 | Status: SHIPPED | OUTPATIENT
Start: 2019-10-01 | End: 2019-10-01

## 2019-10-01 NOTE — TELEPHONE ENCOUNTER
"Requested Prescriptions   Pending Prescriptions Disp Refills     famotidine (PEPCID) 20 MG tablet  Last Written Prescription Date:  9/27/2019  Last Fill Quantity: 180 tablet,  # refills: 3   Last office visit: 9/27/2019 with prescribing provider:  MAJO Thomas   Future Office Visit:     180 tablet 3     Sig: TAKE 1 TABLET BY MOUTH TWICE DAILY AS NEEDED       H2 Blockers Protocol Passed - 10/1/2019  7:35 AM        Passed - Patient is age 12 or older        Passed - Recent (12 mo) or future (30 days) visit within the authorizing provider's specialty     Patient has had an office visit with the authorizing provider or a provider within the authorizing providers department within the previous 12 mos or has a future within next 30 days. See \"Patient Info\" tab in inbasket, or \"Choose Columns\" in Meds & Orders section of the refill encounter.              Passed - Medication is active on med list        hydroxychloroquine (PLAQUENIL) 200 MG tablet     Last Written Prescription Date:  9/27/2019  Last Fill Quantity: 180 tablet,  # refills: 2   Last office visit: 9/27/2019 with prescribing provider:  MAJO Thomas   Future Office Visit:        Routing refill request to provider for review/approval because:  Drug not on the [facility], P or [facility] refill protocol or controlled substance                        metoprolol succinate ER (TOPROL-XL) 50 MG 24 hr tablet  Last Written Prescription Date:  9/27/2019  Last Fill Quantity: 90 tablet,  # refills: 2   Last office visit: 9/27/2019 with prescribing provider:  MAJO Thomas   Future Office Visit:     90 tablet 2     Sig: TAKE 1 BY MOUTH DAILY       Beta-Blockers Protocol Failed - 10/1/2019  7:35 AM        Failed - Blood pressure under 140/90 in past 12 months     BP Readings from Last 3 Encounters:   09/27/19 (!) 144/80   10/29/18 134/62   09/25/18 144/64                 Passed - Patient is age 6 or older        Passed - Recent (12 mo) or future (30 days) visit within the authorizing " "provider's specialty     Patient has had an office visit with the authorizing provider or a provider within the authorizing providers department within the previous 12 mos or has a future within next 30 days. See \"Patient Info\" tab in inbasket, or \"Choose Columns\" in Meds & Orders section of the refill encounter.              Passed - Medication is active on med list        pilocarpine (SALAGEN) 5 MG tablet    Last Written Prescription Date:  9/27/2019  Last Fill Quantity: 360 tablet,  # refills: 2   Last office visit: 9/27/2019 with prescribing provider:  MAJO Thomas   Future Office Visit:           Routing refill request to provider for review/approval because:  Drug not on the [facility], P or [facility] refill protocol or controlled substance                           "

## 2019-10-01 NOTE — TELEPHONE ENCOUNTER
Component      Latest Ref Rng & Units 9/27/2019   WBC      4.0 - 11.0 10e9/L 5.5   RBC Count      3.8 - 5.2 10e12/L 4.54   Hemoglobin      11.7 - 15.7 g/dL 14.3   Hematocrit      35.0 - 47.0 % 43.3   MCV      78 - 100 fl 95   MCH      26.5 - 33.0 pg 31.5   MCHC      31.5 - 36.5 g/dL 33.0   RDW      10.0 - 15.0 % 13.1   Platelet Count      150 - 450 10e9/L 217   % Neutrophils      % 67.9   % Lymphocytes      % 21.2   % Monocytes      % 9.2   % Eosinophils      % 1.3   % Basophils      % 0.4   Absolute Neutrophil      1.6 - 8.3 10e9/L 3.8   Absolute Lymphocytes      0.8 - 5.3 10e9/L 1.2   Absolute Monocytes      0.0 - 1.3 10e9/L 0.5   Absolute Eosinophils      0.0 - 0.7 10e9/L 0.1   Absolute Basophils      0.0 - 0.2 10e9/L 0.0   Diff Method       Automated Method   Sodium      133 - 144 mmol/L 140   Potassium      3.4 - 5.3 mmol/L 3.8   Chloride      94 - 109 mmol/L 105   Carbon Dioxide      20 - 32 mmol/L 26   Anion Gap      3 - 14 mmol/L 9   Glucose      70 - 99 mg/dL 91   Urea Nitrogen      7 - 30 mg/dL 13   Creatinine      0.52 - 1.04 mg/dL 1.06 (H)   GFR Estimate      >60 mL/min/1.73:m2 53 (L)   GFR Estimate If Black      >60 mL/min/1.73:m2 61   Calcium      8.5 - 10.1 mg/dL 9.1   Bilirubin Total      0.2 - 1.3 mg/dL 0.6   Albumin      3.4 - 5.0 g/dL 4.2   Protein Total      6.8 - 8.8 g/dL 7.3   Alkaline Phosphatase      40 - 150 U/L 93   ALT      0 - 50 U/L 40   AST      0 - 45 U/L 33   Cholesterol      <200 mg/dL 208 (H)   Triglycerides      <150 mg/dL 99   HDL Cholesterol      >49 mg/dL 63   LDL Cholesterol Calculated      <100 mg/dL 125 (H)   Non HDL Cholesterol      <130 mg/dL 145 (H)   Creatinine Urine      mg/dL 31   Albumin Urine mg/L      mg/L <5   Albumin Urine mg/g Cr      0 - 25 mg/g Cr Unable to calculate due to low value   CRP Inflammation      0.0 - 8.0 mg/L <2.9   Sed Rate      0 - 30 mm/h 26   Vitamin D Deficiency screening      20 - 75 ug/L 58

## 2019-10-01 NOTE — TELEPHONE ENCOUNTER
The 10-year ASCVD risk score (Donita BEDOYA Jr., et al., 2013) is: 17.2%    Values used to calculate the score:      Age: 69 years      Sex: Female      Is Non- : No      Diabetic: No      Tobacco smoker: No      Systolic Blood Pressure: 144 mmHg      Is BP treated: Yes      HDL Cholesterol: 63 mg/dL      Total Cholesterol: 208 mg/dL  By adding a atorvastatin 10 mg 1 tablet daily for the next year you can drop your risk from 17% down to about 12% if he add aspirin you drop it down to about a 9% absolute risk of a stroke or heart attack in the next 10 years  It ultimately is up to you as to what you decide to take this medication or not  EVY ACOSTA JR lower cholesterol    There is there is some other way or Zetia and it is often associate with muscle aches and pains it can be but not very often another one is what I think called red yeast rice is a nerve called red yeast rice there are other things that will lower your cholesterol as well there is a medication called cholestyramine another one called WelChol they do not affect the muscles at all the L on the average for Wanda about 5 %/pill or 10% per package of the cholestyramine right right. MD

## 2019-10-01 NOTE — PATIENT INSTRUCTIONS
Component      Latest Ref Rng & Units 9/27/2019   WBC      4.0 - 11.0 10e9/L 5.5   RBC Count      3.8 - 5.2 10e12/L 4.54   Hemoglobin      11.7 - 15.7 g/dL 14.3   Hematocrit      35.0 - 47.0 % 43.3   MCV      78 - 100 fl 95   MCH      26.5 - 33.0 pg 31.5   MCHC      31.5 - 36.5 g/dL 33.0   RDW      10.0 - 15.0 % 13.1   Platelet Count      150 - 450 10e9/L 217   % Neutrophils      % 67.9   % Lymphocytes      % 21.2   % Monocytes      % 9.2   % Eosinophils      % 1.3   % Basophils      % 0.4   Absolute Neutrophil      1.6 - 8.3 10e9/L 3.8   Absolute Lymphocytes      0.8 - 5.3 10e9/L 1.2   Absolute Monocytes      0.0 - 1.3 10e9/L 0.5   Absolute Eosinophils      0.0 - 0.7 10e9/L 0.1   Absolute Basophils      0.0 - 0.2 10e9/L 0.0   Diff Method       Automated Method   Sodium      133 - 144 mmol/L 140   Potassium      3.4 - 5.3 mmol/L 3.8   Chloride      94 - 109 mmol/L 105   Carbon Dioxide      20 - 32 mmol/L 26   Anion Gap      3 - 14 mmol/L 9   Glucose      70 - 99 mg/dL 91   Urea Nitrogen      7 - 30 mg/dL 13   Creatinine      0.52 - 1.04 mg/dL 1.06 (H)   GFR Estimate      >60 mL/min/1.73:m2 53 (L)   GFR Estimate If Black      >60 mL/min/1.73:m2 61   Calcium      8.5 - 10.1 mg/dL 9.1   Bilirubin Total      0.2 - 1.3 mg/dL 0.6   Albumin      3.4 - 5.0 g/dL 4.2   Protein Total      6.8 - 8.8 g/dL 7.3   Alkaline Phosphatase      40 - 150 U/L 93   ALT      0 - 50 U/L 40   AST      0 - 45 U/L 33   Cholesterol      <200 mg/dL 208 (H)   Triglycerides      <150 mg/dL 99   HDL Cholesterol      >49 mg/dL 63   LDL Cholesterol Calculated      <100 mg/dL 125 (H)   Non HDL Cholesterol      <130 mg/dL 145 (H)   Creatinine Urine      mg/dL 31   Albumin Urine mg/L      mg/L <5   Albumin Urine mg/g Cr      0 - 25 mg/g Cr Unable to calculate due to low value   CRP Inflammation      0.0 - 8.0 mg/L <2.9   Sed Rate      0 - 30 mm/h 26   Vitamin D Deficiency screening      20 - 75 ug/L 58   PLEASE CALL PATIENT  WITH REPORT  EVY SMITH  KARLA GEORGES MD

## 2019-10-02 RX ORDER — HYDROXYCHLOROQUINE SULFATE 200 MG/1
TABLET, FILM COATED ORAL
Qty: 180 TABLET | Refills: 2 | Status: SHIPPED | OUTPATIENT
Start: 2019-10-02 | End: 2020-12-15

## 2019-10-02 RX ORDER — FAMOTIDINE 20 MG/1
TABLET, FILM COATED ORAL
Qty: 180 TABLET | Refills: 3 | Status: SHIPPED | OUTPATIENT
Start: 2019-10-02 | End: 2019-10-02

## 2019-10-02 RX ORDER — METOPROLOL SUCCINATE 50 MG/1
TABLET, EXTENDED RELEASE ORAL
Qty: 90 TABLET | Refills: 2 | Status: SHIPPED | OUTPATIENT
Start: 2019-10-02 | End: 2020-08-19

## 2019-10-02 RX ORDER — FAMOTIDINE 20 MG/1
TABLET, FILM COATED ORAL
Qty: 180 TABLET | Refills: 3 | Status: SHIPPED | OUTPATIENT
Start: 2019-10-02 | End: 2021-09-24

## 2019-10-02 RX ORDER — HYDROXYCHLOROQUINE SULFATE 200 MG/1
TABLET, FILM COATED ORAL
Qty: 180 TABLET | Refills: 2 | Status: SHIPPED | OUTPATIENT
Start: 2019-10-02 | End: 2019-10-02

## 2019-10-02 RX ORDER — METOPROLOL SUCCINATE 50 MG/1
TABLET, EXTENDED RELEASE ORAL
Qty: 90 TABLET | Refills: 2 | Status: SHIPPED | OUTPATIENT
Start: 2019-10-02 | End: 2019-10-02

## 2019-10-02 RX ORDER — PILOCARPINE HYDROCHLORIDE 5 MG/1
5 TABLET, FILM COATED ORAL
Qty: 360 TABLET | Refills: 2 | Status: SHIPPED | OUTPATIENT
Start: 2019-10-02 | End: 2020-12-17

## 2019-10-02 RX ORDER — PILOCARPINE HYDROCHLORIDE 5 MG/1
5 TABLET, FILM COATED ORAL
Qty: 360 TABLET | Refills: 2 | Status: SHIPPED | OUTPATIENT
Start: 2019-10-02 | End: 2019-10-02

## 2019-10-02 NOTE — TELEPHONE ENCOUNTER
Patient was called. She uses [facility] for all 90 day refills and [facility] for 30 days scripts or short term meds. Requested these for meds sent to [facility]. Sent. HAN sent to PCP.

## 2019-10-11 ENCOUNTER — TELEPHONE (OUTPATIENT)
Dept: FAMILY MEDICINE | Facility: CLINIC | Age: 69
End: 2019-10-11

## 2019-10-11 NOTE — TELEPHONE ENCOUNTER
Reason for Call:  Other call back    Detailed comments: The patient was recently prescribed cholestyramine (QUESTRAN) 4 g packet by Dr. Reid hTomas. She has questions about the frequency that she takes it. She would like a call back to discuss this.     Phone Number Patient can be reached at: Home number on file [phone] (home)    Best Time: Any    Can we leave a detailed message on this number? YES    Call taken on 10/11/2019 at 2:05 PM by Tracey Beauchamp

## 2019-10-11 NOTE — TELEPHONE ENCOUNTER
Questran medication    Patient is wondering if she can take medication BID instead of TID.     She would like to cut out the middle one in the day and only take it in the AM and in the PM. If she cannot take this BID she might want to change to a different medication where she only needs to take BID.     Okay for this change?

## 2019-10-16 ENCOUNTER — TELEPHONE (OUTPATIENT)
Dept: FAMILY MEDICINE | Facility: CLINIC | Age: 69
End: 2019-10-16

## 2019-10-16 NOTE — TELEPHONE ENCOUNTER
Reason for Call:  Other call back    Detailed comments: Fatou has stopped taking   cholestyramine (QUESTRAN) because leg muscles pr and acid reflex.    Fatou says she is no longer going to take a cholesterol medication.    Phone Number Patient can be reached at: Home number on file [phone] (home)    Best Time: any    Can we leave a detailed message on this number? YES    Call taken on 10/16/2019 at 8:17 AM by Nataliia Castillo

## 2019-10-18 NOTE — TELEPHONE ENCOUNTER
THIS WAS VERY UNLIKELY     HOWEVER, SHE FEELS THIS WAY    COULD TRY RED YEAST RICE INSTEAD     OR CHOLESTOFF 1-2 tabs twice daily instead    EVY ACOSTA JR., MD

## 2019-10-18 NOTE — TELEPHONE ENCOUNTER
Called patient and relayed provider message. She states she is not interested in trying another medication, and relates back to her history with trying many different medications. Provided the names of the two supplements recommended so she can do research and decide if she would like to take.     Discussed diet and exercise with patient and she states she has tried without success.     Routing to provider as FYMONCHO. At current, she is not planning to take any medication.

## 2019-11-15 ENCOUNTER — TELEPHONE (OUTPATIENT)
Dept: FAMILY MEDICINE | Facility: CLINIC | Age: 69
End: 2019-11-15

## 2019-11-15 NOTE — LETTER
November 15, 2019    Fatou Brian  [address]  [facility] [address]    Dear Danielle Lainez cares about your health and your health plan.  I have reviewed your medical conditions, medication list and lab results, and am making recommendations based on this review to better manage your health.    You are in particular need of attention regarding:  -Immunizations  -Breast Cancer Screening    I am recommending that you:     -schedule a MAMMOGRAM which is due.    1 in 8 women will develop invasive breast cancer during her lifetime and it is the most common non-skin cancer in American women.  EARLY detection, new treatments, and a better understanding of the disease have increased survival rates - the 5 year survival rate in the 1960s was 63% and today it is close to 90%.    If you are under/uninsured, we recommend you contact [facility]. They offer mammograms at no charge or on a sliding fee charge. You can schedule with them at [phone]. Please have them send us the results.      Please disregard this reminder if you have had this exam elsewhere within the last year.  It would be helpful for us to have a copy of your mammogram report in your file so that we can best coordinate your care - please contact us with when your test was done so we can update your record.               Please call us at the [place] location:  [phone] or use [employer] to address the above recommendations.     Thank you for trusting [facility].  We appreciate the opportunity to serve you and look forward to supporting your healthcare in the future.    If you have (or plan to have) any of these tests done at a facility other than a [facility] or a [facility], please have the results sent to the [facility] location noted above.      Best Regards,    Reid Thomsa Jr MD

## 2019-11-15 NOTE — TELEPHONE ENCOUNTER
Patient Quality Outreach      Patient has the following on her problem list:     Depression / Dysthymia review       PHQ-9 SCORE 9/19/2017 7/31/2018 9/27/2019   PHQ-9 Total Score - - -   PHQ-9 Total Score MyChart - - -   PHQ-9 Total Score 14 0 10       If PHQ-9 recheck is 5 or more, route to provider for next steps.    Composite cancer screening  Chart review shows that this patient is due/due soon for the following Mammogram  Patient declined cancer screening. No  Summary:    Patient is due/failing the following:   Breast Cancer Screening and Immunizations    Type of outreach:    Sent letter.    Questions for provider review:    None                                                                                                                                    SULLY Lal [facility]       Chart routed to .

## 2019-12-03 ENCOUNTER — TELEPHONE (OUTPATIENT)
Dept: FAMILY MEDICINE | Facility: CLINIC | Age: 69
End: 2019-12-03

## 2019-12-03 NOTE — TELEPHONE ENCOUNTER
Panel Management Review      Patient has the following on her problem list:     Depression / Dysthymia review    Measure:  Needs PHQ-9 score of 4 or less during index window.  Administer PHQ-9 and if score is 5 or more, send encounter to provider for next steps.    5 - 7 month window range:     PHQ-9 SCORE 9/19/2017 7/31/2018 9/27/2019   PHQ-9 Total Score - - -   PHQ-9 Total Score MyChart - - -   PHQ-9 Total Score 14 0 10       If PHQ-9 recheck is 5 or more, route to provider for next steps.    Patient is due for:  None    Hypertension   Last three blood pressure readings:  BP Readings from Last 3 Encounters:   09/27/19 (!) 144/80   10/29/18 134/62   09/25/18 144/64     Blood pressure: FAILED    HTN Guidelines:  Less than 140/90      Composite cancer screening  Chart review shows that this patient is due/due soon for the following Mammogram  Summary:    Patient is due/failing the following:   MAMMOGRAM    Action needed:   Patient needs referral/order: mammo    Type of outreach:    Sent letter.    Questions for provider review:    None

## 2019-12-03 NOTE — LETTER
December 3, 2019    Fatou Brian  [address]  [facility] [address]    Dear Danielle Lainez cares about your health and your health plan.  I have reviewed your medical conditions, medication list and lab results, and am making recommendations based on this review to better manage your health.    You are in particular need of attention regarding:  -Breast Cancer Screening    I am recommending that you:     -schedule a MAMMOGRAM which is due.    1 in 8 women will develop invasive breast cancer during her lifetime and it is the most common non-skin cancer in American women.  EARLY detection, new treatments, and a better understanding of the disease have increased survival rates - the 5 year survival rate in the 1960s was 63% and today it is close to 90%.    If you are under/uninsured, we recommend you contact the [facility]. They offer mammograms at no charge or on a sliding fee charge. You can schedule with them at [phone]. Please have them send us the results.      Please disregard this reminder if you have had this exam elsewhere within the last year.  It would be helpful for us to have a copy of your mammogram report in your file so that we can best coordinate your care - please contact us with when your test was done so we can update your record.               Please call us at the [facility] location:  [phone] or use [employer] to address the above recommendations.     Thank you for trusting [facility].  We appreciate the opportunity to serve you and look forward to supporting your healthcare in the future.    If you have (or plan to have) any of these tests done at a facility other than a [facility] or a [facility], please have the results sent to the [facility] location noted above.      Best Regards,    Reid Thomas Jr MD

## 2019-12-15 DIAGNOSIS — I10 ESSENTIAL HYPERTENSION WITH GOAL BLOOD PRESSURE LESS THAN 140/90: Chronic | ICD-10-CM

## 2019-12-16 DIAGNOSIS — K21.9 GASTROESOPHAGEAL REFLUX DISEASE WITHOUT ESOPHAGITIS: Chronic | ICD-10-CM

## 2019-12-16 RX ORDER — PANTOPRAZOLE SODIUM 40 MG/1
TABLET, DELAYED RELEASE ORAL
Qty: 90 TABLET | Refills: 2 | Status: SHIPPED | OUTPATIENT
Start: 2019-12-16 | End: 2020-10-07

## 2019-12-16 RX ORDER — LOSARTAN POTASSIUM AND HYDROCHLOROTHIAZIDE 12.5; 1 MG/1; MG/1
TABLET ORAL
Qty: 90 TABLET | Refills: 4 | Status: SHIPPED | OUTPATIENT
Start: 2019-12-16 | End: 2021-03-17

## 2019-12-16 NOTE — TELEPHONE ENCOUNTER
"Requested Prescriptions   Pending Prescriptions Disp Refills     pantoprazole (PROTONIX) 40 MG EC tablet  Last Written Prescription Date:  9/27/2019  Last Fill Quantity: 90 tablet,  # refills: 3   Last office visit: 9/27/2019 with prescribing provider:  MAJO Thomas   Future Office Visit:     90 tablet 3     Sig: TAKE 1 BY MOUTH DAILY       PPI Protocol Failed - 12/16/2019 11:36 AM        Failed - No diagnosis of osteoporosis on record        Passed - Not on Clopidogrel (unless Pantoprazole ordered)        Passed - Recent (12 mo) or future (30 days) visit within the authorizing provider's specialty     Patient has had an office visit with the authorizing provider or a provider within the authorizing providers department within the previous 12 mos or has a future within next 30 days. See \"Patient Info\" tab in inbasket, or \"Choose Columns\" in Meds & Orders section of the refill encounter.              Passed - Medication is active on med list        Passed - Patient is age 18 or older        Passed - No active pregnacy on record        Passed - No positive pregnancy test in past 12 months           "

## 2019-12-16 NOTE — TELEPHONE ENCOUNTER
"Requested Prescriptions   Pending Prescriptions Disp Refills     losartan-hydrochlorothiazide (HYZAAR) 100-12.5 MG tablet [Pharmacy Med Name: LOSARTAN/HCTZ TABS 100/12H]  Last Written Prescription Date:  1/3/2019  Last Fill Quantity: 90 tablet,  # refills: 3   Last office visit: 9/27/2019 with prescribing provider:  MAJO Thomas   Future Office Visit:     90 tablet 4     Sig: TAKE 1 TABLET DAILY       Angiotensin-II Receptors Failed - 12/15/2019 11:10 AM        Failed - Last blood pressure under 140/90 in past 12 months     BP Readings from Last 3 Encounters:   09/27/19 (!) 144/80   10/29/18 134/62   09/25/18 144/64                 Failed - Normal serum creatinine on file in past 12 months     Recent Labs   Lab Test 09/27/19  1046   CR 1.06*             Passed - Recent (12 mo) or future (30 days) visit within the authorizing provider's specialty     Patient has had an office visit with the authorizing provider or a provider within the authorizing providers department within the previous 12 mos or has a future within next 30 days. See \"Patient Info\" tab in inbasket, or \"Choose Columns\" in Meds & Orders section of the refill encounter.              Passed - Medication is active on med list        Passed - Patient is age 18 or older        Passed - No active pregnancy on record        Passed - Normal serum potassium on file in past 12 months     Recent Labs   Lab Test 09/27/19  1046   POTASSIUM 3.8                    Passed - No positive pregnancy test in past 12 months           "

## 2019-12-16 NOTE — TELEPHONE ENCOUNTER
Routing refill request to provider for review/approval because:  Labs out of range:  Cr, BP    FARZANA BlankN, RN  [employer] [facility]

## 2020-08-25 NOTE — TELEPHONE ENCOUNTER
1)Pt states she doesn't use [facility] for all her medications. She uses mail order for 90 day supply and local [facility] for 30 day supply meds. States she received an e-mail from [facility]. Writer advised pt to call her pharmacy and ask them to send clinic Rx request when before she is out of medication. That way we know what pharmacy to send refill.    2) Pt reports she doesn't take 500 mg tablets but 650 mg every 8 hours. Rx was entered under historical.  Medication list was updated.       3) She is requesting a lab letter with follow up recommendations. She has my chart but it takes her up o 4 hours to find information. She would like to know if any changes needed based on cholesterol results. Please review my chart message advice.   137

## 2020-09-08 ENCOUNTER — TRANSFERRED RECORDS (OUTPATIENT)
Dept: HEALTH INFORMATION MANAGEMENT | Facility: CLINIC | Age: 70
End: 2020-09-08

## 2020-09-24 ENCOUNTER — OFFICE VISIT (OUTPATIENT)
Dept: FAMILY MEDICINE | Facility: CLINIC | Age: 70
End: 2020-09-24
Payer: COMMERCIAL

## 2020-09-24 VITALS
SYSTOLIC BLOOD PRESSURE: 138 MMHG | HEART RATE: 70 BPM | BODY MASS INDEX: 36.32 KG/M2 | DIASTOLIC BLOOD PRESSURE: 74 MMHG | OXYGEN SATURATION: 97 % | RESPIRATION RATE: 17 BRPM | HEIGHT: 65 IN | WEIGHT: 218 LBS | TEMPERATURE: 98.4 F

## 2020-09-24 DIAGNOSIS — R06.01 ORTHOPNEA: ICD-10-CM

## 2020-09-24 DIAGNOSIS — F32.4 MAJOR DEPRESSIVE DISORDER WITH SINGLE EPISODE, IN PARTIAL REMISSION (H): ICD-10-CM

## 2020-09-24 DIAGNOSIS — Z00.00 ENCOUNTER FOR MEDICARE ANNUAL WELLNESS EXAM: Primary | ICD-10-CM

## 2020-09-24 DIAGNOSIS — R60.0 LOCALIZED EDEMA: ICD-10-CM

## 2020-09-24 DIAGNOSIS — E55.9 VITAMIN D DEFICIENCY: ICD-10-CM

## 2020-09-24 DIAGNOSIS — I10 HYPERTENSION GOAL BP (BLOOD PRESSURE) < 140/90: Chronic | ICD-10-CM

## 2020-09-24 DIAGNOSIS — N18.30 CKD (CHRONIC KIDNEY DISEASE) STAGE 3, GFR 30-59 ML/MIN (H): ICD-10-CM

## 2020-09-24 DIAGNOSIS — M35.03 SJOGREN'S SYNDROME WITH MYOPATHY (H): ICD-10-CM

## 2020-09-24 DIAGNOSIS — Z13.6 CARDIOVASCULAR SCREENING; LDL GOAL LESS THAN 160: ICD-10-CM

## 2020-09-24 DIAGNOSIS — Z11.59 ENCOUNTER FOR HEPATITIS C SCREENING TEST FOR LOW RISK PATIENT: ICD-10-CM

## 2020-09-24 DIAGNOSIS — Z13.29 SCREENING FOR THYROID DISORDER: ICD-10-CM

## 2020-09-24 DIAGNOSIS — I77.1 SUBCLAVIAN ARTERY STENOSIS (H): ICD-10-CM

## 2020-09-24 DIAGNOSIS — Z23 NEED FOR PNEUMOCOCCAL VACCINATION: ICD-10-CM

## 2020-09-24 DIAGNOSIS — E66.01 MORBID OBESITY (H): ICD-10-CM

## 2020-09-24 LAB
BASOPHILS # BLD AUTO: 0.1 10E9/L (ref 0–0.2)
BASOPHILS NFR BLD AUTO: 0.8 %
CREAT UR-MCNC: 60 MG/DL
DIFFERENTIAL METHOD BLD: NORMAL
EOSINOPHIL # BLD AUTO: 0.1 10E9/L (ref 0–0.7)
EOSINOPHIL NFR BLD AUTO: 1.5 %
ERYTHROCYTE [DISTWIDTH] IN BLOOD BY AUTOMATED COUNT: 12.8 % (ref 10–15)
HCT VFR BLD AUTO: 38.6 % (ref 35–47)
HGB BLD-MCNC: 12.5 G/DL (ref 11.7–15.7)
LYMPHOCYTES # BLD AUTO: 1.3 10E9/L (ref 0.8–5.3)
LYMPHOCYTES NFR BLD AUTO: 20.2 %
MCH RBC QN AUTO: 30.5 PG (ref 26.5–33)
MCHC RBC AUTO-ENTMCNC: 32.4 G/DL (ref 31.5–36.5)
MCV RBC AUTO: 94 FL (ref 78–100)
MICROALBUMIN UR-MCNC: 8 MG/L
MICROALBUMIN/CREAT UR: 14.05 MG/G CR (ref 0–25)
MONOCYTES # BLD AUTO: 0.6 10E9/L (ref 0–1.3)
MONOCYTES NFR BLD AUTO: 9.6 %
NEUTROPHILS # BLD AUTO: 4.5 10E9/L (ref 1.6–8.3)
NEUTROPHILS NFR BLD AUTO: 67.9 %
NT-PROBNP SERPL-MCNC: 703 PG/ML (ref 0–125)
PLATELET # BLD AUTO: 165 10E9/L (ref 150–450)
RBC # BLD AUTO: 4.1 10E12/L (ref 3.8–5.2)
WBC # BLD AUTO: 6.7 10E9/L (ref 4–11)

## 2020-09-24 PROCEDURE — 84443 ASSAY THYROID STIM HORMONE: CPT | Performed by: PHYSICIAN ASSISTANT

## 2020-09-24 PROCEDURE — 82306 VITAMIN D 25 HYDROXY: CPT | Performed by: PHYSICIAN ASSISTANT

## 2020-09-24 PROCEDURE — 99213 OFFICE O/P EST LOW 20 MIN: CPT | Mod: 25 | Performed by: PHYSICIAN ASSISTANT

## 2020-09-24 PROCEDURE — 36415 COLL VENOUS BLD VENIPUNCTURE: CPT | Performed by: PHYSICIAN ASSISTANT

## 2020-09-24 PROCEDURE — 82043 UR ALBUMIN QUANTITATIVE: CPT | Performed by: PHYSICIAN ASSISTANT

## 2020-09-24 PROCEDURE — 83880 ASSAY OF NATRIURETIC PEPTIDE: CPT | Performed by: PHYSICIAN ASSISTANT

## 2020-09-24 PROCEDURE — 86803 HEPATITIS C AB TEST: CPT | Performed by: PHYSICIAN ASSISTANT

## 2020-09-24 PROCEDURE — 80053 COMPREHEN METABOLIC PANEL: CPT | Performed by: PHYSICIAN ASSISTANT

## 2020-09-24 PROCEDURE — 90670 PCV13 VACCINE IM: CPT | Performed by: PHYSICIAN ASSISTANT

## 2020-09-24 PROCEDURE — 85025 COMPLETE CBC W/AUTO DIFF WBC: CPT | Performed by: PHYSICIAN ASSISTANT

## 2020-09-24 PROCEDURE — 80061 LIPID PANEL: CPT | Performed by: PHYSICIAN ASSISTANT

## 2020-09-24 PROCEDURE — G0439 PPPS, SUBSEQ VISIT: HCPCS | Performed by: PHYSICIAN ASSISTANT

## 2020-09-24 PROCEDURE — G0009 ADMIN PNEUMOCOCCAL VACCINE: HCPCS | Performed by: PHYSICIAN ASSISTANT

## 2020-09-24 RX ORDER — LORATADINE 10 MG/1
10 TABLET ORAL DAILY
COMMUNITY
End: 2021-09-24

## 2020-09-24 ASSESSMENT — ANXIETY QUESTIONNAIRES
6. BECOMING EASILY ANNOYED OR IRRITABLE: MORE THAN HALF THE DAYS
5. BEING SO RESTLESS THAT IT IS HARD TO SIT STILL: NOT AT ALL
IF YOU CHECKED OFF ANY PROBLEMS ON THIS QUESTIONNAIRE, HOW DIFFICULT HAVE THESE PROBLEMS MADE IT FOR YOU TO DO YOUR WORK, TAKE CARE OF THINGS AT HOME, OR GET ALONG WITH OTHER PEOPLE: SOMEWHAT DIFFICULT
7. FEELING AFRAID AS IF SOMETHING AWFUL MIGHT HAPPEN: MORE THAN HALF THE DAYS
2. NOT BEING ABLE TO STOP OR CONTROL WORRYING: SEVERAL DAYS
3. WORRYING TOO MUCH ABOUT DIFFERENT THINGS: MORE THAN HALF THE DAYS
1. FEELING NERVOUS, ANXIOUS, OR ON EDGE: NEARLY EVERY DAY
GAD7 TOTAL SCORE: 11

## 2020-09-24 ASSESSMENT — PATIENT HEALTH QUESTIONNAIRE - PHQ9
SUM OF ALL RESPONSES TO PHQ QUESTIONS 1-9: 10
5. POOR APPETITE OR OVEREATING: SEVERAL DAYS

## 2020-09-24 ASSESSMENT — MIFFLIN-ST. JEOR: SCORE: 1499.72

## 2020-09-24 NOTE — PATIENT INSTRUCTIONS
Patient Education   Personalized Prevention Plan  You are due for the preventive services outlined below.  Your care team is available to assist you in scheduling these services.  If you have already completed any of these items, please share that information with your care team to update in your medical record.  Health Maintenance Due   Topic Date Due     Hepatitis C Screening  1950     Zoster (Shingles) Vaccine (2 of 3) 03/26/2009     Pneumococcal Vaccine (2 of 2 - PCV13) 09/24/2014     Mammogram  12/20/2015     Depression Assessment  03/27/2020     Flu Vaccine (1) 09/01/2020     Annual Wellness Visit  09/27/2020     FALL RISK ASSESSMENT  09/27/2020         Referrals placed for Edema clinic and cardiology.    May use over the counter analgesics acetaminophen (Tylenol - 1000 mg three times a day)

## 2020-09-24 NOTE — NURSING NOTE
Screening Questionnaire for Adult Immunization     Are you sick today?   No    Do you have allergies to medications, food or any vaccine?   Yes    Have you ever had a serious reaction after receiving a vaccination?   No    Do you have a long-term health problem with heart disease, lung disease,  asthma, kidney disease, diabetes, anemia, metabolic or blood disease?   Yes    Do you have cancer, leukemia, AIDS, or any immune system problem?   No    Do you take cortisone, prednisone, other steroids, or anticancer drugs, or  have you had any x-ray (radiation) treatments?   No    Have you had a seizure, brain, or other nervous system problem?   No    During the past year, have you received a transfusion of blood or blood       products, or been given a medicine called immune (gamma) globulin?   No    For women: Are you pregnant or is there a chance you could become         pregnant during the next month?   No    Have you received any vaccinations in the past 4 weeks?   No     Immunization questionnaire was positive for at least one answer.  Notified Pick [employer] Student.      MNVFC doesn't apply on this patient      Per orders of [facility], injection of PCV 13 given by Jaya Lugo MA. Patient instructed to remain in clinic for 20 minutes afterwards, and to report any adverse reaction to me immediately.    Prior to injection verified patient identity using patient's name and date of birth.         Screening performed by Jaya Lugo CMA

## 2020-09-24 NOTE — PROGRESS NOTES
"  SUBJECTIVE:   Fatou Brian is a 70 year old female who presents for Preventive Visit.      Patient has been advised of split billing requirements and indicates understanding: Yes  Are you in the first 12 months of your Medicare Part B coverage?  No    Physical Health:    In general, how would you rate your overall physical health? poor    Outside of work, how many days during the week do you exercise? none    Outside of work, approximately how many minutes a day do you exercise?not applicable    If you drink alcohol do you typically have >3 drinks per day or >7 drinks per week? No    Do you usually eat at least 4 servings of fruit and vegetables a day, include whole grains & fiber and avoid regularly eating high fat or \"junk\" foods? NO    Do you have any problems taking medications regularly?  No    Do you have any side effects from medications? none    Needs assistance for the following daily activities: transportation, shopping, housework and laundry    Which of the following safety concerns are present in your home?  none identified     Hearing impairment: Yes, Difficulty following a conversation in a noisy restaurant or crowded room.    Need to ask people to speak up or repeat themselves.    In the past 6 months, have you been bothered by leaking of urine? yes    Mental Health:    In general, how would you rate your overall mental or emotional health? fair  PHQ-2 Score:      Do you feel safe in your environment? Yes    Have you ever done Advance Care Planning? (For example, a Health Directive, POLST, or a discussion with a medical provider or your loved ones about your wishes): Yes, patient states has an Advance Care Planning document and will bring a copy to the clinic.    Additional concerns to address?  YES- multiple concerns regarding labs work, medicine dosing and chronic swelling in both legs for a year and a half (history of seeing cardiology with no concerns)       Fall risk:  Fallen 2 or more " times in the past year?: No  Any fall with injury in the past year?: No    Cognitive Screenin) Repeat 3 items (Leader, Season, Table)    2) Clock draw: NORMAL  3) 3 item recall: Recalls 3 objects  Results: 3 items recalled: COGNITIVE IMPAIRMENT LESS LIKELY    Mini-CogTM Copyright SULLY Stout. Licensed by the author for use in [facility]; reprinted with permission (izaiah@[facility]). All rights reserved.      Do you have sleep apnea, excessive snoring or daytime drowsiness?: no            Reviewed and updated as needed this visit by clinical staff  Tobacco  Allergies  Meds  Problems  Med Hx  Surg Hx  Fam Hx  Soc Hx          Reviewed and updated as needed this visit by Provider  Tobacco  Allergies  Meds  Problems  Med Hx  Surg Hx  Fam Hx        Social History     Tobacco Use     Smoking status: Former Smoker     Packs/day: 0.00     Types: Cigarettes     Smokeless tobacco: Never Used     Tobacco comment: states quit 4 years ago   Substance Use Topics     Alcohol use: Yes     Comment: occas.                           Current providers sharing in care for this patient include:   Patient Care Team:  No Ref-Primary, Physician as PCP - Reid Johnson MD as Assigned PCP    The following health maintenance items are reviewed in Epic and correct as of today:  Health Maintenance   Topic Date Due     ZOSTER IMMUNIZATION (2 of 3) 2009     MAMMO SCREENING  2015     FALL RISK ASSESSMENT  2020     DTAP/TDAP/TD IMMUNIZATION (2 - Td) 10/14/2020     PHQ-9  2021     MEDICARE ANNUAL WELLNESS VISIT  2021     COLORECTAL CANCER SCREENING  2024     LIPID  2025     ADVANCE CARE PLANNING  2025     DEXA  Completed     HEPATITIS C SCREENING  Completed     DEPRESSION ACTION PLAN  Completed     PNEUMOCOCCAL IMMUNIZATION 65+ LOW/MEDIUM RISK  Completed     INFLUENZA VACCINE  Addressed     IPV IMMUNIZATION  Aged Out     MENINGITIS IMMUNIZATION  Aged Out      HEPATITIS B IMMUNIZATION  Aged Out     Lab work is in process  Labs reviewed in EPIC  BP Readings from Last 3 Encounters:   09/24/20 138/74   09/27/19 (!) 144/80   10/29/18 134/62    Wt Readings from Last 3 Encounters:   09/24/20 98.9 kg (218 lb)   09/27/19 93.4 kg (206 lb)   10/29/18 89.8 kg (198 lb)                  Patient Active Problem List   Diagnosis     Sjogren's syndrome with myopathy (H)     Osteoarthritis of knee     GERD (gastroesophageal reflux disease)     Constipation     Osteoporosis     Irritable bowel syndrome with diarrhea     HL (hearing loss)     Rosacea     Poor memory     Low back pain     Senile keratosis     Spinal stenosis     H/O hysterectomy for benign disease     Bilateral hearing loss     Presbyopia     Knee pain     Hypertension goal BP (blood pressure) < 140/90     Hyperlipidemia with target LDL less than 130     Left arm pain     Arm pain     Major depression in partial remission (H)     Subclavian artery stenosis (H)     ACP (advance care planning)     Gastroesophageal reflux disease without esophagitis     Primary osteoarthritis of both knees     Bilateral low back pain with sciatica, sciatica laterality unspecified, unspecified chronicity     CKD (chronic kidney disease) stage 3, GFR 30-59 ml/min (H)     Morbid obesity (H)     Past Surgical History:   Procedure Laterality Date     aneursym[  1983, 1991    Has metal in head     CHOLECYSTECTOMY       CLOSED RX PROX HUMERUS FRACTURE      10 pins placed     COLONOSCOPY       COLONOSCOPY  1/24/2014    Procedure: COMBINED COLONOSCOPY, SINGLE BIOPSY/POLYPECTOMY BY BIOPSY;  COLONOSCOPY;  Surgeon: Ranjit Pa MD;  Location: [facility] WITH CATARACT SURGERY      both eyes     HYSTERECTOMY, JANET         Social History     Tobacco Use     Smoking status: Former Smoker     Packs/day: 0.00     Types: Cigarettes     Smokeless tobacco: Never Used     Tobacco comment: states quit 4 years ago   Substance Use Topics     Alcohol use:  Yes     Comment: occas.     Family History   Problem Relation Age of Onset     Thyroid Disease Mother      Arthritis Mother         Rheumatoid     Osteoporosis Mother      Cancer Mother         stomach     Cerebrovascular Disease Mother      Heart Disease Father      Cancer Father      Heart Disease Brother         fibulation         Current Outpatient Medications   Medication Sig Dispense Refill     acetaminophen (TYLENOL) 650 MG CR tablet Take 2 tablets (1,300 mg) by mouth every 8 hours as needed for mild pain or pain 270 tablet 3     calcium carbonate (TUMS) 500 MG chewable tablet Take 1 tablet (500 mg) by mouth At Bedtime 120 tablet 11     cholecalciferol (VITAMIN D3) 5000 units TABS tablet Take 1 tablet (5,000 Units) by mouth At Bedtime 100 tablet 3     cholestyramine (QUESTRAN) 4 g packet Take 1 packet (4 g) by mouth 3 times daily (with meals) 60 packet 11     famotidine (PEPCID) 20 MG tablet TAKE 1 TABLET BY MOUTH TWICE DAILY AS NEEDED 180 tablet 3     fexofenadine (ALLEGRA) 180 MG tablet Take 1 tablet (180 mg) by mouth daily 30 tablet 11     fluticasone (FLONASE) 50 MCG/ACT spray Spray 2 sprays into both nostrils daily 48 g 2     hydroxychloroquine (PLAQUENIL) 200 MG tablet TAKE 2 (400 MG) BY MOUTH AT BEDTIME 180 tablet 2     loratadine (CLARITIN) 10 MG tablet Take 10 mg by mouth daily       losartan-hydrochlorothiazide (HYZAAR) 100-12.5 MG tablet TAKE 1 TABLET DAILY 90 tablet 4     metoprolol succinate ER (TOPROL-XL) 50 MG 24 hr tablet TAKE 1 TABLET DAILY 90 tablet 3     order for DME One pair longitudinal arch supports  One pair   Use as directed 1 each 1     ORDER FOR DME Equipment being ordered: RUBBER THRESHOLD /CARGO WEDGE RAMP  ONE  USE AS DIRECTED FOR THRESHOLD  TO PREVENT FALLING 1 Device 1     pantoprazole (PROTONIX) 40 MG EC tablet TAKE 1 BY MOUTH DAILY 90 tablet 2     pilocarpine (SALAGEN) 5 MG tablet Take 1 tablet (5 mg) by mouth 4 times daily (before meals and nightly) 360 tablet 2      "senna-docusate (SENOKOT-S/PERICOLACE) 8.6-50 MG tablet Take 2 tablets by mouth 2 times daily 120 tablet PRN     Skin Protectants, Misc. (EUCERIN) cream Apply topically as needed for dry skin 454 g 3     UNABLE TO FIND MEDICATION NAME: Active finesse keto blen       UNABLE TO FIND MEDICATION NAME: Active Finesse [place] Cleanse       Allergies   Allergen Reactions     Chantix [Varenicline]      suicidal     Influenza Virus Vaccine H5n1      Muscle aches dizzy, nauseated  Light headed     Morphine Sulfate      Sensitivity when in hospital     Omeprazole Magnesium Nausea     Intolerance       Vioxx      Niacin Itching and Rash     Zetia [Ezetimibe] Other (See Comments)     Muscle pain     Zyrtec-D Rash     Recent Labs   Lab Test 09/27/19  1046 09/25/18  1119 08/29/17  1148 08/23/17  1030 09/13/16  1158   A1C  --   --  5.1  --  5.4   * 107* 115*  --  132*   HDL 63 62 57  --  64   TRIG 99 83 93  --  74   ALT 40 22  --  22 24   CR 1.06* 1.07* 1.20* 1.29* 1.25*   GFRESTIMATED 53* 51* 44* 41* 43*   GFRESTBLACK 61 61 54* 50* 52*   POTASSIUM 3.8 4.3 3.9 3.7 4.0   TSH  --   --  1.43  --  0.96      Pneumonia Vaccine:Adults age 65+ who received Pneumovax (PPSV23) at 65 years or older: Should be given PCV13 > 1 year after their most recent PPSV23    ROS:  Constitutional, HEENT, cardiovascular, pulmonary, GI, , musculoskeletal, neuro, skin, endocrine and psych systems are negative, except as otherwise noted.    OBJECTIVE:   /74 (BP Location: Right arm, Patient Position: Sitting, Cuff Size: Adult Large)   Pulse 70   Temp 98.4  F (36.9  C) (Oral)   Resp 17   Ht 1.651 m (5' 5\")   Wt 98.9 kg (218 lb)   SpO2 97%   BMI 36.28 kg/m   Estimated body mass index is 36.28 kg/m  as calculated from the following:    Height as of this encounter: 1.651 m (5' 5\").    Weight as of this encounter: 98.9 kg (218 lb).  EXAM:   GENERAL: healthy, alert and no distress  EYES: Eyes grossly normal to inspection, PERRL and conjunctivae " and sclerae normal  HENT: ear canals and TM's normal, nose and mouth without ulcers or lesions  NECK: no adenopathy, no asymmetry, masses, or scars and thyroid normal to palpation  RESP: lungs clear to auscultation - no rales, rhonchi or wheezes  BREAST: normal without masses, tenderness or nipple discharge and no palpable axillary masses or adenopathy  CV: regular rate and rhythm, normal S1 S2, no S3 or S4, no murmur, click or rub, no peripheral edema and peripheral pulses strong  ABDOMEN: soft, nontender, no hepatosplenomegaly, no masses and bowel sounds normal  MS: no gross musculoskeletal defects noted, 2+ pitting edema up to bilateral knees  SKIN: no suspicious lesions or rashes  NEURO: Normal strength and tone, mentation intact and speech normal  PSYCH: mentation appears normal, affect normal/bright    Diagnostic Test Results:  Labs reviewed in Epic    ASSESSMENT / PLAN:   1. Encounter for Medicare annual wellness exam  Consider screen CT scan lungs and updated CTA/CT head in the next year due to smoking history and aneurysm history.    2. Hypertension goal BP (blood pressure) < 140/90  Long standing, chronic, controlled-  Regular refills to be sent to pharmacy as needed; labs updated today. May be good idea to follow up with cardiology as well given edema (below) also.  - Comprehensive metabolic panel  - Albumin Random Urine Quantitative with Creat Ratio  - CBC with platelets differential  - BNP-N terminal pro  - CARDIOLOGY EVAL ADULT REFERRAL; Future  - PHYSICAL THERAPY REFERRAL; Future    3. CKD (chronic kidney disease) stage 3, GFR 30-59 ml/min  Long standing, chronic, controlled-  Update labs today  - Comprehensive metabolic panel  - Albumin Random Urine Quantitative with Creat Ratio    4. Localized edema  5. Orthopnea   6. Subclavian artery stenosis (H)  Follow up with cardiology to check status/further concerns; [facility] clinic referral placed as well; labs updated today; compression stocking options  "discussed with patient.  - PHYSICAL THERAPY REFERRAL; Future  - BNP-N terminal pro  - CARDIOLOGY EVAL ADULT REFERRAL; Future    7. Vitamin D deficiency  Update labs today.  - Vitamin D Deficiency    8. Major depressive disorder with single episode, in partial remission (H)  Long standing, chronic, controlled    9. Sjogren's syndrome with myopathy (H)  Long standing, chronic, controlled    10. Morbid obesity (H)  Continue to work on diet and exercise.    11. CARDIOVASCULAR SCREENING; LDL GOAL LESS THAN 160  - Lipid panel reflex to direct LDL Fasting    12. Screening for thyroid disorder  - TSH with free T4 reflex    13. Encounter for hepatitis C screening test for low risk patient  - **Hepatitis C Screen Reflex to RNA FUTURE anytime    14. Need for pneumococcal vaccination  - PNEUMOCOCCAL CONJ VACCINE 13 VALENT IM    Patient has been advised of split billing requirements and indicates understanding: Yes    COUNSELING:  Reviewed preventive health counseling, as reflected in patient instructions       Regular exercise       Healthy diet/nutrition    Estimated body mass index is 36.28 kg/m  as calculated from the following:    Height as of this encounter: 1.651 m (5' 5\").    Weight as of this encounter: 98.9 kg (218 lb).    Weight management plan: Discussed healthy diet and exercise guidelines    She reports that she has quit smoking. Her smoking use included cigarettes. She smoked 0.00 packs per day. She has never used smokeless tobacco.    Appropriate preventive services were discussed with this patient, including applicable screening as appropriate for cardiovascular disease, diabetes, osteopenia/osteoporosis, and glaucoma.  As appropriate for age/gender, discussed screening for colorectal cancer, prostate cancer, breast cancer, and cervical cancer. Checklist reviewing preventive services available has been given to the patient.    Reviewed patients plan of care and provided an AVS. The Basic Care Plan (routine " screening as documented in Health Maintenance) for Fatou meets the Care Plan requirement. This Care Plan has been established and reviewed with the Patient.    Counseling Resources:  ATP IV Guidelines  Pooled Cohorts Equation Calculator  Breast Cancer Risk Calculator  BRCA-Related Cancer Risk Assessment: FHS-7 Tool  FRAX Risk Assessment  ICSI Preventive Guidelines  Dietary Guidelines for Americans, 2010  USDA's MyPlate  ASA Prophylaxis  Lung CA Screening    Cassidy Alex PA-C  [facility]

## 2020-09-25 LAB
ALBUMIN SERPL-MCNC: 3.7 G/DL (ref 3.4–5)
ALP SERPL-CCNC: 96 U/L (ref 40–150)
ALT SERPL W P-5'-P-CCNC: 18 U/L (ref 0–50)
ANION GAP SERPL CALCULATED.3IONS-SCNC: 9 MMOL/L (ref 3–14)
AST SERPL W P-5'-P-CCNC: 19 U/L (ref 0–45)
BILIRUB SERPL-MCNC: 0.5 MG/DL (ref 0.2–1.3)
BUN SERPL-MCNC: 19 MG/DL (ref 7–30)
CALCIUM SERPL-MCNC: 8.9 MG/DL (ref 8.5–10.1)
CHLORIDE SERPL-SCNC: 104 MMOL/L (ref 94–109)
CHOLEST SERPL-MCNC: 178 MG/DL
CO2 SERPL-SCNC: 25 MMOL/L (ref 20–32)
CREAT SERPL-MCNC: 1.11 MG/DL (ref 0.52–1.04)
DEPRECATED CALCIDIOL+CALCIFEROL SERPL-MC: 54 UG/L (ref 20–75)
GFR SERPL CREATININE-BSD FRML MDRD: 49 ML/MIN/{1.73_M2}
GLUCOSE SERPL-MCNC: 91 MG/DL (ref 70–99)
HCV AB SERPL QL IA: NONREACTIVE
HDLC SERPL-MCNC: 54 MG/DL
LDLC SERPL CALC-MCNC: 104 MG/DL
NONHDLC SERPL-MCNC: 124 MG/DL
POTASSIUM SERPL-SCNC: 3.5 MMOL/L (ref 3.4–5.3)
PROT SERPL-MCNC: 7 G/DL (ref 6.8–8.8)
SODIUM SERPL-SCNC: 138 MMOL/L (ref 133–144)
TRIGL SERPL-MCNC: 98 MG/DL
TSH SERPL DL<=0.005 MIU/L-ACNC: 1.25 MU/L (ref 0.4–4)

## 2020-09-25 ASSESSMENT — ANXIETY QUESTIONNAIRES: GAD7 TOTAL SCORE: 11

## 2020-09-25 NOTE — RESULT ENCOUNTER NOTE
"Jaleesa Lainez  Your attached labs are within normal limits and stable. Keep up the good work!  Please contact the office with any questions or concerns.    Cassidy Diane \"Дмитрий\" BAM Alex    "

## 2020-10-06 DIAGNOSIS — K21.9 GASTROESOPHAGEAL REFLUX DISEASE WITHOUT ESOPHAGITIS: Chronic | ICD-10-CM

## 2020-10-07 RX ORDER — PANTOPRAZOLE SODIUM 40 MG/1
TABLET, DELAYED RELEASE ORAL
Qty: 90 TABLET | Refills: 2 | Status: SHIPPED | OUTPATIENT
Start: 2020-10-07 | End: 2021-05-10

## 2020-10-27 ENCOUNTER — TELEPHONE (OUTPATIENT)
Dept: FAMILY MEDICINE | Facility: CLINIC | Age: 70
End: 2020-10-27

## 2020-10-27 ENCOUNTER — OFFICE VISIT (OUTPATIENT)
Dept: URGENT CARE | Facility: URGENT CARE | Age: 70
End: 2020-10-27
Payer: COMMERCIAL

## 2020-10-27 VITALS
TEMPERATURE: 98.8 F | SYSTOLIC BLOOD PRESSURE: 144 MMHG | OXYGEN SATURATION: 96 % | HEART RATE: 103 BPM | DIASTOLIC BLOOD PRESSURE: 76 MMHG

## 2020-10-27 DIAGNOSIS — L03.114 CELLULITIS OF LEFT UPPER EXTREMITY: ICD-10-CM
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
## 2020-10-27 PROCEDURE — 99213 OFFICE O/P EST LOW 20 MIN: CPT | Mod: 25 | Performed by: PHYSICIAN ASSISTANT

## 2020-10-27 PROCEDURE — 90471 IMMUNIZATION ADMIN: CPT | Performed by: PHYSICIAN ASSISTANT

## 2020-10-27 PROCEDURE — 90715 TDAP VACCINE 7 YRS/> IM: CPT | Performed by: PHYSICIAN ASSISTANT

## 2020-10-27 RX ORDER — DOXYCYCLINE 100 MG/1
100 TABLET ORAL 2 TIMES DAILY
Qty: 20 TABLET | Refills: 0 | Status: SHIPPED | OUTPATIENT
Start: 2020-10-27 | End: 2020-11-06

## 2020-10-27 ASSESSMENT — ENCOUNTER SYMPTOMS
EYES NEGATIVE: 1
GASTROINTESTINAL NEGATIVE: 1
MUSCULOSKELETAL NEGATIVE: 1
PSYCHIATRIC NEGATIVE: 1
CARDIOVASCULAR NEGATIVE: 1
CONSTITUTIONAL NEGATIVE: 1
WOUND: 1
NEUROLOGICAL NEGATIVE: 1
RESPIRATORY NEGATIVE: 1

## 2020-10-27 NOTE — PROGRESS NOTES
SUBJECTIVE:   Fatou Brian is a 70 year old female presenting with a chief complaint of   Chief Complaint   Patient presents with     Urgent Care     Cat Bite     Yesterday pt was bitten on left hand by her own cat, who was in pain from cancer.  Cat was an indoor cat but not vaccinated; vet will check cat for rabies (was euthanized yesterday).       Cat Bite     Most recent tetanus vaccination was in Oct. 2010.       She is an established patient of [facility].    Onset of symptoms was 1 day(s) ago.  Course of illness is worsening.    Severity mild  Location: Left hand at the base of thumb  Context: Patient's cat was in pain and irritated.   Current and Associated symptoms: redness, warmth and pain  Treatment measures tried include: ice and acetominophen  Relief from treatment: minor    Review of Systems   Constitutional: Negative.    HENT: Negative.    Eyes: Negative.    Respiratory: Negative.    Cardiovascular: Negative.    Gastrointestinal: Negative.    Genitourinary: Negative.    Musculoskeletal: Negative.    Skin: Positive for wound.   Neurological: Negative.    Psychiatric/Behavioral: Negative.        Past Medical History:   Diagnosis Date     Arthritis      Atherosclerosis     diffuse     Emphysema of lung (H)      GERD (gastroesophageal reflux disease)      HLD (hyperlipidaemia)      Hypertension      Sjogren's syndrome (H)      Spinal stenosis      Subclavian artery stenosis (H)     Left     Family History   Problem Relation Age of Onset     Thyroid Disease Mother      Arthritis Mother         Rheumatoid     Osteoporosis Mother      Cancer Mother         stomach     Cerebrovascular Disease Mother      Heart Disease Father      Cancer Father      Heart Disease Brother         fibulation     Current Outpatient Medications   Medication Sig Dispense Refill     acetaminophen (TYLENOL) 650 MG CR tablet Take 2 tablets (1,300 mg) by mouth every 8 hours as needed for mild pain or pain 270 tablet 3     calcium  carbonate (TUMS) 500 MG chewable tablet Take 1 tablet (500 mg) by mouth At Bedtime 120 tablet 11     cholecalciferol (VITAMIN D3) 5000 units TABS tablet Take 1 tablet (5,000 Units) by mouth At Bedtime 100 tablet 3     cholestyramine (QUESTRAN) 4 g packet Take 1 packet (4 g) by mouth 3 times daily (with meals) 60 packet 11     famotidine (PEPCID) 20 MG tablet TAKE 1 TABLET BY MOUTH TWICE DAILY AS NEEDED 180 tablet 3     fexofenadine (ALLEGRA) 180 MG tablet Take 1 tablet (180 mg) by mouth daily 30 tablet 11     fluticasone (FLONASE) 50 MCG/ACT spray Spray 2 sprays into both nostrils daily 48 g 2     hydroxychloroquine (PLAQUENIL) 200 MG tablet TAKE 2 (400 MG) BY MOUTH AT BEDTIME 180 tablet 2     loratadine (CLARITIN) 10 MG tablet Take 10 mg by mouth daily       losartan-hydrochlorothiazide (HYZAAR) 100-12.5 MG tablet TAKE 1 TABLET DAILY 90 tablet 4     metoprolol succinate ER (TOPROL-XL) 50 MG 24 hr tablet TAKE 1 TABLET DAILY 90 tablet 3     pantoprazole (PROTONIX) 40 MG EC tablet TAKE 1 BY MOUTH DAILY 90 tablet 2     pilocarpine (SALAGEN) 5 MG tablet Take 1 tablet (5 mg) by mouth 4 times daily (before meals and nightly) 360 tablet 2     senna-docusate (SENOKOT-S/PERICOLACE) 8.6-50 MG tablet Take 2 tablets by mouth 2 times daily 120 tablet PRN     Skin Protectants, Misc. (EUCERIN) cream Apply topically as needed for dry skin 454 g 3     UNABLE TO FIND MEDICATION NAME: Active finesse keto blen       UNABLE TO FIND MEDICATION NAME: Active Finesse [place] Cleanse       order for DME One pair longitudinal arch supports  One pair   Use as directed 1 each 1     ORDER FOR DME Equipment being ordered: RUBBER THRESHOLD /CARGO WEDGE RAMP  ONE  USE AS DIRECTED FOR THRESHOLD  TO PREVENT FALLING 1 Device 1     Social History     Tobacco Use     Smoking status: Former Smoker     Packs/day: 0.00     Types: Cigarettes     Quit date: 10/1/2009     Years since quittin.0     Smokeless tobacco: Never Used   Substance Use Topics      Alcohol use: Yes     Comment: occas.       OBJECTIVE  BP (!) 144/76   Pulse 103   Temp 98.8  F (37.1  C) (Oral)   SpO2 96%     Physical Exam  Constitutional:       General: She is not in acute distress.     Appearance: Normal appearance. She is normal weight. She is not ill-appearing, toxic-appearing or diaphoretic.   HENT:      Head: Normocephalic and atraumatic.   Cardiovascular:      Rate and Rhythm: Normal rate and regular rhythm.      Pulses: Normal pulses.      Heart sounds: Normal heart sounds. No murmur. No friction rub. No gallop.    Pulmonary:      Effort: Pulmonary effort is normal. No respiratory distress.      Breath sounds: Normal breath sounds. No stridor. No wheezing, rhonchi or rales.   Chest:      Chest wall: No tenderness.   Skin:     General: Skin is warm and dry.      Findings: Erythema and wound present. No abscess.             Comments: Area is mildly tender to palpation.    Neurological:      General: No focal deficit present.      Mental Status: She is alert and oriented to person, place, and time. Mental status is at baseline.      Gait: Gait normal.   Psychiatric:         Mood and Affect: Mood normal.         Behavior: Behavior normal.         Thought Content: Thought content normal.         Judgment: Judgment normal.       ASSESSMENT/PLAN:    (W55.01XA) Cat bite, initial encounter  (primary encounter diagnosis)  Plan: TDAP VACCINE (Adacel, Boostrix)  [[id number]],         doxycycline monohydrate (ADOXA) 100 MG tablet,         EA ADD'L VACCINE    (L03.114) Cellulitis of left upper extremity  Plan:  doxycycline monohydrate (ADOXA) 100 MG tablet    Patient was advised to return to clinic if symptoms do not improve in the amount of time specified in the AVS or if symptoms worsen. Patient educated on red flag symptoms and asked to go directly to the ED if symptoms present themselves.     Neeraj Medel PA-C on 10/27/2020 at 6:01 PM

## 2020-10-27 NOTE — TELEPHONE ENCOUNTER
Reason for call:  Patient reporting a symptom    Symptom or request: Cat bite on thumb    Duration (how long have symptoms been present): 1 day    Have you been treated for this before? No    Additional comments: Per Alana WANG, patient needs to be seen in clinic or UC.    Phone Number patient can be reached at:  Home number on file [phone] (home)    Best Time:  No need to call, patient will come to UC    Can we leave a detailed message on this number:  YES    Call taken on 10/27/2020 at 12:44 PM by Michelle White

## 2020-10-27 NOTE — PATIENT INSTRUCTIONS
Patient Education     Cat Bite    A cat bite can cause a wound deep enough to break the skin. In such cases, the wound is cleaned and then sometimes closed. If the wound is closed it is usually not closed completely. This is so that fluid can drain if the wound becomes infected. Often the wound is left open to heal. In addition to wound care, a tetanus shot may be given, if needed.  Home care    Wash your hands well with soap and warm water before and after caring for the wound. This helps lower the risk of infection.    Care for the wound as directed. If a dressing was applied to the wound, be sure to change it as directed.    If the wound bleeds, place a clean, soft cloth on the wound. Then firmly apply pressure until the bleeding stops. This may take up to 5 minutes. Don't release the pressure and look at the wound during this time.    Always get medical attention for cat bites on the hand. They are highly likely to become infected.    Most wounds heal within 10 days. But an infection can occur even with proper treatment. So be sure to check the wound daily for signs of infection (see below).    Antibiotics may be prescribed. These help prevent or treat infection. If you re given antibiotics, take them as directed. Also be sure to complete the medicines.  Rabies prevention  Rabies is a virus that can be carried in certain animals. These can include domestic animals such as cats and dogs. Pets fully vaccinated against rabies (2 shots) are at very low risk of infection. But because human rabies is almost always fatal, any biting pet should be confined for 10 days as an extra precaution. In general, if there is a risk for rabies, the following steps may need to be taken:    If someone s pet cat has bitten you, it should be kept in a secure area for the next 10 days to watch for signs of illness. If the pet owner won t allow this, contact your local animal control center. If the cat becomes ill or dies during that  time, contact your local animal control center at once so the animal may be tested for rabies. If the cat stays healthy for the next 10 days, there is no danger of rabies in the animal or you.    If a stray cat bit you, contact your local animal control center. They can give information on capture, quarantine, and animal rabies testing.    If you can t find the animal that bit you in the next 2 days, and if rabies exists in your area, you may need to receive the rabies vaccine series. Call your healthcare provider right away. Or return to the emergency department promptly.    All animal bites should be reported to the local animal control center. If you were not given a form to fill out, you can report this yourself.  Follow-up care  Follow up with your healthcare provider, or as directed.  When to seek medical advice  Call your healthcare provider right away if any of these occur:    Signs of infection:  ? Spreading redness or warmth from the wound  ? Increased pain or swelling  ? Fever of 100.4 F (38 C) or higher, or as directed by your healthcare provider  ? Colored fluid or pus draining from the wound  ? Enlarged lymph nodes above the area that was bitten, such as lymph nodes in the armpit if you were bitten on the hand or arm. This may be a sign of cat-scratch disease (cat-scratch fever).    Signs of rabies infection:  ? Headache  ? Confusion  ? Strange behavior  ? Increased salivating or drooling  ? Seizure    Decreased ability to move any body part near the bite area    Bleeding that can't be stopped after 5 minutes of firm pressure  Date Last Reviewed: 5/1/2018 2000-2019 [employer]. [address] [place] [address], [place] [address]. All rights reserved. This information is not intended as a substitute for professional medical care. Always follow your healthcare professional's instructions.         Probiotic pills

## 2020-12-15 DIAGNOSIS — M35.03 SJOGREN'S SYNDROME WITH MYOPATHY (H): ICD-10-CM

## 2020-12-15 DIAGNOSIS — M35.09 SJOGREN'S SYNDROME WITH OTHER ORGAN INVOLVEMENT (H): ICD-10-CM

## 2020-12-17 RX ORDER — HYDROXYCHLOROQUINE SULFATE 200 MG/1
TABLET, FILM COATED ORAL
Qty: 180 TABLET | Refills: 2 | Status: SHIPPED | OUTPATIENT
Start: 2020-12-17 | End: 2021-09-24

## 2020-12-17 RX ORDER — PILOCARPINE HYDROCHLORIDE 5 MG/1
5 TABLET, FILM COATED ORAL
Qty: 360 TABLET | Refills: 2 | Status: SHIPPED | OUTPATIENT
Start: 2020-12-17 | End: 2021-09-24

## 2020-12-17 NOTE — TELEPHONE ENCOUNTER
Routing refill request to provider for review/approval because:  --Drug not on the [facility] refill protocol hydroxychloroquine AND pilocarpine.    --Last visit:  9/24/2020 [facility] for Medicare visit.  --Future Office Visit: none

## 2020-12-21 ENCOUNTER — TELEPHONE (OUTPATIENT)
Dept: FAMILY MEDICINE | Facility: CLINIC | Age: 70
End: 2020-12-21

## 2020-12-21 NOTE — TELEPHONE ENCOUNTER
Reason for Call:  Other call back    Detailed comments: patient calling req a advise on the inside of her lips burning for about 4 weeks now has a tooth problem .  Did not know that Dr Alex moved to [place]     Phone Number Patient can be reached at: Home number on file [phone] (home)    Best Time: today    Can we leave a detailed message on this number? YES    Call taken on 12/21/2020 at 9:41 AM by Sheron Merino

## 2020-12-21 NOTE — TELEPHONE ENCOUNTER
"Spoke with patient.  States since Thanksgiving the insides of her upper and lower lips have been \"burning\"    States she does not see any sores inside her lips/mouth  Denies lips are dry/cracked.  Denies drinking any hot liquids/burning lips.    Has tried Blistex, hydrogen peroxide rinse, mouth wash.  Nothing has helped.  States she has a chipped tooth in the front but doesn't think that is causing burning feeling.    States hasn't been to dentist for a while.  Advised patient be seen.    Does not want to come to [facility], states too far.  Was at [place] and [place] with MP.    Advised she call [facility] for appointment.  States she will check with her neighbor to see if she can bring her to urgent care.    Mirna Saleh RN      "

## 2021-02-17 ENCOUNTER — TELEPHONE (OUTPATIENT)
Dept: FAMILY MEDICINE | Facility: CLINIC | Age: 71
End: 2021-02-17

## 2021-02-17 NOTE — TELEPHONE ENCOUNTER
Patient Quality Outreach      Summary:    Patient has the following on her problem list/HM: None    Patient is due/failing the following:   Breast Cancer Screening - Mammogram    Type of outreach:    Sent [email or web address] message.    Questions for provider review:    None                                                                                     Nydia KASPER RN

## 2021-03-06 ENCOUNTER — IMMUNIZATION (OUTPATIENT)
Dept: NURSING | Facility: CLINIC | Age: 71
End: 2021-03-06
Payer: COMMERCIAL

## 2021-03-06 PROCEDURE — 0031A PR COVID VAC JANSSEN AD26 0.5ML: CPT

## 2021-03-06 PROCEDURE — 91303 PR COVID VAC JANSSEN AD26 0.5ML: CPT

## 2021-03-16 ENCOUNTER — TELEPHONE (OUTPATIENT)
Dept: FAMILY MEDICINE | Facility: CLINIC | Age: 71
End: 2021-03-16
Payer: COMMERCIAL

## 2021-03-16 NOTE — TELEPHONE ENCOUNTER
Summary:    Patient is due/failing the following:   BP CHECK    Type of outreach:  Sent [employer] message.  Action needed: Patient needs office visit for blood pressure follow up.    If need for provider review:    Please indicate OV, lab, MTM, or nurse appt if needed.  Indicate fasting or not fasting.                                                                                                                                          Addison Palacios MA

## 2021-03-17 ENCOUNTER — TELEPHONE (OUTPATIENT)
Dept: FAMILY MEDICINE | Facility: CLINIC | Age: 71
End: 2021-03-17

## 2021-03-17 DIAGNOSIS — I10 ESSENTIAL HYPERTENSION WITH GOAL BLOOD PRESSURE LESS THAN 140/90: Chronic | ICD-10-CM

## 2021-03-17 RX ORDER — LOSARTAN POTASSIUM AND HYDROCHLOROTHIAZIDE 12.5; 1 MG/1; MG/1
1 TABLET ORAL DAILY
Qty: 90 TABLET | Refills: 0 | Status: SHIPPED | OUTPATIENT
Start: 2021-03-17 | End: 2021-06-17

## 2021-05-10 DIAGNOSIS — K21.9 GASTROESOPHAGEAL REFLUX DISEASE WITHOUT ESOPHAGITIS: Chronic | ICD-10-CM

## 2021-05-10 RX ORDER — PANTOPRAZOLE SODIUM 40 MG/1
TABLET, DELAYED RELEASE ORAL
Qty: 90 TABLET | Refills: 0 | Status: SHIPPED | OUTPATIENT
Start: 2021-05-10 | End: 2021-09-24

## 2021-05-10 NOTE — TELEPHONE ENCOUNTER
Patient called.  States she needs refill for Pantoprazole.  Not sure if she wants to come to [facility] - too far for her.  Informed patient she can establish care with [place] provider if she prefers that clinic.    Not due for appointment/wellness exam until September.  Last 9/24/2020    Refill sent to [facility] per patient request  Mirna Saleh RN

## 2021-06-16 DIAGNOSIS — I10 ESSENTIAL HYPERTENSION WITH GOAL BLOOD PRESSURE LESS THAN 140/90: Chronic | ICD-10-CM

## 2021-06-17 RX ORDER — LOSARTAN POTASSIUM AND HYDROCHLOROTHIAZIDE 12.5; 1 MG/1; MG/1
TABLET ORAL
Qty: 90 TABLET | Refills: 0 | Status: SHIPPED | OUTPATIENT
Start: 2021-06-17 | End: 2021-09-16

## 2021-06-17 NOTE — TELEPHONE ENCOUNTER
Pt informed  Pt will decide if she wants to see MP still or see new provider at   Nydia KASPER RN

## 2021-06-17 NOTE — TELEPHONE ENCOUNTER
"Contact patient to see if she will come in for a lab only appointment, she probably will not, but worth a call.  Thanks  Cassidy \"Дмитрий\" BAM Alex   "

## 2021-06-17 NOTE — TELEPHONE ENCOUNTER
MP,    Losartan-hydrochlorothiazide:    Routing refill request to provider for review/approval because:  BP Readings from Last 3 Encounters:   10/27/20 (!) 144/76   09/24/20 138/74   09/27/19 (!) 144/80     Creatinine   Date Value Ref Range Status   09/24/2020 1.11 (H) 0.52 - 1.04 mg/dL Final     Last OV 9/24/2020 - annual wellness visit.    Thanks,  Mirna Saleh, RN

## 2021-09-15 DIAGNOSIS — I10 ESSENTIAL HYPERTENSION WITH GOAL BLOOD PRESSURE LESS THAN 140/90: Chronic | ICD-10-CM

## 2021-09-16 RX ORDER — LOSARTAN POTASSIUM AND HYDROCHLOROTHIAZIDE 12.5; 1 MG/1; MG/1
TABLET ORAL
Qty: 30 TABLET | Refills: 0 | Status: SHIPPED | OUTPATIENT
Start: 2021-09-16 | End: 2021-09-24

## 2021-09-23 NOTE — PATIENT INSTRUCTIONS
Did you know?      You can schedule a video visit for follow-up appointments as well as future appointments for certain conditions.  Please see the below link.     [email or web address]    If you have not already done so,  I encourage you to sign up for [email or web address] ([email or web address]).  This will allow you to review your results, securely communicate with a provider, and schedule virtual visits as well.

## 2021-09-23 NOTE — PROGRESS NOTES
"SUBJECTIVE:   Fatou Brian is a 71 year old female who presents for Preventive Visit.    Patient has been advised of split billing requirements and indicates understanding: Yes   Are you in the first 12 months of your Medicare coverage?  No    Healthy Habits:     In general, how would you rate your overall health?  Fair    Frequency of exercise:  1 day/week    Duration of exercise:  Less than 15 minutes    Do you usually eat at least 4 servings of fruit and vegetables a day, include whole grains    & fiber and avoid regularly eating high fat or \"junk\" foods?  No    Taking medications regularly:  No    Medication side effects:  None    Ability to successfully perform activities of daily living:  Telephone requires assistance, transportation requires assistance, shopping requires assistance, preparing meals requires assistance, housework requires assistance, bathing requires assistance and laundry requires assistance    Home Safety:  No safety concerns identified    Hearing Impairment:  Difficulty following a conversation in a noisy restaurant or crowded room, feel that people are mumbling or not speaking clearly, difficulty following dialogue in the theater, difficult to understand a speaker at a public meeting or [employer] service, need to ask people to speak up or repeat themselves, difficulty understanding soft or whispered speech and difficulty understanding speech on the telephone    In the past 6 months, have you been bothered by leaking of urine? Yes    In general, how would you rate your overall mental or emotional health?  Fair      PHQ-2 Total Score: 3    Additional concerns today:  No    Multiple complaint regarding chronic issues, not really interested in next steps though.  Would like as full a bloodwork panel as possible.  Refuses mammo and flu shot today.  Wondering about options for walker update.  Would prefer to see provider down in , does not like [place] location and gets stress/BP " increases with metro mobility rides.    Answers for HPI/ROS submitted by the patient on 2021  If you checked off any problems, how difficult have these problems made it for you to do your work, take care of things at home, or get along with other people?: Not difficult at all  PHQ9 TOTAL SCORE: 6      Do you feel safe in your environment? Yes    Have you ever done Advance Care Planning? (For example, a Health Directive, POLST, or a discussion with a medical provider or your loved ones about your wishes): Yes, advance care planning is on file.       Fall risk  Fallen 2 or more times in the past year?: No  Any fall with injury in the past year?: No    Cognitive Screening Not appropriate due to known dementia    Do you have sleep apnea, excessive snoring or daytime drowsiness?: yes    Reviewed and updated as needed this visit by clinical staff  Tobacco  Allergies  Meds  Problems  Med Hx  Surg Hx  Fam Hx          Reviewed and updated as needed this visit by Provider  Tobacco  Allergies  Meds  Problems  Med Hx  Surg Hx  Fam Hx         Social History     Tobacco Use     Smoking status: Former Smoker     Packs/day: 0.00     Types: Cigarettes     Quit date: 10/1/2009     Years since quittin.9     Smokeless tobacco: Never Used   Substance Use Topics     Alcohol use: Yes     Comment: occas.     If you drink alcohol do you typically have >3 drinks per day or >7 drinks per week? No    Alcohol Use 2021   Prescreen: >3 drinks/day or >7 drinks/week? No   Prescreen: >3 drinks/day or >7 drinks/week? -   No flowsheet data found.            Current providers sharing in care for this patient include:   Patient Care Team:  Cassidy Alex PA-C as PCP - General (Physician Assistant)  Cassidy Alex PA-C as Assigned PCP    The following health maintenance items are reviewed in Epic and correct as of today:  Health Maintenance Due   Topic Date Due     ANNUAL REVIEW OF HM ORDERS  Never done      ZOSTER IMMUNIZATION (2 of 3) 03/26/2009     MAMMO SCREENING  12/20/2015     INFLUENZA VACCINE (1) 09/01/2021     FALL RISK ASSESSMENT  09/24/2021     Lab work is in process  Labs reviewed in EPIC  BP Readings from Last 3 Encounters:   09/24/21 (!) 172/75   10/27/20 (!) 144/76   09/24/20 138/74    Wt Readings from Last 3 Encounters:   09/24/21 101.6 kg (223 lb 14.4 oz)   09/24/20 98.9 kg (218 lb)   09/27/19 93.4 kg (206 lb)                  Patient Active Problem List   Diagnosis     Sjogren's syndrome with myopathy (H)     Osteoarthritis of knee     GERD (gastroesophageal reflux disease)     Constipation     Osteoporosis     Irritable bowel syndrome with diarrhea     HL (hearing loss)     Rosacea     Poor memory     Low back pain     Senile keratosis     Spinal stenosis     H/O hysterectomy for benign disease     Bilateral hearing loss     Presbyopia     Knee pain     Hypertension goal BP (blood pressure) < 140/90     Hyperlipidemia with target LDL less than 130     Left arm pain     Arm pain     Major depression in partial remission (H)     Subclavian artery stenosis (H)     ACP (advance care planning)     Gastroesophageal reflux disease without esophagitis     Primary osteoarthritis of both knees     Bilateral low back pain with sciatica, sciatica laterality unspecified, unspecified chronicity     CKD (chronic kidney disease) stage 3, GFR 30-59 ml/min     Morbid obesity (H)     Past Surgical History:   Procedure Laterality Date     aneursym[  1983, 1991    Has metal in head     CHOLECYSTECTOMY       CLOSED RX PROX HUMERUS FRACTURE      10 pins placed     COLONOSCOPY       COLONOSCOPY  1/24/2014    Procedure: COMBINED COLONOSCOPY, SINGLE BIOPSY/POLYPECTOMY BY BIOPSY;  COLONOSCOPY;  Surgeon: Ranjit Pa MD;  Location:  GI      [facility] WITH CATARACT SURGERY      both eyes     HYSTERECTOMY, JANET         Social History     Tobacco Use     Smoking status: Former Smoker     Packs/day: 0.00     Types:  Cigarettes     Quit date: 10/1/2009     Years since quittin.9     Smokeless tobacco: Never Used   Substance Use Topics     Alcohol use: Yes     Comment: occas.     Family History   Problem Relation Age of Onset     Thyroid Disease Mother      Arthritis Mother         Rheumatoid     Osteoporosis Mother      Cancer Mother         stomach     Cerebrovascular Disease Mother      Heart Disease Father      Cancer Father      Heart Disease Brother         fibulation         Current Outpatient Medications   Medication Sig Dispense Refill     acetaminophen (TYLENOL) 650 MG CR tablet Take 2 tablets (1,300 mg) by mouth every 8 hours as needed for mild pain or pain 270 tablet 3     calcium carbonate (TUMS) 500 MG chewable tablet Take 1 tablet (500 mg) by mouth At Bedtime 120 tablet 11     cholecalciferol (VITAMIN D3) 5000 units TABS tablet Take 1 tablet (5,000 Units) by mouth At Bedtime 100 tablet 3     famotidine (PEPCID) 20 MG tablet TAKE 1 TABLET BY MOUTH TWICE DAILY AS NEEDED 180 tablet 3     fexofenadine (ALLEGRA) 180 MG tablet Take 1 tablet (180 mg) by mouth daily 30 tablet 11     gabapentin (NEURONTIN) 100 MG capsule TAKE ONE CAPSULE BY MOUTH THREE TIMES DAILY 270 capsule 1     hydroxychloroquine (PLAQUENIL) 200 MG tablet TAKE 2 (400 MG) BY MOUTH AT BEDTIME 180 tablet 3     losartan-hydrochlorothiazide (HYZAAR) 100-12.5 MG tablet Take 1 tablet by mouth daily 90 tablet 3     metoprolol succinate ER (TOPROL-XL) 50 MG 24 hr tablet TAKE 1 TABLET DAILY 90 tablet 3     pantoprazole (PROTONIX) 40 MG EC tablet TAKE 1 BY MOUTH DAILY 90 tablet 3     pilocarpine (SALAGEN) 5 MG tablet Take 1 tablet (5 mg) by mouth 4 times daily (before meals and nightly) 360 tablet 3     senna-docusate (SENOKOT-S/PERICOLACE) 8.6-50 MG tablet Take 2 tablets by mouth 2 times daily 120 tablet PRN     order for DME One pair longitudinal arch supports  One pair   Use as directed 1 each 1     ORDER FOR DME Equipment being ordered: RUBBER THRESHOLD  /CARGO WEDGE RAMP  ONE  USE AS DIRECTED FOR THRESHOLD  TO PREVENT FALLING 1 Device 1     Skin Protectants, Misc. (EUCERIN) cream Apply topically as needed for dry skin 454 g 3     UNABLE TO FIND MEDICATION NAME: Active fineradha keto blen       UNABLE TO FIND MEDICATION NAME: Active Dharmesh [place] Cleanse       Allergies   Allergen Reactions     Chantix [Varenicline]      suicidal     Influenza Virus Vaccine H5n1      Muscle aches dizzy, nauseated  Light headed     Morphine Sulfate      Sensitivity when in hospital     Omeprazole Magnesium Nausea     Intolerance       Vioxx      Niacin Itching and Rash     Zetia [Ezetimibe] Other (See Comments)     Muscle pain     Zyrtec-D Rash     Recent Labs   Lab Test 09/24/20  1152 09/27/19  1046 09/25/18  1119 09/25/18  1119 08/29/17  1148 08/29/17  1148 08/23/17  1030 09/13/16  1158   A1C  --   --   --   --   --  5.1  --  5.4   * 125*  --  107*   < > 115*  --  132*   HDL 54 63  --  62   < > 57  --  64   TRIG 98 99  --  83   < > 93  --  74   ALT 18 40  --  22  --   --    < > 24   CR 1.11* 1.06*   < > 1.07*   < > 1.20*   < > 1.25*   GFRESTIMATED 49* 53*   < > 51*   < > 44*   < > 43*   GFRESTBLACK 57* 61   < > 61   < > 54*   < > 52*   POTASSIUM 3.5 3.8   < > 4.3   < > 3.9   < > 4.0   TSH 1.25  --   --   --   --  1.43  --  0.96    < > = values in this interval not displayed.      Mammogram Screening: Mammogram Screening - Patient under age 75, has elected to discontinue screenings.        Review of Systems   Respiratory: Positive for cough and shortness of breath.    Cardiovascular: Positive for chest pain and peripheral edema.   Gastrointestinal: Positive for abdominal pain and constipation.   Breasts:  Negative for tenderness, breast mass and discharge.   Genitourinary: Positive for frequency. Negative for pelvic pain, vaginal bleeding and vaginal discharge.   Musculoskeletal: Positive for arthralgias, joint swelling and myalgias.   Neurological: Positive for weakness and  "paresthesias.   Psychiatric/Behavioral: The patient is nervous/anxious.        OBJECTIVE:   BP (!) 172/75   Pulse 74   Temp 97.9  F (36.6  C)   Resp 18   Ht 1.651 m (5' 5\")   Wt 101.6 kg (223 lb 14.4 oz)   BMI 37.26 kg/m   Estimated body mass index is 37.26 kg/m  as calculated from the following:    Height as of this encounter: 1.651 m (5' 5\").    Weight as of this encounter: 101.6 kg (223 lb 14.4 oz).  Physical Exam  GENERAL: healthy, alert and no distress  EYES: Eyes grossly normal to inspection, PERRL and conjunctivae and sclerae normal  HENT: ear canals and TM's normal, nose and mouth without ulcers or lesions  NECK: no adenopathy, no asymmetry, masses, or scars and thyroid normal to palpation  RESP: lungs clear to auscultation - no rales, rhonchi or wheezes  BREAST: normal without masses, tenderness or nipple discharge and no palpable axillary masses or adenopathy  CV: regular rate and rhythm, normal S1 S2, no S3 or S4, no murmur, click or rub, no peripheral edema and peripheral pulses strong  ABDOMEN: soft, nontender, no hepatosplenomegaly, no masses and bowel sounds normal  MS: no gross musculoskeletal defects noted, no edema  SKIN: no suspicious lesions or rashes  NEURO: Normal strength and tone, mentation intact and speech normal  PSYCH: mentation appears normal, affect normal/bright    Diagnostic Test Results:  Labs reviewed in Epic    ASSESSMENT / PLAN:   Fatou was seen today for physical.    Diagnoses and all orders for this visit:    Encounter for Medicare annual wellness exam  -     HOME CARE NURSING REFERRAL  -     Walker Order for DME - ONLY FOR DME  Routine labs updated today.    Essential hypertension with goal blood pressure less than 140/90  Subclavian artery stenosis (H)  Shortness of breath   Long standing, chronic, UNcontrolled-  Regular refills on medicine sent to pharmacy and patient encouraged to follow up with cardiology as previously directed. Patient to get BP repeat in a few " weeks and an easier accessible location.  -     Comprehensive metabolic panel; Future  -     CBC with Platelets & Differential; Future  -     Lipid panel reflex to direct LDL Fasting; Future  -     TSH with free T4 reflex; Future  -     Albumin Random Urine Quantitative with Creat Ratio; Future  -     losartan-hydrochlorothiazide (HYZAAR) 100-12.5 MG tablet; Take 1 tablet by mouth daily  -     metoprolol succinate ER (TOPROL-XL) 50 MG 24 hr tablet; TAKE 1 TABLET DAILY  -     Adult Cardiology Eval Referral; Future  -     BNP-N terminal pro; Future    Stage 3a chronic kidney disease  Long standing, chronic, controlled- labs updated today  -     Comprehensive metabolic panel; Future  -     CBC with Platelets & Differential; Future  -     Lipid panel reflex to direct LDL Fasting; Future  -     Albumin Random Urine Quantitative with Creat Ratio; Future    Morbid obesity (H)  Labs updated today with importance of diet and exercise discussed with patient; option for Physical Therapy to increased mobility discussed with patient at length as well.  -     Comprehensive metabolic panel; Future  -     CBC with Platelets & Differential; Future  -     Lipid panel reflex to direct LDL Fasting; Future    Hyperlipidemia with target LDL less than 130  Long standing, chronic, controlled-  Labs updated today.    Recurrent major depressive disorder, in partial remission (H)  Long standing, chronic, controlled per patient -  Patient not interested in options at this time    Sjogren's syndrome with myopathy (H)  Long standing, chronic, controlled-  Sees dentist regularly as well.  -     hydroxychloroquine (PLAQUENIL) 200 MG tablet; TAKE 2 (400 MG) BY MOUTH AT BEDTIME  -     pilocarpine (SALAGEN) 5 MG tablet; Take 1 tablet (5 mg) by mouth 4 times daily (before meals and nightly)    Bilateral low back pain with sciatica, sciatica laterality unspecified, unspecified chronicity  Primary osteoarthritis of both knees  Vitamin D  "insufficiency  Option for home Physical Therapy and updated walked discussed with patient.  -     gabapentin (NEURONTIN) 100 MG capsule; TAKE ONE CAPSULE BY MOUTH THREE TIMES DAILY  -     HOME CARE NURSING REFERRAL  -     [facility] Order for DME - ONLY FOR DME  -     gabapentin (NEURONTIN) 100 MG capsule; TAKE ONE CAPSULE BY MOUTH THREE TIMES DAILY  -     Vitamin D Deficiency; Future    Gastroesophageal reflux disease without esophagitis  -     famotidine (PEPCID) 20 MG tablet; TAKE 1 TABLET BY MOUTH TWICE DAILY AS NEEDED  -     pantoprazole (PROTONIX) 40 MG EC tablet; TAKE 1 BY MOUTH DAILY        Patient has been advised of split billing requirements and indicates understanding: Yes  COUNSELING:  Reviewed preventive health counseling, as reflected in patient instructions       Regular exercise       Healthy diet/nutrition       Vision screening       Hearing screening       Dental care    Estimated body mass index is 37.26 kg/m  as calculated from the following:    Height as of this encounter: 1.651 m (5' 5\").    Weight as of this encounter: 101.6 kg (223 lb 14.4 oz).    Weight management plan: Discussed healthy diet and exercise guidelines    She reports that she quit smoking about 11 years ago. Her smoking use included cigarettes. She smoked 0.00 packs per day. She has never used smokeless tobacco.      Appropriate preventive services were discussed with this patient, including applicable screening as appropriate for cardiovascular disease, diabetes, osteopenia/osteoporosis, and glaucoma.  As appropriate for age/gender, discussed screening for colorectal cancer, prostate cancer, breast cancer, and cervical cancer. Checklist reviewing preventive services available has been given to the patient.    Reviewed patients plan of care and provided an AVS. The Basic Care Plan (routine screening as documented in Health Maintenance) for Fatou meets the Care Plan requirement. This Care Plan has been established and reviewed " with the Patient.    Counseling Resources:  ATP IV Guidelines  Pooled Cohorts Equation Calculator  Breast Cancer Risk Calculator  Breast Cancer: Medication to Reduce Risk  FRAX Risk Assessment  ICSI Preventive Guidelines  Dietary Guidelines for Americans, 2010  USDA's MyPlate  ASA Prophylaxis  Lung CA Screening    [facility]    Identified Health Risks:

## 2021-09-24 ENCOUNTER — TELEPHONE (OUTPATIENT)
Dept: FAMILY MEDICINE | Facility: CLINIC | Age: 71
End: 2021-09-24

## 2021-09-24 ENCOUNTER — OFFICE VISIT (OUTPATIENT)
Dept: FAMILY MEDICINE | Facility: CLINIC | Age: 71
End: 2021-09-24
Payer: COMMERCIAL

## 2021-09-24 VITALS
RESPIRATION RATE: 18 BRPM | HEART RATE: 74 BPM | TEMPERATURE: 97.9 F | WEIGHT: 223.9 LBS | HEIGHT: 65 IN | SYSTOLIC BLOOD PRESSURE: 172 MMHG | BODY MASS INDEX: 37.3 KG/M2 | DIASTOLIC BLOOD PRESSURE: 75 MMHG

## 2021-09-24 DIAGNOSIS — R06.02 SHORTNESS OF BREATH: ICD-10-CM

## 2021-09-24 DIAGNOSIS — Z13.29 SCREENING FOR THYROID DISORDER: Primary | ICD-10-CM

## 2021-09-24 DIAGNOSIS — K21.9 GASTROESOPHAGEAL REFLUX DISEASE WITHOUT ESOPHAGITIS: Chronic | ICD-10-CM

## 2021-09-24 DIAGNOSIS — M17.0 PRIMARY OSTEOARTHRITIS OF BOTH KNEES: ICD-10-CM

## 2021-09-24 DIAGNOSIS — I77.1 SUBCLAVIAN ARTERY STENOSIS (H): ICD-10-CM

## 2021-09-24 DIAGNOSIS — E66.01 MORBID OBESITY (H): ICD-10-CM

## 2021-09-24 DIAGNOSIS — I10 ESSENTIAL HYPERTENSION WITH GOAL BLOOD PRESSURE LESS THAN 140/90: ICD-10-CM

## 2021-09-24 DIAGNOSIS — M54.40 BILATERAL LOW BACK PAIN WITH SCIATICA, SCIATICA LATERALITY UNSPECIFIED, UNSPECIFIED CHRONICITY: Chronic | ICD-10-CM

## 2021-09-24 DIAGNOSIS — Z00.00 ENCOUNTER FOR MEDICARE ANNUAL WELLNESS EXAM: Primary | ICD-10-CM

## 2021-09-24 DIAGNOSIS — M35.03 SJOGREN'S SYNDROME WITH MYOPATHY (H): ICD-10-CM

## 2021-09-24 DIAGNOSIS — N18.31 STAGE 3A CHRONIC KIDNEY DISEASE (H): ICD-10-CM

## 2021-09-24 DIAGNOSIS — E78.5 HYPERLIPIDEMIA WITH TARGET LDL LESS THAN 130: Chronic | ICD-10-CM

## 2021-09-24 DIAGNOSIS — F33.41 RECURRENT MAJOR DEPRESSIVE DISORDER, IN PARTIAL REMISSION (H): Chronic | ICD-10-CM

## 2021-09-24 DIAGNOSIS — E55.9 VITAMIN D INSUFFICIENCY: ICD-10-CM

## 2021-09-24 LAB
BASOPHILS # BLD AUTO: 0 10E3/UL (ref 0–0.2)
BASOPHILS NFR BLD AUTO: 0 %
EOSINOPHIL # BLD AUTO: 0.1 10E3/UL (ref 0–0.7)
EOSINOPHIL NFR BLD AUTO: 1 %
ERYTHROCYTE [DISTWIDTH] IN BLOOD BY AUTOMATED COUNT: 13.9 % (ref 10–15)
HCT VFR BLD AUTO: 41.1 % (ref 35–47)
HGB BLD-MCNC: 13.3 G/DL (ref 11.7–15.7)
LYMPHOCYTES # BLD AUTO: 1.1 10E3/UL (ref 0.8–5.3)
LYMPHOCYTES NFR BLD AUTO: 15 %
MCH RBC QN AUTO: 30.4 PG (ref 26.5–33)
MCHC RBC AUTO-ENTMCNC: 32.4 G/DL (ref 31.5–36.5)
MCV RBC AUTO: 94 FL (ref 78–100)
MONOCYTES # BLD AUTO: 0.6 10E3/UL (ref 0–1.3)
MONOCYTES NFR BLD AUTO: 9 %
NEUTROPHILS # BLD AUTO: 5.6 10E3/UL (ref 1.6–8.3)
NEUTROPHILS NFR BLD AUTO: 75 %
NT-PROBNP SERPL-MCNC: 730 PG/ML (ref 0–125)
PLATELET # BLD AUTO: 176 10E3/UL (ref 150–450)
RBC # BLD AUTO: 4.38 10E6/UL (ref 3.8–5.2)
WBC # BLD AUTO: 7.4 10E3/UL (ref 4–11)

## 2021-09-24 PROCEDURE — 82043 UR ALBUMIN QUANTITATIVE: CPT | Performed by: PHYSICIAN ASSISTANT

## 2021-09-24 PROCEDURE — 84443 ASSAY THYROID STIM HORMONE: CPT | Performed by: PHYSICIAN ASSISTANT

## 2021-09-24 PROCEDURE — 36415 COLL VENOUS BLD VENIPUNCTURE: CPT | Performed by: PHYSICIAN ASSISTANT

## 2021-09-24 PROCEDURE — 85025 COMPLETE CBC W/AUTO DIFF WBC: CPT | Performed by: PHYSICIAN ASSISTANT

## 2021-09-24 PROCEDURE — 99214 OFFICE O/P EST MOD 30 MIN: CPT | Mod: 25 | Performed by: PHYSICIAN ASSISTANT

## 2021-09-24 PROCEDURE — 84439 ASSAY OF FREE THYROXINE: CPT | Performed by: PHYSICIAN ASSISTANT

## 2021-09-24 PROCEDURE — 99397 PER PM REEVAL EST PAT 65+ YR: CPT | Performed by: PHYSICIAN ASSISTANT

## 2021-09-24 PROCEDURE — 83880 ASSAY OF NATRIURETIC PEPTIDE: CPT | Performed by: PHYSICIAN ASSISTANT

## 2021-09-24 PROCEDURE — 80053 COMPREHEN METABOLIC PANEL: CPT | Performed by: PHYSICIAN ASSISTANT

## 2021-09-24 PROCEDURE — 80061 LIPID PANEL: CPT | Performed by: PHYSICIAN ASSISTANT

## 2021-09-24 PROCEDURE — 82306 VITAMIN D 25 HYDROXY: CPT | Performed by: PHYSICIAN ASSISTANT

## 2021-09-24 RX ORDER — METOPROLOL SUCCINATE 50 MG/1
TABLET, EXTENDED RELEASE ORAL
Qty: 90 TABLET | Refills: 3 | Status: ON HOLD | OUTPATIENT
Start: 2021-09-24 | End: 2021-11-04

## 2021-09-24 RX ORDER — FAMOTIDINE 20 MG/1
TABLET, FILM COATED ORAL
Qty: 180 TABLET | Refills: 3 | Status: SHIPPED | OUTPATIENT
Start: 2021-09-24 | End: 2022-10-26

## 2021-09-24 RX ORDER — GABAPENTIN 100 MG/1
CAPSULE ORAL
Qty: 270 CAPSULE | Refills: 1 | Status: SHIPPED | OUTPATIENT
Start: 2021-09-24 | End: 2021-12-13

## 2021-09-24 RX ORDER — PANTOPRAZOLE SODIUM 40 MG/1
TABLET, DELAYED RELEASE ORAL
Qty: 90 TABLET | Refills: 3 | Status: SHIPPED | OUTPATIENT
Start: 2021-09-24 | End: 2022-08-25

## 2021-09-24 RX ORDER — PILOCARPINE HYDROCHLORIDE 5 MG/1
5 TABLET, FILM COATED ORAL
Qty: 360 TABLET | Refills: 3 | Status: SHIPPED | OUTPATIENT
Start: 2021-09-24 | End: 2022-10-26

## 2021-09-24 RX ORDER — HYDROXYCHLOROQUINE SULFATE 200 MG/1
TABLET, FILM COATED ORAL
Qty: 180 TABLET | Refills: 3 | Status: SHIPPED | OUTPATIENT
Start: 2021-09-24 | End: 2022-10-18

## 2021-09-24 RX ORDER — LOSARTAN POTASSIUM AND HYDROCHLOROTHIAZIDE 12.5; 1 MG/1; MG/1
1 TABLET ORAL DAILY
Qty: 90 TABLET | Refills: 3 | Status: ON HOLD | OUTPATIENT
Start: 2021-09-24 | End: 2021-11-04

## 2021-09-24 ASSESSMENT — ENCOUNTER SYMPTOMS
ABDOMINAL PAIN: 1
COUGH: 1
MYALGIAS: 1
ARTHRALGIAS: 1
BREAST MASS: 0
SHORTNESS OF BREATH: 1
PARESTHESIAS: 1
FREQUENCY: 1
JOINT SWELLING: 1
NERVOUS/ANXIOUS: 1
CONSTIPATION: 1
WEAKNESS: 1

## 2021-09-24 ASSESSMENT — ACTIVITIES OF DAILY LIVING (ADL)
CURRENT_FUNCTION: LAUNDRY REQUIRES ASSISTANCE
CURRENT_FUNCTION: PREPARING MEALS REQUIRES ASSISTANCE
CURRENT_FUNCTION: TELEPHONE REQUIRES ASSISTANCE
CURRENT_FUNCTION: HOUSEWORK REQUIRES ASSISTANCE
CURRENT_FUNCTION: BATHING REQUIRES ASSISTANCE
CURRENT_FUNCTION: TRANSPORTATION REQUIRES ASSISTANCE
CURRENT_FUNCTION: SHOPPING REQUIRES ASSISTANCE

## 2021-09-24 ASSESSMENT — PATIENT HEALTH QUESTIONNAIRE - PHQ9
10. IF YOU CHECKED OFF ANY PROBLEMS, HOW DIFFICULT HAVE THESE PROBLEMS MADE IT FOR YOU TO DO YOUR WORK, TAKE CARE OF THINGS AT HOME, OR GET ALONG WITH OTHER PEOPLE: NOT DIFFICULT AT ALL
SUM OF ALL RESPONSES TO PHQ QUESTIONS 1-9: 6
SUM OF ALL RESPONSES TO PHQ QUESTIONS 1-9: 6

## 2021-09-24 ASSESSMENT — MIFFLIN-ST. JEOR: SCORE: 1521.48

## 2021-09-24 NOTE — TELEPHONE ENCOUNTER
Velma SULLIVAN from [facility] intake calling says they cannot accomodates patient as they are already at [facility] for home care.      Please advise.  Thanks,  Carol Perez RN

## 2021-09-24 NOTE — TELEPHONE ENCOUNTER
"Are there alternative companies/referrals we can do for in home Physical Therapy?    Thanks  Cassidy \"Дмитрий\" BAM Alex   "

## 2021-09-25 LAB
ALBUMIN SERPL-MCNC: 4.1 G/DL (ref 3.4–5)
ALP SERPL-CCNC: 83 U/L (ref 40–150)
ALT SERPL W P-5'-P-CCNC: 24 U/L (ref 0–50)
ANION GAP SERPL CALCULATED.3IONS-SCNC: 9 MMOL/L (ref 3–14)
AST SERPL W P-5'-P-CCNC: 31 U/L (ref 0–45)
BILIRUB SERPL-MCNC: 0.6 MG/DL (ref 0.2–1.3)
BUN SERPL-MCNC: 21 MG/DL (ref 7–30)
CALCIUM SERPL-MCNC: 8.9 MG/DL (ref 8.5–10.1)
CHLORIDE BLD-SCNC: 106 MMOL/L (ref 94–109)
CHOLEST SERPL-MCNC: 200 MG/DL
CO2 SERPL-SCNC: 23 MMOL/L (ref 20–32)
CREAT SERPL-MCNC: 1.28 MG/DL (ref 0.52–1.04)
CREAT UR-MCNC: 50 MG/DL
FASTING STATUS PATIENT QL REPORTED: YES
GFR SERPL CREATININE-BSD FRML MDRD: 42 ML/MIN/1.73M2
GLUCOSE BLD-MCNC: 102 MG/DL (ref 70–99)
HDLC SERPL-MCNC: 57 MG/DL
LDLC SERPL CALC-MCNC: 120 MG/DL
MICROALBUMIN UR-MCNC: 24 MG/L
MICROALBUMIN/CREAT UR: 48 MG/G CR (ref 0–25)
NONHDLC SERPL-MCNC: 143 MG/DL
POTASSIUM BLD-SCNC: 4.2 MMOL/L (ref 3.4–5.3)
PROT SERPL-MCNC: 7.2 G/DL (ref 6.8–8.8)
SODIUM SERPL-SCNC: 138 MMOL/L (ref 133–144)
T4 FREE SERPL-MCNC: 1.25 NG/DL (ref 0.76–1.46)
TRIGL SERPL-MCNC: 117 MG/DL
TSH SERPL DL<=0.005 MIU/L-ACNC: 4.09 MU/L (ref 0.4–4)

## 2021-09-26 LAB — DEPRECATED CALCIDIOL+CALCIFEROL SERPL-MC: 59 UG/L (ref 20–75)

## 2021-09-27 NOTE — TELEPHONE ENCOUNTER
No I do know of other home care. Patient would need to call her insurance and get a list of Home care in her network.     Isela Stauffer  Referral Coordinator

## 2021-09-27 NOTE — TELEPHONE ENCOUNTER
Pt informed  States she has 3 different cuffs but it's different readings than at clinic  Told pt to have BP checked when comes in for labs  Nydia KASPER RN

## 2021-09-27 NOTE — TELEPHONE ENCOUNTER
"Ok, do you mind letting patient know that home care if full right now, but she has the option to contact her insurance for home Physical Therapy options if she would like.    Can you also discuss her lab results with her at the same time...  TSH level a bit elevated, associated levels are within normal limits though.  Would like to repeat this in 6-8 weeks, lab only appointment would be ok.  Also, a little protein in her urine was noted/new, this is most likely from her high blood pressure; need to keep an eye on her BP readings, at home would be great if possible for patient to get her own BP cuff.    She's hard or hearing and hates coming into clinic so it's fine if she prefers this follow up at ; but I would especially encourage her to follow up with cardiology as discussed in clinic with referral placed at that time.    Thanks  Cassidy \"Дмитрий\" BAM Alex   "

## 2021-10-28 ENCOUNTER — TELEPHONE (OUTPATIENT)
Dept: FAMILY MEDICINE | Facility: CLINIC | Age: 71
End: 2021-10-28
Payer: COMMERCIAL

## 2021-10-28 ENCOUNTER — TELEPHONE (OUTPATIENT)
Dept: FAMILY MEDICINE | Facility: CLINIC | Age: 71
End: 2021-10-28

## 2021-10-28 NOTE — TELEPHONE ENCOUNTER
Patient calling in to speak with Isai's team - said in September had physical and patient is having difficulty with computer - she is requesting physical and lab results be mailed to her - she has not received anything at this time - I sent letter, AVS, and gave information on [facility] helpline to get her logged in    Needing labs rechecked - and vaccinations - discussed scheduling with MA/Lab - transferred to set up appointment    Feels she can't go to cardiology because she has a cat that is 19 years old and if she goes to the appointment the cat may not make it because no one else knows how to care for her and treat her - declined DME for blood pressure cuff - says she will pick one up on her own - she was strongly encouraged to get a cuff, journal, noted to contact with elevated readings above 140/90, follow up with cardiology

## 2021-10-28 NOTE — LETTER
[facility]  [address]  [place] [address] [phone]      October 28, 2021    Fatou Brian                                                                                                                     [address]  [place] [address]      Dear Fatou,    Home care is currently full right now, but you do have the option to contact your insurance for home Physical Therapy options if you would like.     Your TSH level is a bit elevated, associated levels are within normal limits though.  I would like to repeat this lab in 6-8 weeks (after 11/5/21), schedule a lab only appointment by calling [phone].    Also, there was a little protein in your urine, this is most likely from your high blood pressure. I would encourage you to keep an eye on your blood pressure readings, at home! I would suggest getting a home blood pressure cuff and journal your readings - we can order a blood pressure cuff for you - let us know if you would like this option. Call you insurance to get information on where this order needs to be sent for coverage.  Call or schedule an appointment if you consisently get readings over 140/90.     I would especially encourage you to follow up with cardiology as well [phone]     For [employer] - please call the 24/7 helpline [phone]    Office Visit on 09/24/2021   Component Date Value Ref Range Status     Sodium 09/24/2021 138  133 - 144 mmol/L Final     Potassium 09/24/2021 4.2  3.4 - 5.3 mmol/L Final     Chloride 09/24/2021 106  94 - 109 mmol/L Final     Carbon Dioxide (CO2) 09/24/2021 23  20 - 32 mmol/L Final     Anion Gap 09/24/2021 9  3 - 14 mmol/L Final     Urea Nitrogen 09/24/2021 21  7 - 30 mg/dL Final     Creatinine 09/24/2021 1.28* 0.52 - 1.04 mg/dL Final     Calcium 09/24/2021 8.9  8.5 - 10.1 mg/dL Final     Glucose 09/24/2021 102* 70 - 99 mg/dL Final     Alkaline Phosphatase 09/24/2021 83  40 - 150 U/L  Final     AST 09/24/2021 31  0 - 45 U/L Final     ALT 09/24/2021 24  0 - 50 U/L Final     Protein Total 09/24/2021 7.2  6.8 - 8.8 g/dL Final     Albumin 09/24/2021 4.1  3.4 - 5.0 g/dL Final     Bilirubin Total 09/24/2021 0.6  0.2 - 1.3 mg/dL Final     GFR Estimate 09/24/2021 42* >60 mL/min/1.73m2 Final    As of July 11, 2021, eGFR is calculated by the CKD-EPI creatinine equation, without race adjustment. eGFR can be influenced by muscle mass, exercise, and diet. The reported eGFR is an estimation only and is only applicable if the renal function is stable.     Cholesterol 09/24/2021 200* <200 mg/dL Final     Triglycerides 09/24/2021 117  <150 mg/dL Final     Direct Measure HDL 09/24/2021 57  >=50 mg/dL Final     LDL Cholesterol Calculated 09/24/2021 120* <=100 mg/dL Final     Non HDL Cholesterol 09/24/2021 143* <130 mg/dL Final     Patient Fasting > 8hrs? 09/24/2021 Yes   Final     TSH 09/24/2021 4.09* 0.40 - 4.00 mU/L Final     Creatinine Urine mg/dL 09/24/2021 50  mg/dL Final     Albumin Urine mg/L 09/24/2021 24  mg/L Final     Albumin Urine mg/g Cr 09/24/2021 48.00* 0.00 - 25.00 mg/g Cr Final     Vitamin D, Total (25-Hydroxy) 09/24/2021 59  20 - 75 ug/L Final     N Terminal Pro BNP Outpatient 09/24/2021 730* 0 - 125 pg/mL Final    Reference range shown and results flagged as abnormal are for the outpatient, non acute settings. Establishing a baseline value for each individual patient is useful for follow-up.    Suggested inpatient cut points for confirming diagnosis of CHF in an acute setting are:  >450 pg/mL (age 18 to less than 50)  >900 pg/mL (age 50 to less than 75)  >1800 pg/mL (75 yrs and older)    An inpatient or emergency department NT-proPBNP <300 pg/mL effectively rules out acute CHF, with 99% negative predictive value.         WBC Count 09/24/2021 7.4  4.0 - 11.0 10e3/uL Final     RBC Count 09/24/2021 4.38  3.80 - 5.20 10e6/uL Final     Hemoglobin 09/24/2021 13.3  11.7 - 15.7 g/dL Final     Hematocrit  09/24/2021 41.1  35.0 - 47.0 % Final     MCV 09/24/2021 94  78 - 100 fL Final     MCH 09/24/2021 30.4  26.5 - 33.0 pg Final     MCHC 09/24/2021 32.4  31.5 - 36.5 g/dL Final     RDW 09/24/2021 13.9  10.0 - 15.0 % Final     Platelet Count 09/24/2021 176  150 - 450 10e3/uL Final     % Neutrophils 09/24/2021 75  % Final     % Lymphocytes 09/24/2021 15  % Final     % Monocytes 09/24/2021 9  % Final     % Eosinophils 09/24/2021 1  % Final     % Basophils 09/24/2021 0  % Final     Absolute Neutrophils 09/24/2021 5.6  1.6 - 8.3 10e3/uL Final     Absolute Lymphocytes 09/24/2021 1.1  0.8 - 5.3 10e3/uL Final     Absolute Monocytes 09/24/2021 0.6  0.0 - 1.3 10e3/uL Final     Absolute Eosinophils 09/24/2021 0.1  0.0 - 0.7 10e3/uL Final     Absolute Basophils 09/24/2021 0.0  0.0 - 0.2 10e3/uL Final     Free T4 09/24/2021 1.25  0.76 - 1.46 ng/dL Final     Sincerely,     Cassidy Alex CNP

## 2021-10-28 NOTE — TELEPHONE ENCOUNTER
"Patient calling to get a COVID booster shot scheduled. RN attempted to schedule on MA schedule at [facility] but scheduling was blocked.     RN advised patient call central scheduling, patient then said \"forget it I wont get a booster\" and hung up the phone.    HANY Cochran RN  [facility]    "

## 2021-11-01 ENCOUNTER — HOSPITAL ENCOUNTER (INPATIENT)
Facility: CLINIC | Age: 71
LOS: 2 days | Discharge: HOME | DRG: 247 | End: 2021-11-04
Attending: EMERGENCY MEDICINE | Admitting: INTERNAL MEDICINE
Payer: COMMERCIAL

## 2021-11-01 ENCOUNTER — APPOINTMENT (OUTPATIENT)
Dept: CT IMAGING | Facility: CLINIC | Age: 71
DRG: 247 | End: 2021-11-01
Attending: EMERGENCY MEDICINE
Payer: COMMERCIAL

## 2021-11-01 DIAGNOSIS — I21.4 NSTEMI (NON-ST ELEVATED MYOCARDIAL INFARCTION) (H): Primary | ICD-10-CM

## 2021-11-01 DIAGNOSIS — E78.5 HYPERLIPIDEMIA WITH TARGET LDL LESS THAN 130: ICD-10-CM

## 2021-11-01 DIAGNOSIS — R07.9 CHEST PAIN, UNSPECIFIED TYPE: ICD-10-CM

## 2021-11-01 LAB
BASOPHILS # BLD AUTO: 0 10E3/UL (ref 0–0.2)
BASOPHILS NFR BLD AUTO: 0 %
CREAT BLD-MCNC: 1.4 MG/DL (ref 0.5–1)
EOSINOPHIL # BLD AUTO: 0.1 10E3/UL (ref 0–0.7)
EOSINOPHIL NFR BLD AUTO: 2 %
ERYTHROCYTE [DISTWIDTH] IN BLOOD BY AUTOMATED COUNT: 13.3 % (ref 10–15)
GFR SERPL CREATININE-BSD FRML MDRD: 37 ML/MIN/1.73M2
HCT VFR BLD AUTO: 36.3 % (ref 35–47)
HGB BLD-MCNC: 11.9 G/DL (ref 11.7–15.7)
IMM GRANULOCYTES # BLD: 0 10E3/UL
IMM GRANULOCYTES NFR BLD: 0 %
LYMPHOCYTES # BLD AUTO: 1.4 10E3/UL (ref 0.8–5.3)
LYMPHOCYTES NFR BLD AUTO: 19 %
MCH RBC QN AUTO: 30.1 PG (ref 26.5–33)
MCHC RBC AUTO-ENTMCNC: 32.8 G/DL (ref 31.5–36.5)
MCV RBC AUTO: 92 FL (ref 78–100)
MONOCYTES # BLD AUTO: 0.7 10E3/UL (ref 0–1.3)
MONOCYTES NFR BLD AUTO: 9 %
NEUTROPHILS # BLD AUTO: 5.5 10E3/UL (ref 1.6–8.3)
NEUTROPHILS NFR BLD AUTO: 70 %
NRBC # BLD AUTO: 0 10E3/UL
NRBC BLD AUTO-RTO: 0 /100
PLATELET # BLD AUTO: 142 10E3/UL (ref 150–450)
RBC # BLD AUTO: 3.96 10E6/UL (ref 3.8–5.2)
WBC # BLD AUTO: 7.8 10E3/UL (ref 4–11)

## 2021-11-01 PROCEDURE — 96375 TX/PRO/DX INJ NEW DRUG ADDON: CPT

## 2021-11-01 PROCEDURE — 71260 CT THORAX DX C+: CPT

## 2021-11-01 PROCEDURE — 93005 ELECTROCARDIOGRAM TRACING: CPT

## 2021-11-01 PROCEDURE — 82565 ASSAY OF CREATININE: CPT

## 2021-11-01 PROCEDURE — 80053 COMPREHEN METABOLIC PANEL: CPT | Performed by: EMERGENCY MEDICINE

## 2021-11-01 PROCEDURE — 85025 COMPLETE CBC W/AUTO DIFF WBC: CPT | Performed by: EMERGENCY MEDICINE

## 2021-11-01 PROCEDURE — 36415 COLL VENOUS BLD VENIPUNCTURE: CPT | Performed by: EMERGENCY MEDICINE

## 2021-11-01 PROCEDURE — 250N000011 HC RX IP 250 OP 636: Performed by: EMERGENCY MEDICINE

## 2021-11-01 PROCEDURE — 87635 SARS-COV-2 COVID-19 AMP PRB: CPT | Performed by: EMERGENCY MEDICINE

## 2021-11-01 PROCEDURE — C9803 HOPD COVID-19 SPEC COLLECT: HCPCS

## 2021-11-01 PROCEDURE — 250N000013 HC RX MED GY IP 250 OP 250 PS 637: Performed by: EMERGENCY MEDICINE

## 2021-11-01 PROCEDURE — 258N000003 HC RX IP 258 OP 636: Performed by: EMERGENCY MEDICINE

## 2021-11-01 PROCEDURE — 93005 ELECTROCARDIOGRAM TRACING: CPT | Mod: 76

## 2021-11-01 PROCEDURE — 99291 CRITICAL CARE FIRST HOUR: CPT | Mod: 25

## 2021-11-01 PROCEDURE — 84484 ASSAY OF TROPONIN QUANT: CPT | Performed by: EMERGENCY MEDICINE

## 2021-11-01 PROCEDURE — 83735 ASSAY OF MAGNESIUM: CPT | Performed by: EMERGENCY MEDICINE

## 2021-11-01 PROCEDURE — 96361 HYDRATE IV INFUSION ADD-ON: CPT

## 2021-11-01 PROCEDURE — 83690 ASSAY OF LIPASE: CPT | Performed by: EMERGENCY MEDICINE

## 2021-11-01 RX ORDER — HYDROMORPHONE HYDROCHLORIDE 1 MG/ML
0.3 INJECTION, SOLUTION INTRAMUSCULAR; INTRAVENOUS; SUBCUTANEOUS
Status: COMPLETED | OUTPATIENT
Start: 2021-11-01 | End: 2021-11-01

## 2021-11-01 RX ORDER — NITROGLYCERIN 0.4 MG/1
0.4 TABLET SUBLINGUAL EVERY 5 MIN PRN
Status: DISCONTINUED | OUTPATIENT
Start: 2021-11-01 | End: 2021-11-02

## 2021-11-01 RX ORDER — IOPAMIDOL 755 MG/ML
80 INJECTION, SOLUTION INTRAVASCULAR ONCE
Status: COMPLETED | OUTPATIENT
Start: 2021-11-01 | End: 2021-11-02

## 2021-11-01 RX ADMIN — SODIUM CHLORIDE 500 ML: 9 INJECTION, SOLUTION INTRAVENOUS at 23:44

## 2021-11-01 RX ADMIN — NITROGLYCERIN 0.4 MG: 0.4 TABLET SUBLINGUAL at 23:36

## 2021-11-01 RX ADMIN — HYDROMORPHONE HYDROCHLORIDE 0.3 MG: 1 INJECTION, SOLUTION INTRAMUSCULAR; INTRAVENOUS; SUBCUTANEOUS at 23:42

## 2021-11-01 NOTE — Clinical Note
The first balloon was inserted into the circumflex and marginal circumflex.Max pressure = 12 cory. Total duration = 10 seconds.     Max pressure = 12 cory. Total duration = 10 seconds.    Balloon reinflated a second time: Max pressure = 12 cory. Total duration = 10 seconds.

## 2021-11-02 ENCOUNTER — APPOINTMENT (OUTPATIENT)
Dept: CARDIOLOGY | Facility: CLINIC | Age: 71
DRG: 247 | End: 2021-11-02
Attending: INTERNAL MEDICINE
Payer: COMMERCIAL

## 2021-11-02 PROBLEM — R07.9 CHEST PAIN, UNSPECIFIED TYPE: Status: ACTIVE | Noted: 2021-11-02

## 2021-11-02 PROBLEM — I21.4 NSTEMI (NON-ST ELEVATED MYOCARDIAL INFARCTION) (H): Status: ACTIVE | Noted: 2021-11-02

## 2021-11-02 LAB
ACT BLD: 163 SECONDS (ref 74–150)
ACT BLD: 251 SECONDS (ref 74–150)
ALBUMIN SERPL-MCNC: 3.5 G/DL (ref 3.4–5)
ALP SERPL-CCNC: 89 U/L (ref 40–150)
ALT SERPL W P-5'-P-CCNC: 17 U/L (ref 0–50)
ANION GAP SERPL CALCULATED.3IONS-SCNC: 4 MMOL/L (ref 3–14)
AST SERPL W P-5'-P-CCNC: 16 U/L (ref 0–45)
ATRIAL RATE - MUSE: 79 BPM
ATRIAL RATE - MUSE: 80 BPM
ATRIAL RATE - MUSE: 81 BPM
BILIRUB SERPL-MCNC: 0.4 MG/DL (ref 0.2–1.3)
BUN SERPL-MCNC: 30 MG/DL (ref 7–30)
CALCIUM SERPL-MCNC: 8.5 MG/DL (ref 8.5–10.1)
CHLORIDE BLD-SCNC: 105 MMOL/L (ref 94–109)
CO2 SERPL-SCNC: 28 MMOL/L (ref 20–32)
CREAT SERPL-MCNC: 1.21 MG/DL (ref 0.52–1.04)
DIASTOLIC BLOOD PRESSURE - MUSE: NORMAL MMHG
ERYTHROCYTE [DISTWIDTH] IN BLOOD BY AUTOMATED COUNT: 13.2 % (ref 10–15)
GFR SERPL CREATININE-BSD FRML MDRD: 44 ML/MIN/1.73M2
GLUCOSE BLD-MCNC: 142 MG/DL (ref 70–99)
HCT VFR BLD AUTO: 36.4 % (ref 35–47)
HGB BLD-MCNC: 11.6 G/DL (ref 11.7–15.7)
HOLD SPECIMEN: NORMAL
HOLD SPECIMEN: NORMAL
INTERPRETATION ECG - MUSE: NORMAL
LIPASE SERPL-CCNC: 174 U/L (ref 73–393)
LVEF ECHO: NORMAL
MAGNESIUM SERPL-MCNC: 1.9 MG/DL (ref 1.6–2.3)
MCH RBC QN AUTO: 29.4 PG (ref 26.5–33)
MCHC RBC AUTO-ENTMCNC: 31.9 G/DL (ref 31.5–36.5)
MCV RBC AUTO: 92 FL (ref 78–100)
P AXIS - MUSE: 70 DEGREES
P AXIS - MUSE: 70 DEGREES
P AXIS - MUSE: 74 DEGREES
PLATELET # BLD AUTO: 153 10E3/UL (ref 150–450)
POTASSIUM BLD-SCNC: 3.4 MMOL/L (ref 3.4–5.3)
PR INTERVAL - MUSE: 164 MS
PR INTERVAL - MUSE: 166 MS
PR INTERVAL - MUSE: 174 MS
PROT SERPL-MCNC: 6.6 G/DL (ref 6.8–8.8)
QRS DURATION - MUSE: 90 MS
QRS DURATION - MUSE: 92 MS
QRS DURATION - MUSE: 94 MS
QT - MUSE: 410 MS
QT - MUSE: 430 MS
QT - MUSE: 436 MS
QTC - MUSE: 476 MS
QTC - MUSE: 493 MS
QTC - MUSE: 502 MS
R AXIS - MUSE: 78 DEGREES
R AXIS - MUSE: 80 DEGREES
R AXIS - MUSE: 80 DEGREES
RBC # BLD AUTO: 3.94 10E6/UL (ref 3.8–5.2)
SARS-COV-2 RNA RESP QL NAA+PROBE: NEGATIVE
SODIUM SERPL-SCNC: 137 MMOL/L (ref 133–144)
SYSTOLIC BLOOD PRESSURE - MUSE: NORMAL MMHG
T AXIS - MUSE: 80 DEGREES
T AXIS - MUSE: 81 DEGREES
T AXIS - MUSE: 82 DEGREES
TROPONIN I SERPL-MCNC: 3.51 UG/L (ref 0–0.04)
TROPONIN I SERPL-MCNC: 6.98 UG/L (ref 0–0.04)
TROPONIN I SERPL-MCNC: <0.015 UG/L (ref 0–0.04)
TSH SERPL DL<=0.005 MIU/L-ACNC: 2.3 MU/L (ref 0.4–4)
UFH PPP CHRO-ACNC: 0.88 IU/ML
VENTRICULAR RATE- MUSE: 79 BPM
VENTRICULAR RATE- MUSE: 80 BPM
VENTRICULAR RATE- MUSE: 81 BPM
WBC # BLD AUTO: 8.5 10E3/UL (ref 4–11)

## 2021-11-02 PROCEDURE — C1760 CLOSURE DEV, VASC: HCPCS | Performed by: INTERNAL MEDICINE

## 2021-11-02 PROCEDURE — 999N000208 ECHOCARDIOGRAM COMPLETE

## 2021-11-02 PROCEDURE — 250N000011 HC RX IP 250 OP 636: Performed by: EMERGENCY MEDICINE

## 2021-11-02 PROCEDURE — 85520 HEPARIN ASSAY: CPT | Performed by: INTERNAL MEDICINE

## 2021-11-02 PROCEDURE — 250N000011 HC RX IP 250 OP 636: Performed by: INTERNAL MEDICINE

## 2021-11-02 PROCEDURE — 93306 TTE W/DOPPLER COMPLETE: CPT | Mod: 26 | Performed by: INTERNAL MEDICINE

## 2021-11-02 PROCEDURE — 96376 TX/PRO/DX INJ SAME DRUG ADON: CPT

## 2021-11-02 PROCEDURE — C1874 STENT, COATED/COV W/DEL SYS: HCPCS | Performed by: INTERNAL MEDICINE

## 2021-11-02 PROCEDURE — 250N000009 HC RX 250: Performed by: INTERNAL MEDICINE

## 2021-11-02 PROCEDURE — 999N000128 HC STATISTIC PERIPHERAL IV START W/O US GUIDANCE

## 2021-11-02 PROCEDURE — C1887 CATHETER, GUIDING: HCPCS | Performed by: INTERNAL MEDICINE

## 2021-11-02 PROCEDURE — C1725 CATH, TRANSLUMIN NON-LASER: HCPCS | Performed by: INTERNAL MEDICINE

## 2021-11-02 PROCEDURE — 99153 MOD SED SAME PHYS/QHP EA: CPT | Performed by: INTERNAL MEDICINE

## 2021-11-02 PROCEDURE — C9600 PERC DRUG-EL COR STENT SING: HCPCS | Mod: LC | Performed by: INTERNAL MEDICINE

## 2021-11-02 PROCEDURE — 99223 1ST HOSP IP/OBS HIGH 75: CPT | Mod: AI | Performed by: INTERNAL MEDICINE

## 2021-11-02 PROCEDURE — C1769 GUIDE WIRE: HCPCS | Performed by: INTERNAL MEDICINE

## 2021-11-02 PROCEDURE — 255N000002 HC RX 255 OP 636: Performed by: INTERNAL MEDICINE

## 2021-11-02 PROCEDURE — 258N000003 HC RX IP 258 OP 636: Performed by: INTERNAL MEDICINE

## 2021-11-02 PROCEDURE — 210N000002 HC R&B HEART CARE

## 2021-11-02 PROCEDURE — 92928 PRQ TCAT PLMT NTRAC ST 1 LES: CPT | Mod: LC | Performed by: INTERNAL MEDICINE

## 2021-11-02 PROCEDURE — 99207 PR NO CHARGE LOS: CPT | Performed by: INTERNAL MEDICINE

## 2021-11-02 PROCEDURE — 84443 ASSAY THYROID STIM HORMONE: CPT | Performed by: EMERGENCY MEDICINE

## 2021-11-02 PROCEDURE — 250N000013 HC RX MED GY IP 250 OP 250 PS 637: Performed by: INTERNAL MEDICINE

## 2021-11-02 PROCEDURE — 93454 CORONARY ARTERY ANGIO S&I: CPT | Performed by: INTERNAL MEDICINE

## 2021-11-02 PROCEDURE — 85027 COMPLETE CBC AUTOMATED: CPT | Performed by: INTERNAL MEDICINE

## 2021-11-02 PROCEDURE — 36415 COLL VENOUS BLD VENIPUNCTURE: CPT | Performed by: INTERNAL MEDICINE

## 2021-11-02 PROCEDURE — G0378 HOSPITAL OBSERVATION PER HR: HCPCS

## 2021-11-02 PROCEDURE — 250N000013 HC RX MED GY IP 250 OP 250 PS 637: Performed by: EMERGENCY MEDICINE

## 2021-11-02 PROCEDURE — 93454 CORONARY ARTERY ANGIO S&I: CPT | Mod: 26 | Performed by: INTERNAL MEDICINE

## 2021-11-02 PROCEDURE — 85347 COAGULATION TIME ACTIVATED: CPT

## 2021-11-02 PROCEDURE — C1894 INTRO/SHEATH, NON-LASER: HCPCS | Performed by: INTERNAL MEDICINE

## 2021-11-02 PROCEDURE — 250N000009 HC RX 250: Performed by: EMERGENCY MEDICINE

## 2021-11-02 PROCEDURE — 272N000001 HC OR GENERAL SUPPLY STERILE: Performed by: INTERNAL MEDICINE

## 2021-11-02 PROCEDURE — 93005 ELECTROCARDIOGRAM TRACING: CPT

## 2021-11-02 PROCEDURE — 96365 THER/PROPH/DIAG IV INF INIT: CPT | Mod: 59

## 2021-11-02 PROCEDURE — 96366 THER/PROPH/DIAG IV INF ADDON: CPT

## 2021-11-02 PROCEDURE — 99223 1ST HOSP IP/OBS HIGH 75: CPT | Mod: 25 | Performed by: INTERNAL MEDICINE

## 2021-11-02 PROCEDURE — 84484 ASSAY OF TROPONIN QUANT: CPT | Performed by: INTERNAL MEDICINE

## 2021-11-02 PROCEDURE — 99152 MOD SED SAME PHYS/QHP 5/>YRS: CPT | Performed by: INTERNAL MEDICINE

## 2021-11-02 PROCEDURE — 258N000003 HC RX IP 258 OP 636: Performed by: STUDENT IN AN ORGANIZED HEALTH CARE EDUCATION/TRAINING PROGRAM

## 2021-11-02 PROCEDURE — 99152 MOD SED SAME PHYS/QHP 5/>YRS: CPT | Mod: GC | Performed by: INTERNAL MEDICINE

## 2021-11-02 DEVICE — STENT RESOLUTE ONYX DE 2.7FR 3.00X15MM RONYX DE30015UX: Type: IMPLANTABLE DEVICE | Status: FUNCTIONAL

## 2021-11-02 RX ORDER — HYDRALAZINE HYDROCHLORIDE 20 MG/ML
10 INJECTION INTRAMUSCULAR; INTRAVENOUS EVERY 6 HOURS PRN
Status: DISCONTINUED | OUTPATIENT
Start: 2021-11-02 | End: 2021-11-04 | Stop reason: HOSPADM

## 2021-11-02 RX ORDER — ASPIRIN 325 MG
325 TABLET ORAL ONCE
Status: COMPLETED | OUTPATIENT
Start: 2021-11-02 | End: 2021-11-02

## 2021-11-02 RX ORDER — SODIUM CHLORIDE 9 MG/ML
INJECTION, SOLUTION INTRAVENOUS CONTINUOUS
Status: DISCONTINUED | OUTPATIENT
Start: 2021-11-02 | End: 2021-11-02

## 2021-11-02 RX ORDER — FLUMAZENIL 0.1 MG/ML
0.2 INJECTION, SOLUTION INTRAVENOUS
Status: ACTIVE | OUTPATIENT
Start: 2021-11-02 | End: 2021-11-02

## 2021-11-02 RX ORDER — OXYCODONE HYDROCHLORIDE 5 MG/1
5 TABLET ORAL EVERY 4 HOURS PRN
Status: DISCONTINUED | OUTPATIENT
Start: 2021-11-02 | End: 2021-11-02

## 2021-11-02 RX ORDER — ONDANSETRON 2 MG/ML
4 INJECTION INTRAMUSCULAR; INTRAVENOUS EVERY 6 HOURS PRN
Status: DISCONTINUED | OUTPATIENT
Start: 2021-11-02 | End: 2021-11-02

## 2021-11-02 RX ORDER — ACETAMINOPHEN 325 MG/1
650 TABLET ORAL EVERY 4 HOURS PRN
Status: DISCONTINUED | OUTPATIENT
Start: 2021-11-02 | End: 2021-11-02

## 2021-11-02 RX ORDER — HYDRALAZINE HYDROCHLORIDE 20 MG/ML
10 INJECTION INTRAMUSCULAR; INTRAVENOUS EVERY 4 HOURS PRN
Status: DISCONTINUED | OUTPATIENT
Start: 2021-11-02 | End: 2021-11-04 | Stop reason: HOSPADM

## 2021-11-02 RX ORDER — LABETALOL HYDROCHLORIDE 5 MG/ML
10 INJECTION, SOLUTION INTRAVENOUS EVERY 6 HOURS PRN
Status: DISCONTINUED | OUTPATIENT
Start: 2021-11-02 | End: 2021-11-04 | Stop reason: HOSPADM

## 2021-11-02 RX ORDER — ONDANSETRON 4 MG/1
4 TABLET, ORALLY DISINTEGRATING ORAL EVERY 6 HOURS PRN
Status: DISCONTINUED | OUTPATIENT
Start: 2021-11-02 | End: 2021-11-04 | Stop reason: HOSPADM

## 2021-11-02 RX ORDER — ONDANSETRON 2 MG/ML
4 INJECTION INTRAMUSCULAR; INTRAVENOUS EVERY 6 HOURS PRN
Status: DISCONTINUED | OUTPATIENT
Start: 2021-11-02 | End: 2021-11-04 | Stop reason: HOSPADM

## 2021-11-02 RX ORDER — HEPARIN SODIUM 10000 [USP'U]/100ML
100-1000 INJECTION, SOLUTION INTRAVENOUS CONTINUOUS PRN
Status: DISCONTINUED | OUTPATIENT
Start: 2021-11-02 | End: 2021-11-02 | Stop reason: HOSPADM

## 2021-11-02 RX ORDER — SODIUM CHLORIDE 9 MG/ML
INJECTION, SOLUTION INTRAVENOUS CONTINUOUS
Status: DISCONTINUED | OUTPATIENT
Start: 2021-11-02 | End: 2021-11-02 | Stop reason: HOSPADM

## 2021-11-02 RX ORDER — HEPARIN SODIUM 10000 [USP'U]/100ML
0-5000 INJECTION, SOLUTION INTRAVENOUS CONTINUOUS
Status: DISCONTINUED | OUTPATIENT
Start: 2021-11-02 | End: 2021-11-02

## 2021-11-02 RX ORDER — FENTANYL CITRATE 50 UG/ML
25 INJECTION, SOLUTION INTRAMUSCULAR; INTRAVENOUS
Status: DISCONTINUED | OUTPATIENT
Start: 2021-11-02 | End: 2021-11-04 | Stop reason: HOSPADM

## 2021-11-02 RX ORDER — ATORVASTATIN CALCIUM 40 MG/1
40 TABLET, FILM COATED ORAL EVERY EVENING
Status: DISCONTINUED | OUTPATIENT
Start: 2021-11-02 | End: 2021-11-02

## 2021-11-02 RX ORDER — FAMOTIDINE 20 MG/1
20 TABLET, FILM COATED ORAL DAILY
Status: DISCONTINUED | OUTPATIENT
Start: 2021-11-02 | End: 2021-11-02

## 2021-11-02 RX ORDER — FEXOFENADINE HCL 60 MG/1
60 TABLET, FILM COATED ORAL DAILY
Status: DISCONTINUED | OUTPATIENT
Start: 2021-11-02 | End: 2021-11-02

## 2021-11-02 RX ORDER — AMOXICILLIN 250 MG
2 CAPSULE ORAL 2 TIMES DAILY PRN
COMMUNITY

## 2021-11-02 RX ORDER — ASPIRIN 81 MG/1
81 TABLET, CHEWABLE ORAL ONCE
Status: DISCONTINUED | OUTPATIENT
Start: 2021-11-02 | End: 2021-11-02

## 2021-11-02 RX ORDER — MAGNESIUM HYDROXIDE/ALUMINUM HYDROXICE/SIMETHICONE 120; 1200; 1200 MG/30ML; MG/30ML; MG/30ML
30 SUSPENSION ORAL EVERY 4 HOURS PRN
Status: DISCONTINUED | OUTPATIENT
Start: 2021-11-02 | End: 2021-11-04 | Stop reason: HOSPADM

## 2021-11-02 RX ORDER — HYDROXYCHLOROQUINE SULFATE 200 MG/1
400 TABLET, FILM COATED ORAL AT BEDTIME
Status: DISCONTINUED | OUTPATIENT
Start: 2021-11-02 | End: 2021-11-04 | Stop reason: HOSPADM

## 2021-11-02 RX ORDER — IOPAMIDOL 755 MG/ML
INJECTION, SOLUTION INTRAVASCULAR
Status: DISCONTINUED | OUTPATIENT
Start: 2021-11-02 | End: 2021-11-02 | Stop reason: HOSPADM

## 2021-11-02 RX ORDER — NALOXONE HYDROCHLORIDE 0.4 MG/ML
0.2 INJECTION, SOLUTION INTRAMUSCULAR; INTRAVENOUS; SUBCUTANEOUS
Status: DISCONTINUED | OUTPATIENT
Start: 2021-11-02 | End: 2021-11-02

## 2021-11-02 RX ORDER — ASPIRIN 81 MG/1
81 TABLET ORAL DAILY
Status: DISCONTINUED | OUTPATIENT
Start: 2021-11-03 | End: 2021-11-02

## 2021-11-02 RX ORDER — ACETAMINOPHEN 325 MG/1
650 TABLET ORAL EVERY 6 HOURS PRN
Status: DISCONTINUED | OUTPATIENT
Start: 2021-11-02 | End: 2021-11-04 | Stop reason: HOSPADM

## 2021-11-02 RX ORDER — CARVEDILOL 3.12 MG/1
3.12 TABLET ORAL 2 TIMES DAILY WITH MEALS
Status: DISCONTINUED | OUTPATIENT
Start: 2021-11-02 | End: 2021-11-02

## 2021-11-02 RX ORDER — PILOCARPINE HYDROCHLORIDE 5 MG/1
5 TABLET, FILM COATED ORAL
Status: DISCONTINUED | OUTPATIENT
Start: 2021-11-02 | End: 2021-11-04 | Stop reason: HOSPADM

## 2021-11-02 RX ORDER — METOPROLOL SUCCINATE 50 MG/1
50 TABLET, EXTENDED RELEASE ORAL DAILY
Status: DISCONTINUED | OUTPATIENT
Start: 2021-11-03 | End: 2021-11-02

## 2021-11-02 RX ORDER — OXYCODONE HYDROCHLORIDE 5 MG/1
10 TABLET ORAL EVERY 4 HOURS PRN
Status: DISCONTINUED | OUTPATIENT
Start: 2021-11-02 | End: 2021-11-02

## 2021-11-02 RX ORDER — PROCHLORPERAZINE MALEATE 5 MG
5 TABLET ORAL EVERY 6 HOURS PRN
Status: DISCONTINUED | OUTPATIENT
Start: 2021-11-02 | End: 2021-11-04 | Stop reason: HOSPADM

## 2021-11-02 RX ORDER — NALOXONE HYDROCHLORIDE 0.4 MG/ML
0.4 INJECTION, SOLUTION INTRAMUSCULAR; INTRAVENOUS; SUBCUTANEOUS
Status: DISCONTINUED | OUTPATIENT
Start: 2021-11-02 | End: 2021-11-04 | Stop reason: HOSPADM

## 2021-11-02 RX ORDER — PROCHLORPERAZINE 25 MG
12.5 SUPPOSITORY, RECTAL RECTAL EVERY 12 HOURS PRN
Status: DISCONTINUED | OUTPATIENT
Start: 2021-11-02 | End: 2021-11-04 | Stop reason: HOSPADM

## 2021-11-02 RX ORDER — PANTOPRAZOLE SODIUM 40 MG/1
40 TABLET, DELAYED RELEASE ORAL
Status: DISCONTINUED | OUTPATIENT
Start: 2021-11-03 | End: 2021-11-04 | Stop reason: HOSPADM

## 2021-11-02 RX ORDER — LIDOCAINE 40 MG/G
CREAM TOPICAL
Status: DISCONTINUED | OUTPATIENT
Start: 2021-11-02 | End: 2021-11-02 | Stop reason: HOSPADM

## 2021-11-02 RX ORDER — ATORVASTATIN CALCIUM 40 MG/1
40 TABLET, FILM COATED ORAL EVERY EVENING
Status: DISCONTINUED | OUTPATIENT
Start: 2021-11-02 | End: 2021-11-04

## 2021-11-02 RX ORDER — POTASSIUM CHLORIDE 1500 MG/1
20 TABLET, EXTENDED RELEASE ORAL
Status: DISCONTINUED | OUTPATIENT
Start: 2021-11-02 | End: 2021-11-02 | Stop reason: HOSPADM

## 2021-11-02 RX ORDER — FENTANYL CITRATE 50 UG/ML
INJECTION, SOLUTION INTRAMUSCULAR; INTRAVENOUS
Status: DISCONTINUED | OUTPATIENT
Start: 2021-11-02 | End: 2021-11-02 | Stop reason: HOSPADM

## 2021-11-02 RX ORDER — NALOXONE HYDROCHLORIDE 0.4 MG/ML
0.4 INJECTION, SOLUTION INTRAMUSCULAR; INTRAVENOUS; SUBCUTANEOUS
Status: DISCONTINUED | OUTPATIENT
Start: 2021-11-02 | End: 2021-11-02

## 2021-11-02 RX ORDER — ONDANSETRON 4 MG/1
4 TABLET, ORALLY DISINTEGRATING ORAL EVERY 6 HOURS PRN
Status: DISCONTINUED | OUTPATIENT
Start: 2021-11-02 | End: 2021-11-02

## 2021-11-02 RX ORDER — NALOXONE HYDROCHLORIDE 0.4 MG/ML
0.2 INJECTION, SOLUTION INTRAMUSCULAR; INTRAVENOUS; SUBCUTANEOUS
Status: DISCONTINUED | OUTPATIENT
Start: 2021-11-02 | End: 2021-11-04 | Stop reason: HOSPADM

## 2021-11-02 RX ORDER — LORAZEPAM 0.5 MG/1
0.5 TABLET ORAL
Status: DISCONTINUED | OUTPATIENT
Start: 2021-11-02 | End: 2021-11-02 | Stop reason: HOSPADM

## 2021-11-02 RX ORDER — LORAZEPAM 2 MG/ML
0.5 INJECTION INTRAMUSCULAR
Status: DISCONTINUED | OUTPATIENT
Start: 2021-11-02 | End: 2021-11-02 | Stop reason: HOSPADM

## 2021-11-02 RX ORDER — NITROGLYCERIN 0.4 MG/1
0.4 TABLET SUBLINGUAL EVERY 5 MIN PRN
Status: DISCONTINUED | OUTPATIENT
Start: 2021-11-02 | End: 2021-11-02

## 2021-11-02 RX ORDER — ATROPINE SULFATE 0.1 MG/ML
0.5 INJECTION INTRAVENOUS
Status: ACTIVE | OUTPATIENT
Start: 2021-11-02 | End: 2021-11-02

## 2021-11-02 RX ORDER — ACETAMINOPHEN 650 MG/1
650 SUPPOSITORY RECTAL EVERY 6 HOURS PRN
Status: DISCONTINUED | OUTPATIENT
Start: 2021-11-02 | End: 2021-11-04 | Stop reason: HOSPADM

## 2021-11-02 RX ORDER — NITROGLYCERIN 0.4 MG/1
0.4 TABLET SUBLINGUAL EVERY 5 MIN PRN
Status: DISCONTINUED | OUTPATIENT
Start: 2021-11-02 | End: 2021-11-04 | Stop reason: HOSPADM

## 2021-11-02 RX ORDER — SODIUM CHLORIDE 9 MG/ML
INJECTION, SOLUTION INTRAVENOUS CONTINUOUS
Status: ACTIVE | OUTPATIENT
Start: 2021-11-02 | End: 2021-11-03

## 2021-11-02 RX ORDER — CALCIUM CARBONATE 500 MG/1
500 TABLET, CHEWABLE ORAL AT BEDTIME
Status: DISCONTINUED | OUTPATIENT
Start: 2021-11-02 | End: 2021-11-04 | Stop reason: HOSPADM

## 2021-11-02 RX ORDER — METOPROLOL TARTRATE 1 MG/ML
5 INJECTION, SOLUTION INTRAVENOUS
Status: DISCONTINUED | OUTPATIENT
Start: 2021-11-02 | End: 2021-11-04 | Stop reason: HOSPADM

## 2021-11-02 RX ORDER — HEPARIN SODIUM 1000 [USP'U]/ML
INJECTION, SOLUTION INTRAVENOUS; SUBCUTANEOUS
Status: DISCONTINUED | OUTPATIENT
Start: 2021-11-02 | End: 2021-11-02 | Stop reason: HOSPADM

## 2021-11-02 RX ORDER — ASPIRIN 81 MG/1
81 TABLET ORAL DAILY
Status: DISCONTINUED | OUTPATIENT
Start: 2021-11-03 | End: 2021-11-04 | Stop reason: HOSPADM

## 2021-11-02 RX ORDER — METOPROLOL SUCCINATE 50 MG/1
50 TABLET, EXTENDED RELEASE ORAL DAILY
Status: DISCONTINUED | OUTPATIENT
Start: 2021-11-02 | End: 2021-11-02

## 2021-11-02 RX ORDER — FAMOTIDINE 20 MG/1
20 TABLET, FILM COATED ORAL DAILY PRN
Status: DISCONTINUED | OUTPATIENT
Start: 2021-11-02 | End: 2021-11-04 | Stop reason: HOSPADM

## 2021-11-02 RX ADMIN — CHOLECALCIFEROL TAB 125 MCG (5000 UNIT) 125 MCG: 125 TAB at 20:48

## 2021-11-02 RX ADMIN — ACETAMINOPHEN 650 MG: 325 TABLET, FILM COATED ORAL at 03:38

## 2021-11-02 RX ADMIN — ASPIRIN 325 MG ORAL TABLET 325 MG: 325 PILL ORAL at 05:20

## 2021-11-02 RX ADMIN — CARVEDILOL 3.12 MG: 3.12 TABLET, FILM COATED ORAL at 12:19

## 2021-11-02 RX ADMIN — ACETAMINOPHEN 650 MG: 325 TABLET, FILM COATED ORAL at 15:38

## 2021-11-02 RX ADMIN — IOPAMIDOL 80 ML: 755 INJECTION, SOLUTION INTRAVENOUS at 00:01

## 2021-11-02 RX ADMIN — HEPARIN SODIUM 1200 UNITS/HR: 10000 INJECTION, SOLUTION INTRAVENOUS at 05:15

## 2021-11-02 RX ADMIN — SODIUM CHLORIDE: 9 INJECTION, SOLUTION INTRAVENOUS at 15:38

## 2021-11-02 RX ADMIN — SODIUM CHLORIDE: 9 INJECTION, SOLUTION INTRAVENOUS at 13:18

## 2021-11-02 RX ADMIN — ONDANSETRON 4 MG: 4 TABLET, ORALLY DISINTEGRATING ORAL at 16:43

## 2021-11-02 RX ADMIN — SODIUM CHLORIDE: 9 INJECTION, SOLUTION INTRAVENOUS at 17:31

## 2021-11-02 RX ADMIN — SODIUM CHLORIDE 80 ML: 900 INJECTION INTRAVENOUS at 00:02

## 2021-11-02 RX ADMIN — HUMAN ALBUMIN MICROSPHERES AND PERFLUTREN 9 ML: 10; .22 INJECTION, SOLUTION INTRAVENOUS at 12:08

## 2021-11-02 RX ADMIN — ATORVASTATIN CALCIUM 40 MG: 40 TABLET, FILM COATED ORAL at 20:49

## 2021-11-02 RX ADMIN — CALCIUM CARBONATE (ANTACID) CHEW TAB 500 MG 500 MG: 500 CHEW TAB at 20:48

## 2021-11-02 RX ADMIN — HYDROMORPHONE HYDROCHLORIDE 1 MG: 2 TABLET ORAL at 04:56

## 2021-11-02 RX ADMIN — HYDROXYCHLOROQUINE SULFATE 400 MG: 200 TABLET ORAL at 20:48

## 2021-11-02 RX ADMIN — PILOCARPINE HYDROCHLORIDE 5 MG: 5 TABLET, FILM COATED ORAL at 20:49

## 2021-11-02 ASSESSMENT — ACTIVITIES OF DAILY LIVING (ADL)
ADLS_ACUITY_SCORE: 16
ADLS_ACUITY_SCORE: 6
ADLS_ACUITY_SCORE: 16
ADLS_ACUITY_SCORE: 6
ADLS_ACUITY_SCORE: 11
ADLS_ACUITY_SCORE: 16
ADLS_ACUITY_SCORE: 6
ADLS_ACUITY_SCORE: 11
ADLS_ACUITY_SCORE: 13
ADLS_ACUITY_SCORE: 6
ADLS_ACUITY_SCORE: 6
ADLS_ACUITY_SCORE: 11
ADLS_ACUITY_SCORE: 6

## 2021-11-02 ASSESSMENT — ENCOUNTER SYMPTOMS
COUGH: 0
SHORTNESS OF BREATH: 0
FEVER: 0
ABDOMINAL PAIN: 0
DYSPHORIC MOOD: 1
NAUSEA: 0

## 2021-11-02 ASSESSMENT — MIFFLIN-ST. JEOR: SCORE: 1515.59

## 2021-11-02 NOTE — PRE-PROCEDURE
GENERAL PRE-PROCEDURE:   Procedure:  Coronary angiogram  Date/Time:  11/2/2021 1:43 PM    Verbal consent obtained?: Yes    Written consent obtained?: No    Risks and benefits: Risks, benefits and alternatives were discussed    Consent given by:  Patient  Patient states understanding of procedure being performed: Yes    Patient's understanding of procedure matches consent: Yes    Procedure consent matches procedure scheduled: Yes    Expected level of sedation:  Moderate  Appropriately NPO:  Yes  ASA Class:  3  Mallampati  :  Grade 3- soft palate visible, posterior pharyngeal wall not visible  Lungs:  Lungs clear with good breath sounds bilaterally  Heart:  Normal heart sounds and rate  History & Physical reviewed:  History and physical reviewed and no updates needed  Statement of review:  I have reviewed the lab findings, diagnostic data, medications, and the plan for sedation

## 2021-11-02 NOTE — PROGRESS NOTES
RECEIVING UNIT ED HANDOFF REVIEW    ED Nurse Handoff Report was reviewed by: Rupinder Holm RN on November 2, 2021 at 7:58 AM

## 2021-11-02 NOTE — PROGRESS NOTES
Cath reviewed. S/p PCI of OM1 with one LIS.  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 Continue DAPT for 1 yr uninterrupted. Continue Lipitor and BB. No other recs from [facility].      MD Elías  Cardiology

## 2021-11-02 NOTE — ED NOTES
Patient states she is chest pain free, but would like some tylenol for her chronic neck/shoulder pain.

## 2021-11-02 NOTE — ED TRIAGE NOTES
Pt BIBA from home. Pt called 911 for chest pain that began at 22:00. Mid sternal chest pain that radiates to her back. Denies SOB or dizziness. EMS concerned for STEMI. Patient screaming in pain on arrival to ED. Pt had 2 SL sprays of nitroglycerin pta.  
Yes

## 2021-11-02 NOTE — CONSULTS
[facility]  Cardiology Consultation     Date of Admission:  11/1/2021    Assessment & Plan   Fatou Brian is a 71 year old female with    1.  NSTEMI  2.  Hypertension  3.  Hyperlipidemia-  4.  75 pack years of smoking-quit in 2009  5.  GERD  6.  Emphysema    Recommendations:  1.  We will perform coronary angiogram for further work-up.  Discussed the risk and benefits of the procedure with the patient and answered all her questions.  She would be an appropriate candidate for DAPT therapy if needed.  She provided written consent for the procedure. Has received 80 ml of contrast today for CT aortic survey.   2. Echocardiogram is pending and will follow up on the results.   3. Continue metoprolol and lipitor 40 mg qd.      Venu Elías    Code Status    Prior    Reason for Consult   Reason for consult: Elevated troponin    Primary Care Physician   Cassidy Alex    Chief Complaint   Chest pain    History of Present Illness   Fatou Brian is a 71 year old female with past medical history of hypertension, hyperlipidemia, GERD and 75 pack years of smoking who presented to the hospital for complaints of substernal chest pain.    The patient was watching TV last night when she developed substernal, burning, 10/10 pain with some radiation to the neck.  The pain lasted all the time and only improved minimally after sublingual nitroglycerin.  She has never felt pain like this before.  She does have episodes of GERD every now and then but that pain is very different than the current episode.  She does not have any documented history of coronary disease and has never had any ischemic work-up in the past.    In the emergency department, she was hypertensive with some of her numbers being as high as 184/66.  Initial EKG showed sinus rhythm with nonspecific ST-T wave changes.  She had a CT scan of the chest which ruled out aortic dissection but did show severe coronary artery  disease as well as occlusion of the proximal left subclavian artery.  Blood work showed creatinine of 1.2, hemoglobin 11.9, platelet 142, troponin initially was <0.01--3.5--6.9.       Clinically Significant Risk Factors Present on Admission                      Patient Active Problem List   Diagnosis     Sjogren's syndrome with myopathy (H)     Osteoarthritis of knee     GERD (gastroesophageal reflux disease)     Constipation     Osteoporosis     Irritable bowel syndrome with diarrhea     HL (hearing loss)     Rosacea     Poor memory     Low back pain     Senile keratosis     Spinal stenosis     H/O hysterectomy for benign disease     Bilateral hearing loss     Presbyopia     Knee pain     Hypertension goal BP (blood pressure) < 140/90     Hyperlipidemia with target LDL less than 130     Left arm pain     Arm pain     Major depression in partial remission (H)     Subclavian artery stenosis (H)     ACP (advance care planning)     Gastroesophageal reflux disease without esophagitis     Primary osteoarthritis of both knees     Bilateral low back pain with sciatica, sciatica laterality unspecified, unspecified chronicity     CKD (chronic kidney disease) stage 3, GFR 30-59 ml/min (H)     Morbid obesity (H)     Chest pain, unspecified type     NSTEMI (non-ST elevated myocardial infarction) (H)       Past Medical History   I have reviewed this patient's medical history and updated it with pertinent information if needed.   Past Medical History:   Diagnosis Date     Arthritis      Atherosclerosis     diffuse     Emphysema of lung (H)      GERD (gastroesophageal reflux disease)      HLD (hyperlipidaemia)      Hypertension      Sjogren's syndrome (H)      Spinal stenosis      Subclavian artery stenosis (H)     Left       Past Surgical History   I have reviewed this patient's surgical history and updated it with pertinent information if needed.  Past Surgical History:   Procedure Laterality Date     aneursym[  1983, 1991     Has metal in head     CHOLECYSTECTOMY       CLOSED RX PROX HUMERUS FRACTURE      10 pins placed     COLONOSCOPY       COLONOSCOPY  1/24/2014    Procedure: COMBINED COLONOSCOPY, SINGLE BIOPSY/POLYPECTOMY BY BIOPSY;  COLONOSCOPY;  Surgeon: Ranjit Pa MD;  Location:  GI     HC ECP WITH CATARACT SURGERY      both eyes     HYSTERECTOMY, JANET         Prior to Admission Medications   Prior to Admission Medications   Prescriptions Last Dose Informant Patient Reported? Taking?   ORDER FOR DME   No No   Sig: Equipment being ordered: RUBBER THRESHOLD /CARGO WEDGE RAMP  ONE  USE AS DIRECTED FOR THRESHOLD  TO PREVENT FALLING   acetaminophen (TYLENOL) 650 MG CR tablet Past Week at Unknown time  No Yes   Sig: Take 2 tablets (1,300 mg) by mouth every 8 hours as needed for mild pain or pain   calcium carbonate (TUMS) 500 MG chewable tablet 11/1/2021 at PM  No Yes   Sig: Take 1 tablet (500 mg) by mouth At Bedtime   cholecalciferol (VITAMIN D3) 5000 units TABS tablet 11/1/2021 at PM  No Yes   Sig: Take 1 tablet (5,000 Units) by mouth At Bedtime   famotidine (PEPCID) 20 MG tablet 11/1/2021 at PM  No Yes   Sig: TAKE 1 TABLET BY MOUTH TWICE DAILY AS NEEDED   gabapentin (NEURONTIN) 100 MG capsule  at not yet started  No Yes   Sig: TAKE ONE CAPSULE BY MOUTH THREE TIMES DAILY   hydroxychloroquine (PLAQUENIL) 200 MG tablet 11/1/2021 at PM  No Yes   Sig: TAKE 2 (400 MG) BY MOUTH AT BEDTIME   losartan-hydrochlorothiazide (HYZAAR) 100-12.5 MG tablet 11/1/2021 at PM  No Yes   Sig: Take 1 tablet by mouth daily   metoprolol succinate ER (TOPROL-XL) 50 MG 24 hr tablet 11/1/2021 at PM  No Yes   Sig: TAKE 1 TABLET DAILY   order for DME   No No   Sig: One pair longitudinal arch supports  One pair   Use as directed   pantoprazole (PROTONIX) 40 MG EC tablet 11/1/2021 at AM  No Yes   Sig: TAKE 1 BY MOUTH DAILY   pilocarpine (SALAGEN) 5 MG tablet 11/1/2021 at PM  No Yes   Sig: Take 1 tablet (5 mg) by mouth 4 times daily (before meals and  nightly)   senna-docusate (SENOKOT-S/PERICOLACE) 8.6-50 MG tablet More than a month at Unknown time  Yes Yes   Sig: Take 2 tablets by mouth 2 times daily as needed for constipation      Facility-Administered Medications: None     Current Facility-Administered Medications   Medication Dose Route Frequency     Current Facility-Administered Medications   Medication Last Rate     heparin 1,200 Units/hr (11/02/21 7553)     Allergies   Allergies   Allergen Reactions     Chantix [Varenicline]      suicidal     Influenza Virus Vaccine H5n1      Muscle aches dizzy, nauseated  Light headed     Morphine Sulfate      Sensitivity when in hospital     Omeprazole Magnesium Nausea     Intolerance       Vioxx      Niacin Itching and Rash     Zetia [Ezetimibe] Other (See Comments)     Muscle pain     Zyrtec-D Rash       Social History    reports that she quit smoking about 12 years ago. Her smoking use included cigarettes. She smoked 0.00 packs per day. She has never used smokeless tobacco. She reports current alcohol use. She reports that she does not use drugs.    Family History   Family History   Problem Relation Age of Onset     Thyroid Disease Mother      Arthritis Mother         Rheumatoid     Osteoporosis Mother      Cancer Mother         stomach     Cerebrovascular Disease Mother      Heart Disease Father      Cancer Father      Heart Disease Brother         fibulation       Review of Systems   The comprehensive 10 point Review of Systems is negative other than noted in the HPI or here.     Physical Exam   Temp: 97.4  F (36.3  C) Temp src: Temporal BP: (!) 159/67 Pulse: 60   Resp: 18 SpO2: 96 % O2 Device: None (Room air)    Vital Signs with Ranges  Temp:  [97.4  F (36.3  C)-98  F (36.7  C)] 97.4  F (36.3  C)  Pulse:  [60-80] 60  Resp:  [11-23] 18  BP: (151-187)/(66-81) 159/67  SpO2:  [96 %-99 %] 96 %  220 lbs 0 oz    Constitutional: Alert, no apparent distress,    Lungs: Normal bilateral breath sounds   Cardiovascular:  Regular rate and rhythm, normal S1 and S2, and no murmur   Lymphm node  Neck No enlargement  No jugular vein extension or carotid bruit   Skin: Normal   Extremity: No edema       Data   Results for orders placed or performed during the hospital encounter of 11/01/21 (from the past 24 hour(s))   EKG 12-lead, tracing only   Result Value Ref Range    Systolic Blood Pressure  mmHg    Diastolic Blood Pressure  mmHg    Ventricular Rate 79 BPM    Atrial Rate 79 BPM    NY Interval 166 ms    QRS Duration 90 ms     ms    QTc 493 ms    P Axis 74 degrees    R AXIS 80 degrees    T Axis 80 degrees    Interpretation ECG       Sinus rhythm  ST elevation, consider early repolarization, pericarditis, or injury  Prolonged QT  Abnormal ECG  When compared with ECG of 23-AUG-2017 10:31,  Premature ventricular complexes are no longer Present  RSR' pattern in V1 is no longer Present  Confirmed by GENERATED REPORT, COMPUTER ([id number]),  Anthony Tee ([id number]) on 11/2/2021 12:10:20 AM     EKG 12-lead, tracing only   Result Value Ref Range    Systolic Blood Pressure  mmHg    Diastolic Blood Pressure  mmHg    Ventricular Rate 80 BPM    Atrial Rate 80 BPM    NY Interval 164 ms    QRS Duration 92 ms     ms    QTc 502 ms    P Axis 70 degrees    R AXIS 80 degrees    T Axis 82 degrees    Interpretation ECG       Sinus rhythm  Prolonged QT  Abnormal ECG  When compared with ECG of 01-NOV-2021 23:23, (unconfirmed)  No significant change was found  Confirmed by GENERATED REPORT, COMPUTER ([id number]),  Anthony Tee ([id number]) on 11/2/2021 12:10:13 AM     CBC with platelets differential    Narrative    The following orders were created for panel order CBC with platelets differential.  Procedure                               Abnormality         Status                     ---------                               -----------         ------                     CBC with platelets and d...[[id number]]  Abnormal            Final result                  Please view results for these tests on the individual orders.   Troponin I   Result Value Ref Range    Troponin I <0.015 0.000 - 0.045 ug/L   Comprehensive metabolic panel   Result Value Ref Range    Sodium 137 133 - 144 mmol/L    Potassium 3.4 3.4 - 5.3 mmol/L    Chloride 105 94 - 109 mmol/L    Carbon Dioxide (CO2) 28 20 - 32 mmol/L    Anion Gap 4 3 - 14 mmol/L    Urea Nitrogen 30 7 - 30 mg/dL    Creatinine 1.21 (H) 0.52 - 1.04 mg/dL    Calcium 8.5 8.5 - 10.1 mg/dL    Glucose 142 (H) 70 - 99 mg/dL    Alkaline Phosphatase 89 40 - 150 U/L    AST 16 0 - 45 U/L    ALT 17 0 - 50 U/L    Protein Total 6.6 (L) 6.8 - 8.8 g/dL    Albumin 3.5 3.4 - 5.0 g/dL    Bilirubin Total 0.4 0.2 - 1.3 mg/dL    GFR Estimate 44 (L) >60 mL/min/1.73m2   Lipase   Result Value Ref Range    Lipase 174 73 - 393 U/L   Magnesium   Result Value Ref Range    Magnesium 1.9 1.6 - 2.3 mg/dL   CBC with platelets and differential   Result Value Ref Range    WBC Count 7.8 4.0 - 11.0 10e3/uL    RBC Count 3.96 3.80 - 5.20 10e6/uL    Hemoglobin 11.9 11.7 - 15.7 g/dL    Hematocrit 36.3 35.0 - 47.0 %    MCV 92 78 - 100 fL    MCH 30.1 26.5 - 33.0 pg    MCHC 32.8 31.5 - 36.5 g/dL    RDW 13.3 10.0 - 15.0 %    Platelet Count 142 (L) 150 - 450 10e3/uL    % Neutrophils 70 %    % Lymphocytes 19 %    % Monocytes 9 %    % Eosinophils 2 %    % Basophils 0 %    % Immature Granulocytes 0 %    NRBCs per 100 WBC 0 <1 /100    Absolute Neutrophils 5.5 1.6 - 8.3 10e3/uL    Absolute Lymphocytes 1.4 0.8 - 5.3 10e3/uL    Absolute Monocytes 0.7 0.0 - 1.3 10e3/uL    Absolute Eosinophils 0.1 0.0 - 0.7 10e3/uL    Absolute Basophils 0.0 0.0 - 0.2 10e3/uL    Absolute Immature Granulocytes 0.0 <=0.0 10e3/uL    Absolute NRBCs 0.0 10e3/uL   [place] Draw    Narrative    The following orders were created for panel order [place] Draw.  Procedure                               Abnormality         Status                     ---------                               -----------         ------                      Extra Blue Top Tube[[id number]]                              Final result               Extra Red Top Tube[[id number]]                               Final result                 Please view results for these tests on the individual orders.   Extra Blue Top Tube   Result Value Ref Range    Hold Specimen JIC    Extra Red Top Tube   Result Value Ref Range    Hold Specimen JIC    Creatinine POCT   Result Value Ref Range    Creatinine POCT 1.4 (H) 0.5 - 1.0 mg/dL    GFR, ESTIMATED POCT 37 (L) >60 mL/min/1.73m2   Asymptomatic COVID-19 Virus (Coronavirus) by PCR Nasopharyngeal    Specimen: Nasopharyngeal; Swab   Result Value Ref Range    SARS CoV2 PCR Negative Negative    Narrative    Testing was performed using the hannah  SARS-CoV-2 & Influenza A/B Assay on the hannah  [employer]  System.  This test should be ordered for the detection of SARS-COV-2 in individuals who meet SARS-CoV-2 clinical and/or epidemiological criteria. Test performance is unknown in asymptomatic patients.  This test is for in vitro diagnostic use under the FDA EUA for laboratories certified under CLIA to perform moderate and/or high complexity testing. This test has not been FDA cleared or approved.  A negative test does not rule out the presence of PCR inhibitors in the specimen or target RNA in concentration below the limit of detection for the assay. The possibility of a false negative should be considered if the patient's recent exposure or clinical presentation suggests COVID-19.  [facility] are certified under the Clinical Laboratory Improvement Amendments of 1988 (CLIA-88) as qualified to perform moderate and/or high complexity laboratory testing.   CT Aortic Survey w Contrast    Narrative    EXAM: CT AORTIC SURVEY W CONTRAST  LOCATION: [facility]  DATE/TIME: 11/2/2021 12:30 AM    INDICATION: Abdominal pain, aortic dissection suspected  COMPARISON: None.  TECHNIQUE: CT angiogram chest  abdomen pelvis during arterial phase of injection of IV contrast. 2D and 3D MIP reconstructions were performed by the CT technologist. Dose reduction techniques were used.   CONTRAST: 80mL Isovue-370    FINDINGS:   CT ANGIOGRAM CHEST, ABDOMEN, AND PELVIS: No hyperdense acute aortic intramural hematoma on the noncontrast series. Severe atherosclerotic calcification of the thoracoabdominal aorta. There is multivessel coronary artery calcification. Following the   administration of IV contrast, there is extensive soft and calcified plaque throughout the thoracoabdominal aorta. There is a brief, short segment occlusion of the left subclavian artery just beyond its origin (image 23 of series 5 and image 41 of the   coronal series). There is distal reconstitution of the left subclavian artery. The abdominal aortic branch vessels are patent, though there is mild atherosclerotic narrowing of the trunk of the superior mesenteric artery. There is mild fusiform   aneurysmal dilatation of the distal infrarenal abdominal aorta, measuring 3.1 cm in diameter on image 48 of the coronal series. Atherosclerotic bilateral iliac and femoral arteries.    LUNGS AND PLEURA: Centrilobular emphysema. No airspace consolidation. No pleural effusion.    MEDIASTINUM/AXILLAE: Heterogeneous, multinodular thyroid. Heart size is normal. Small hiatal hernia. No pericardial effusion.    CORONARY ARTERY CALCIFICATION: Severe.    HEPATOBILIARY: Benign simple cyst in hepatic segment 3 requires no follow-up. Gallbladder is absent.    PANCREAS: Normal.    SPLEEN: Normal.    ADRENAL GLANDS: Mild nodular thickening of both adrenal glands.    KIDNEYS/BLADDER: Moderately atrophic left kidney. Bilateral renal cortical cysts measuring up to 6.4 cm in the right upper pole. No hydronephrosis. Nondistended bladder.    BOWEL: No mechanical bowel obstruction or free air. Normal appendix. Moderate distal colonic diverticulosis.    LYMPH NODES: Normal.    PELVIC  ORGANS: Status post hysterectomy. No free fluid.    MUSCULOSKELETAL: Osteopenia with multilevel bridging anterior endplate osteophytes in the mid and lower thoracic spine. Moderate lower lumbar facet arthropathy. Moderate right and moderate to severe left hip arthritic change. Postoperative change of   prior proximal right humerus fracture repair.      Impression    IMPRESSION:  1.  Extensive atherosclerotic changes as described above, including short segment occlusion of the proximal left subclavian artery, but no thoracoabdominal aortic dissection.    2.  Severe coronary artery disease.    3.  Emphysema.    4.  Moderate distal colonic diverticulosis without convincing evidence of acute diverticulitis.     EKG 12-lead, tracing only   Result Value Ref Range    Systolic Blood Pressure  mmHg    Diastolic Blood Pressure  mmHg    Ventricular Rate 81 BPM    Atrial Rate 81 BPM    SC Interval 174 ms    QRS Duration 94 ms     ms    QTc 476 ms    P Axis 70 degrees    R AXIS 78 degrees    T Axis 81 degrees    Interpretation ECG       Sinus rhythm  Nonspecific ST abnormality  Abnormal ECG  When compared with ECG of 01-NOV-2021 23:24,  No significant change was found  Confirmed by GENERATED REPORT, COMPUTER ([id number]),  Anthony Tee ([id number]) on 11/2/2021 12:35:31 AM     Troponin I - now then in 4 hours x 2   Result Value Ref Range    Troponin I 3.509 (HH) 0.000 - 0.045 ug/L   TSH with free T4 reflex   Result Value Ref Range    TSH 2.30 0.40 - 4.00 mU/L   CBC with platelets   Result Value Ref Range    WBC Count 8.5 4.0 - 11.0 10e3/uL    RBC Count 3.94 3.80 - 5.20 10e6/uL    Hemoglobin 11.6 (L) 11.7 - 15.7 g/dL    Hematocrit 36.4 35.0 - 47.0 %    MCV 92 78 - 100 fL    MCH 29.4 26.5 - 33.0 pg    MCHC 31.9 31.5 - 36.5 g/dL    RDW 13.2 10.0 - 15.0 %    Platelet Count 153 150 - 450 10e3/uL   Troponin I - now then in 4 hours x 2   Result Value Ref Range    Troponin I 6.980 (HH) 0.000 - 0.045 ug/L

## 2021-11-02 NOTE — PROVIDER NOTIFICATION
MD Notification    Notified Person: MD    Notified Person Name: Dr. Live    Notification Date/Time: 11/2/2021    Notification Interaction: Vocera message    Purpose of Notification: Critical lab troponin increase, ask to place cardiology consult    Orders Received: Cardiology consult placed    Comments:

## 2021-11-02 NOTE — ED NOTES
Pt is worried about her inflamed gums and possible infection in them that could cause her heart pain.

## 2021-11-02 NOTE — H&P
[facility]  History and Physical  Hospitalist       Date of Admission:  11/1/2021    Assessment & Plan   Fatou Brian is a 71 year old female with hypertension, dyslipidemia, depression, CKD stage 3, IBS, h/o tobacco abuse, Sjogren's syndrome with myopathy for which she takes hydroxychloroquine, GERD, chronic back pain who was admitted on 11/1/2021 for chest pain at rest and treatment of NSTEMI.     NSTEMI  Hypertension  Dyslipidemia  Initial complaint of 10/10 chest pain while seated on couch at rest, no radiation, some palliation of symptoms with nitroglycerin. Initial troponin negative, 2nd troponin 3.5. EKG without obvious ischemia. Chest CT with atherosclerosis noted. Managed PTA with losartan, hydrochlorothiazide, metoprolol. H/o tobacco abuse. No recent TTE or cath available.   -admit to inpatient with telemetry  -initiate therapeutic heparin infusion   -serial troponins  -cardiology consultation for probable coronary angiogram today  -prn nitroglycerin  -resume PTA antihypertensives once doses verified    Sjogren's Syndrome with myopathy  Occasional blue toes  -resume PTA hydroxychloroquine once dose verified    CKD stage 3b  Stable.     Chronic, stable problems:  Back pain, spinal stenosis  Depression  GERD  Irritable Bowel Syndrome    Asymptomatic Rule Out of COVID-19 infection  This patient was evaluated during a global COVID-19 pandemic, which necessitated consideration that the patient might be at risk for infection with the SARS-CoV-2 virus that causes COVID-19. Applicable protocols for evaluation were followed during the patient's care. Low suspicion for infection. Vaccination status: unknown.   -follow-up COVID-19 PCR test result => negative    Clinically Significant Risk Factors Present on Admission     DVT prophylaxis: therapeutic heparin  Code Status:  Full    Disposition: Expected discharge in 2-3 days.    Lidia Connor MD  Text  Page    ~~~~~~~~~~~~~~~~~~~~~~~~~~~~~~~~~~~~~~~~~~~~~~~~~~~~~  Primary Care Physician   Cassidy Alex    Chief Complaint   Chest pain    History is obtained from the patient and medical records.    History of Present Illness   Fatou Brian is a very pleasant 71 year old female with hypertension, dyslipidemia, depression, CKD stage 3, IBS, h/o tobacco abuse, Sjogren's syndrome with myopathy for which she takes hydroxychloroquine, GERD, chronic back pain who presents with chest pain. She was seated on her couch earlier this evening and developed a burning quality central chest pain, 8-10/10 without radiation. Minimal improvement in chest pain with nitroglycerin but reduced to 1/10 with dilaudid. She recently had a yearly physical exam which was benign and patient recalls she may need her thyroid function tested. Denies palpitations, fevers, cough, shortness of breath, diaphoresis. No known CAD history. She has had brain aneurysm clipped in 2017. Does not take any blood thinners.     Case reviewed with Dr. Lindo from the Emergency Department. Labs and available imaging reviewed.     Past Medical History    I have reviewed this patient's medical history and updated it with pertinent information if needed.   Past Medical History:   Diagnosis Date     Arthritis      Atherosclerosis     diffuse     Emphysema of lung (H)      GERD (gastroesophageal reflux disease)      HLD (hyperlipidaemia)      Hypertension      Sjogren's syndrome (H)      Spinal stenosis      Subclavian artery stenosis (H)     Left     Past Surgical History   I have reviewed this patient's surgical history and updated it with pertinent information if needed.  Past Surgical History:   Procedure Laterality Date     aneursym[  1983, 1991    Has metal in head     CHOLECYSTECTOMY       CLOSED RX PROX HUMERUS FRACTURE      10 pins placed     COLONOSCOPY       COLONOSCOPY  1/24/2014    Procedure: COMBINED COLONOSCOPY, SINGLE BIOPSY/POLYPECTOMY  BY BIOPSY;  COLONOSCOPY;  Surgeon: Ranjit Pa MD;  Location:  GI     HC ECP WITH CATARACT SURGERY      both eyes     HYSTERECTOMY, JANET         Prior to Admission Medications   Prior to Admission Medications   Prescriptions Last Dose Informant Patient Reported? Taking?   ORDER FOR DME   No No   Sig: Equipment being ordered: RUBBER THRESHOLD /CARGO WEDGE RAMP  ONE  USE AS DIRECTED FOR THRESHOLD  TO PREVENT FALLING   Skin Protectants, Misc. (EUCERIN) cream   No No   Sig: Apply topically as needed for dry skin   UNABLE TO FIND   Yes No   Sig: MEDICATION NAME: Active finesse keto blen   UNABLE TO FIND   Yes No   Sig: MEDICATION NAME: Active Finesse Clarence Cleanse   acetaminophen (TYLENOL) 650 MG CR tablet   No No   Sig: Take 2 tablets (1,300 mg) by mouth every 8 hours as needed for mild pain or pain   calcium carbonate (TUMS) 500 MG chewable tablet   No No   Sig: Take 1 tablet (500 mg) by mouth At Bedtime   cholecalciferol (VITAMIN D3) 5000 units TABS tablet   No No   Sig: Take 1 tablet (5,000 Units) by mouth At Bedtime   famotidine (PEPCID) 20 MG tablet   No No   Sig: TAKE 1 TABLET BY MOUTH TWICE DAILY AS NEEDED   fexofenadine (ALLEGRA) 180 MG tablet   No No   Sig: Take 1 tablet (180 mg) by mouth daily   gabapentin (NEURONTIN) 100 MG capsule   No No   Sig: TAKE ONE CAPSULE BY MOUTH THREE TIMES DAILY   hydroxychloroquine (PLAQUENIL) 200 MG tablet   No No   Sig: TAKE 2 (400 MG) BY MOUTH AT BEDTIME   losartan-hydrochlorothiazide (HYZAAR) 100-12.5 MG tablet   No No   Sig: Take 1 tablet by mouth daily   metoprolol succinate ER (TOPROL-XL) 50 MG 24 hr tablet   No No   Sig: TAKE 1 TABLET DAILY   order for DME   No No   Sig: One pair longitudinal arch supports  One pair   Use as directed   pantoprazole (PROTONIX) 40 MG EC tablet   No No   Sig: TAKE 1 BY MOUTH DAILY   pilocarpine (SALAGEN) 5 MG tablet   No No   Sig: Take 1 tablet (5 mg) by mouth 4 times daily (before meals and nightly)   senna-docusate  (SENOKOT-S/PERICOLACE) 8.6-50 MG tablet   No No   Sig: Take 2 tablets by mouth 2 times daily      Facility-Administered Medications: None     Allergies   Allergies   Allergen Reactions     Chantix [Varenicline]      suicidal     Influenza Virus Vaccine H5n1      Muscle aches dizzy, nauseated  Light headed     Morphine Sulfate      Sensitivity when in hospital     Omeprazole Magnesium Nausea     Intolerance       Vioxx      Niacin Itching and Rash     Zetia [Ezetimibe] Other (See Comments)     Muscle pain     Zyrtec-D Rash     Social History   I have reviewed this patient's social history and updated it with pertinent information if needed. Fatou Brian  reports that she quit smoking about 12 years ago. Her smoking use included cigarettes. She smoked 0.00 packs per day. She has never used smokeless tobacco. She reports current alcohol use. She reports that she does not use drugs.    Family History   I have reviewed this patient's family history and updated it with pertinent information if needed.   Family History   Problem Relation Age of Onset     Thyroid Disease Mother      Arthritis Mother         Rheumatoid     Osteoporosis Mother      Cancer Mother         stomach     Cerebrovascular Disease Mother      Heart Disease Father      Cancer Father      Heart Disease Brother         fibulation       Review of Systems   The 10 point Review of Systems is negative other than noted in the HPI or here.    Physical Exam   Temp: 98  F (36.7  C) Temp src: Temporal BP: (!) 165/73 Pulse: 67   Resp: 14 SpO2: 98 % O2 Device: None (Room air)    Vital Signs with Ranges  Temp:  [98  F (36.7  C)] 98  F (36.7  C)  Pulse:  [67-80] 67  Resp:  [12-23] 14  BP: (159-184)/(66-78) 165/73  SpO2:  [97 %-99 %] 98 %  220 lbs 0 oz    Constitutional: Alert, NAD, pleasant and interactive  Eyes: PERRL, sclerae anicteric  HEENT: mmm, atraumatic  Respiratory: Lungs CTAB, no wheezes or crackles  No chest wall tenderness to  palpation  Cardiovascular: RRR, no murmurs  no LE edema  GI: soft, non-tender, nondistended  Skin/Integument: warm, dry, no acute rashes  Genitourinary: not examined  Musc: BRAGG, normal limb strength x 4  Neuro: AOx3, no focal deficits, no tremors  Psych: friendly affect, not anxious, not confused      Data   Data reviewed today:  I personally reviewed: EKG normal sinus rhythm.   Recent Labs   Lab 11/02/21  0352 11/01/21  2334 11/01/21  2330   WBC  --   --  7.8   HGB  --   --  11.9   MCV  --   --  92   PLT  --   --  142*   NA  --   --  137   POTASSIUM  --   --  3.4   CHLORIDE  --   --  105   CO2  --   --  28   BUN  --   --  30   CR  --  1.4* 1.21*   ANIONGAP  --   --  4   HARIKA  --   --  8.5   GLC  --   --  142*   ALBUMIN  --   --  3.5   PROTTOTAL  --   --  6.6*   BILITOTAL  --   --  0.4   ALKPHOS  --   --  89   ALT  --   --  17   AST  --   --  16   LIPASE  --   --  174   TROPONIN 3.509*  --  <0.015       Imaging:  Recent Results (from the past 24 hour(s))   CT Aortic Survey w Contrast    Narrative    EXAM: CT AORTIC SURVEY W CONTRAST  LOCATION: [facility]  DATE/TIME: 11/2/2021 12:30 AM    INDICATION: Abdominal pain, aortic dissection suspected  COMPARISON: None.  TECHNIQUE: CT angiogram chest abdomen pelvis during arterial phase of injection of IV contrast. 2D and 3D MIP reconstructions were performed by the CT technologist. Dose reduction techniques were used.   CONTRAST: 80mL Isovue-370    FINDINGS:   CT ANGIOGRAM CHEST, ABDOMEN, AND PELVIS: No hyperdense acute aortic intramural hematoma on the noncontrast series. Severe atherosclerotic calcification of the thoracoabdominal aorta. There is multivessel coronary artery calcification. Following the   administration of IV contrast, there is extensive soft and calcified plaque throughout the thoracoabdominal aorta. There is a brief, short segment occlusion of the left subclavian artery just beyond its origin (image 23 of series 5 and image 41 of  the   coronal series). There is distal reconstitution of the left subclavian artery. The abdominal aortic branch vessels are patent, though there is mild atherosclerotic narrowing of the trunk of the superior mesenteric artery. There is mild fusiform   aneurysmal dilatation of the distal infrarenal abdominal aorta, measuring 3.1 cm in diameter on image 48 of the coronal series. Atherosclerotic bilateral iliac and femoral arteries.    LUNGS AND PLEURA: Centrilobular emphysema. No airspace consolidation. No pleural effusion.    MEDIASTINUM/AXILLAE: Heterogeneous, multinodular thyroid. Heart size is normal. Small hiatal hernia. No pericardial effusion.    CORONARY ARTERY CALCIFICATION: Severe.    HEPATOBILIARY: Benign simple cyst in hepatic segment 3 requires no follow-up. Gallbladder is absent.    PANCREAS: Normal.    SPLEEN: Normal.    ADRENAL GLANDS: Mild nodular thickening of both adrenal glands.    KIDNEYS/BLADDER: Moderately atrophic left kidney. Bilateral renal cortical cysts measuring up to 6.4 cm in the right upper pole. No hydronephrosis. Nondistended bladder.    BOWEL: No mechanical bowel obstruction or free air. Normal appendix. Moderate distal colonic diverticulosis.    LYMPH NODES: Normal.    PELVIC ORGANS: Status post hysterectomy. No free fluid.    MUSCULOSKELETAL: Osteopenia with multilevel bridging anterior endplate osteophytes in the mid and lower thoracic spine. Moderate lower lumbar facet arthropathy. Moderate right and moderate to severe left hip arthritic change. Postoperative change of   prior proximal right humerus fracture repair.      Impression    IMPRESSION:  1.  Extensive atherosclerotic changes as described above, including short segment occlusion of the proximal left subclavian artery, but no thoracoabdominal aortic dissection.    2.  Severe coronary artery disease.    3.  Emphysema.    4.  Moderate distal colonic diverticulosis without convincing evidence of acute diverticulitis.

## 2021-11-02 NOTE — ED PROVIDER NOTES
History     Chief Complaint:  Chest Pain      HPI   Fatou Brian is a 71 year old female with history of GERD, hyper lipidemia, hypertension, and aneurysm who presents with non radiating burning chest pain. Per the patient, she developed the pain around 10 PM while watching tv. She denies shortness of breath, fever, other covid symptoms, recent travel, recent illness, abdominal pain, suicidal thoughts, nausea, difficulty breathing, but does reports having felt depressed lately. She reports having had issues with acid reflux before but the pain was never this severe and reports feeling earlier today. She reports that nitroglycerine has helped her pain somewhat and reduced it form a 10 out of 10 at onset to an 8 out of 10 now.    Review of Systems   Constitutional: Negative for fever.   Respiratory: Negative for cough and shortness of breath.    Cardiovascular: Positive for chest pain.   Gastrointestinal: Negative for abdominal pain and nausea.   Psychiatric/Behavioral: Positive for dysphoric mood. Negative for suicidal ideas.   10 point review of systems was obtained and negative other than mentioned above.    Allergies:  Chantix [Varenicline]  Influenza Virus Vaccine H5n1  Morphine Sulfate  Omeprazole Magnesium  Vioxx  Niacin  Zetia [Ezetimibe]  Zyrtec-D    Medications:    Pepcid  Gabapentin  Hyzaar  Toprol  Protonix  Pilocarpine    Plaquenil     Past Medical History:    Arthritis  Atherosclerosis  GERD  Hyperlipidemia  Hypertension  Spinal stenosis  Subclavian artery stenosis  Morbid obesity  CKD  Primary osteoarthritis of both knees  bilateral low back pain with sciatica   Osteoporosis   IBS    Aneurysm      Past Surgical History:    Cholecystectomy   Combined colonoscopy, single biopsy, polypectomy by biopsy  HC ECP with cataract surgery bilateral  Hysterectomy    Family History:    Father: heart disease, aneurysm, cancer  Mother: thyroid disease, AR, osteoporosis, stomach cancer, cerebrovascular  disease  Brother: esvin     Social History:  Patient presents via EMS    Physical Exam     Patient Vitals for the past 24 hrs:   BP Temp Temp src Pulse Resp SpO2   21 0100 (!) 167/71 -- -- 80 22 97 %   21 0010 (!) 165/71 -- -- 77 16 98 %   21 2335 -- -- -- 78 23 99 %   21 2334 -- -- -- 78 18 98 %   21 2333 (!) 175/78 -- -- 80 -- --   21 -- 98  F (36.7  C) Temporal -- -- --   21 (!) 184/66 -- -- 79 17 98 %       Physical Exam  General: Sitting up in bed  Eyes:  The pupils are equal and round    Conjunctivae and sclerae are normal  ENT:    Wearing a mask  Neck:  Normal range of motion  CV:  Regular rate, regular rhythm     Skin warm and well perfused   Resp:  Non labored breathing on room air    No tachypnea    No cough heard    Lungs clear bilaterally  GI:  Abdomen is soft, there is no rigidity    No distension    No rebound tenderness     No abdominal tenderness  MS:  Normal muscular tone  Skin:  No rash or acute skin lesions noted  Neuro:   Awake, alert.      Speech is normal and fluent.    Face is symmetric.     Moves all extremities equally  Psych: Normal affect.  Appropriate interactions.      Emergency Department Course   EC  ECG taken at 2324, ECG read at 2325  Normal sinus rhythm  Prolonged QT   Abnormal ECG   No significant change as compared to prior, dated 2017.  Rate 80 bpm. DC interval 164 ms. QRS duration 92 ms. QT/QTc 436/502 ms. P-R-T axes 70 80 82.     EC  ECG taken at 0031, ECG read at 0033  Normal sinus rhythm  Nonspecific ST abnormality  Abnormal ECG   No significant change as compared to prior, dated 21.  Rate 76 bpm. DC interval 184 ms. QRS duration 94 ms. QT/QTc 408/459 ms. P-R-T axes 62 81 77.     Imaging:  CT Aortic Survey w Contrast  1.  Extensive atherosclerotic changes as described above, including short segment occlusion of the proximal left subclavian artery, but no thoracoabdominal aortic dissection.    2.   Severe coronary artery disease.    3.  Emphysema.    4.  Moderate distal colonic diverticulosis without convincing evidence of acute diverticulitis.  Reading per radiology    Laboratory:  Asymptomatic COVID19 Virus PCR by nasopharyngeal swab Negative     CMP: glucose 142(H),protein total 6.6 (L), GFR estimate 44 (L) o/w WNL (Creatinine 1.21 (H))   CBC: WBC 7.8, HGB 11.9,  (L)   Troponin (Collected 2330): <0.015  Lipase: 174  Creatinine POCT: creatinine 1.4 (H), GFR estimated 37 (L)  Magnesium: 1.9    Emergency Department Course:  2318 Patient arrives  2321 History taken form EMS and patient  2323 EKG taken, physical exam performed  2324 EKG repeated  2327 Blood work collected  2357 Patient transferred to main room  0400 Updated patient on her elevated troponin and concern for NSTEMI. Denies chest pain    Consults:   0130 I spoke with Dr. Connor of the Hospitalist service from [facility] regarding patient's presentation, findings, and plan of care.  0405 Spoke to Dr. Connor about elevated troponin    Interventions:  2336 Nitroglycerin, 0.4 mg, sublingual  2342 Dilaudid, 0.3 mg, IV   2344 Sodium chloride bolus, 500 ml, IV    Heparin   Aspirin 324 mg    Disposition:  The patient was admitted to the hospital under the care of Dr. Connor.    Impression & Plan     Medical Decision Making:  Fatou Brian is a 71-year-old female who presents to the emergency department with chest pain.  Patient was brought to a stab room on arrival to the emergency department and concern for possible STEMI by EMS.  Reviewed her EKG and I do not think she has a STEMI on her prehospital EKG.  No STEMI on ED EKG.  Initial troponin was negative.  And patient's pain resolved with nitroglycerin and Dilaudid.  Given her severe hypertension, I did obtain CT to rule out dissection.  She has extensive atherosclerosis and severe coronary artery disease on imaging.  Patient very concerned about aspirin use given her history of brain  aneurysms but given this as I do think the benefits outweigh the risks at this time.  Discussed with hospitalist for admission.  Extensive patient boarding in the emergency department and so repeat troponin was done while in the emergency department and it was elevated.  Started on heparin to treat for NSTEMI.  Doubt PE.  She denies chest pain.  Discussed with her the need for anticoagulation which she was in agreement with.  Changed her bed to [facility] after troponin came back elevated and discussed with her about need for likely angiogram and possible stent placement.    Critical Care time:  was 30 minutes for this patient excluding procedures.    Covid-19  Fatou Brian was evaluated during a global COVID-19 pandemic, which necessitated consideration that the patient might be at risk for infection with the SARS-CoV-2 virus that causes COVID-19.   Applicable protocols for evaluation were followed during the patient's care.   COVID-19 was considered as part of the patient's evaluation. The plan for testing is:  a test was obtained during this visit.    Diagnosis:    ICD-10-CM    1. Chest pain, unspecified type  R07.9        Scribe Disclosure:  I, Babak Subramanian, am serving as a scribe at 11:26 PM on 11/1/2021 to document services personally performed by Marivel Lindo, based on my observations and the provider's statements to me.      Marivel Lindo MD  11/02/21 0628

## 2021-11-02 NOTE — TELEPHONE ENCOUNTER
RECORDS RECEIVED FROM:   DATE RECEIVED:   NOTES STATUS DETAILS   OFFICE NOTE from referring provider    Internal K Isai 9-24-21   OFFICE NOTE from other cardiologist    N/A    DISCHARGE SUMMARY from hospital    N/A    DISCHARGE REPORT from the ER   Internal 11-1-21 [facility]   OPERATIVE REPORT    Internal Cath 11-2-21   MEDICATION LIST   Internal    LABS     BMP   N/A    CBC   Internal 11-2-21   CMP   Internal 11-1-21   Lipids   Internal 9-24-21   TSH   Internal 11-2-21   DIAGNOSTIC PROCEDURES     EKG   Internal 11-2-21   Monitor Reports   N/A    IMAGING (DISC & REPORT)      Echo   Internal 11-2-21   Stress Tests   N/A    Cath   In process 11-2-21 case requested   MRI/MRA   N/A    CT/CTA   Internal CT Aortic 11-1-21

## 2021-11-02 NOTE — ED NOTES
[facility]  ED Nurse Handoff Report    ED Chief complaint: Chest Pain      ED Diagnosis:   Final diagnoses:   Chest pain, unspecified type   NSTEMI (non-ST elevated myocardial infarction) (H)       Code Status: as per hospitalist    Allergies:   Allergies   Allergen Reactions     Chantix [Varenicline]      suicidal     Influenza Virus Vaccine H5n1      Muscle aches dizzy, nauseated  Light headed     Morphine Sulfate      Sensitivity when in hospital     Omeprazole Magnesium Nausea     Intolerance       Vioxx      Niacin Itching and Rash     Zetia [Ezetimibe] Other (See Comments)     Muscle pain     Zyrtec-D Rash       Patient Story: mid-sternal chest pain, radiates to the back since 2200. Hx. Of HTN, HLD, GERD  Focused Assessment:  Ambulates with a walker, A&Ox4, [place]. Respirations even and unlabored, skin dry, warm, color wnl. Initial pain was 10/10, then 4/10, now pain free. 2nd troponin elevated.  Results for orders placed or performed during the hospital encounter of 11/01/21   CT Aortic Survey w Contrast     Status: None    Narrative    EXAM: CT AORTIC SURVEY W CONTRAST  LOCATION: [facility]  DATE/TIME: 11/2/2021 12:30 AM    INDICATION: Abdominal pain, aortic dissection suspected  COMPARISON: None.  TECHNIQUE: CT angiogram chest abdomen pelvis during arterial phase of injection of IV contrast. 2D and 3D MIP reconstructions were performed by the CT technologist. Dose reduction techniques were used.   CONTRAST: 80mL Isovue-370    FINDINGS:   CT ANGIOGRAM CHEST, ABDOMEN, AND PELVIS: No hyperdense acute aortic intramural hematoma on the noncontrast series. Severe atherosclerotic calcification of the thoracoabdominal aorta. There is multivessel coronary artery calcification. Following the   administration of IV contrast, there is extensive soft and calcified plaque throughout the thoracoabdominal aorta. There is a brief, short segment occlusion of the left subclavian artery  just beyond its origin (image 23 of series 5 and image 41 of the   coronal series). There is distal reconstitution of the left subclavian artery. The abdominal aortic branch vessels are patent, though there is mild atherosclerotic narrowing of the trunk of the superior mesenteric artery. There is mild fusiform   aneurysmal dilatation of the distal infrarenal abdominal aorta, measuring 3.1 cm in diameter on image 48 of the coronal series. Atherosclerotic bilateral iliac and femoral arteries.    LUNGS AND PLEURA: Centrilobular emphysema. No airspace consolidation. No pleural effusion.    MEDIASTINUM/AXILLAE: Heterogeneous, multinodular thyroid. Heart size is normal. Small hiatal hernia. No pericardial effusion.    CORONARY ARTERY CALCIFICATION: Severe.    HEPATOBILIARY: Benign simple cyst in hepatic segment 3 requires no follow-up. Gallbladder is absent.    PANCREAS: Normal.    SPLEEN: Normal.    ADRENAL GLANDS: Mild nodular thickening of both adrenal glands.    KIDNEYS/BLADDER: Moderately atrophic left kidney. Bilateral renal cortical cysts measuring up to 6.4 cm in the right upper pole. No hydronephrosis. Nondistended bladder.    BOWEL: No mechanical bowel obstruction or free air. Normal appendix. Moderate distal colonic diverticulosis.    LYMPH NODES: Normal.    PELVIC ORGANS: Status post hysterectomy. No free fluid.    MUSCULOSKELETAL: Osteopenia with multilevel bridging anterior endplate osteophytes in the mid and lower thoracic spine. Moderate lower lumbar facet arthropathy. Moderate right and moderate to severe left hip arthritic change. Postoperative change of   prior proximal right humerus fracture repair.      Impression    IMPRESSION:  1.  Extensive atherosclerotic changes as described above, including short segment occlusion of the proximal left subclavian artery, but no thoracoabdominal aortic dissection.    2.  Severe coronary artery disease.    3.  Emphysema.    4.  Moderate distal colonic  diverticulosis without convincing evidence of acute diverticulitis.     Troponin I     Status: Normal   Result Value Ref Range    Troponin I <0.015 0.000 - 0.045 ug/L   Comprehensive metabolic panel     Status: Abnormal   Result Value Ref Range    Sodium 137 133 - 144 mmol/L    Potassium 3.4 3.4 - 5.3 mmol/L    Chloride 105 94 - 109 mmol/L    Carbon Dioxide (CO2) 28 20 - 32 mmol/L    Anion Gap 4 3 - 14 mmol/L    Urea Nitrogen 30 7 - 30 mg/dL    Creatinine 1.21 (H) 0.52 - 1.04 mg/dL    Calcium 8.5 8.5 - 10.1 mg/dL    Glucose 142 (H) 70 - 99 mg/dL    Alkaline Phosphatase 89 40 - 150 U/L    AST 16 0 - 45 U/L    ALT 17 0 - 50 U/L    Protein Total 6.6 (L) 6.8 - 8.8 g/dL    Albumin 3.5 3.4 - 5.0 g/dL    Bilirubin Total 0.4 0.2 - 1.3 mg/dL    GFR Estimate 44 (L) >60 mL/min/1.73m2   Lipase     Status: Normal   Result Value Ref Range    Lipase 174 73 - 393 U/L   Asymptomatic COVID-19 Virus (Coronavirus) by PCR Nasopharyngeal     Status: Normal    Specimen: Nasopharyngeal; Swab   Result Value Ref Range    SARS CoV2 PCR Negative Negative    Narrative    Testing was performed using the hannah  SARS-CoV-2 & Influenza A/B Assay on the hannah  [employer]  System.  This test should be ordered for the detection of SARS-COV-2 in individuals who meet SARS-CoV-2 clinical and/or epidemiological criteria. Test performance is unknown in asymptomatic patients.  This test is for in vitro diagnostic use under the FDA EUA for laboratories certified under CLIA to perform moderate and/or high complexity testing. This test has not been FDA cleared or approved.  A negative test does not rule out the presence of PCR inhibitors in the specimen or target RNA in concentration below the limit of detection for the assay. The possibility of a false negative should be considered if the patient's recent exposure or clinical presentation suggests COVID-19.  [facility] are certified under the Clinical Laboratory Improvement Amendments of 1988  (CLIA-88) as qualified to perform moderate and/or high complexity laboratory testing.   Magnesium     Status: Normal   Result Value Ref Range    Magnesium 1.9 1.6 - 2.3 mg/dL   CBC with platelets and differential     Status: Abnormal   Result Value Ref Range    WBC Count 7.8 4.0 - 11.0 10e3/uL    RBC Count 3.96 3.80 - 5.20 10e6/uL    Hemoglobin 11.9 11.7 - 15.7 g/dL    Hematocrit 36.3 35.0 - 47.0 %    MCV 92 78 - 100 fL    MCH 30.1 26.5 - 33.0 pg    MCHC 32.8 31.5 - 36.5 g/dL    RDW 13.3 10.0 - 15.0 %    Platelet Count 142 (L) 150 - 450 10e3/uL    % Neutrophils 70 %    % Lymphocytes 19 %    % Monocytes 9 %    % Eosinophils 2 %    % Basophils 0 %    % Immature Granulocytes 0 %    NRBCs per 100 WBC 0 <1 /100    Absolute Neutrophils 5.5 1.6 - 8.3 10e3/uL    Absolute Lymphocytes 1.4 0.8 - 5.3 10e3/uL    Absolute Monocytes 0.7 0.0 - 1.3 10e3/uL    Absolute Eosinophils 0.1 0.0 - 0.7 10e3/uL    Absolute Basophils 0.0 0.0 - 0.2 10e3/uL    Absolute Immature Granulocytes 0.0 <=0.0 10e3/uL    Absolute NRBCs 0.0 10e3/uL   Extra Blue Top Tube     Status: None   Result Value Ref Range    Hold Specimen JIC    Extra Red Top Tube     Status: None   Result Value Ref Range    Hold Specimen JIC    Creatinine POCT     Status: Abnormal   Result Value Ref Range    Creatinine POCT 1.4 (H) 0.5 - 1.0 mg/dL    GFR, ESTIMATED POCT 37 (L) >60 mL/min/1.73m2   Troponin I - now then in 4 hours x 2     Status: Abnormal   Result Value Ref Range    Troponin I 3.509 (HH) 0.000 - 0.045 ug/L   EKG 12-lead, tracing only     Status: None   Result Value Ref Range    Systolic Blood Pressure  mmHg    Diastolic Blood Pressure  mmHg    Ventricular Rate 80 BPM    Atrial Rate 80 BPM    KS Interval 164 ms    QRS Duration 92 ms     ms    QTc 502 ms    P Axis 70 degrees    R AXIS 80 degrees    T Axis 82 degrees    Interpretation ECG       Sinus rhythm  Prolonged QT  Abnormal ECG  When compared with ECG of 01-NOV-2021 23:23, (unconfirmed)  No significant  change was found  Confirmed by GENERATED REPORT, COMPUTER ([id number]),  Anthony Tee ([id number]) on 11/2/2021 12:10:13 AM     EKG 12-lead, tracing only     Status: None   Result Value Ref Range    Systolic Blood Pressure  mmHg    Diastolic Blood Pressure  mmHg    Ventricular Rate 79 BPM    Atrial Rate 79 BPM    MS Interval 166 ms    QRS Duration 90 ms     ms    QTc 493 ms    P Axis 74 degrees    R AXIS 80 degrees    T Axis 80 degrees    Interpretation ECG       Sinus rhythm  ST elevation, consider early repolarization, pericarditis, or injury  Prolonged QT  Abnormal ECG  When compared with ECG of 23-AUG-2017 10:31,  Premature ventricular complexes are no longer Present  RSR' pattern in V1 is no longer Present  Confirmed by GENERATED REPORT, COMPUTER ([id number]),  Anthony Tee ([id number]) on 11/2/2021 12:10:20 AM     EKG 12-lead, tracing only     Status: None   Result Value Ref Range    Systolic Blood Pressure  mmHg    Diastolic Blood Pressure  mmHg    Ventricular Rate 81 BPM    Atrial Rate 81 BPM    MS Interval 174 ms    QRS Duration 94 ms     ms    QTc 476 ms    P Axis 70 degrees    R AXIS 78 degrees    T Axis 81 degrees    Interpretation ECG       Sinus rhythm  Nonspecific ST abnormality  Abnormal ECG  When compared with ECG of 01-NOV-2021 23:24,  No significant change was found  Confirmed by GENERATED REPORT, COMPUTER ([id number]),  Anthony Tee ([id number]) on 11/2/2021 12:35:31 AM     CBC with platelets differential     Status: Abnormal    Narrative    The following orders were created for panel order CBC with platelets differential.  Procedure                               Abnormality         Status                     ---------                               -----------         ------                     CBC with platelets and d...[[id number]]  Abnormal            Final result                 Please view results for these tests on the individual orders.   [place] Draw     Status: None    Narrative    The  following orders were created for panel order [place] Draw.  Procedure                               Abnormality         Status                     ---------                               -----------         ------                     Extra Blue Top Tube[[id number]]                              Final result               Extra Red Top Tube[[id number]]                               Final result                 Please view results for these tests on the individual orders.       Treatments and/or interventions provided: PTA EMS gave nitroglycerin spray x 2 and 1 SL tab in ED, pain meds, IVF, full cardiac monitoring. ASA, Heparin  Patient's response to treatments and/or interventions: VSS. Pain improved, comfortably resting in stretcher.    To be done/followed up on inpatient unit:  cardiology consult?    Does this patient have any cognitive concerns?: [place]    Activity level - Baseline/Home:  Walker and Wheelchair  Activity Level - Current:   Stand with assist x2    Patient's Preferred language: English   Needed?: No    Isolation: None  Infection: Not Applicable  Patient tested for COVID 19 prior to admission: YES  Bariatric?: No    Vital Signs:   Vitals:    11/01/21 2334 11/01/21 2335 11/02/21 0010 11/02/21 0100   BP:   (!) 165/71 (!) 167/71   BP Location:       Patient Position:       Pulse: 78 78 77 80   Resp: 18 23 16 22   Temp:       TempSrc:       SpO2: 98% 99% 98% 97%       Cardiac Rhythm:     Was the PSS-3 completed:   Yes  What interventions are required if any?    Family Comments: pt by herself in ED  OBS brochure/video discussed/provided to patient/family: N/A  For the majority of the shift this patient's behavior was Green.   Behavioral interventions performed were n/a.    ED NURSE PHONE NUMBER: [phone]

## 2021-11-02 NOTE — PHARMACY-ADMISSION MEDICATION HISTORY
Pharmacy Medication History  Admission medication history interview status for the 2021  admission is complete. See EPIC admission navigator for prior to admission medications     Location of Interview: Patient room  Medication history sources: Patient, Surescripts and Care Everywhere    Significant changes made to the medication list:  Removed fexofenadine 180 mg daily (Rx , from 2019, patient not taking), Eucerin topically daily PRN (Rx , from 2019, patient not taking), active finesse keto blend and samson cleanse supplements (patient confirms no longer taking)   Changed senna-docusate scheduled --> PRN     In the past week, patient estimated taking medication this percent of the time: greater than 90%    Additional medication history information:   Patient is a reliable historian. Patient generally takes pilocarpine BID and famotidine at bedtime (sometimes taking an additional daily dose as needed), kept these as prescribed on PTA med list.     Medication reconciliation completed by provider prior to medication history? Yes    Time spent in this activity: 20 minutes    Prior to Admission medications    Medication Sig Last Dose Taking? Auth Provider   acetaminophen (TYLENOL) 650 MG CR tablet Take 2 tablets (1,300 mg) by mouth every 8 hours as needed for mild pain or pain Past Week at Unknown time Yes Reid Thomas MD   calcium carbonate (TUMS) 500 MG chewable tablet Take 1 tablet (500 mg) by mouth At Bedtime 2021 at PM Yes Reid Thomas MD   cholecalciferol (VITAMIN D3) 5000 units TABS tablet Take 1 tablet (5,000 Units) by mouth At Bedtime 2021 at PM Yes Reid Thomas MD   famotidine (PEPCID) 20 MG tablet TAKE 1 TABLET BY MOUTH TWICE DAILY AS NEEDED 2021 at PM Yes Cassidy Alex PA-C   gabapentin (NEURONTIN) 100 MG capsule TAKE ONE CAPSULE BY MOUTH THREE TIMES DAILY  at not yet started Yes Cassidy Alex PA-C    hydroxychloroquine (PLAQUENIL) 200 MG tablet TAKE 2 (400 MG) BY MOUTH AT BEDTIME 11/1/2021 at PM Yes Cassidy Alex PA-C   losartan-hydrochlorothiazide (HYZAAR) 100-12.5 MG tablet Take 1 tablet by mouth daily 11/1/2021 at PM Yes Cassidy Alex PA-C   metoprolol succinate ER (TOPROL-XL) 50 MG 24 hr tablet TAKE 1 TABLET DAILY 11/1/2021 at PM Yes Cassidy Alex PA-C   pantoprazole (PROTONIX) 40 MG EC tablet TAKE 1 BY MOUTH DAILY 11/1/2021 at AM Yes Cassidy Alex PA-C   pilocarpine (SALAGEN) 5 MG tablet Take 1 tablet (5 mg) by mouth 4 times daily (before meals and nightly) 11/1/2021 at PM Yes Cassidy Alex PA-C   senna-docusate (SENOKOT-S/PERICOLACE) 8.6-50 MG tablet Take 2 tablets by mouth 2 times daily as needed for constipation More than a month at Unknown time Yes Unknown, Entered By History   order for DME One pair longitudinal arch supports  One pair   Use as directed   Reid Thomas MD   ORDER FOR DME Equipment being ordered: RUBBER THRESHOLD /CARGO WEDGE RAMP  ONE  USE AS DIRECTED FOR THRESHOLD  TO PREVENT FALLING   Reid Thomas MD       The information provided in this note is only as accurate as the sources available at the time of update(s)   Myrna Jaimes, [place]

## 2021-11-02 NOTE — PROGRESS NOTES
[facility]    Internal Medicine Hospitalist Progress Note  11/02/2021  I evaluated patient on the above date.    Boyd Live Jr., MD  [phone] (p)  Text Page  Vocera        Assessment & Plan New actions/orders today (11/02/2021) are underlined.    Fatou Brian is a 71 year old female with history including obesity, Sjogren's syndrome, hypertension, dyslipidemia, GERD and CKD, who presented 11/1/2021 for chest pain and found with trop elevation.      NSTEMI.  Hypertension (benign essential).  [PTA: losartan-HCTZ 100-12.5 mg daily; metoprolol XL 50 mg daily.]  * Presented 11/1 with initial complaint of 10/10 chest pain while seated on couch at rest, no radiation, some palliation of symptoms with nitroglycerin. On in evaluation 11/1, pt was afebrile, hypertensive; ECG showed sinus w/o acute ischemic changes. Initial trop 11/1 negative, but follow-up trop 11/2 3.509. Started on heparin gtt on admit. Started on ASA on admit.   * 11/2: No chest pain.  Recent Labs   Lab 11/02/21  0352 11/01/21 2330   TROPONIN 3.509* <0.015   - Continue heparin gtt.  - Continue ASA, PRN NTG.  - Resume losartan 100 mg daily and metoprolol XL 50 mg daily.  - Order PRN IV hydralazine and PRN IV labetalol.  - Start atorvastatin.  - Hold PTA HCTZ for now.  - Continue PRN acetaminophen, PRN IV hydromorphone for refractory chest pain.   - Echocardiogram pending.  - Cardiology consult pending, appreciate help.  - NPO for now.      CKD stage 3b  Unclear of baseline, but cr ranged from ~ 1-1.2 dating back to 2014. Cr 1.4 on admit 11/1.  Recent Labs   Lab 11/01/21  2334 11/01/21 2330   CR 1.4* 1.21*   - Order NS at 75 ml/hr.  - Monitor BMP.  - Avoid nephrotoxic medications.    Hyperlipidemia.  * FLP 9/2021: , HDL 57, , .  - Start atorvastatin as above.    Sjogren's syndrome with myopathy.  Occasional blue toes.  - Continue hydroxychloroquine, pilocarpine.    Spinal stenosis.  OA.  Recently  prescribed gabapentin PTA for chronic back pain that was worsened; had not started taking yet when her back pain improved.  - Continue PRN acetaminophen.  - Hold gabapentin for now.    GERD.  Chronic and stable.  - Resume pantoprazole and famotidine..    Obesity.  Body mass index is 36.61 kg/m .  - Needs to pursue aggressive dietary and lifestyle modifications.     COVID-19 testing.  COVID-19 PCR Results    COVID-19 PCR Results 11/1/21   SARS CoV2 PCR Negative      Comments are available for some flowsheets but are not being displayed.         COVID-19 Antibody Results, Testing for Immunity    COVID-19 Antibody Results, Testing for Immunity   No data to display.             Diet:   NPO  Prophylaxis: PCD's, ambulation. On heparin gtt.  Shah Catheter: Not present  Central Lines: None  Code Status: Prior    Disposition Plan   Expected discharge: 1-2d recommended to prior living arrangement pending cardiac workup.  Entered: Boyd Live MD 11/02/2021, 8:05 AM       I spent approximately 35 minutes bedside and on the inpatient unit today managing the care of patient. Over 50% of my time on the unit was spent counseling the patient and/or coordinating care regarding services listed in this note.        Interval History   Doing better.   No chest pain presently.  C/o neck pain from positioning/stretcher now.    -Data reviewed today: I reviewed all new labs and imaging over the last 24 hours. I personally reviewed the EKG tracing showing (from admit) sinus w/o acute ischemic changes.    Physical Exam    , Blood pressure (!) 172/73, pulse 62, temperature 98  F (36.7  C), temperature source Temporal, resp. rate 11, weight 99.8 kg (220 lb), SpO2 98 %, not currently breastfeeding.  Vitals:    11/02/21 0457   Weight: 99.8 kg (220 lb)     Vital Signs with Ranges  Temp:  [98  F (36.7  C)] 98  F (36.7  C)  Pulse:  [62-80] 62  Resp:  [11-23] 11  BP: (151-187)/(66-81) 172/73  SpO2:  [96 %-99 %] 98 %  Patient Vitals for the  past 24 hrs:   BP Temp Temp src Pulse Resp SpO2 Weight   11/02/21 0800 (!) 172/73 -- -- 62 11 98 % --   11/02/21 0700 (!) 167/75 -- -- 64 20 98 % --   11/02/21 0600 (!) 187/79 -- -- 72 14 97 % --   11/02/21 0500 (!) 185/81 -- -- 73 23 98 % --   11/02/21 0457 -- -- -- -- -- -- 99.8 kg (220 lb)   11/02/21 0400 (!) 151/69 -- -- 69 14 96 % --   11/02/21 0300 (!) 165/73 -- -- 67 14 98 % --   11/02/21 0200 (!) 159/70 -- -- 77 12 97 % --   11/02/21 0100 (!) 167/71 -- -- 80 22 97 % --   11/02/21 0010 (!) 165/71 -- -- 77 16 98 % --   11/01/21 2335 -- -- -- 78 23 99 % --   11/01/21 2334 -- -- -- 78 18 98 % --   11/01/21 2333 (!) 175/78 -- -- 80 -- -- --   11/01/21 2324 -- 98  F (36.7  C) Temporal -- -- -- --   11/01/21 2321 (!) 184/66 -- -- 79 17 98 % --     I/O's Last 24 hours  I/O last 3 completed shifts:  In: 500 [IV Piggyback:500]  Out: -     Constitutional: Awake, alert, pleasant.  Respiratory: Diminished in bases. No crackles or wheezes.  Cardiovascular: RRR, no m/r/g.  GI: Soft, nt, nd, +BS.  Skin/Integumen: Bilateral mild swelling, tender to palpation.  Other:        Data   Recent Labs   Lab 11/02/21  0619 11/02/21  0352 11/01/21 2334 11/01/21 2330   WBC 8.5  --   --  7.8   HGB 11.6*  --   --  11.9   MCV 92  --   --  92     --   --  142*   NA  --   --   --  137   POTASSIUM  --   --   --  3.4   CHLORIDE  --   --   --  105   CO2  --   --   --  28   BUN  --   --   --  30   CR  --   --  1.4* 1.21*   ANIONGAP  --   --   --  4   HARIKA  --   --   --  8.5   GLC  --   --   --  142*   ALBUMIN  --   --   --  3.5   PROTTOTAL  --   --   --  6.6*   BILITOTAL  --   --   --  0.4   ALKPHOS  --   --   --  89   ALT  --   --   --  17   AST  --   --   --  16   LIPASE  --   --   --  174   TROPONIN  --  3.509*  --  <0.015     Recent Labs   Lab Test 11/01/21  2330 09/24/21  1137 09/24/20  1152 09/27/19  1046 09/25/18  1119   * 102* 91 91 84     No results for input(s): CRP, DD, LDH, FIBR, MEHUL, IL6B in the last 168  hours.      Recent Results (from the past 24 hour(s))   CT Aortic Survey w Contrast    Narrative    EXAM: CT AORTIC SURVEY W CONTRAST  LOCATION: [facility]  DATE/TIME: 11/2/2021 12:30 AM    INDICATION: Abdominal pain, aortic dissection suspected  COMPARISON: None.  TECHNIQUE: CT angiogram chest abdomen pelvis during arterial phase of injection of IV contrast. 2D and 3D MIP reconstructions were performed by the CT technologist. Dose reduction techniques were used.   CONTRAST: 80mL Isovue-370    FINDINGS:   CT ANGIOGRAM CHEST, ABDOMEN, AND PELVIS: No hyperdense acute aortic intramural hematoma on the noncontrast series. Severe atherosclerotic calcification of the thoracoabdominal aorta. There is multivessel coronary artery calcification. Following the   administration of IV contrast, there is extensive soft and calcified plaque throughout the thoracoabdominal aorta. There is a brief, short segment occlusion of the left subclavian artery just beyond its origin (image 23 of series 5 and image 41 of the   coronal series). There is distal reconstitution of the left subclavian artery. The abdominal aortic branch vessels are patent, though there is mild atherosclerotic narrowing of the trunk of the superior mesenteric artery. There is mild fusiform   aneurysmal dilatation of the distal infrarenal abdominal aorta, measuring 3.1 cm in diameter on image 48 of the coronal series. Atherosclerotic bilateral iliac and femoral arteries.    LUNGS AND PLEURA: Centrilobular emphysema. No airspace consolidation. No pleural effusion.    MEDIASTINUM/AXILLAE: Heterogeneous, multinodular thyroid. Heart size is normal. Small hiatal hernia. No pericardial effusion.    CORONARY ARTERY CALCIFICATION: Severe.    HEPATOBILIARY: Benign simple cyst in hepatic segment 3 requires no follow-up. Gallbladder is absent.    PANCREAS: Normal.    SPLEEN: Normal.    ADRENAL GLANDS: Mild nodular thickening of both adrenal  glands.    KIDNEYS/BLADDER: Moderately atrophic left kidney. Bilateral renal cortical cysts measuring up to 6.4 cm in the right upper pole. No hydronephrosis. Nondistended bladder.    BOWEL: No mechanical bowel obstruction or free air. Normal appendix. Moderate distal colonic diverticulosis.    LYMPH NODES: Normal.    PELVIC ORGANS: Status post hysterectomy. No free fluid.    MUSCULOSKELETAL: Osteopenia with multilevel bridging anterior endplate osteophytes in the mid and lower thoracic spine. Moderate lower lumbar facet arthropathy. Moderate right and moderate to severe left hip arthritic change. Postoperative change of   prior proximal right humerus fracture repair.      Impression    IMPRESSION:  1.  Extensive atherosclerotic changes as described above, including short segment occlusion of the proximal left subclavian artery, but no thoracoabdominal aortic dissection.    2.  Severe coronary artery disease.    3.  Emphysema.    4.  Moderate distal colonic diverticulosis without convincing evidence of acute diverticulitis.         Medications   All medications were reviewed.    heparin 1,200 Units/hr (11/02/21 3972)       acetaminophen **OR** acetaminophen, HYDROmorphone, nitroGLYcerin

## 2021-11-03 ENCOUNTER — APPOINTMENT (OUTPATIENT)
Dept: OCCUPATIONAL THERAPY | Facility: CLINIC | Age: 71
DRG: 247 | End: 2021-11-03
Attending: STUDENT IN AN ORGANIZED HEALTH CARE EDUCATION/TRAINING PROGRAM
Payer: COMMERCIAL

## 2021-11-03 ENCOUNTER — APPOINTMENT (OUTPATIENT)
Dept: OCCUPATIONAL THERAPY | Facility: CLINIC | Age: 71
DRG: 247 | End: 2021-11-03
Payer: COMMERCIAL

## 2021-11-03 LAB
ANION GAP SERPL CALCULATED.3IONS-SCNC: 3 MMOL/L (ref 3–14)
BASOPHILS # BLD AUTO: 0 10E3/UL (ref 0–0.2)
BASOPHILS NFR BLD AUTO: 1 %
BUN SERPL-MCNC: 22 MG/DL (ref 7–30)
CALCIUM SERPL-MCNC: 8.5 MG/DL (ref 8.5–10.1)
CHLORIDE BLD-SCNC: 110 MMOL/L (ref 94–109)
CO2 SERPL-SCNC: 27 MMOL/L (ref 20–32)
CREAT SERPL-MCNC: 1.12 MG/DL (ref 0.52–1.04)
EOSINOPHIL # BLD AUTO: 0.1 10E3/UL (ref 0–0.7)
EOSINOPHIL NFR BLD AUTO: 1 %
ERYTHROCYTE [DISTWIDTH] IN BLOOD BY AUTOMATED COUNT: 13.2 % (ref 10–15)
GFR SERPL CREATININE-BSD FRML MDRD: 49 ML/MIN/1.73M2
GLUCOSE BLD-MCNC: 92 MG/DL (ref 70–99)
HCT VFR BLD AUTO: 32.9 % (ref 35–47)
HGB BLD-MCNC: 10.8 G/DL (ref 11.7–15.7)
IMM GRANULOCYTES # BLD: 0 10E3/UL
IMM GRANULOCYTES NFR BLD: 1 %
LYMPHOCYTES # BLD AUTO: 1.1 10E3/UL (ref 0.8–5.3)
LYMPHOCYTES NFR BLD AUTO: 18 %
MCH RBC QN AUTO: 29.7 PG (ref 26.5–33)
MCHC RBC AUTO-ENTMCNC: 32.8 G/DL (ref 31.5–36.5)
MCV RBC AUTO: 90 FL (ref 78–100)
MONOCYTES # BLD AUTO: 0.6 10E3/UL (ref 0–1.3)
MONOCYTES NFR BLD AUTO: 9 %
NEUTROPHILS # BLD AUTO: 4.6 10E3/UL (ref 1.6–8.3)
NEUTROPHILS NFR BLD AUTO: 70 %
NRBC # BLD AUTO: 0 10E3/UL
NRBC BLD AUTO-RTO: 0 /100
PLATELET # BLD AUTO: 143 10E3/UL (ref 150–450)
POTASSIUM BLD-SCNC: 3.8 MMOL/L (ref 3.4–5.3)
RBC # BLD AUTO: 3.64 10E6/UL (ref 3.8–5.2)
SODIUM SERPL-SCNC: 140 MMOL/L (ref 133–144)
WBC # BLD AUTO: 6.5 10E3/UL (ref 4–11)

## 2021-11-03 PROCEDURE — 250N000013 HC RX MED GY IP 250 OP 250 PS 637: Performed by: STUDENT IN AN ORGANIZED HEALTH CARE EDUCATION/TRAINING PROGRAM

## 2021-11-03 PROCEDURE — 85004 AUTOMATED DIFF WBC COUNT: CPT | Performed by: INTERNAL MEDICINE

## 2021-11-03 PROCEDURE — 210N000002 HC R&B HEART CARE

## 2021-11-03 PROCEDURE — 97165 OT EVAL LOW COMPLEX 30 MIN: CPT | Mod: GO

## 2021-11-03 PROCEDURE — 97110 THERAPEUTIC EXERCISES: CPT | Mod: GO | Performed by: OCCUPATIONAL THERAPIST

## 2021-11-03 PROCEDURE — 80048 BASIC METABOLIC PNL TOTAL CA: CPT | Performed by: STUDENT IN AN ORGANIZED HEALTH CARE EDUCATION/TRAINING PROGRAM

## 2021-11-03 PROCEDURE — 93005 ELECTROCARDIOGRAM TRACING: CPT

## 2021-11-03 PROCEDURE — 97530 THERAPEUTIC ACTIVITIES: CPT | Mod: GO

## 2021-11-03 PROCEDURE — 250N000013 HC RX MED GY IP 250 OP 250 PS 637: Performed by: PHYSICIAN ASSISTANT

## 2021-11-03 PROCEDURE — 97110 THERAPEUTIC EXERCISES: CPT | Mod: GO

## 2021-11-03 PROCEDURE — 250N000013 HC RX MED GY IP 250 OP 250 PS 637: Performed by: INTERNAL MEDICINE

## 2021-11-03 PROCEDURE — 99233 SBSQ HOSP IP/OBS HIGH 50: CPT | Performed by: HOSPITALIST

## 2021-11-03 PROCEDURE — 99233 SBSQ HOSP IP/OBS HIGH 50: CPT | Performed by: INTERNAL MEDICINE

## 2021-11-03 PROCEDURE — 36415 COLL VENOUS BLD VENIPUNCTURE: CPT | Performed by: STUDENT IN AN ORGANIZED HEALTH CARE EDUCATION/TRAINING PROGRAM

## 2021-11-03 RX ORDER — CLOPIDOGREL BISULFATE 75 MG/1
75 TABLET ORAL DAILY
Status: DISCONTINUED | OUTPATIENT
Start: 2021-11-04 | End: 2021-11-04 | Stop reason: HOSPADM

## 2021-11-03 RX ORDER — LOSARTAN POTASSIUM 100 MG/1
100 TABLET ORAL DAILY
Status: DISCONTINUED | OUTPATIENT
Start: 2021-11-03 | End: 2021-11-04 | Stop reason: HOSPADM

## 2021-11-03 RX ORDER — CLOPIDOGREL 300 MG/1
300 TABLET, FILM COATED ORAL ONCE
Status: COMPLETED | OUTPATIENT
Start: 2021-11-04 | End: 2021-11-04

## 2021-11-03 RX ADMIN — TICAGRELOR 90 MG: 90 TABLET ORAL at 03:31

## 2021-11-03 RX ADMIN — PANTOPRAZOLE SODIUM 40 MG: 40 TABLET, DELAYED RELEASE ORAL at 08:48

## 2021-11-03 RX ADMIN — HYDROXYCHLOROQUINE SULFATE 400 MG: 200 TABLET ORAL at 21:08

## 2021-11-03 RX ADMIN — CALCIUM CARBONATE (ANTACID) CHEW TAB 500 MG 500 MG: 500 CHEW TAB at 21:08

## 2021-11-03 RX ADMIN — ATORVASTATIN CALCIUM 40 MG: 40 TABLET, FILM COATED ORAL at 20:09

## 2021-11-03 RX ADMIN — CHOLECALCIFEROL TAB 125 MCG (5000 UNIT) 125 MCG: 125 TAB at 21:08

## 2021-11-03 RX ADMIN — PILOCARPINE HYDROCHLORIDE 5 MG: 5 TABLET, FILM COATED ORAL at 11:30

## 2021-11-03 RX ADMIN — PILOCARPINE HYDROCHLORIDE 5 MG: 5 TABLET, FILM COATED ORAL at 08:45

## 2021-11-03 RX ADMIN — ACETAMINOPHEN 650 MG: 325 TABLET, FILM COATED ORAL at 23:38

## 2021-11-03 RX ADMIN — ASPIRIN 81 MG: 81 TABLET, COATED ORAL at 08:45

## 2021-11-03 RX ADMIN — LOSARTAN POTASSIUM 100 MG: 100 TABLET, FILM COATED ORAL at 11:30

## 2021-11-03 RX ADMIN — PILOCARPINE HYDROCHLORIDE 5 MG: 5 TABLET, FILM COATED ORAL at 21:07

## 2021-11-03 RX ADMIN — METOPROLOL SUCCINATE 75 MG: 50 TABLET, EXTENDED RELEASE ORAL at 08:45

## 2021-11-03 RX ADMIN — PILOCARPINE HYDROCHLORIDE 5 MG: 5 TABLET, FILM COATED ORAL at 18:36

## 2021-11-03 ASSESSMENT — ACTIVITIES OF DAILY LIVING (ADL)
ADLS_ACUITY_SCORE: 17
ADLS_ACUITY_SCORE: 16
ADLS_ACUITY_SCORE: 17
ADLS_ACUITY_SCORE: 16
ADLS_ACUITY_SCORE: 17
ADLS_ACUITY_SCORE: 17
ADLS_ACUITY_SCORE: 16
ADLS_ACUITY_SCORE: 16
ADLS_ACUITY_SCORE: 17
ADLS_ACUITY_SCORE: 16
ADLS_ACUITY_SCORE: 16
ADLS_ACUITY_SCORE: 17
ADLS_ACUITY_SCORE: 17
ADLS_ACUITY_SCORE: 16
ADLS_ACUITY_SCORE: 17
ADLS_ACUITY_SCORE: 17
PREVIOUS_RESPONSIBILITIES: MEAL PREP;MEDICATION MANAGEMENT;FINANCES
ADLS_ACUITY_SCORE: 17
ADLS_ACUITY_SCORE: 16

## 2021-11-03 ASSESSMENT — MIFFLIN-ST. JEOR: SCORE: 1509.69

## 2021-11-03 NOTE — CONSULTS
"NUTRITION EDUCATION    REASON FOR ASSESSMENT:  Nutrition education on American Heart Association (AHA) Heart Healthy Diet.    NUTRITION HISTORY:  Information obtained from patient.   - Lives independently. A neighbor often shops for her. She likes to have bagels and cream cheese, canned mandarin oranges, \"lots of\" ha/sausage/ham. Likes the mini Baby Adelaida bars (eats ~3 or so each day), and has ice cream daily.   - Drinks Lactose-free milk.     Has a book on the DASH diet at home, and notes that her MD has recommended to her the Mediterranean diet.   - Eats eggs every once in a while     CURRENT DIET ORDER:  Low Sat Fat, NA <2400 mg    NUTRITION DIAGNOSIS:  Food- and nutrition-related knowledge deficit R/t heart healthy diet AEB diet recall as above high in simple carbs, sat fat, sodium.    INTERVENTIONS:  Nutrition Prescription:    Recommended AHA Heart Healthy Diet    Implementation:     Nutrition Education (Content):  a) reviewed Heart Healthy Diet guidelines  b) provided heart healthy diet handout    Nutrition Education (Application):  a) Discussed current eating habits and recommended alternative food choices    Anticipate good compliance    Diet Education - refer to Education flowsheet    Goals:    Patient verbalizes understanding of diet     All of the above goals met during education session    Follow Up/Monitoring:    Provided RD contact information for future questions    Ginny Lopez RD, LD  [facility], [facility]  Pager: [phone]  Weekend Pager: [phone]    "

## 2021-11-03 NOTE — PLAN OF CARE
Neuro: A&O    CV: SR. BLE 2+ edema     Resp: lung sounds clear, room air    GI/: voiding without difficulty, active bowel sounds    Skin: groin site no hematoma, small amount of old drainage.   Bilateral arm bruises.    Skel: ambulating SBA, no complaints of pain    Louise Tomlinson RN on 11/3/2021 at 4:58 AM

## 2021-11-03 NOTE — PHARMACY-RX INSURANCE COVERAGE
Antiplatelet coverage check.  Patient has Medicare D through [facility] with unmet deductible.    Christian Peterson Upon receipt of RX, Discharge Pharmacy can dispense 1 month free.   Dec $204.       Prasugrel: $20/mo.       Clopidogrel: $7/mo.     I am able to check 2022 pricing under this plan upon request; please consult if this is desired.    SOPHIE Vega, Pharmacy Technician/Liaison, [facility]charge Pharmacy [phone]

## 2021-11-03 NOTE — PLAN OF CARE
A&O, [place]. Elevated BP's, PRN antihypertensive medications avaliable for SBP > 180. All other VSS on room air. Tele: SR, HR 60's. Pt taken to cath lab today. R groin site, small ooze d/t venous puncture. Site re-dressed small dried red blood on new dressing/thrombix, intact. Good pulses, CMS intact except baseline neuropathy. Denies CP pre and post cath lab. Denies SOB. Off bedrest. Up with 1 and walker/gaitbelt. Plan for possible discharge tomorrow. Pt initially nauseous when arrived back to unit from cath lab, controlled with antiemetic PO. C/o back pain/ controlled with PRN tylenol.

## 2021-11-03 NOTE — PROGRESS NOTES
A/OX4. Slightly elevated BP, other VSS on RA. Denies pain. R groin puncture site drsg w/ small dried serosanguinous drainage, no change. BLE numbness, baseline. BLE edema 2-3+. Likely to discharge tomorrow.

## 2021-11-03 NOTE — PROGRESS NOTES
[facility]    Medicine Progress Note - Hospitalist Service       Date of Admission:  11/1/2021    Assessment & Plan         Fatou Brian is a 71 year old female with hypertension, dyslipidemia, depression, CKD stage 3, IBS, h/o tobacco abuse, Sjogren's syndrome with myopathy for which she takes hydroxychloroquine, GERD, chronic back pain who was admitted on 11/1/2021 for chest pain at rest and treatment of NSTEMI.      NSTEMI s/p LIS to OM1  Residual RCA disease, medical mgmt  Hypertension  Dyslipidemia  Initial complaint of 10/10 chest pain while seated on couch at rest, no radiation, some palliation of symptoms with nitroglycerin. Initial troponin negative, 2nd troponin 3.5. EKG without obvious ischemia. Chest CT with atherosclerosis noted. Managed PTA with losartan, hydrochlorothiazide, metoprolol. H/o tobacco abuse. No recent TTE or cath available.   -initiate therapeutic heparin infusion   -serial troponin: peaked at 6.9 11/2  -cardiology consulted - angio 11/2 with PCI of OM1 with LIS. Residual RCA disease noted.  -- DAPT x 1 year  - PTA atorvastatin and metoprolol continued  - prn nitroglycerin  - lostartan added for BP control per cardiology  - await therapy recommendations - possibly TCU vs home care anticipated     Sjogren's Syndrome with myopathy  Occasional blue toes  - resume PTA hydroxychloroquine once dose verified     CKD stage 3b  Stable.      Chronic, stable problems:  Back pain, spinal stenosis  Depression  GERD  Irritable Bowel Syndrome     Asymptomatic Rule Out of COVID-19 infection  This patient was evaluated during a global COVID-19 pandemic, which necessitated consideration that the patient might be at risk for infection with the SARS-CoV-2 virus that causes COVID-19. Applicable protocols for evaluation were followed during the patient's care. Low suspicion for infection. Vaccination status: unknown.   - follow-up COVID-19 PCR test result => negative    Total  encounter time was 41 min with greater than 50% spent in direct patient care and discussion of both current and chronic medical conditions that she will follow with primary, counseling, and coordination of care with RN.       Diet: Low Saturated Fat Na <2400 mg    DVT Prophylaxis: Pneumatic Compression Devices  Shah Catheter: Not present  Central Lines: None  Code Status: Full Code      Disposition Plan   Expected discharge: 11/04/2021   recommended to TCU vs home with home care possibly     The patient's care was discussed with the Bedside Nurse and Patient.    Dani Walter MD  Hospitalist Service  [facility]  Securely message with the Vocera Web Console (learn more here)  Text page via [employer] Paging/Directory        Clinically Significant Risk Factors Present on Admission               ______________________________________________________________________    Interval History   Care assumed today.  Patient seen and examined this morning.  No new complaints.  Blood pressure elevated.  She denies chest pain, shortness of breath, or palpitations.  No fevers or chills.  Will work with therapy this afternoon.    Data reviewed today: I reviewed all medications, new labs and imaging results over the last 24 hours. I personally reviewed no images or EKG's today.    Physical Exam   Vital Signs: Temp: 97.5  F (36.4  C) Temp src: Oral BP: (!) 180/64 Pulse: 91   Resp: 16 SpO2: 98 % O2 Device: None (Room air) Oxygen Delivery: 4 LPM  Weight: 221 lbs 4.8 oz    Gen: NAD, pleasant, [place]  HEENT: Normocephalic, EOMI, MMM  Resp: no crackles,  no wheezes, no increased work of resp  CV: S1S2 heard, reg rhythm, reg rate, bilateral pedal edema  Abdo: soft, nontender, nondistended, bowel sounds present  Ext: calves nontender, well perfused  Neuro: AAOx3, CN grossly intact, no facial asymmetry      Data   Recent Labs   Lab 11/03/21  0618 11/02/21  0851 11/02/21  0619 11/02/21  0352 11/01/21  2334 11/01/21  2330    WBC 6.5  --  8.5  --   --  7.8   HGB 10.8*  --  11.6*  --   --  11.9   MCV 90  --  92  --   --  92   *  --  153  --   --  142*     --   --   --   --  137   POTASSIUM 3.8  --   --   --   --  3.4   CHLORIDE 110*  --   --   --   --  105   CO2 27  --   --   --   --  28   BUN 22  --   --   --   --  30   CR 1.12*  --   --   --  1.4* 1.21*   ANIONGAP 3  --   --   --   --  4   HARIKA 8.5  --   --   --   --  8.5   GLC 92  --   --   --   --  142*   ALBUMIN  --   --   --   --   --  3.5   PROTTOTAL  --   --   --   --   --  6.6*   BILITOTAL  --   --   --   --   --  0.4   ALKPHOS  --   --   --   --   --  89   ALT  --   --   --   --   --  17   AST  --   --   --   --   --  16   LIPASE  --   --   --   --   --  174   TROPONIN  --  6.980*  --  3.509*  --  <0.015     Medications     - MEDICATION INSTRUCTIONS -       - MEDICATION INSTRUCTIONS -       Percutaneous Coronary Intervention orders placed (this is information for BPA alerting)         aspirin  81 mg Oral Daily     atorvastatin  40 mg Oral QPM     calcium carbonate  500 mg Oral At Bedtime     [START ON 11/4/2021] clopidogrel  300 mg Oral Once     [START ON 11/4/2021] clopidogrel  75 mg Oral Daily     hydroxychloroquine  400 mg Oral At Bedtime     losartan  100 mg Oral Daily     metoprolol succinate ER  75 mg Oral Daily     pantoprazole  40 mg Oral QAM AC     pilocarpine  5 mg Oral 4x Daily AC & HS     sodium chloride (PF)  10 mL Intravenous Once     Vitamin D3  125 mcg Oral At Bedtime

## 2021-11-03 NOTE — PROGRESS NOTES
[facility]  Cardiology Progress Note    Date of Service (when I saw the patient): 11/03/2021  Summary: Fatou Brian is a 71 year old female with history of uncontrolled hypertension, hyperlipidemia, 75 pack year of smoking, GERD, emphysema, Sjogren's syndrome who was admitted on 11/1/2021 with chest pain and a NSTEMI.  Interval History   No chest pain or shortness of breath. She has had some oozing at her femoral access site. It is bruised this morning. She remains hypertensive.   Assessment & Plan   1.  NSTEMI. Admitted with substernal chest pain while watching TV. Initial EKG showed NSR with nonspecific ST-T wave changes. CT of the chest showed severe coronary artery disease.   - troponin up to 6, not trended after  - echo showed normal LV function with mild LVH and mid anterolateral wall hypokinesis. Normal RV function.   -  Coronary angiogram on 11/2 showed single vessel CAD s/p PCI of OM1 with a LIS. She otherwise had minimal coronary disease.  2.  Hypertension. Uncontrolled. On Metoprolol XL 50 mg and losartan-hydrochlorothiazide 100/12.5 mg prior to admission.  -  Losartan-hydrochlorothiazide held on admission.  3.  Hyperlipidemia. .  -  Statin initiated.  4.  Hx Sjogren's syndrome.  5.  Hx tobacco use.     Plan:  1.  Continue DAPT. Appears Brilinta will be 200$ thus will transition to plavix. Will load with 300 mg when next Brilinta dose is due and then start plavix 75 mg daily starting tomorrow.   2.  Continue lipitor 40 mg daily. Will need f/u lipid profile.  3.  Resume losartan 100 mg daily.  4.  Continue Metoprolol XL - increased to 75 mg daily.   5.  Hold hydrochlorothiazide for now given two contrast loads yesterday.  6.  Cardiac rehab.  7.  Will arrange for follow up in cardiology clinic in 2 - 3 weeks. She should also follow up with her PCP. Will likely need further adjustments to her antihypertensive regimen.    Maria Teresa Garay PA-C    Physical Exam   Temp:  97.5  F (36.4  C) Temp src: Oral BP: (!) 180/64 Pulse: 91   Resp: 16 SpO2: 98 % O2 Device: None (Room air) Oxygen Delivery: 4 LPM  Vitals:    21 0457 21 1255 21 0624   Weight: 99.8 kg (220 lb) 101 kg (222 lb 9.6 oz) 100.4 kg (221 lb 4.8 oz)     Vital Signs with Ranges  Temp:  [97.5  F (36.4  C)-98.3  F (36.8  C)] 97.5  F (36.4  C)  Pulse:  [59-91] 91  Resp:  [10-38] 16  BP: (148-189)/(63-89) 180/64  SpO2:  [91 %-98 %] 98 %  10/29 0700 -  0659  In: 1301.25 [P.O.:240; I.V.:561.25]  Out: -   Net: 1301.25  Constitutional: NAD.   Respiratory: CTAB.   Cardiovascular: RRR, s1s2, no murmur. R femoral access site has some moderate surrounding ecchymosis. No hematoma or bruit.   GI: soft, BS+  Skin: warm, no rashes  Musculoskeletal: Moving all extremities.    Neurologic: Alert, oriented x 3  Neuropsychiatric: Normal affect   Data   Results for orders placed or performed during the hospital encounter of 21 (from the past 24 hour(s))   Heparin Unfractionated Anti Xa Level   Result Value Ref Range    Anti Xa Unfractionated Heparin 0.88 For Reference Range, See Comment IU/mL    Narrative    Therapeutic Range: UFH: 0.25-0.50 IU/mL for low intensity dosing,  0.30-0.70 IU/mL for high intensity dosing DVT and PE.  This test is not validated for other direct factor X inhibitors (e.g. rivaroxaban, apixaban, edoxaban, betrixaban, fondaparinux) and should not be used for monitoring of other medications.   Echocardiogram Complete   Result Value Ref Range    LVEF  60-65%     Narrative    [id number]  [id number]  [id number]  [id number]^JEREMIAS^[facility]  Echocardiography Laboratory  [address] [place] [id number]     Name: LEROY BALDWIN  MRN: [id number]  : 1950  Study Date: 2021 11:50 AM  Age: 71 yrs  Gender: Female  Patient Location: [facility]  Reason For Study: Stable Angina  Ordering Physician: JAEL MCDOWELL  Referring Physician: Cassidy Alex  Performed By:  Rohith Araya, [facility]     BSA: 2.1 m2  Height: 65 in  Weight: 220 lb  HR: 66  BP: 189/80 mmHg  ______________________________________________________________________________  Procedure  Complete Portable Echo Adult. Optison (NDC #[id number]) given intravenously.  ______________________________________________________________________________  Interpretation Summary     1. Left ventricular systolic function is normal. The visual ejection fraction  is 60-65%.  2. There is mild concentric left ventricular hypertrophy.  3. Mid anterolateral wall hypokinesis is present.  4. The right ventricle is normal in structure, function and size.  5. No evidence for significant valvular pathology.  ______________________________________________________________________________  Left Ventricle  The left ventricle is normal in size. There is mild concentric left  ventricular hypertrophy. Left ventricular systolic function is normal. The  visual ejection fraction is 60-65%. Grade I or early diastolic dysfunction.  Mid anterolateral wall hypokinesis is present.     Right Ventricle  The right ventricle is normal in structure, function and size.     Atria  Normal left atrial size. Right atrial size is normal. There is no color  Doppler evidence of an atrial shunt.     Mitral Valve  The mitral valve is normal in structure and function. There is trace mitral  regurgitation.     Tricuspid Valve  The tricuspid valve is normal in structure and function. There is trace  tricuspid regurgitation.     Aortic Valve  The aortic valve is normal in structure and function.     Pulmonic Valve  The pulmonic valve is normal in structure and function.     Vessels  Normal size aorta. IVC diameter <2.1 cm collapsing >50% with sniff suggests a  normal RA pressure of 3 mmHg.     Pericardium  There is no pericardial effusion.     Rhythm  Sinus rhythm was noted.  ______________________________________________________________________________  MMode/2D Measurements &  Calculations  IVSd: 1.4 cm     LVIDd: 4.3 cm  LVIDs: 2.7 cm  LVPWd: 1.1 cm  FS: 36.3 %  LV mass(C)d: 189.0 grams  LV mass(C)dI: 91.8 grams/m2  Ao root diam: 3.3 cm  LA dimension: 3.9 cm  LA/Ao: 1.2  LA Volume (BP): 44.4 ml  LA Volume Index (BP): 21.6 ml/m2  RWT: 0.51     Doppler Measurements & Calculations  MV E max selma: 84.8 cm/sec  MV A max selma: 95.2 cm/sec  MV E/A: 0.89  MV dec slope: 308.8 cm/sec2  PA acc time: 0.13 sec  E/E' av.8  Lateral E/e': 9.9  Medial E/e': 13.7     ______________________________________________________________________________  Report approved by: Ailyn Hendricks 2021 01:36 PM         Activated clotting time celite, POCT   Result Value Ref Range    Activated Clotting Time (Celite) POCT 163 (H) 74 - 150 seconds   Activated clotting time celite, POCT   Result Value Ref Range    Activated Clotting Time (Celite) POCT 251 (H) 74 - 150 seconds   Cardiac Catheterization    Narrative    1. Single vessel CAD of the OM1  2. Successful PCI of OM1 with 3.0x15 mm Resolute [place] LIS   EKG 12-lead, tracing only   Result Value Ref Range    Systolic Blood Pressure  mmHg    Diastolic Blood Pressure  mmHg    Ventricular Rate 57 BPM    Atrial Rate 57 BPM    NV Interval 170 ms    QRS Duration 102 ms     ms    QTc 473 ms    P Axis 68 degrees    R AXIS 72 degrees    T Axis 71 degrees    Interpretation ECG       Sinus bradycardia  Otherwise normal ECG  When compared with ECG of 2021 00:31,  Nonspecific T wave abnormality now evident in Lateral leads     CBC with Platelets & Differential    Narrative    The following orders were created for panel order CBC with Platelets & Differential.  Procedure                               Abnormality         Status                     ---------                               -----------         ------                     CBC with platelets and d...[[id number]]  Abnormal            Final result                 Please view results for these tests on  the individual orders.   Basic metabolic panel   Result Value Ref Range    Sodium 140 133 - 144 mmol/L    Potassium 3.8 3.4 - 5.3 mmol/L    Chloride 110 (H) 94 - 109 mmol/L    Carbon Dioxide (CO2) 27 20 - 32 mmol/L    Anion Gap 3 3 - 14 mmol/L    Urea Nitrogen 22 7 - 30 mg/dL    Creatinine 1.12 (H) 0.52 - 1.04 mg/dL    Calcium 8.5 8.5 - 10.1 mg/dL    Glucose 92 70 - 99 mg/dL    GFR Estimate 49 (L) >60 mL/min/1.73m2   CBC with platelets and differential   Result Value Ref Range    WBC Count 6.5 4.0 - 11.0 10e3/uL    RBC Count 3.64 (L) 3.80 - 5.20 10e6/uL    Hemoglobin 10.8 (L) 11.7 - 15.7 g/dL    Hematocrit 32.9 (L) 35.0 - 47.0 %    MCV 90 78 - 100 fL    MCH 29.7 26.5 - 33.0 pg    MCHC 32.8 31.5 - 36.5 g/dL    RDW 13.2 10.0 - 15.0 %    Platelet Count 143 (L) 150 - 450 10e3/uL    % Neutrophils 70 %    % Lymphocytes 18 %    % Monocytes 9 %    % Eosinophils 1 %    % Basophils 1 %    % Immature Granulocytes 1 %    NRBCs per 100 WBC 0 <1 /100    Absolute Neutrophils 4.6 1.6 - 8.3 10e3/uL    Absolute Lymphocytes 1.1 0.8 - 5.3 10e3/uL    Absolute Monocytes 0.6 0.0 - 1.3 10e3/uL    Absolute Eosinophils 0.1 0.0 - 0.7 10e3/uL    Absolute Basophils 0.0 0.0 - 0.2 10e3/uL    Absolute Immature Granulocytes 0.0 <=0.0 10e3/uL    Absolute NRBCs 0.0 10e3/uL   EKG 12-lead, tracing only   Result Value Ref Range    Systolic Blood Pressure  mmHg    Diastolic Blood Pressure  mmHg    Ventricular Rate 83 BPM    Atrial Rate 83 BPM    IL Interval 158 ms    QRS Duration 96 ms     ms    QTc 439 ms    P Axis 72 degrees    R AXIS 69 degrees    T Axis 69 degrees    Interpretation ECG       Sinus rhythm  Nonspecific T wave abnormality  Abnormal ECG  When compared with ECG of 02-NOV-2021 15:16, (unconfirmed)  No significant change was found         Medications     - MEDICATION INSTRUCTIONS -       - MEDICATION INSTRUCTIONS -       Percutaneous Coronary Intervention orders placed (this is information for BPA alerting)         aspirin  81 mg Oral  Daily     atorvastatin  40 mg Oral QPM     calcium carbonate  500 mg Oral At Bedtime     hydroxychloroquine  400 mg Oral At Bedtime     metoprolol succinate ER  75 mg Oral Daily     pantoprazole  40 mg Oral QAM AC     pilocarpine  5 mg Oral 4x Daily AC & HS     sodium chloride (PF)  10 mL Intravenous Once     ticagrelor  90 mg Oral Q12H     Vitamin D3  125 mcg Oral At Bedtime

## 2021-11-03 NOTE — PROGRESS NOTES
11/03/21 1054   Quick Adds   Type of Visit Initial Occupational Therapy Evaluation   Living Environment   People in home alone   Current Living Arrangements house   Home Accessibility stairs to enter home;stairs within home   Number of Stairs, Main Entrance 5   Living Environment Comments Stairs to laundry. Has intermittent assist from neighbor   Self-Care   Usual Activity Tolerance moderate   Current Activity Tolerance fair   Equipment Currently Used at Home shower chair;walker, rolling;cane, quad  (V shaped walker. reacher. BP cuff. LIft chair)   Activity/Exercise/Self-Care Comment At baseline is independent in self-cares using walker. Has cut out tub. Uses over the toilet commode. Reports baseline limited to about 30 ft of walking before needing a rest break   Instrumental Activities of Daily Living (IADL)   Previous Responsibilities meal prep;medication management;finances  (caring for cat)   IADL Comments Does not drive. Has help with yardwork and grocery shopping. Does own cooking, cleaning, laundry   Disability/Function   Fall history within last six months no   Change in Functional Status Since Onset of Current Illness/Injury yes   General Information   Onset of Illness/Injury or Date of Surgery 11/01/21   Referring Physician Bobo Torres MD   Patient/Family Therapy Goal Statement (OT) Return home   Additional Occupational Profile Info/Pertinent History of Current Problem 71 year old female with history of uncontrolled hypertension, hyperlipidemia, 75 pack year of smoking, GERD, emphysema, Sjogren's syndrome who was admitted on 11/1/2021 with chest pain and a NSTEMI. Post LIS   Existing Precautions/Restrictions cardiac;fall   Heart Disease Risk Factors Lack of physical activity;Overweight;Age   Cognitive Status Examination   Affect/Mental Status (Cognitive) WFL   Follows Commands follows two-step commands   Cognitive Status Comments [place]   Sensory   Sensory Comments Reports baseline numbness/tingling in  BLEs   Integumentary/Edema   Integumentary/Edema Comments edematous BLEs   Strength Comprehensive (MMT)   Comment, General Manual Muscle Testing (MMT) Assessment Generalized weakness   Bed Mobility   Bed Mobility supine-sit;sit-supine   Supine-Sit [place] (Bed Mobility) supervision   Sit-Supine [place] (Bed Mobility) supervision   Transfers   Transfers sit-stand transfer   Sit-Stand Transfer   Sit-Stand [place] (Transfers) supervision   Activities of Daily Living   BADL Assessment toileting   Toileting   [place] Level (Toileting) contact guard assist   Clinical Impression   Criteria for Skilled Therapeutic Interventions Met (OT) yes;meets criteria;skilled treatment is necessary   OT Diagnosis decline function   OT Problem List-Impairments impacting ADL activity tolerance impaired;balance;mobility;post-surgical precautions   Assessment of Occupational Performance 3-5 Performance Deficits   Identified Performance Deficits strenuous IADLs; LB dressing; toileting; bathing   Planned Therapy Interventions (OT) home program guidelines;progressive activity/exercise;transfer training;IADL retraining;risk factor education   Clinical Decision Making Complexity (OT) low complexity   Therapy Frequency (OT) 2x/day   Predicted Duration of Therapy 3 days   Anticipated Equipment Needs Upon Discharge (OT) reacher   Risk & Benefits of therapy have been explained evaluation/treatment results reviewed;care plan/treatment goals reviewed;risks/benefits reviewed;current/potential barriers reviewed;participants voiced agreement with care plan;participants included;patient   OT Discharge Planning    OT Discharge Recommendation (DC Rec) Home with assist;home with home care occupational therapy   OT Rationale for DC Rec Anticipate that with further medical management and therapy participation that pt will progress to discharge home with intermittent assist from neighbors with strenuous IADLs. Pt functioning below baseline  and will benefit from home OT; home therapy indicated as pt requires assist and taxing effort to leave her home   Total Evaluation Time (Minutes)   Total Evaluation Time (Minutes) 5

## 2021-11-04 ENCOUNTER — APPOINTMENT (OUTPATIENT)
Dept: OCCUPATIONAL THERAPY | Facility: CLINIC | Age: 71
DRG: 247 | End: 2021-11-04
Payer: COMMERCIAL

## 2021-11-04 VITALS
DIASTOLIC BLOOD PRESSURE: 63 MMHG | OXYGEN SATURATION: 95 % | SYSTOLIC BLOOD PRESSURE: 153 MMHG | TEMPERATURE: 97.9 F | HEART RATE: 67 BPM | RESPIRATION RATE: 17 BRPM | BODY MASS INDEX: 37 KG/M2 | HEIGHT: 65 IN | WEIGHT: 222.1 LBS

## 2021-11-04 PROCEDURE — 97535 SELF CARE MNGMENT TRAINING: CPT | Mod: GO | Performed by: OCCUPATIONAL THERAPIST

## 2021-11-04 PROCEDURE — B41F1ZZ FLUOROSCOPY OF RIGHT LOWER EXTREMITY ARTERIES USING LOW OSMOLAR CONTRAST: ICD-10-PCS | Performed by: INTERNAL MEDICINE

## 2021-11-04 PROCEDURE — 250N000013 HC RX MED GY IP 250 OP 250 PS 637: Performed by: INTERNAL MEDICINE

## 2021-11-04 PROCEDURE — 97110 THERAPEUTIC EXERCISES: CPT | Mod: GO | Performed by: OCCUPATIONAL THERAPIST

## 2021-11-04 PROCEDURE — 027034Z DILATION OF CORONARY ARTERY, ONE ARTERY WITH DRUG-ELUTING INTRALUMINAL DEVICE, PERCUTANEOUS APPROACH: ICD-10-PCS | Performed by: INTERNAL MEDICINE

## 2021-11-04 PROCEDURE — B2111ZZ FLUOROSCOPY OF MULTIPLE CORONARY ARTERIES USING LOW OSMOLAR CONTRAST: ICD-10-PCS | Performed by: INTERNAL MEDICINE

## 2021-11-04 PROCEDURE — 250N000013 HC RX MED GY IP 250 OP 250 PS 637: Performed by: PHYSICIAN ASSISTANT

## 2021-11-04 PROCEDURE — 99239 HOSP IP/OBS DSCHRG MGMT >30: CPT | Performed by: HOSPITALIST

## 2021-11-04 PROCEDURE — 250N000013 HC RX MED GY IP 250 OP 250 PS 637: Performed by: STUDENT IN AN ORGANIZED HEALTH CARE EDUCATION/TRAINING PROGRAM

## 2021-11-04 RX ORDER — ATORVASTATIN CALCIUM 20 MG/1
20 TABLET, FILM COATED ORAL EVERY EVENING
Qty: 30 TABLET | Refills: 0 | Status: SHIPPED | OUTPATIENT
Start: 2021-11-04 | End: 2021-11-09 | Stop reason: ALTCHOICE

## 2021-11-04 RX ORDER — LOSARTAN POTASSIUM 100 MG/1
100 TABLET ORAL DAILY
Qty: 30 TABLET | Refills: 0 | Status: SHIPPED | OUTPATIENT
Start: 2021-11-05 | End: 2021-11-25

## 2021-11-04 RX ORDER — NITROGLYCERIN 0.4 MG/1
TABLET SUBLINGUAL
Qty: 12 TABLET | Refills: 0 | Status: SHIPPED | OUTPATIENT
Start: 2021-11-04 | End: 2022-10-26

## 2021-11-04 RX ORDER — METOPROLOL SUCCINATE 25 MG/1
75 TABLET, EXTENDED RELEASE ORAL DAILY
Qty: 30 TABLET | Refills: 0 | Status: SHIPPED | OUTPATIENT
Start: 2021-11-05 | End: 2021-11-16

## 2021-11-04 RX ORDER — ATORVASTATIN CALCIUM 20 MG/1
20 TABLET, FILM COATED ORAL EVERY EVENING
Status: DISCONTINUED | OUTPATIENT
Start: 2021-11-04 | End: 2021-11-04 | Stop reason: HOSPADM

## 2021-11-04 RX ORDER — ATORVASTATIN CALCIUM 40 MG/1
40 TABLET, FILM COATED ORAL EVERY EVENING
Qty: 30 TABLET | Refills: 0 | Status: SHIPPED | OUTPATIENT
Start: 2021-11-04 | End: 2021-11-04

## 2021-11-04 RX ORDER — CLOPIDOGREL BISULFATE 75 MG/1
75 TABLET ORAL DAILY
Qty: 30 TABLET | Refills: 0 | Status: SHIPPED | OUTPATIENT
Start: 2021-11-04 | End: 2021-11-25

## 2021-11-04 RX ADMIN — ACETAMINOPHEN 650 MG: 325 TABLET, FILM COATED ORAL at 05:58

## 2021-11-04 RX ADMIN — PILOCARPINE HYDROCHLORIDE 5 MG: 5 TABLET, FILM COATED ORAL at 08:50

## 2021-11-04 RX ADMIN — LOSARTAN POTASSIUM 100 MG: 100 TABLET, FILM COATED ORAL at 08:54

## 2021-11-04 RX ADMIN — METOPROLOL SUCCINATE 75 MG: 50 TABLET, EXTENDED RELEASE ORAL at 08:50

## 2021-11-04 RX ADMIN — CLOPIDOGREL BISULFATE 300 MG: 300 TABLET, FILM COATED ORAL at 02:33

## 2021-11-04 RX ADMIN — PILOCARPINE HYDROCHLORIDE 5 MG: 5 TABLET, FILM COATED ORAL at 12:18

## 2021-11-04 RX ADMIN — ACETAMINOPHEN 650 MG: 325 TABLET, FILM COATED ORAL at 14:56

## 2021-11-04 RX ADMIN — PANTOPRAZOLE SODIUM 40 MG: 40 TABLET, DELAYED RELEASE ORAL at 08:50

## 2021-11-04 RX ADMIN — ASPIRIN 81 MG: 81 TABLET, COATED ORAL at 08:50

## 2021-11-04 RX ADMIN — CLOPIDOGREL BISULFATE 75 MG: 75 TABLET ORAL at 14:21

## 2021-11-04 ASSESSMENT — ACTIVITIES OF DAILY LIVING (ADL)
ADLS_ACUITY_SCORE: 16

## 2021-11-04 ASSESSMENT — MIFFLIN-ST. JEOR: SCORE: 1513.32

## 2021-11-04 NOTE — PROGRESS NOTES
Discharge instructions reviewed, understood, and signed by patient. VSS, PIV removed, new medications reviewed and understood, patient has all belongings. Patient left unit via wheelchair with nursing staff.

## 2021-11-04 NOTE — PLAN OF CARE
Pt denies chest pain or SOB, up with SBA and a walker, right groin site soft and ecchymotic, BP elevated and was started on losartan, Tele SR

## 2021-11-04 NOTE — PROGRESS NOTES
Homecare recommeded at discharge.  Referral sent to [facility] and was declined. Referral sent to [facility]; orders, demographics and chart notes faxed to: [phone].  Awaiting callback.    3:23 PM  Rec'd call back from [place] with [facility] and she said they can accept patient.  AVS updated with contact information .    Carmel Leavitt RN  Care Coordinator  [facility]  [phone] (text or call)

## 2021-11-04 NOTE — PLAN OF CARE
VSS, tele SR, denies pain.  We will go through discharge instruction when ok to leave per care coordinator.  New medications filled for patient. IV and tele out, belongings sent with patient. She will leave unit with staff and sister will bring patient home.

## 2021-11-04 NOTE — DISCHARGE INSTRUCTIONS
Cardiac Angioplasty/Stent Discharge Instructions - Femoral    After you go home:      Have an adult stay with you until tomorrow.    Drink extra fluids for 2 days.    You may resume your normal diet.    No smoking       For 24 hours - due to the sedation you received:    Relax and take it easy.    Do NOT make any important or legal decisions.    Do NOT drive or operate machines at home or at work.    Do NOT drink alcohol.    Care of Groin Puncture Site:      For the first 24 hrs - check the puncture site every 1-2 hours while awake.    For 2 days, when you cough, sneeze, laugh or move your bowels, hold your hand over the puncture site and press firmly.    Remove the bandaid after 24 hours. If there is minor oozing, apply another bandaid and remove it after 12 hours.    It is normal to have a small bruise or pea size lump at the site.    You may shower tomorrow. Do NOT take a bath, or use a hot tub or pool for at least 3 days. Do NOT scrub the site. Do not use lotion or powder near the puncture site.     Activity:            For 2 days:    No stooping or squatting    Do NOT do any heavy activity such as exercise, lifting, or straining.     No housework, yard work or any activity that make you sweat    Do NOT lift more than 10 pounds    Bleeding:      If you start bleeding from the site in your groin, lie down flat and press firmly on/above the site for 10 minutes.     Once bleeding stops, lay flat for 2 hours.     Call [facility] as soon as you can.       Call 911 right away if you have heavy bleeding or bleeding that does not stop.      Medicines:      If you are taking an antiplatelet medication such as Plavix, Brilinta or Effient, do not stop taking it until you talk to your cardiologist.        If you are on Metformin (Glucophage), do not restart it until you have blood tests (within 2 to 3 days after discharge).  After you have your blood drawn, you may restart the Metformin.     Take your medications, including  blood thinners, unless your provider tells you not to.      If you take Coumadin (Warfarin), have your INR checked by your provider in  3-5 days. Call your clinic to schedule this.    If you have stopped any medicines, check with your provider about when to restart them.    Follow Up Appointments:      Follow up with [facility] Nurse Practitioner at [facility] of patient preference in 7-10 days.    Cardiac Rehab will contact you for follow up care.    Call the clinic if:      You have increased pain or a large or growing hard lump around the site.    The site is red, swollen, hot or tender.    Blood or fluid is draining from the site.    You have chills or a fever greater than 101 F (38 C).    Your leg feels numb, cool or changes color.    You have hives, a rash or unusual itching.    New pain in the back or belly that you cannot control with Tylenol.    Any questions or concerns.      Other Instructions:      If you received a stent - carry your stent card with you at all times.      [facility] [place]:    [phone] [facility] (7 days a week)

## 2021-11-04 NOTE — PLAN OF CARE
A/O x4, VSS on room air, Tele SR, neck and shoulder pain managed with tylenol and hot pack, right groin site ecchymotic, soft , SBA , hard of hearing, pending discharge to TCU/home

## 2021-11-04 NOTE — DISCHARGE SUMMARY
[facility]  Hospitalist Discharge Summary      Date of Admission:  11/1/2021  Date of Discharge:  11/04/2021  Discharging Provider: Dani Walter MD      Discharge Diagnoses   NSTEMI s/p LIS to OM1  Residual RCA disease, medical mgmt  Hypertension  Dyslipidemia  Sjogren's Syndrome with myopathy  Occasional blue toes  CKD stage 3b     Chronic, stable problems:  Back pain, spinal stenosis  Depression  GERD  Irritable Bowel Syndrome    Follow-ups Needed After Discharge   Follow-up Appointments     Follow-up and recommended labs and tests       PCP for hospitalization follow up  Cardiology as scheduled             Unresulted Labs Ordered in the Past 30 Days of this Admission     No orders found from 10/2/2021 to 11/2/2021.      These results will be followed up by NA    Discharge Disposition   Discharged to home with home health care  Condition at discharge: Stable      Hospital Course   Fatou Brian is a 71 year old female with hypertension, dyslipidemia, depression, CKD stage 3, IBS, h/o tobacco abuse, Sjogren's syndrome with myopathy for which she takes hydroxychloroquine, GERD, chronic back pain who was admitted on 11/1/2021 for chest pain at rest and treatment of NSTEMI.      NSTEMI s/p LIS to OM1  Residual RCA disease, medical mgmt  Hypertension  Dyslipidemia  Initial complaint of 10/10 chest pain while seated on couch at rest, no radiation, some palliation of symptoms with nitroglycerin. Initial troponin negative, 2nd troponin 3.5. EKG without obvious ischemia. Chest CT with atherosclerosis noted. Managed PTA with losartan, hydrochlorothiazide, metoprolol. H/o tobacco abuse. No recent TTE or cath available.   -initiate therapeutic heparin infusion   -serial troponin: peaked at 6.9 11/2  -cardiology consulted - angio 11/2 with PCI of OM1 with LIS. Residual RCA disease noted.  -- DAPT x 1 year  - metoprolol increased as below  - prn nitroglycerin  - lostartan added for BP control  per cardiology  - discharging on losartan, was previously on combo with losartan-hydrochlorothiazide but cardiology and hospitalist service concur to proceed without hydrochlorothiazide for now. Follow up with pcp and cardiology for further Bp mgmt. She had some high bp before discharge but this was noted to be during time of acute anxiety.  Close follow up ideal.   - of note, patient was started on lipitor 40 mg per protocol and rather rapidly reported possible new myalgias. Certainly unclear given chronicity etc that statins are to blame; she then explained that she has tried multiple other statins and could never tolerate them several years ago.  After much discussion, she wishes to discharge on 20 mg atorva daily.  If this is not tolerable, she will discuss with PCP and/or cardiology team at follow up.     Sjogren's Syndrome with myopathy  Occasional blue toes  - resume PTA hydroxychloroquine once dose verified     CKD stage 3b  Stable.      Chronic, stable problems:  Back pain, spinal stenosis  Depression  GERD  Irritable Bowel Syndrome     Asymptomatic Rule Out of COVID-19 infection  This patient was evaluated during a global COVID-19 pandemic, which necessitated consideration that the patient might be at risk for infection with the SARS-CoV-2 virus that causes COVID-19. Applicable protocols for evaluation were followed during the patient's care. Low suspicion for infection. Vaccination status: unknown.   - follow-up COVID-19 PCR test result => negative       Consultations This Hospital Stay   PHARMACY IP CONSULT  PHARMACY IP CONSULT  CARDIOLOGY IP CONSULT  NUTRITION SERVICES ADULT IP CONSULT  CARDIAC REHAB IP CONSULT  PHARMACY IP CONSULT  PHARMACY IP CONSULT  SMOKING CESSATION PROGRAM IP CONSULT    Code Status   Full Code    Time Spent on this Encounter   I, Dani Walter MD, personally saw the patient today and spent greater than 30 minutes discharging this patient.       Dani Walter MD  [facility]  [facility]  [address]  [place] [address]  Phone: [phone]  ______________________________________________________________________    Physical Exam   Vital Signs: Temp: 97.9  F (36.6  C) Temp src: Oral BP: (!) 166/64 Pulse: 75   Resp: 16 SpO2: 95 % O2 Device: None (Room air)    Weight: 222 lbs 1.6 oz    Gen: NAD, pleasant  HEENT: Normocephalic, EOMI, MMM  Resp: no crackles,  no wheezes, no increased work of resp  CV: S1S2 heard, reg rhythm, reg rate  Abdo: soft, nontender, nondistended, bowel sounds present  Ext: calves nontender, well perfused  Neuro: AAOx3, CN grossly intact, no facial asymmetry         Primary Care Physician   Cassidy Alex    Discharge Orders      Basic metabolic panel     CARDIAC REHAB REFERRAL      Discharge Order: F/U with Cardiac  [facility]      Home care nursing referral      Home Care PT Referral for Hospital Discharge      Home Care OT Referral for Hospital Discharge      Brief Discharge Instructions    Do NOT stop your aspirin or platelet inhibitor unless directed by your Cardiologist.  These medications help to prevent platelets in your blood from sticking together and forming a clot.  Examples of these medications are:  Ticagrelor (Brilinta), Clopidigrel (Plavix), Prasugrel (Effient)     When to call - Contact the Heart Clinic    You may experience symptoms that require follow-up before your scheduled appointment. Contact the Heart Clinic if you develop: Fever over 100.4o Fahrenheit, that lasts more than one day; Redness, heat, or pus at the puncture site; Change in color or temperature in your groin or leg.     When to call - Reasons to Call 911    If your groin starts to bleed or begins to swell suddenly after leaving the hospital, lie flat and apply firm pressure just above the puncture site for 15 minutes.  If bleeding continues, call 9-1-1.     Precautions - Lifting    NO lifting of more than 10 pounds for at least 3 days.  If  you usually lift 50 pounds or more daily, talk with your Cardiologist.     Precautions - Household Activities    Avoid any hard work or tiring activities.  NO physical activity such as mowing the lawn, raking, vacuuming, changing sheets on your bed, snow shoveling, or using a .     Precautions - Active Sports Activities    Avoid any tiring sports activities.  This includes, yard work, jogging, biking, bowling, swimming, tennis or golf, and sexual activity.     Precautions - Elective Dental Work    NO elective dental work for 6 weeks after receiving a stent.     Comfort and Pain Management - Pain after Surgery    Pain after surgery is normal and expected.  Your leg may be sore or stiff for a few days, and your pain will improve with time. You may take Tylenol or a pain medicine recommended by your Cardiologist.     Comfort and Pain Management - Bruising after Surgery    Bruising around the groin area is normal.  It may take 2-3 weeks for this to go away.  It is normal for the bruised area to turn green and/or yellow as it is healing.  A small lump may also be present and may last 2-3 months.     Activity - Daily Walking    During the day get up and walk around every 2 hours.     Activity - Light Household Activities    Light household activities are ok.     Activity - Elevate Legs    Elevate legs in between all activities.     Activity - Cardiac Rehab    You are encouraged to enroll in an Outpatient Cardiac Rehab program after discharge from the hospital.  Our Cardiac Rehab staff may visit briefly with you while you're in the hospital.  If they miss you, someone will contact you after you are home.     Return to Driving    Driving is NOT permitted for 24 hours after surgery     Return to work    You may return to work after 72 hours if you are feeling well and your job does not involve heavy lifting.     Dressing Removal    You may take off the dressing on your groin the day after your procedure.      Incision Care    Keep the incision area dry and clean.  You do not need to use a bandage on your incision.     Shower / Bathing    It is ok to shower with regular soap. Pat dry, do not rub. No tub bath for 3 days. No swimming in a pool or hot tub immersion for 1 week     Reason for your hospital stay    Chest pain     Follow-up and recommended labs and tests     PCP for hospitalization follow up  Cardiology as scheduled     MD face to face encounter    Documentation of Face to Face and Certification for Home Health Services    I certify that patient: Fatou Brian is under my care and that I, or a nurse practitioner or physician's assistant working with me, had a face-to-face encounter that meets the physician face-to-face encounter requirements with this patient on: 11/4/2021.    This encounter with the patient was in whole, or in part, for the following medical condition, which is the primary reason for home health care: NSTEMI requiring PCI with stent placed.    I certify that, based on my findings, the following services are medically necessary home health services: Nursing, Occupational Therapy, and Physical Therapy.    My clinical findings support the need for the above services because: Nurse is needed: To provide assessment and oversight required in the home to assure adherence to the medical plan due to: uncontrolled HTN, multiple comorbidities.., Occupational Therapy Services are needed to assess and treat cognitive ability and address ADL safety due to impairment in independent living., and Physical Therapy Services are needed to assess and treat the following functional impairments: deconditioned, multiple medical problems, recent NSTEMI.    Further, I certify that my clinical findings support that this patient is homebound (i.e. absences from home require considerable and taxing effort and are for medical reasons or [facility] services or infrequently or of short duration when for other reasons)  because: Leaving home is medically contraindicated for the following reason(s): Dyspnea on exertion that makes it so they cannot leave their home for needed services without clinical deterioration. and Infection risk / immunocompromised state where it is safer for them to receive services in the home...    Based on the above findings. I certify that this patient is confined to the home and needs intermittent skilled nursing care, physical therapy and/or speech therapy.  The patient is under my care, and I have initiated the establishment of the plan of care.  This patient will be followed by a physician who will periodically review the plan of care.  Physician/Provider to provide follow up care: Cassidy Alex    Attending hospital physician (the Medicare certified [place] provider): Dani Walter MD  Physician Signature: See electronic signature associated with these discharge orders.  Date: 11/4/2021     Activity    Your activity upon discharge: activity as tolerated with restrictions as delineated by cardiology     Diet    Follow this diet upon discharge: Orders Placed This Encounter      Low Saturated Fat Na <2400 mg       Significant Results and Procedures   Most Recent 3 CBC's:Recent Labs   Lab Test 11/03/21  0618 11/02/21  0619 11/01/21  2330   WBC 6.5 8.5 7.8   HGB 10.8* 11.6* 11.9   MCV 90 92 92   * 153 142*     Most Recent 3 BMP's:Recent Labs   Lab Test 11/03/21  0618 11/01/21  2334 11/01/21  2330 09/24/21  1137 09/24/21  1137     --  137  --  138   POTASSIUM 3.8  --  3.4  --  4.2   CHLORIDE 110*  --  105  --  106   CO2 27  --  28  --  23   BUN 22  --  30  --  21   CR 1.12* 1.4* 1.21*   < > 1.28*   ANIONGAP 3  --  4  --  9   HARIKA 8.5  --  8.5  --  8.9   GLC 92  --  142*  --  102*    < > = values in this interval not displayed.     Most Recent 3 Troponin's:Recent Labs   Lab Test 11/02/21  0851 11/02/21  0352 11/01/21  2330 08/23/17  1030 01/08/16  1910 01/08/16  1630   TROPI  --   --    --  <0.015 <0.015  The 99th percentile for upper reference range is 0.045 ug/L.  Troponin values in   the range of 0.045 - 0.120 ug/L may be associated with risks of adverse   clinical events.   <0.015  The 99th percentile for upper reference range is 0.045 ug/L.  Troponin values in   the range of 0.045 - 0.120 ug/L may be associated with risks of adverse   clinical events.     TROPONIN 6.980* 3.509* <0.015  --   --   --      Most Recent Cholesterol Panel:Recent Labs   Lab Test 09/24/21  1137   CHOL 200*   *   HDL 57   TRIG 117   ,   Results for orders placed or performed during the hospital encounter of 11/01/21   CT Aortic Survey w Contrast    Narrative    EXAM: CT AORTIC SURVEY W CONTRAST  LOCATION: [facility]  DATE/TIME: 11/2/2021 12:30 AM    INDICATION: Abdominal pain, aortic dissection suspected  COMPARISON: None.  TECHNIQUE: CT angiogram chest abdomen pelvis during arterial phase of injection of IV contrast. 2D and 3D MIP reconstructions were performed by the CT technologist. Dose reduction techniques were used.   CONTRAST: 80mL Isovue-370    FINDINGS:   CT ANGIOGRAM CHEST, ABDOMEN, AND PELVIS: No hyperdense acute aortic intramural hematoma on the noncontrast series. Severe atherosclerotic calcification of the thoracoabdominal aorta. There is multivessel coronary artery calcification. Following the   administration of IV contrast, there is extensive soft and calcified plaque throughout the thoracoabdominal aorta. There is a brief, short segment occlusion of the left subclavian artery just beyond its origin (image 23 of series 5 and image 41 of the   coronal series). There is distal reconstitution of the left subclavian artery. The abdominal aortic branch vessels are patent, though there is mild atherosclerotic narrowing of the trunk of the superior mesenteric artery. There is mild fusiform   aneurysmal dilatation of the distal infrarenal abdominal aorta, measuring 3.1 cm in  diameter on image 48 of the coronal series. Atherosclerotic bilateral iliac and femoral arteries.    LUNGS AND PLEURA: Centrilobular emphysema. No airspace consolidation. No pleural effusion.    MEDIASTINUM/AXILLAE: Heterogeneous, multinodular thyroid. Heart size is normal. Small hiatal hernia. No pericardial effusion.    CORONARY ARTERY CALCIFICATION: Severe.    HEPATOBILIARY: Benign simple cyst in hepatic segment 3 requires no follow-up. Gallbladder is absent.    PANCREAS: Normal.    SPLEEN: Normal.    ADRENAL GLANDS: Mild nodular thickening of both adrenal glands.    KIDNEYS/BLADDER: Moderately atrophic left kidney. Bilateral renal cortical cysts measuring up to 6.4 cm in the right upper pole. No hydronephrosis. Nondistended bladder.    BOWEL: No mechanical bowel obstruction or free air. Normal appendix. Moderate distal colonic diverticulosis.    LYMPH NODES: Normal.    PELVIC ORGANS: Status post hysterectomy. No free fluid.    MUSCULOSKELETAL: Osteopenia with multilevel bridging anterior endplate osteophytes in the mid and lower thoracic spine. Moderate lower lumbar facet arthropathy. Moderate right and moderate to severe left hip arthritic change. Postoperative change of   prior proximal right humerus fracture repair.      Impression    IMPRESSION:  1.  Extensive atherosclerotic changes as described above, including short segment occlusion of the proximal left subclavian artery, but no thoracoabdominal aortic dissection.    2.  Severe coronary artery disease.    3.  Emphysema.    4.  Moderate distal colonic diverticulosis without convincing evidence of acute diverticulitis.     Echocardiogram Complete     Value    LVEF  60-65%    Narrative    [id number]  [id number]  [id number]^JEREMIAS^[facility]  Echocardiography Laboratory  [address] [place] [address]     Name: LEROY BALDWIN  MRN: [id number]  : 1950  Study Date: 2021 11:50 AM  Age: 71 yrs  Gender:  Female  Patient Location: [facility]  Reason For Study: Stable Angina  Ordering Physician: JAEL MCDOWELL  Referring Physician: Cassidy Alex  Performed By: Rohith Araya RDCS     BSA: 2.1 m2  Height: 65 in  Weight: 220 lb  HR: 66  BP: 189/80 mmHg  ______________________________________________________________________________  Procedure  Complete Portable Echo Adult. Optison (NDC #[id number]) given intravenously.  ______________________________________________________________________________  Interpretation Summary     1. Left ventricular systolic function is normal. The visual ejection fraction  is 60-65%.  2. There is mild concentric left ventricular hypertrophy.  3. Mid anterolateral wall hypokinesis is present.  4. The right ventricle is normal in structure, function and size.  5. No evidence for significant valvular pathology.  ______________________________________________________________________________  Left Ventricle  The left ventricle is normal in size. There is mild concentric left  ventricular hypertrophy. Left ventricular systolic function is normal. The  visual ejection fraction is 60-65%. Grade I or early diastolic dysfunction.  Mid anterolateral wall hypokinesis is present.     Right Ventricle  The right ventricle is normal in structure, function and size.     Atria  Normal left atrial size. Right atrial size is normal. There is no color  Doppler evidence of an atrial shunt.     Mitral Valve  The mitral valve is normal in structure and function. There is trace mitral  regurgitation.     Tricuspid Valve  The tricuspid valve is normal in structure and function. There is trace  tricuspid regurgitation.     Aortic Valve  The aortic valve is normal in structure and function.     Pulmonic Valve  The pulmonic valve is normal in structure and function.     Vessels  Normal size aorta. IVC diameter <2.1 cm collapsing >50% with sniff suggests a  normal RA pressure of 3 mmHg.     Pericardium  There is no  pericardial effusion.     Rhythm  Sinus rhythm was noted.  ______________________________________________________________________________  MMode/2D Measurements & Calculations  IVSd: 1.4 cm     LVIDd: 4.3 cm  LVIDs: 2.7 cm  LVPWd: 1.1 cm  FS: 36.3 %  LV mass(C)d: 189.0 grams  LV mass(C)dI: 91.8 grams/m2  Ao root diam: 3.3 cm  LA dimension: 3.9 cm  LA/Ao: 1.2  LA Volume (BP): 44.4 ml  LA Volume Index (BP): 21.6 ml/m2  RWT: 0.51     Doppler Measurements & Calculations  MV E max selma: 84.8 cm/sec  MV A max selma: 95.2 cm/sec  MV E/A: 0.89  MV dec slope: 308.8 cm/sec2  PA acc time: 0.13 sec  E/E' av.8  Lateral E/e': 9.9  Medial E/e': 13.7     ______________________________________________________________________________  Report approved by: Ailyn Hendricks 2021 01:36 PM         Cardiac Catheterization    Narrative    1. Single vessel CAD of the OM1  2. Successful PCI of OM1 with 3.0x15 mm Resolute Terrell LIS       Discharge Medications   Current Discharge Medication List      START taking these medications    Details   aspirin (ASA) 81 MG EC tablet Take 1 tablet (81 mg) by mouth daily Start tomorrow.  Qty: 30 tablet, Refills: 3    Associated Diagnoses: NSTEMI (non-ST elevated myocardial infarction) (H)      atorvastatin (LIPITOR) 20 MG tablet Take 1 tablet (20 mg) by mouth every evening  Qty: 30 tablet, Refills: 0    Associated Diagnoses: Hyperlipidemia with target LDL less than 130      clopidogrel (PLAVIX) 75 MG tablet Take 1 tablet (75 mg) by mouth daily  Qty: 30 tablet, Refills: 0    Associated Diagnoses: NSTEMI (non-ST elevated myocardial infarction) (H)      losartan (COZAAR) 100 MG tablet Take 1 tablet (100 mg) by mouth daily  Qty: 30 tablet, Refills: 0    Associated Diagnoses: NSTEMI (non-ST elevated myocardial infarction) (H)      nitroGLYcerin (NITROSTAT) 0.4 MG sublingual tablet For chest pain place 1 tablet under the tongue every 5 minutes for 3 doses. If symptoms persist 5 minutes after 1st dose  call 911.  Qty: 12 tablet, Refills: 0    Associated Diagnoses: NSTEMI (non-ST elevated myocardial infarction) (H)         CONTINUE these medications which have CHANGED    Details   metoprolol succinate ER (TOPROL-XL) 25 MG 24 hr tablet Take 3 tablets (75 mg) by mouth daily  Qty: 30 tablet, Refills: 0    Associated Diagnoses: NSTEMI (non-ST elevated myocardial infarction) (H)         CONTINUE these medications which have NOT CHANGED    Details   acetaminophen (TYLENOL) 650 MG CR tablet Take 2 tablets (1,300 mg) by mouth every 8 hours as needed for mild pain or pain  Qty: 270 tablet, Refills: 3    Associated Diagnoses: Bilateral low back pain with sciatica, sciatica laterality unspecified, unspecified chronicity; Primary osteoarthritis of both knees      calcium carbonate (TUMS) 500 MG chewable tablet Take 1 tablet (500 mg) by mouth At Bedtime  Qty: 120 tablet, Refills: 11    Associated Diagnoses: Vitamin D insufficiency      cholecalciferol (VITAMIN D3) 5000 units TABS tablet Take 1 tablet (5,000 Units) by mouth At Bedtime  Qty: 100 tablet, Refills: 3    Associated Diagnoses: Vitamin D insufficiency      famotidine (PEPCID) 20 MG tablet TAKE 1 TABLET BY MOUTH TWICE DAILY AS NEEDED  Qty: 180 tablet, Refills: 3    Comments: Patient will notify pharmacy when refills needed.  Associated Diagnoses: Gastroesophageal reflux disease without esophagitis      gabapentin (NEURONTIN) 100 MG capsule TAKE ONE CAPSULE BY MOUTH THREE TIMES DAILY  Qty: 270 capsule, Refills: 1    Associated Diagnoses: Bilateral low back pain with sciatica, sciatica laterality unspecified, unspecified chronicity; Primary osteoarthritis of both knees      hydroxychloroquine (PLAQUENIL) 200 MG tablet TAKE 2 (400 MG) BY MOUTH AT BEDTIME  Qty: 180 tablet, Refills: 3    Comments: Patient will notify pharmacy when refills needed.  Associated Diagnoses: Sjogren's syndrome with myopathy (H)      pantoprazole (PROTONIX) 40 MG EC tablet TAKE 1 BY MOUTH  DAILY  Qty: 90 tablet, Refills: 3    Comments: Patient will notify pharmacy when refills needed.  Associated Diagnoses: Gastroesophageal reflux disease without esophagitis      pilocarpine (SALAGEN) 5 MG tablet Take 1 tablet (5 mg) by mouth 4 times daily (before meals and nightly)  Qty: 360 tablet, Refills: 3    Comments: Patient will notify pharmacy when refills needed.  Associated Diagnoses: Sjogren's syndrome with myopathy (H)      senna-docusate (SENOKOT-S/PERICOLACE) 8.6-50 MG tablet Take 2 tablets by mouth 2 times daily as needed for constipation      !! order for DME One pair longitudinal arch supports  One pair   Use as directed  Qty: 1 each, Refills: 1    Associated Diagnoses: Plantar fasciitis      !! ORDER FOR DME Equipment being ordered: RUBBER THRESHOLD /CARGO WEDGE RAMP  ONE  USE AS DIRECTED FOR THRESHOLD  TO PREVENT FALLING  Qty: 1 Device, Refills: 1    Associated Diagnoses: Thoracic or lumbosacral neuritis or radiculitis, unspecified; OA (osteoarthritis) of knee       !! - Potential duplicate medications found. Please discuss with provider.      STOP taking these medications       losartan-hydrochlorothiazide (HYZAAR) 100-12.5 MG tablet Comments:   Reason for Stopping:             Allergies   Allergies   Allergen Reactions     Chantix [Varenicline]      suicidal     Influenza Virus Vaccine H5n1      Muscle aches dizzy, nauseated  Light headed     Morphine Sulfate      Sensitivity when in hospital     Omeprazole Magnesium Nausea     Intolerance       Vioxx      Niacin Itching and Rash     Zetia [Ezetimibe] Other (See Comments)     Muscle pain     Zyrtec-D Rash

## 2021-11-05 ENCOUNTER — PATIENT OUTREACH (OUTPATIENT)
Dept: CARE COORDINATION | Facility: CLINIC | Age: 71
End: 2021-11-05

## 2021-11-05 DIAGNOSIS — Z71.89 OTHER SPECIFIED COUNSELING: ICD-10-CM

## 2021-11-05 LAB
ATRIAL RATE - MUSE: 57 BPM
ATRIAL RATE - MUSE: 83 BPM
DIASTOLIC BLOOD PRESSURE - MUSE: NORMAL MMHG
DIASTOLIC BLOOD PRESSURE - MUSE: NORMAL MMHG
INTERPRETATION ECG - MUSE: NORMAL
INTERPRETATION ECG - MUSE: NORMAL
P AXIS - MUSE: 68 DEGREES
P AXIS - MUSE: 72 DEGREES
PR INTERVAL - MUSE: 158 MS
PR INTERVAL - MUSE: 170 MS
QRS DURATION - MUSE: 102 MS
QRS DURATION - MUSE: 96 MS
QT - MUSE: 374 MS
QT - MUSE: 486 MS
QTC - MUSE: 439 MS
QTC - MUSE: 473 MS
R AXIS - MUSE: 69 DEGREES
R AXIS - MUSE: 72 DEGREES
SYSTOLIC BLOOD PRESSURE - MUSE: NORMAL MMHG
SYSTOLIC BLOOD PRESSURE - MUSE: NORMAL MMHG
T AXIS - MUSE: 69 DEGREES
T AXIS - MUSE: 71 DEGREES
VENTRICULAR RATE- MUSE: 57 BPM
VENTRICULAR RATE- MUSE: 83 BPM

## 2021-11-05 NOTE — PLAN OF CARE
Occupational Therapy Discharge Summary    Reason for therapy discharge:    Discharged to home with home therapy.    Progress towards therapy goal(s). See goals on Care Plan in [facility] record for goal details.  Goals partially met.  Barriers to achieving goals:   discharge from facility.    Therapy recommendation(s):    Continued therapy is recommended.  Rationale/Recommendations:  Home OT to increase safety and independence w/ I/ADLs.

## 2021-11-05 NOTE — PROGRESS NOTES
Clinic Care Coordination Contact  [facility]: Post-Discharge Note  SITUATION                                                      Admission:    Admission Date: 11/01/21   Reason for Admission: Chest Pain  Discharge:   Discharge Date: 11/04/21  Discharge Diagnosis: Nstemi    BACKGROUND                                                      71 year old female with hypertension, dyslipidemia, depression, CKD stage 3, IBS, h/o tobacco abuse, Sjogren's syndrome with myopathy for which she takes hydroxychloroquine, GERD, chronic back pain who was admitted on 11/1/2021 for chest pain at rest and treatment of NSTEMI.      NSTEMI s/p LIS to OM1  Residual RCA disease, medical mgmt  Hypertension  Dyslipidemia  Initial complaint of 10/10 chest pain while seated on couch at rest, no radiation, some palliation of symptoms with nitroglycerin. Initial troponin negative, 2nd troponin 3.5. EKG without obvious ischemia. Chest CT with atherosclerosis noted. Managed PTA with losartan, hydrochlorothiazide, metoprolol. H/o tobacco abuse. No recent TTE or cath available.   -initiate therapeutic heparin infusion   -serial troponin: peaked at 6.9 11/2  -cardiology consulted - angio 11/2 with PCI of OM1 with LIS. Residual RCA disease noted.  -- DAPT x 1 year  - metoprolol increased as below  - prn nitroglycerin  - lostartan added for BP control per cardiology  - discharging on losartan, was previously on combo with losartan-hydrochlorothiazide but cardiology and hospitalist service concur to proceed without hydrochlorothiazide for now. Follow up with pcp and cardiology for further Bp mgmt. She had some high bp before discharge but this was noted to be during time of acute anxiety.  Close follow up ideal.   - of note, patient was started on lipitor 40 mg per protocol and rather rapidly reported possible new myalgias. Certainly unclear given chronicity etc that statins are to blame; she then explained that she has tried multiple other  statins and could never tolerate them several years ago.  After much discussion, she wishes to discharge on 20 mg atorva daily.  If this is not tolerable, she will discuss with PCP and/or cardiology team at follow up.      Sjogren's Syndrome with myopathy  Occasional blue toes  - resume PTA hydroxychloroquine once dose verified     CKD stage 3b  Stable.      Chronic, stable problems:  Back pain, spinal stenosis  Depression  GERD  Irritable Bowel Syndrome     Asymptomatic Rule Out of COVID-19 infection  This patient was evaluated during a global COVID-19 pandemic, which necessitated consideration that the patient might be at risk for infection with the SARS-CoV-2 virus that causes COVID-19. Applicable protocols for evaluation were followed during the patient's care. Low suspicion for infection. Vaccination status: unknown.   - follow-up COVID-19 PCR test result => negative                ASSESSMENT      Enrollment  Primary Care Care Coordination Status: Declined    Discharge Assessment  How are you doing now that you are home?: very stressful., too many instructions  How are your symptoms? (Red Flag symptoms escalate to triage hotline per guidelines): Improved  Do you feel your condition is stable enough to be safe at home until your provider visit?: Yes  Does the patient have their discharge instructions? : Yes  Does the patient have questions regarding their discharge instructions? : No (Just upset because there is so much of it.)  Were you started on any new medications or were there changes to any of your previous medications? : Yes  Does the patient have all of their medications?: Yes  Do you have questions regarding any of your medications? : No  Do you have all of your needed medical supplies or equipment (DME)?  (i.e. oxygen tank, CPAP, cane, etc.): Yes  Discharge follow-up appointment scheduled within 14 calendar days? : Yes  Discharge Follow Up Appointment Date: 11/09/21  Discharge Follow Up Appointment  Scheduled with?: Primary Care Provider    Post-op ([facility] Only)  If the patient had a surgery or procedure, do they have any questions for a nurse?: Yes (see comment)             PLAN                                                      Outpatient Plan:   PCP for hospitalization follow up  Cardiology as scheduled               Future Appointments   Date Time Provider Department Center   11/9/2021  3:40 PM Cassidy Alex PA-C [facility]   11/16/2021 10:30 AM MAJO Herrera MD [facility]   11/18/2021 10:30 AM VALDES [facility]   11/18/2021 11:00 AM [facility]         For any urgent concerns, please contact our 24 hour nurse triage line: [phone] ([phone])       Fatou is very stressed with having to have home care and not wanting people coming in and telling her what to do.   [place] offered her a clinician outreach and or CCC but she declined as she does not want anymore people involved.  I gave her my phone number and asked her to call me if she changed her mind.     Deborah Mccarty MA

## 2021-11-07 ENCOUNTER — MYC MEDICAL ADVICE (OUTPATIENT)
Dept: FAMILY MEDICINE | Facility: CLINIC | Age: 71
End: 2021-11-07
Payer: COMMERCIAL

## 2021-11-07 DIAGNOSIS — E78.5 HYPERLIPIDEMIA WITH TARGET LDL LESS THAN 130: Primary | ICD-10-CM

## 2021-11-08 RX ORDER — ROSUVASTATIN CALCIUM 10 MG/1
10 TABLET, COATED ORAL DAILY
Qty: 90 TABLET | Refills: 1 | Status: SHIPPED | OUTPATIENT
Start: 2021-11-08 | End: 2021-12-13 | Stop reason: SINTOL

## 2021-11-09 ENCOUNTER — OFFICE VISIT (OUTPATIENT)
Dept: FAMILY MEDICINE | Facility: CLINIC | Age: 71
End: 2021-11-09
Payer: COMMERCIAL

## 2021-11-09 VITALS
HEIGHT: 65 IN | OXYGEN SATURATION: 96 % | SYSTOLIC BLOOD PRESSURE: 148 MMHG | WEIGHT: 225.4 LBS | HEART RATE: 92 BPM | DIASTOLIC BLOOD PRESSURE: 72 MMHG | TEMPERATURE: 98 F | BODY MASS INDEX: 37.55 KG/M2

## 2021-11-09 DIAGNOSIS — I10 ESSENTIAL HYPERTENSION WITH GOAL BLOOD PRESSURE LESS THAN 140/90: Primary | ICD-10-CM

## 2021-11-09 DIAGNOSIS — E78.5 HYPERLIPIDEMIA WITH TARGET LDL LESS THAN 130: ICD-10-CM

## 2021-11-09 DIAGNOSIS — I25.2 HISTORY OF ST ELEVATION MYOCARDIAL INFARCTION (STEMI): ICD-10-CM

## 2021-11-09 PROCEDURE — 99495 TRANSJ CARE MGMT MOD F2F 14D: CPT | Performed by: PHYSICIAN ASSISTANT

## 2021-11-09 ASSESSMENT — PATIENT HEALTH QUESTIONNAIRE - PHQ9
10. IF YOU CHECKED OFF ANY PROBLEMS, HOW DIFFICULT HAVE THESE PROBLEMS MADE IT FOR YOU TO DO YOUR WORK, TAKE CARE OF THINGS AT HOME, OR GET ALONG WITH OTHER PEOPLE: NOT DIFFICULT AT ALL
SUM OF ALL RESPONSES TO PHQ QUESTIONS 1-9: 9
SUM OF ALL RESPONSES TO PHQ QUESTIONS 1-9: 9

## 2021-11-09 ASSESSMENT — MIFFLIN-ST. JEOR: SCORE: 1528.29

## 2021-11-09 NOTE — PROGRESS NOTES
Assessment & Plan     Essential hypertension with goal blood pressure less than 140/90  History of ST elevation myocardial infarction (STEMI)  BP fluctuating a lot, recent medicine changes while in hospital; patient has follow up next week with cardiology and will discuss medicine changes at that time; continue to track BPs at home if possible. Return to clinic with any worsening or changes in symptoms or go to ER Urgent care in off hours.    Hyperlipidemia - recently sent prescription for rosuvastatin instead of atorvastatin    Review of prior external note(s) from - ED note11/1/21  30 minutes spent on the date of the encounter doing chart review, history and exam, documentation and further activities per the note       Patient Instructions   You can add coenzyme Q10 100-400 mg daily to prevent/alleviate muscle aches from statin medicine.  Continue to check BP at home until follow up with cardiology.      Did you know?      You can schedule a video visit for follow-up appointments as well as future appointments for certain conditions.  Please see the below link.     [email or web address]    If you have not already done so,  I encourage you to sign up for [employer] ([email or web address]).  This will allow you to review your results, securely communicate with a provider, and schedule virtual visits as well.      Return in about 3 months (around 2/9/2022) for Follow up, BP Recheck, or sooner with worsening symptoms.    [facility]    Arti Lainez is a 71 year old who presents for the following health issues     History of Present Illness       Hypertension: She presents for follow up of hypertension.  She does check blood pressure  regularly outside of the clinic. Outside blood pressures have been over 140/90. She follows a low salt diet.     She eats 0-1 servings of fruits and vegetables daily.She consumes 0  sweetened beverage(s) daily.She exercises with enough effort to increase her heart rate 9 or less minutes per day.  She exercises with enough effort to increase her heart rate 3 or less days per week.   She is taking medications regularly.     Answers for HPI/ROS submitted by the patient on 11/9/2021  If you checked off any problems, how difficult have these problems made it for you to do your work, take care of things at home, or get along with other people?: Not difficult at all  PHQ9 TOTAL SCORE: 9    Post Discharge Outreach 11/5/2021   Admission Date 11/1/2021   Reason for Admission Chest Pain   Discharge Date 11/4/2021   Discharge Diagnosis Nstemi   How are you doing now that you are home? very stressful., too many instructions   How are your symptoms? (Red Flag symptoms escalate to triage hotline per guidelines) Improved   Do you feel your condition is stable enough to be safe at home until your provider visit? Yes   Does the patient have their discharge instructions?  Yes   Does the patient have questions regarding their discharge instructions?  No   Were you started on any new medications or were there changes to any of your previous medications?  Yes   Does the patient have all of their medications? Yes   Do you have questions regarding any of your medications?  No   Do you have all of your needed medical supplies or equipment (DME)?  (i.e. oxygen tank, CPAP, cane, etc.) Yes   Discharge follow-up appointment scheduled within 14 calendar days?  Yes   Discharge Follow Up Appointment Date 11/9/2021   Discharge Follow Up Appointment Scheduled with? Primary Care Provider     Hospital Follow-up Visit:    Hospital/Nursing Home/IP Rehab Facility: [facility]  Date of Admission: 11/01/2021  Date of Discharge: 11/04/2021  Reason(s) for Admission: NSTEMI (non-ST elevated myocardial infarction) (H)     Was your hospitalization related to COVID-19? No   Problems taking medications regularly:   None  Medication changes since discharge: None  Problems adhering to non-medication therapy:  None    Summary of hospitalization:  [facility] discharge summary reviewed  Diagnostic Tests/Treatments reviewed.  Follow up needed: cardiology 11/16/21; labs 11/18/21  Other Healthcare Providers Involved in Patient s Care:  Homecare  Update since discharge: improved.       Post Discharge Medication Reconciliation: discharge medications reconciled and changed, per note/orders.  Plan of care communicated with patient                Hospital Course     Fatou Brian is a 71 year old female with hypertension, dyslipidemia, depression, CKD stage 3, IBS, h/o tobacco abuse, Sjogren's syndrome with myopathy for which she takes hydroxychloroquine, GERD, chronic back pain who was admitted on 11/1/2021 for chest pain at rest and treatment of NSTEMI.      NSTEMI s/p LIS to OM1  Residual RCA disease, medical mgmt  Hypertension  Dyslipidemia  Initial complaint of 10/10 chest pain while seated on couch at rest, no radiation, some palliation of symptoms with nitroglycerin. Initial troponin negative, 2nd troponin 3.5. EKG without obvious ischemia. Chest CT with atherosclerosis noted. Managed PTA with losartan, hydrochlorothiazide, metoprolol. H/o tobacco abuse. No recent TTE or cath available.   -initiate therapeutic heparin infusion   -serial troponin: peaked at 6.9 11/2  -cardiology consulted - angio 11/2 with PCI of OM1 with LIS. Residual RCA disease noted.  -- DAPT x 1 year  - metoprolol increased as below  - prn nitroglycerin  - lostartan added for BP control per cardiology  - discharging on losartan, was previously on combo with losartan-hydrochlorothiazide but cardiology and hospitalist service concur to proceed without hydrochlorothiazide for now. Follow up with pcp and cardiology for further Bp mgmt. She had some high bp before discharge but this was noted to be during time of acute anxiety.  Close follow up  "ideal.   - of note, patient was started on lipitor 40 mg per protocol and rather rapidly reported possible new myalgias. Certainly unclear given chronicity etc that statins are to blame; she then explained that she has tried multiple other statins and could never tolerate them several years ago.  After much discussion, she wishes to discharge on 20 mg atorvastatin daily.  If this is not tolerable, she will discuss with PCP and/or cardiology team at follow up.      Sjogren's Syndrome with myopathy  Occasional blue toes  - resume PTA hydroxychloroquine once dose verified     CKD stage 3b  Stable.      Chronic, stable problems:  Back pain, spinal stenosis  Depression  GERD  Irritable Bowel Syndrome    TSH due to be updated and done in hospital - within normal limits.  Switched statins via Nano ePrint message to rosuvastatin (from lipitor) as most statins given her severe muscle aches.  Patient checking BP readings at home on WRIST cuff - 144-153/65-74      Review of Systems   Constitutional, HEENT, cardiovascular, pulmonary, GI, , musculoskeletal, neuro, skin, endocrine and psych systems are negative, except as otherwise noted.      Objective    BP (!) 148/72 (Patient Position: Sitting, Cuff Size: Adult Regular)   Pulse 92   Temp 98  F (36.7  C)   Ht 1.651 m (5' 5\")   Wt 102.2 kg (225 lb 6.4 oz)   SpO2 96%   BMI 37.51 kg/m    Body mass index is 37.51 kg/m .  Physical Exam   GENERAL: healthy, alert and no distress  NECK: no adenopathy, no asymmetry, masses, or scars and thyroid normal to palpation  RESP: lungs clear to auscultation - no rales, rhonchi or wheezes  CV: regular rate and rhythm, normal S1 S2, no S3 or S4, no murmur, click or rub, no peripheral edema and peripheral pulses strong  MS: no gross musculoskeletal defects noted, no edema  NEURO: Normal strength and tone, mentation intact and speech normal  PSYCH: mentation appears normal, affect normal/bright                "

## 2021-11-09 NOTE — PATIENT INSTRUCTIONS
You can add coenzyme Q10 100-400 mg daily to prevent/alleviate muscle aches from statin medicine.  Continue to check BP at home until follow up with cardiology.      Did you know?      You can schedule a video visit for follow-up appointments as well as future appointments for certain conditions.  Please see the below link.     [email or web address]    If you have not already done so,  I encourage you to sign up for [employer] ([email or web address]).  This will allow you to review your results, securely communicate with a provider, and schedule virtual visits as well.

## 2021-11-10 ENCOUNTER — TELEPHONE (OUTPATIENT)
Dept: FAMILY MEDICINE | Facility: CLINIC | Age: 71
End: 2021-11-10
Payer: COMMERCIAL

## 2021-11-10 ASSESSMENT — PATIENT HEALTH QUESTIONNAIRE - PHQ9: SUM OF ALL RESPONSES TO PHQ QUESTIONS 1-9: 9

## 2021-11-10 NOTE — TELEPHONE ENCOUNTER
Reason for Call:  Other    Detailed comments: [facility] is requesting orders for skilled nursing 1 x week for 4 weeks, PT OT to eval and treat. Please follow up. Thanks!    Phone Number Patient can be reached at: [phone]    Best Time: Any    Can we leave a detailed message on this number? YES    Call taken on 11/10/2021 at 4:59 PM by Cami Gresham

## 2021-11-12 ENCOUNTER — TELEPHONE (OUTPATIENT)
Dept: NURSING | Facility: CLINIC | Age: 71
End: 2021-11-12
Payer: COMMERCIAL

## 2021-11-13 NOTE — TELEPHONE ENCOUNTER
Fatou realized that she had been taking her metoprolol incorrectly for the past 8 days.    Instead of taking 3 tablets daily - 75 mg, she was only taking 1 tablet daily - 25 mg.  Upon realizing it tonight, she took an additional 50 mg, (she had taken 25 mg at 8 am)    She is concerned about taking the full dose tomorrow morning.    A pharmacist advised that she get back on her regular prescribed dosing by gradually getting back to her 8 am medication time. The pharmacist advised:  Sat - Take med at 2 pm  Sun - Take med at 12 pm  Mon - Take med at 10 am  Tue - Take med at 8 am    Advised to continue as advised by Pharmacist.    COVID 19 Nurse Triage Plan/Patient Instructions    Please be aware that novel coronavirus (COVID-19) may be circulating in the community. If you develop symptoms such as fever, cough, or SOB or if you have concerns about the presence of another infection including coronavirus (COVID-19), please contact your health care provider or visit [email or web address].     Disposition/Instructions    Home care recommended. Follow home care protocol based instructions.    Thank you for taking steps to prevent the spread of this virus.  o Limit your contact with others.  o Wear a simple mask to cover your cough.  o Wash your hands well and often.    Resources    [facility]: About COVID-19: [email or web address]    CDC: What to Do If You're Sick: www.cdc.gov/coronavirus/2019-ncov/about/steps-when-sick.html    CDC: Ending Home Isolation: www.cdc.gov/coronavirus/2019-ncov/hcp/disposition-in-home-patients.html     CDC: Caring for Someone: www.cdc.gov/coronavirus/2019-ncov/if-you-are-sick/care-for-someone.html     [facility]: Interim Guidance for Hospital Discharge to Home: www.health.[facility].mn.us/diseases/coronavirus/hcp/hospdischarge.pdf    [facility] clinical trials (COVID-19 research studies): [email or web address]/umn-clinical-trials     Below are the COVID-19 hotlines  at the [place] Department of Health ([facility]). Interpreters are available.   o For health questions: Call 433-383-2107 or 1-614.980.9121 (7 a.m. to 7 p.m.)  o For questions about schools and childcare: Call 474-076-8920 or 1-705.850.1765 (7 a.m. to 7 p.m.)     Muna Galaviz RN  [facility]

## 2021-11-16 ENCOUNTER — OFFICE VISIT (OUTPATIENT)
Dept: CARDIOLOGY | Facility: CLINIC | Age: 71
End: 2021-11-16
Attending: PHYSICIAN ASSISTANT
Payer: COMMERCIAL

## 2021-11-16 ENCOUNTER — LAB (OUTPATIENT)
Dept: LAB | Facility: CLINIC | Age: 71
End: 2021-11-16
Payer: COMMERCIAL

## 2021-11-16 ENCOUNTER — PRE VISIT (OUTPATIENT)
Dept: CARDIOLOGY | Facility: CLINIC | Age: 71
End: 2021-11-16

## 2021-11-16 VITALS
WEIGHT: 223 LBS | OXYGEN SATURATION: 97 % | SYSTOLIC BLOOD PRESSURE: 170 MMHG | HEART RATE: 80 BPM | DIASTOLIC BLOOD PRESSURE: 70 MMHG | BODY MASS INDEX: 37.11 KG/M2

## 2021-11-16 DIAGNOSIS — I10 BENIGN ESSENTIAL HYPERTENSION: ICD-10-CM

## 2021-11-16 DIAGNOSIS — Z98.61 STATUS POST PERCUTANEOUS TRANSLUMINAL CORONARY ANGIOPLASTY: Primary | ICD-10-CM

## 2021-11-16 DIAGNOSIS — I21.4 NSTEMI (NON-ST ELEVATED MYOCARDIAL INFARCTION) (H): ICD-10-CM

## 2021-11-16 DIAGNOSIS — M35.09 SJOGREN'S SYNDROME WITH OTHER ORGAN INVOLVEMENT (H): ICD-10-CM

## 2021-11-16 DIAGNOSIS — E78.5 HYPERLIPIDEMIA WITH TARGET LDL LESS THAN 70: ICD-10-CM

## 2021-11-16 DIAGNOSIS — I77.1 SUBCLAVIAN ARTERY STENOSIS (H): ICD-10-CM

## 2021-11-16 LAB
ANION GAP SERPL CALCULATED.3IONS-SCNC: 10 MMOL/L (ref 3–14)
BUN SERPL-MCNC: 19 MG/DL (ref 7–30)
CALCIUM SERPL-MCNC: 8.8 MG/DL (ref 8.5–10.1)
CHLORIDE BLD-SCNC: 110 MMOL/L (ref 94–109)
CO2 SERPL-SCNC: 24 MMOL/L (ref 20–32)
CREAT SERPL-MCNC: 1.07 MG/DL (ref 0.52–1.04)
GFR SERPL CREATININE-BSD FRML MDRD: 52 ML/MIN/1.73M2
GLUCOSE BLD-MCNC: 95 MG/DL (ref 70–99)
POTASSIUM BLD-SCNC: 3.8 MMOL/L (ref 3.4–5.3)
SODIUM SERPL-SCNC: 144 MMOL/L (ref 133–144)

## 2021-11-16 PROCEDURE — 99204 OFFICE O/P NEW MOD 45 MIN: CPT | Performed by: INTERNAL MEDICINE

## 2021-11-16 PROCEDURE — 36415 COLL VENOUS BLD VENIPUNCTURE: CPT | Performed by: PATHOLOGY

## 2021-11-16 PROCEDURE — G0463 HOSPITAL OUTPT CLINIC VISIT: HCPCS

## 2021-11-16 PROCEDURE — 80048 BASIC METABOLIC PNL TOTAL CA: CPT | Performed by: PATHOLOGY

## 2021-11-16 RX ORDER — METOPROLOL SUCCINATE 100 MG/1
100 TABLET, EXTENDED RELEASE ORAL DAILY
Qty: 90 TABLET | Refills: 3 | Status: SHIPPED | OUTPATIENT
Start: 2021-11-16 | End: 2022-10-26

## 2021-11-16 ASSESSMENT — PAIN SCALES - GENERAL: PAINLEVEL: MILD PAIN (2)

## 2021-11-16 NOTE — NURSING NOTE
Chief Complaint   Patient presents with     New Patient     establish care with cardiologist       Vitals were taken and medications were reconciled.

## 2021-11-16 NOTE — PATIENT INSTRUCTIONS
Metoprolol dose increased from 75 mg per day to 100 mg per day.  You can use up the 25 mg pills that you are currently taking.  New RX sent for 100 mg pills.  Sent to mail order pharmacy.  Once you receive those, begin taking.     Follow up in 6 months with Dr. Reed. This can be either in clinic or virtual visit.    For vaccinations, you will need to contact your primary care provider, or some of these may be available directly from a local pharmacy of your choosing.  You will need to contact either your primary care provider or a pharmacy for more information.

## 2021-11-16 NOTE — PROGRESS NOTES
SUBJECTIVE:  Fatou Brian is a 71 year old female who presents for post hospital discharge follow-up.  She was admitted from 11/1/2021 to 11/4/2021 with NSTEMI.  Patient had angiogram and stent to obtuse marginal 1.  Postprocedure had no complications.  Had no cardiac complaints today.  Her past medical history is significant for hypertension, dyslipidemia, depression, CKD stage 3, IBS, h/o tobacco abuse, Sjogren's syndrome with myopathy for which she takes hydroxychloroquine, GERD, chronic back pain.  Also known to have left subclavian stenosis and variation in blood pressure between both arms.  Patient had no cardiac complaints today.  Overall she is doing well.    Patient Active Problem List    Diagnosis Date Noted     History of ST elevation myocardial infarction (STEMI) 11/09/2021     Priority: Medium     Chest pain, unspecified type 11/02/2021     Priority: Medium     NSTEMI (non-ST elevated myocardial infarction) (H) 11/02/2021     Priority: Medium     Morbid obesity (H) 09/24/2020     Priority: Medium     CKD (chronic kidney disease) stage 3, GFR 30-59 ml/min (H) 09/27/2019     Priority: Medium     Primary osteoarthritis of both knees 01/14/2017     Priority: Medium     Bilateral low back pain with sciatica, sciatica laterality unspecified, unspecified chronicity 01/14/2017     Priority: Medium     ACP (advance care planning) 11/12/2015     Priority: Medium     Advance Care Planning 3/20/2017: ACP Facilitation Session:    Fatou Brian presented for ACP Facilitation session at a group session. She was accompanied by no one. [facility] information provided and resources reviewed. She currently wishes to give additional consideration to ACP  She currently has the following questions or concerns about Advance Care Planning: none.  Confirmed/documented legally designated decision maker(s).  Added by Karli Aaron RN, Advance Care Planning Liaison.'  Advance Care Planning 11/12/2015: ACP  Review and Resources Provided:  Reviewed chart for advance care plan.  Fatou Brian has no plan or code status on file. Discussed available resources and provided with information. Confirmed code status reflects current choices pending further ACP discussions.  Added by Theresa Bennett         Subclavian artery stenosis (H)      Priority: Medium     Left       Major depression in partial remission (H) 07/03/2014     Priority: Medium     Left arm pain 04/17/2014     Priority: Medium     Arm pain 04/17/2014     Priority: Medium     Hyperlipidemia with target LDL less than 130 01/16/2014     Priority: Medium     Diagnosis updated by automated process. Provider to review and confirm.       Hypertension goal BP (blood pressure) < 140/90 01/08/2014     Priority: Medium     Knee pain 10/25/2013     Priority: Medium     Poor memory 09/24/2013     Priority: Medium     Low back pain 09/24/2013     Priority: Medium     Diagnosis updated by automated process. Provider to review and confirm.    gabapentin (NEURONTIN) 100 MG capsule, Tramadol  Last  check - 1/2/17           Senile keratosis 09/24/2013     Priority: Medium     Spinal stenosis 09/24/2013     Priority: Medium     H/O hysterectomy for benign disease 09/24/2013     Priority: Medium     Bilateral hearing loss 09/24/2013     Priority: Medium     Presbyopia 09/24/2013     Priority: Medium     Sjogren's syndrome with myopathy (H) 04/04/2013     Priority: Medium     Osteoarthritis of knee 04/04/2013     Priority: Medium     GERD (gastroesophageal reflux disease) 04/04/2013     Priority: Medium     Constipation 04/04/2013     Priority: Medium     Osteoporosis 04/04/2013     Priority: Medium     Irritable bowel syndrome with diarrhea 04/04/2013     Priority: Medium     HL (hearing loss) 04/04/2013     Priority: Medium     Rosacea 04/04/2013     Priority: Medium     Gastroesophageal reflux disease without esophagitis 01/19/2011     Priority: Medium    .  Current  Outpatient Medications   Medication Sig     acetaminophen (TYLENOL) 650 MG CR tablet Take 2 tablets (1,300 mg) by mouth every 8 hours as needed for mild pain or pain     aspirin (ASA) 81 MG EC tablet Take 1 tablet (81 mg) by mouth daily Start tomorrow.     calcium carbonate (TUMS) 500 MG chewable tablet Take 1 tablet (500 mg) by mouth At Bedtime     cholecalciferol (VITAMIN D3) 5000 units TABS tablet Take 1 tablet (5,000 Units) by mouth At Bedtime     clopidogrel (PLAVIX) 75 MG tablet Take 1 tablet (75 mg) by mouth daily     famotidine (PEPCID) 20 MG tablet TAKE 1 TABLET BY MOUTH TWICE DAILY AS NEEDED     gabapentin (NEURONTIN) 100 MG capsule TAKE ONE CAPSULE BY MOUTH THREE TIMES DAILY     hydroxychloroquine (PLAQUENIL) 200 MG tablet TAKE 2 (400 MG) BY MOUTH AT BEDTIME     losartan (COZAAR) 100 MG tablet Take 1 tablet (100 mg) by mouth daily     metoprolol succinate ER (TOPROL-XL) 25 MG 24 hr tablet Take 3 tablets (75 mg) by mouth daily     nitroGLYcerin (NITROSTAT) 0.4 MG sublingual tablet For chest pain place 1 tablet under the tongue every 5 minutes for 3 doses. If symptoms persist 5 minutes after 1st dose call 911.     order for DME One pair longitudinal arch supports  One pair   Use as directed     ORDER FOR DME Equipment being ordered: RUBBER THRESHOLD /CARGO WEDGE RAMP  ONE  USE AS DIRECTED FOR THRESHOLD  TO PREVENT FALLING     pantoprazole (PROTONIX) 40 MG EC tablet TAKE 1 BY MOUTH DAILY     pilocarpine (SALAGEN) 5 MG tablet Take 1 tablet (5 mg) by mouth 4 times daily (before meals and nightly)     rosuvastatin (CRESTOR) 10 MG tablet Take 1 tablet (10 mg) by mouth daily     senna-docusate (SENOKOT-S/PERICOLACE) 8.6-50 MG tablet Take 2 tablets by mouth 2 times daily as needed for constipation     No current facility-administered medications for this visit.     Past Medical History:   Diagnosis Date     Arthritis      Atherosclerosis     diffuse     Chest pain, unspecified type 11/02/2021     CKD (chronic  kidney disease) stage 3, GFR 30-59 ml/min (H) 09/27/2019     Emphysema of lung (H)      Former smoker     Quit 2009     GERD (gastroesophageal reflux disease)      HLD (hyperlipidaemia)      Hypertension      Major depression in partial remission (H) 07/03/2014     Morbid obesity (H) 09/24/2020     NSTEMI (non-ST elevated myocardial infarction) (H) 11/02/2021    S/P LIS to OM1     Sjogren's syndrome (H)      Sjogren's syndrome with myopathy (H) 04/04/2013     Spinal stenosis      Subclavian artery stenosis (H)     Left     Past Surgical History:   Procedure Laterality Date     aneursym[  1983, 1991    Has metal in head     CHOLECYSTECTOMY       CLOSED RX PROX HUMERUS FRACTURE      10 pins placed     COLONOSCOPY       COLONOSCOPY  1/24/2014    Procedure: COMBINED COLONOSCOPY, SINGLE BIOPSY/POLYPECTOMY BY BIOPSY;  COLONOSCOPY;  Surgeon: Ranjit Pa MD;  Location:  GI     CV HEART CATHETERIZATION WITH POSSIBLE INTERVENTION N/A 11/2/2021    Procedure: Heart Catheterization with Possible Intervention;  Surgeon: Keeley Pérez MD;  Location:  HEART CARDIAC CATH LAB     CV PCI STENT DRUG ELUTING N/A 11/2/2021    Procedure: Percutaneous Coronary Intervention Stent Drug Eluting;  Surgeon: Keeley Pérez MD;  Location:  HEART CARDIAC CATH LAB     HC ECP WITH CATARACT SURGERY      both eyes     HYSTERECTOMY, JANET       Allergies   Allergen Reactions     Chantix [Varenicline]      suicidal     Influenza Virus Vaccine H5n1      Muscle aches dizzy, nauseated  Light headed     Morphine Sulfate      Sensitivity when in hospital     Omeprazole Magnesium Nausea     Intolerance       Vioxx      Niacin Itching and Rash     Zetia [Ezetimibe] Other (See Comments)     Muscle pain     Zyrtec-D Rash     Social History     Socioeconomic History     Marital status: Single     Spouse name: Not on file     Number of children: Not on file     Years of education: Not on file     Highest education level: Not on  file   Occupational History     Not on file   Tobacco Use     Smoking status: Former Smoker     Packs/day: 0.00     Types: Cigarettes     Quit date: 10/1/2009     Years since quittin.1     Smokeless tobacco: Never Used   Substance and Sexual Activity     Alcohol use: Yes     Comment: occas.     Drug use: No     Sexual activity: Not Currently     Partners: Male   Other Topics Concern     Parent/sibling w/ CABG, MI or angioplasty before 65F 55M? Yes     Comment: brother      Service Not Asked     Blood Transfusions Not Asked     Caffeine Concern No     Occupational Exposure Not Asked     Hobby Hazards Not Asked     Sleep Concern Not Asked     Stress Concern Not Asked     Weight Concern Not Asked     Special Diet Not Asked     Back Care Not Asked     Exercise No     Bike Helmet Not Asked     Seat Belt Yes     Self-Exams Not Asked   Social History Narrative    --------------------------------------------------------------------------------    Surgical History  Return To Top     Status Surgery Time Frame Comment Record Date     Inactive  neck surgery    benign throat growths removed  2007      Inactive  Cataract  1980's  bilateral  2007      Inactive  hysterectomy  1981  TAHBSO  2007      Inactive  Brain surgery  ;  brain aneurysm  2007      Inactive  cholecsystectomy  2007          --------------------------------------------------------------------------------    Food Allergy  Return To Top     Allergen Reaction Comment Record Date     * No known food allergies      2007          --------------------------------------------------------------------------------    Drug Allergy  Return To Top     Allergen Reaction Comment Record Date     MORPHINE SULFATE    sensitivity when in hospital  2012      Vioxx      10/26/2010      NIACIN PREPARATIONS  itching; rash    10/26/2010      Zyrtec  rash    10/26/2010      OMEPRAZOLE/LANSOPRAZOLE  intolerance; nausea   PRILOSEC OTC ONLY OMEPREZOLE OK AND PREVACID OK; PRILOSEC OTC ONLY OMEPREZOLE OK AND PREVACID OK  4/10/2008          --------------------------------------------------------------------------------    Environment Allergy  Return To Top     Allergen Reaction Comment Record Date     * No known environmental allergies      11/7/2007          --------------------------------------------------------------------------------    Immunization History Return To Top     Funding Source Vaccine Type of Vaccine Date Given Route Site Given Lot#  Exp. Date Date on VIS Date Given VIS Vaccinator     Private  Herpes Zoster (Zostavax) age 60 +  Herpes Zoster  1/29/2009  SQ  RA  [id number]  MRK  04/28/2010 9/11/06 1/29/2009  DALIA Fuentes CMA      Private  Influenza (Adult) Seasonal  Flu Adult  10/14/2010  IM  Left Deltoid; Left Deltoid  [id number]  SP  6/30/2011  8/10/2010  10/14/2010  MAJO [facility]      Private  Tdap (Adacel) Adult  Tdap  10/14/2010  IM  Left Deltoid; Left Deltoid  [id number]  SP; SP  06/03/2012  11/18/2008  10/14/2010  MAJO [facility]          --------------------------------------------------------------------------------    Social History  Return To Top     Question Answer Comment Record Date     Marital status      9/24/2012      Emotional Abuse  No    9/16/2011      Exercise      4/9/2008      Caffeine  Yes  1 1/2 cups q.d.   4/9/2008      Physical Abuse  No    9/16/2011      Sealtbelts  Yes    4/9/2008      Sexual Abuse  No    9/16/2011      Breast/Testicle Self Check  Yes  Occasional  4/9/2008      Number of children  0    4/9/2008      Tobacco history  Quit this year  10/26/09  11/17/2009      Number of years using tobacco  35    11/11/2008      Number of cigarettes/day      11/11/2008      Alcohol history  Currently drinks alcohol    9/16/2011      Frequency of drinks  1-4 drinks per week    11/7/2007      Assist to Quit Information  Form Given to Patient    11/11/2008           --------------------------------------------------------------------------------    History - Overall Remark: 9/24/2012 Return To Top     MEDICAL HX: Collapsed lung morphine sensitivitiy last hospitalization    --------------------------------------------------------------------------------    Medical History Return To Top     Status Diagnosis Time Frame Comment Record Date     Active (389.18) - C - Sensorineural hearing loss, bilateral      9/24/2012      Active (V72.62) - I - Laboratory exam as part of general physical exam      9/24/2012      Active (V77.0) - C - Screening, thyroid disorders      9/24/2012      Active (462) - C - Sore throat      9/24/2012      Active (V82.9) - C - Screening, vitamin d deficiency      9/24/2012      Active (466.0) - C - Bronchitis, acute      8/17/2012      Active (372.72) - C - Subconjunctival hemorrhage    RIGHT EYE  11/28/2011      Active (715.16) - C - Osteoarthritis, knee    MANY YEARS BILATERAL  11/28/2011      Active (728.85) - C - Muscle spasm      11/28/2011      Active (V76.12) - C - Screening, mammogram      9/21/2011      Active (724.02) - C - Spinal stenosis, lumbar region    LUMBAR 4-5 AREA  9/21/2011      Active (782.3) - C - Edema, localized, NOS    ANKLES FEET AND CALVES  9/16/2011      Active (455.2) - C - Hemorrhoids, Internal w/Complication    INTERMITTENT BLEEDING  9/16/2011      Active (V76.51) - C - Screening, colon      9/16/2011      Active (719.42) - C - Elbow pain    RIGHT OLECRANON WITH POSSIBLE MOUSE IN BURSA  9/16/2011      Active (724.02) - C - Spinal stenosis, lumbar region      9/16/2011      Active (722.52) - C - Degenerative disc disease, lumbar      9/16/2011      Active (564.00) - C - Constipation      8/19/2011      Active (807.00) - C - Rib fracture      8/19/2011      Active (433.10) - C - CAROTID ART OCC W/O INFARC      4/28/2011      Active (786.52) - C - Chest wall pain    LEFT CHEST WALL  3/25/2011      Active (780.4) - C -  Dizziness/vertigo, NOS      1/3/2011      Active (710.2) - C - Sjogren's disease    confirmed will send to rheumatology  10/14/2010      Active (389.10) - C - Hearing loss, sensorineural    bilateral  10/14/2010      Active 528.00 Stomatits, mucositis, unpec., NOS      12/30/2009      Active 523.01 ACUTE GINGIVITIS NONPLAQUE INDUCED      12/30/2009      Active 241.1 NONTOXIC MULTINODUL GOITER      4/29/2009      Active 241.0 Thyroid nodule      4/28/2009      Active 780.79 Fatigue      11/11/2008      Active (401.1) - C - Hypertension, benign      4/10/2008      Active (729.5) - C - limb pain    HUMERUS FRACTURE WITH RESIDUAL PAIN JULY 2008 PLATE AND 10 SCREWS  4/10/2008      Active (719.46) - C - Knee pain    OSTEOARTHRITIS OF KNEES  4/10/2008      Active 728.6 Dupuytren's contracture    right hand  4/10/2008      Active (723.1) - C - Neck pain    INTERMITTENT NECK PAIN  4/10/2008      Active 796.2 Elevated blood pressure- no hypertension dx      4/10/2008      Active (272.4) - C - Hyperlipidemia      4/10/2008      Active (530.81) - C - GERD      4/10/2008      Active (780.52) - C - Insomnia    associated with adjustment  4/10/2008      Active (V70.0) - C - Routine Medical Exam      11/7/2007      Active 461.0 Sinusitis, acute, maxillary      8/14/2007      Active 733.00 Osteoporosis, unspec.      8/1/2007      Active (305.1) - C - Tobacco abuse      8/1/2007      Active 812.20 Humerus, closed, unspec.      8/1/2007      Active (V72.83) - C - Preop    humerus fx rt  8/1/2007      Inactive  (789.00) - I - Abdominal pain      11/11/2010      Inactive  (v06.1) - C - Tdap vaccine      10/14/2010      Inactive  (V72.62) - C - Laboratory exam as part of general physical exam      10/14/2010      Inactive  (V04.81) - C - Influenza vaccination      10/14/2010      Inactive  (V76.51) - C - Screening, colon      10/14/2010      Inactive  (305.1) - C - Tobacco dependence    resolved  11/17/2009      Inactive  490 Bronchitis       2009      Inactive  466.0 Bronchitis, acute      2009      Inactive  V05.8 Immunization, other, specified      2009      Inactive  V72.31 Screening, pap      2008      Inactive  Abnormal pap      2008          --------------------------------------------------------------------------------    Medication History Return To Top                 --------------------------------------------------------------------------------    Family History  Return To Top     Status Relationship Disease Comment Record Date     Alive Brother      2007      Alive Sister      2007      Alive Brother      2007      Alive Sister      2007      Alive Sister      2007         Brother  heart    2007         Brother  kidney cancer    2007         Mother  stomach cancer  age 79  2007         Brother  heart    2007         Father  heart;AAA  age 80  2007          --------------------------------------------------------------------------------    Infection History Return To Top     Question Answer Comment Record Date     History of HPV  No    2008      Live with someone with TB or exposed to TB  No    2008      History of Hepatitis B  No    2008      History of chlamydia  No    2008      History of gonorrhea  No    2008      History of syphilis  No    2008          --------------------------------------------------------------------------------             Social Determinants of Health     Financial Resource Strain: Not on file   Food Insecurity: Not on file   Transportation Needs: Not on file   Physical Activity: Not on file   Stress: Not on file   Social Connections: Not on file   Intimate Partner Violence: Not on file   Housing Stability: Not on file     Family History   Problem Relation Age of Onset     Thyroid Disease Mother      Arthritis Mother         Rheumatoid     Osteoporosis Mother       Cancer Mother         stomach     Cerebrovascular Disease Mother      Heart Disease Father      Cancer Father      Heart Disease Brother         fibulation          REVIEW OF SYSTEMS:  General: negative, fever, chills, night sweats  Skin: negative, acne, rash and scaling  Eyes: negative, double vision, eye pain and photophobia  Ears/Nose/Throat: negative, nasal congestion and purulent rhinorrhea  Respiratory: No dyspnea on exertion, No cough, No hemoptysis and negative  Cardiovascular: negative, palpitations, tachycardia, irregular heart beat, chest pain, exertional chest pain or pressure, paroxysmal nocturnal dyspnea, dyspnea on exertion and orthopnea       OBJECTIVE:  Pulse 80, weight 101.2 kg (223 lb), SpO2 97 %, not currently breastfeeding.  General Appearance: alert and no distress  Head: Normocephalic. No masses, lesions, tenderness or abnormalities  Eyes: conjuctiva clear, PERRL, EOM intact  Ears: External ears normal. Canals clear. TM's normal.  Nose: Nares normal  Mouth: normal  Neck: Supple, no cervical adenopathy, no thyromegaly  Lungs: clear to auscultation  Cardiac: regular rate and rhythm, normal S1 and S2, no murmur       ASSESSMENT/PLAN:  Patient here for follow-up after recent admission with NSTEMI.  Patient was admitted from 11/1/2021 to level 4/20/2021 at [facility].  Peak troponin was 6.9.  Patient had coronary angiogram showing obtuse marginal 1 as the culprit lesion.  Patient had a stent.  Do have nonobstructive disease of RCA.  Post discharge patient remained asymptomatic.  Her cardiac risk factors are hypertension and hyperlipidemia.  Remote history of smoking quit in 2009.  Patient with Sjogren's syndrome.  Also have significant backache.  Limited mobility.  Reviewed EKG.  Normal sinus rhythm.  Nonspecific ST-T changes.  Echocardiogram reviewed this showed normal biventricular function.  No significant valvular abnormalities.  Coronary angiogram as  follows.                    Today's lab result reviewed.  Creatinine 1.07.  Better than before.  Result discussed with patient.  Today's blood pressure is elevated.  Currently patient is on Toprol-XL 75 mg daily.  Will increase the dose 200 mg daily.  Patient is advised to continue her rest of medications including aspirin and Plavix.  She will return to clinic in 6 months for a follow-up.    Per orders.   Return to Clinic 6 months.  Total visit duration 45 minutes.  This includes face-to-face interview, physical exam, chart review, review of EKG, echocardiogram, coronary angiogram and documentation.

## 2021-11-16 NOTE — LETTER
11/16/2021      RE: Fatou Brian  [address]  [facility] [address]       Dear Colleague,    Thank you for the opportunity to participate in the care of your patient, Fatou Brian, at the [facility] [place] [facility]. Please see a copy of my visit note below.       SUBJECTIVE:  Fatou Brian is a 71 year old female who presents for post hospital discharge follow-up.  She was admitted from 11/1/2021 to 11/4/2021 with NSTEMI.  Patient had angiogram and stent to obtuse marginal 1.  Postprocedure had no complications.  Had no cardiac complaints today.  Her past medical history is significant for hypertension, dyslipidemia, depression, CKD stage 3, IBS, h/o tobacco abuse, Sjogren's syndrome with myopathy for which she takes hydroxychloroquine, GERD, chronic back pain.  Also known to have left subclavian stenosis and variation in blood pressure between both arms.  Patient had no cardiac complaints today.  Overall she is doing well.    Patient Active Problem List    Diagnosis Date Noted     History of ST elevation myocardial infarction (STEMI) 11/09/2021     Priority: Medium     Chest pain, unspecified type 11/02/2021     Priority: Medium     NSTEMI (non-ST elevated myocardial infarction) (H) 11/02/2021     Priority: Medium     Morbid obesity (H) 09/24/2020     Priority: Medium     CKD (chronic kidney disease) stage 3, GFR 30-59 ml/min (H) 09/27/2019     Priority: Medium     Primary osteoarthritis of both knees 01/14/2017     Priority: Medium     Bilateral low back pain with sciatica, sciatica laterality unspecified, unspecified chronicity 01/14/2017     Priority: Medium     ACP (advance care planning) 11/12/2015     Priority: Medium     Advance Care Planning 3/20/2017: ACP Facilitation Session:    Fatou Brian presented for ACP Facilitation session at a group session. She was accompanied by no one. Honoring Choices  information provided and resources reviewed. She currently wishes to give additional consideration to ACP  She currently has the following questions or concerns about Advance Care Planning: none.  Confirmed/documented legally designated decision maker(s).  Added by Karli Aaron RN, Advance Care Planning Liaison.'  Advance Care Planning 11/12/2015: ACP Review and Resources Provided:  Reviewed chart for advance care plan.  Fatou Brian has no plan or code status on file. Discussed available resources and provided with information. Confirmed code status reflects current choices pending further ACP discussions.  Added by Theresa Bennett         Subclavian artery stenosis (H)      Priority: Medium     Left       Major depression in partial remission (H) 07/03/2014     Priority: Medium     Left arm pain 04/17/2014     Priority: Medium     Arm pain 04/17/2014     Priority: Medium     Hyperlipidemia with target LDL less than 130 01/16/2014     Priority: Medium     Diagnosis updated by automated process. Provider to review and confirm.       Hypertension goal BP (blood pressure) < 140/90 01/08/2014     Priority: Medium     Knee pain 10/25/2013     Priority: Medium     Poor memory 09/24/2013     Priority: Medium     Low back pain 09/24/2013     Priority: Medium     Diagnosis updated by automated process. Provider to review and confirm.    gabapentin (NEURONTIN) 100 MG capsule, Tramadol  Last  check - 1/2/17           Senile keratosis 09/24/2013     Priority: Medium     Spinal stenosis 09/24/2013     Priority: Medium     H/O hysterectomy for benign disease 09/24/2013     Priority: Medium     Bilateral hearing loss 09/24/2013     Priority: Medium     Presbyopia 09/24/2013     Priority: Medium     Sjogren's syndrome with myopathy (H) 04/04/2013     Priority: Medium     Osteoarthritis of knee 04/04/2013     Priority: Medium     GERD (gastroesophageal reflux disease) 04/04/2013     Priority: Medium     Constipation  04/04/2013     Priority: Medium     Osteoporosis 04/04/2013     Priority: Medium     Irritable bowel syndrome with diarrhea 04/04/2013     Priority: Medium     HL (hearing loss) 04/04/2013     Priority: Medium     Rosacea 04/04/2013     Priority: Medium     Gastroesophageal reflux disease without esophagitis 01/19/2011     Priority: Medium    .  Current Outpatient Medications   Medication Sig     acetaminophen (TYLENOL) 650 MG CR tablet Take 2 tablets (1,300 mg) by mouth every 8 hours as needed for mild pain or pain     aspirin (ASA) 81 MG EC tablet Take 1 tablet (81 mg) by mouth daily Start tomorrow.     calcium carbonate (TUMS) 500 MG chewable tablet Take 1 tablet (500 mg) by mouth At Bedtime     cholecalciferol (VITAMIN D3) 5000 units TABS tablet Take 1 tablet (5,000 Units) by mouth At Bedtime     clopidogrel (PLAVIX) 75 MG tablet Take 1 tablet (75 mg) by mouth daily     famotidine (PEPCID) 20 MG tablet TAKE 1 TABLET BY MOUTH TWICE DAILY AS NEEDED     gabapentin (NEURONTIN) 100 MG capsule TAKE ONE CAPSULE BY MOUTH THREE TIMES DAILY     hydroxychloroquine (PLAQUENIL) 200 MG tablet TAKE 2 (400 MG) BY MOUTH AT BEDTIME     losartan (COZAAR) 100 MG tablet Take 1 tablet (100 mg) by mouth daily     metoprolol succinate ER (TOPROL-XL) 25 MG 24 hr tablet Take 3 tablets (75 mg) by mouth daily     nitroGLYcerin (NITROSTAT) 0.4 MG sublingual tablet For chest pain place 1 tablet under the tongue every 5 minutes for 3 doses. If symptoms persist 5 minutes after 1st dose call 911.     order for DME One pair longitudinal arch supports  One pair   Use as directed     ORDER FOR DME Equipment being ordered: RUBBER THRESHOLD /CARGO WEDGE RAMP  ONE  USE AS DIRECTED FOR THRESHOLD  TO PREVENT FALLING     pantoprazole (PROTONIX) 40 MG EC tablet TAKE 1 BY MOUTH DAILY     pilocarpine (SALAGEN) 5 MG tablet Take 1 tablet (5 mg) by mouth 4 times daily (before meals and nightly)     rosuvastatin (CRESTOR) 10 MG tablet Take 1 tablet (10 mg) by  mouth daily     senna-docusate (SENOKOT-S/PERICOLACE) 8.6-50 MG tablet Take 2 tablets by mouth 2 times daily as needed for constipation     No current facility-administered medications for this visit.     Past Medical History:   Diagnosis Date     Arthritis      Atherosclerosis     diffuse     Chest pain, unspecified type 11/02/2021     CKD (chronic kidney disease) stage 3, GFR 30-59 ml/min (H) 09/27/2019     Emphysema of lung (H)      Former smoker     Quit 2009     GERD (gastroesophageal reflux disease)      HLD (hyperlipidaemia)      Hypertension      Major depression in partial remission (H) 07/03/2014     Morbid obesity (H) 09/24/2020     NSTEMI (non-ST elevated myocardial infarction) (H) 11/02/2021    S/P LIS to OM1     Sjogren's syndrome (H)      Sjogren's syndrome with myopathy (H) 04/04/2013     Spinal stenosis      Subclavian artery stenosis (H)     Left     Past Surgical History:   Procedure Laterality Date     aneursym[  1983, 1991    Has metal in head     CHOLECYSTECTOMY       CLOSED RX PROX HUMERUS FRACTURE      10 pins placed     COLONOSCOPY       COLONOSCOPY  1/24/2014    Procedure: COMBINED COLONOSCOPY, SINGLE BIOPSY/POLYPECTOMY BY BIOPSY;  COLONOSCOPY;  Surgeon: Ranjit Pa MD;  Location:  GI     CV HEART CATHETERIZATION WITH POSSIBLE INTERVENTION N/A 11/2/2021    Procedure: Heart Catheterization with Possible Intervention;  Surgeon: Keeley Pérez MD;  Location:  HEART CARDIAC CATH LAB     CV PCI STENT DRUG ELUTING N/A 11/2/2021    Procedure: Percutaneous Coronary Intervention Stent Drug Eluting;  Surgeon: Keeley Pérez MD;  Location:  HEART CARDIAC CATH LAB     HC ECP WITH CATARACT SURGERY      both eyes     HYSTERECTOMY, JANET       Allergies   Allergen Reactions     Chantix [Varenicline]      suicidal     Influenza Virus Vaccine H5n1      Muscle aches dizzy, nauseated  Light headed     Morphine Sulfate      Sensitivity when in hospital     Omeprazole Magnesium  Nausea     Intolerance       Vioxx      Niacin Itching and Rash     Zetia [Ezetimibe] Other (See Comments)     Muscle pain     Zyrtec-D Rash     Social History     Socioeconomic History     Marital status: Single     Spouse name: Not on file     Number of children: Not on file     Years of education: Not on file     Highest education level: Not on file   Occupational History     Not on file   Tobacco Use     Smoking status: Former Smoker     Packs/day: 0.00     Types: Cigarettes     Quit date: 10/1/2009     Years since quittin.1     Smokeless tobacco: Never Used   Substance and Sexual Activity     Alcohol use: Yes     Comment: occas.     Drug use: No     Sexual activity: Not Currently     Partners: Male   Other Topics Concern     Parent/sibling w/ CABG, MI or angioplasty before 65F 55M? Yes     Comment: brother      Service Not Asked     Blood Transfusions Not Asked     Caffeine Concern No     Occupational Exposure Not Asked     Hobby Hazards Not Asked     Sleep Concern Not Asked     Stress Concern Not Asked     Weight Concern Not Asked     Special Diet Not Asked     Back Care Not Asked     Exercise No     Bike Helmet Not Asked     Seat Belt Yes     Self-Exams Not Asked   Social History Narrative    --------------------------------------------------------------------------------    Surgical History  Return To Top     Status Surgery Time Frame Comment Record Date     Inactive  neck surgery    benign throat growths removed  2007      Inactive  Cataract  1980's  bilateral  2007      Inactive  hysterectomy  1981  TAHBSO  2007      Inactive  Brain surgery  ;  brain aneurysm  2007      Inactive  cholecsystectomy  2007          --------------------------------------------------------------------------------    Food Allergy  Return To Top     Allergen Reaction Comment Record Date     * No known food allergies      2007           --------------------------------------------------------------------------------    Drug Allergy  Return To Top     Allergen Reaction Comment Record Date     MORPHINE SULFATE    sensitivity when in hospital  9/24/2012      Vioxx      10/26/2010      NIACIN PREPARATIONS  itching; rash    10/26/2010      Zyrtec  rash    10/26/2010      OMEPRAZOLE/LANSOPRAZOLE  intolerance; nausea  PRILOSEC OTC ONLY OMEPREZOLE OK AND PREVACID OK; PRILOSEC OTC ONLY OMEPREZOLE OK AND PREVACID OK  4/10/2008          --------------------------------------------------------------------------------    Environment Allergy  Return To Top     Allergen Reaction Comment Record Date     * No known environmental allergies      11/7/2007          --------------------------------------------------------------------------------    Immunization History Return To Top     Funding Source Vaccine Type of Vaccine Date Given Route Site Given Lot#  Exp. Date Date on VIS Date Given VIS Vaccinator     Private  Herpes Zoster (Zostavax) age 60 +  Herpes Zoster  1/29/2009  SQ  RA  [id number]  MRK  04/28/2010 9/11/06 [facility]29[facility]2009  [facility]      Private  Influenza (Adult) Seasonal  Flu Adult  10/14/2010  IM  Left Deltoid; Left Deltoid  [id number]  SP  6/30/2011  8/10/2010  10/14/2010  MAJO Corley [facility]      Private  Tdap (Adacel) Adult  Tdap  10/14/2010  IM  Left Deltoid; Left Deltoid  [id number]  SP; SP  06/03/2012  11/18/2008  10/14/2010  MAJO Corley CMA          --------------------------------------------------------------------------------    Social History  Return To Top     Question Answer Comment Record Date     Marital status      9/24/2012      Emotional Abuse  No    9/16/2011      Exercise      4/9/2008      Caffeine  Yes  1 1/2 cups q.d.   4/9/2008      Physical Abuse  No    9/16/2011      Sealtbelts  Yes    4/9/2008      Sexual Abuse  No    9/16/2011      Breast/Testicle Self Check  Yes  Occasional  4/9/2008      Number of children  0    4/9/2008       Tobacco history  Quit this year  10/26/09  11/17/2009      Number of years using tobacco  35    11/11/2008      Number of cigarettes/day      11/11/2008      Alcohol history  Currently drinks alcohol    9/16/2011      Frequency of drinks  1-4 drinks per week    11/7/2007      Assist to Quit Information  Form Given to Patient    11/11/2008          --------------------------------------------------------------------------------    History - Overall Remark: 9/24/2012 Return To Top     MEDICAL HX: Collapsed lung morphine sensitivitiy last hospitalization    --------------------------------------------------------------------------------    Medical History Return To Top     Status Diagnosis Time Frame Comment Record Date     Active (389.18) - C - Sensorineural hearing loss, bilateral      9/24/2012      Active (V72.62) - I - Laboratory exam as part of general physical exam      9/24/2012      Active (V77.0) - C - Screening, thyroid disorders      9/24/2012      Active (462) - C - Sore throat      9/24/2012      Active (V82.9) - C - Screening, vitamin d deficiency      9/24/2012      Active (466.0) - C - Bronchitis, acute      8/17/2012      Active (372.72) - C - Subconjunctival hemorrhage    RIGHT EYE  11/28/2011      Active (715.16) - C - Osteoarthritis, knee    MANY YEARS BILATERAL  11/28/2011      Active (728.85) - C - Muscle spasm      11/28/2011      Active (V76.12) - C - Screening, mammogram      9/21/2011      Active (724.02) - C - Spinal stenosis, lumbar region    LUMBAR 4-5 AREA  9/21/2011      Active (782.3) - C - Edema, localized, NOS    ANKLES FEET AND CALVES  9/16/2011      Active (455.2) - C - Hemorrhoids, Internal w/Complication    INTERMITTENT BLEEDING  9/16/2011      Active (V76.51) - C - Screening, colon      9/16/2011      Active (719.42) - C - Elbow pain    RIGHT OLECRANON WITH POSSIBLE MOUSE IN BURSA  9/16/2011      Active (724.02) - C - Spinal stenosis, lumbar region      9/16/2011      Active  (722.52) - C - Degenerative disc disease, lumbar      9/16/2011      Active (564.00) - C - Constipation      8/19/2011      Active (807.00) - C - Rib fracture      8/19/2011      Active (433.10) - C - CAROTID ART OCC W/O INFARC      4/28/2011      Active (786.52) - C - Chest wall pain    LEFT CHEST WALL  3/25/2011      Active (780.4) - C - Dizziness/vertigo, NOS      1/3/2011      Active (710.2) - C - Sjogren's disease    confirmed will send to rheumatology  10/14/2010      Active (389.10) - C - Hearing loss, sensorineural    bilateral  10/14/2010      Active 528.00 Stomatits, mucositis, unpec., NOS      12/30/2009      Active 523.01 ACUTE GINGIVITIS NONPLAQUE INDUCED      12/30/2009      Active 241.1 NONTOXIC MULTINODUL GOITER      4/29/2009      Active 241.0 Thyroid nodule      4/28/2009      Active 780.79 Fatigue      11/11/2008      Active (401.1) - C - Hypertension, benign      4/10/2008      Active (729.5) - C - limb pain    HUMERUS FRACTURE WITH RESIDUAL PAIN JULY 2008 PLATE AND 10 SCREWS  4/10/2008      Active (719.46) - C - Knee pain    OSTEOARTHRITIS OF KNEES  4/10/2008      Active 728.6 Dupuytren's contracture    right hand  4/10/2008      Active (723.1) - C - Neck pain    INTERMITTENT NECK PAIN  4/10/2008      Active 796.2 Elevated blood pressure- no hypertension dx      4/10/2008      Active (272.4) - C - Hyperlipidemia      4/10/2008      Active (530.81) - C - GERD      4/10/2008      Active (780.52) - C - Insomnia    associated with adjustment  4/10/2008      Active (V70.0) - C - Routine Medical Exam      11/7/2007      Active 461.0 Sinusitis, acute, maxillary      8/14/2007      Active 733.00 Osteoporosis, unspec.      8/1/2007      Active (305.1) - C - Tobacco abuse      8/1/2007      Active 812.20 Humerus, closed, unspec.      8/1/2007      Active (V72.83) - C - Preop    humerus fx rt  8/1/2007      Inactive  (789.00) - I - Abdominal pain      11/11/2010      Inactive  (v06.1) - C - Tdap vaccine       10/14/2010      Inactive  (V72.62) - C - Laboratory exam as part of general physical exam      10/14/2010      Inactive  (V04.81) - C - Influenza vaccination      10/14/2010      Inactive  (V76.51) - C - Screening, colon      10/14/2010      Inactive  (305.1) - C - Tobacco dependence    resolved  2009      Inactive  490 Bronchitis      2009      Inactive  466.0 Bronchitis, acute      2009      Inactive  V05.8 Immunization, other, specified      2009      Inactive  V72.31 Screening, pap      2008      Inactive  Abnormal pap      2008          --------------------------------------------------------------------------------    Medication History Return To Top                 --------------------------------------------------------------------------------    Family History  Return To Top     Status Relationship Disease Comment Record Date     Alive Brother      2007      Alive Sister      2007      Alive Brother      2007      Alive Sister      2007      Alive Sister      2007         Brother  heart    2007         Brother  kidney cancer    2007         Mother  stomach cancer  age 79  2007         Brother  heart    2007         Father  heart;AAA  age 80  2007          --------------------------------------------------------------------------------    Infection History Return To Top     Question Answer Comment Record Date     History of HPV  No    2008      Live with someone with TB or exposed to TB  No    2008      History of Hepatitis B  No    2008      History of chlamydia  No    2008      History of gonorrhea  No    2008      History of syphilis  No    2008          --------------------------------------------------------------------------------             Social Determinants of Health     Financial Resource Strain: Not on file   Food Insecurity: Not on file    Transportation Needs: Not on file   Physical Activity: Not on file   Stress: Not on file   Social Connections: Not on file   Intimate Partner Violence: Not on file   Housing Stability: Not on file     Family History   Problem Relation Age of Onset     Thyroid Disease Mother      Arthritis Mother         Rheumatoid     Osteoporosis Mother      Cancer Mother         stomach     Cerebrovascular Disease Mother      Heart Disease Father      Cancer Father      Heart Disease Brother         fibulation          REVIEW OF SYSTEMS:  General: negative, fever, chills, night sweats  Skin: negative, acne, rash and scaling  Eyes: negative, double vision, eye pain and photophobia  Ears/Nose/Throat: negative, nasal congestion and purulent rhinorrhea  Respiratory: No dyspnea on exertion, No cough, No hemoptysis and negative  Cardiovascular: negative, palpitations, tachycardia, irregular heart beat, chest pain, exertional chest pain or pressure, paroxysmal nocturnal dyspnea, dyspnea on exertion and orthopnea       OBJECTIVE:  Pulse 80, weight 101.2 kg (223 lb), SpO2 97 %, not currently breastfeeding.  General Appearance: alert and no distress  Head: Normocephalic. No masses, lesions, tenderness or abnormalities  Eyes: conjuctiva clear, PERRL, EOM intact  Ears: External ears normal. Canals clear. TM's normal.  Nose: Nares normal  Mouth: normal  Neck: Supple, no cervical adenopathy, no thyromegaly  Lungs: clear to auscultation  Cardiac: regular rate and rhythm, normal S1 and S2, no murmur       ASSESSMENT/PLAN:  Patient here for follow-up after recent admission with NSTEMI.  Patient was admitted from 11/1/2021 to level 4/20/2021 at [facility].  Peak troponin was 6.9.  Patient had coronary angiogram showing obtuse marginal 1 as the culprit lesion.  Patient had a stent.  Do have nonobstructive disease of RCA.  Post discharge patient remained asymptomatic.  Her cardiac risk factors are hypertension and hyperlipidemia.  Remote  history of smoking quit in 2009.  Patient with Sjogren's syndrome.  Also have significant backache.  Limited mobility.  Reviewed EKG.  Normal sinus rhythm.  Nonspecific ST-T changes.  Echocardiogram reviewed this showed normal biventricular function.  No significant valvular abnormalities.  Coronary angiogram as follows.                    Today's lab result reviewed.  Creatinine 1.07.  Better than before.  Result discussed with patient.  Today's blood pressure is elevated.  Currently patient is on Toprol-XL 75 mg daily.  Will increase the dose 200 mg daily.  Patient is advised to continue her rest of medications including aspirin and Plavix.  She will return to clinic in 6 months for a follow-up.    Per orders.   Return to Clinic 6 months.  Total visit duration 45 minutes.  This includes face-to-face interview, physical exam, chart review, review of EKG, echocardiogram, coronary angiogram and documentation.      Please do not hesitate to contact me if you have any questions/concerns.     Sincerely,     MAJO Herrera MD

## 2021-11-17 ENCOUNTER — TELEPHONE (OUTPATIENT)
Dept: FAMILY MEDICINE | Facility: CLINIC | Age: 71
End: 2021-11-17
Payer: COMMERCIAL

## 2021-11-17 NOTE — TELEPHONE ENCOUNTER
LDVM with isabela Broussard for below orders. Call back if further questions.    Thank you,  Arlette Nesbitt RN  [place]

## 2021-11-17 NOTE — TELEPHONE ENCOUNTER
Reason for Call:  Home Health Care    [facility] with [facility] called regarding (reason for call): verbal orders    Orders are needed for this patient.     PT: Starting today  2x per week for 1 week  1x per week for 1 week  2x per week for 3 weeks  1s per week for 3 weeks    OT:     Skilled Nursing:     Pt Provider: Isai    Phone Number Homecare Nurse can be reached at: [phone]    Can we leave a detailed message on this number? YES    Phone number patient can be reached at:     Best Time:     Call taken on 11/17/2021 at 12:34 PM by Nataliia Greenfield

## 2021-11-18 ENCOUNTER — TELEPHONE (OUTPATIENT)
Dept: FAMILY MEDICINE | Facility: CLINIC | Age: 71
End: 2021-11-18

## 2021-11-18 NOTE — TELEPHONE ENCOUNTER
.  Reason for Call:  Form, our goal is to have forms completed with 72 hours, however, some forms may require a visit or additional information.    Type of letter, form or note:  home care   SN 1 w 4, PT eval and treat, OT eval and treat   Who is the form from?: Home care [facility]  Where did the form come from: form was faxed in    What clinic location was the form placed at?: [facility]    Where the form was placed: [facility] [employer]Folder    What number is listed as a contact on the form?: [phone]       Additional comments:     Call taken on 11/18/2021 at 3:07 PM by Michelle Becerra

## 2021-11-19 ENCOUNTER — MEDICAL CORRESPONDENCE (OUTPATIENT)
Dept: HEALTH INFORMATION MANAGEMENT | Facility: CLINIC | Age: 71
End: 2021-11-19
Payer: COMMERCIAL

## 2021-11-19 ENCOUNTER — TELEPHONE (OUTPATIENT)
Dept: FAMILY MEDICINE | Facility: CLINIC | Age: 71
End: 2021-11-19
Payer: COMMERCIAL

## 2021-11-19 NOTE — TELEPHONE ENCOUNTER
Reason for Call:  Form, our goal is to have forms completed with 72 hours, however, some forms may require a visit or additional information.    Type of letter, form or note:    Physician orders: This is a request for a delayed start of care due to patient's request, she wanted to check with her insurance before agreeing to services. Intake was received on 11/4/21, SOC completed on 11/10/21.    Plan of care:  Certification period 11/10/21 to 1/8/2022    Who is the form from?:   [facility]     Where did the form come from: form was faxed in    What clinic location was the form placed at?:   [facility]    Where the form was placed:   Ashlie Alex's desk    What number is listed as a contact on the form?:   Fax: [phone]       Additional comments: none    Call taken on 11/19/2021 at 2:25 PM by Nataliia Castillo

## 2021-11-23 ENCOUNTER — TELEPHONE (OUTPATIENT)
Dept: FAMILY MEDICINE | Facility: CLINIC | Age: 71
End: 2021-11-23
Payer: COMMERCIAL

## 2021-11-23 NOTE — TELEPHONE ENCOUNTER
Reason for Call:  Form, our goal is to have forms completed with 72 hours, however, some forms may require a visit or additional information.    Type of letter, form or note:    Medication alert physician order.    -Potential major medication interaction  -Duplicate Therary medications    Who is the form from?:   [facility]    Where did the form come from: form was faxed in    What clinic location was the form placed at?:   [facility]    Where the form was placed:   Ashlie Alex's desk    What number is listed as a contact on the form?:   Fax: [phone]       Additional comments:   none    Call taken on 11/23/2021 at 8:25 AM by Nataliia Castillo

## 2021-11-24 ENCOUNTER — MEDICAL CORRESPONDENCE (OUTPATIENT)
Dept: HEALTH INFORMATION MANAGEMENT | Facility: CLINIC | Age: 71
End: 2021-11-24
Payer: COMMERCIAL

## 2021-11-25 DIAGNOSIS — I21.4 NSTEMI (NON-ST ELEVATED MYOCARDIAL INFARCTION) (H): ICD-10-CM

## 2021-11-25 NOTE — TELEPHONE ENCOUNTER
"Routing refill request to provider for review/approval because:  Labs out of range:  Hgb, platelet and creatinine    B/P out of range at last office visit.  Patient is requesting that prescriptions get sent to [facility].    RN pended orders for a years worth of refills      Last office visit provider:  11/09/2021     Requested Prescriptions   Pending Prescriptions Disp Refills     aspirin (ASA) 81 MG EC tablet 90 tablet 3     Sig: Take 1 tablet (81 mg) by mouth daily Start tomorrow.       Analgesics (Non-Narcotic Tylenol and ASA Only) Failed - 11/25/2021  1:52 PM        Failed - Recent (12 mo) or future (30 days) visit within the authorizing provider's specialty     Patient has had an office visit with the authorizing provider or a provider within the authorizing providers department within the previous 12 mos or has a future within next 30 days. See \"Patient Info\" tab in inbasket, or \"Choose Columns\" in Meds & Orders section of the refill encounter.              Passed - Patient is age 20 years or older     If ASA is flagged for ages under 20 years old. Forward to provider for confirmation Ryes Syndrome is not a concern.              Passed - Medication is active on med list           clopidogrel (PLAVIX) 75 MG tablet 90 tablet 3     Sig: Take 1 tablet (75 mg) by mouth daily       Plavix Failed - 11/25/2021  1:52 PM        Failed - Normal HGB on file in past 12 months     Recent Labs   Lab Test 11/03/21  0618   HGB 10.8*               Failed - Normal Platelets on file in past 12 months     Recent Labs   Lab Test 11/03/21  0618   *               Failed - Recent (12 mo) or future (30 days) visit within the authorizing provider's specialty     Patient has had an office visit with the authorizing provider or a provider within the authorizing providers department within the previous 12 mos or has a future within next 30 days. See \"Patient Info\" tab in inbasket, or \"Choose Columns\" in Meds & Orders " "section of the refill encounter.              Passed - No active PPI on record unless is Protonix        Passed - Medication is active on med list        Passed - Patient is age 18 or older        Passed - No active pregnancy on record        Passed - No positive pregnancy test in past 12 months           losartan (COZAAR) 100 MG tablet 90 tablet 3     Sig: Take 1 tablet (100 mg) by mouth daily       Angiotensin-II Receptors Failed - 11/25/2021  1:52 PM        Failed - Last blood pressure under 140/90 in past 12 months     BP Readings from Last 3 Encounters:   11/16/21 (!) 170/70   11/09/21 (!) 148/72   11/04/21 (!) 153/63                 Failed - Recent (12 mo) or future (30 days) visit within the authorizing provider's specialty     Patient has had an office visit with the authorizing provider or a provider within the authorizing providers department within the previous 12 mos or has a future within next 30 days. See \"Patient Info\" tab in inbasket, or \"Choose Columns\" in Meds & Orders section of the refill encounter.              Failed - Normal serum creatinine on file in past 12 months     Recent Labs   Lab Test 11/16/21  1010   CR 1.07*       Ok to refill medication if creatinine is low          Passed - Medication is active on med list        Passed - Patient is age 18 or older        Passed - No active pregnancy on record        Passed - Normal serum potassium on file in past 12 months     Recent Labs   Lab Test 11/16/21  1010   POTASSIUM 3.8                    Passed - No positive pregnancy test in past 12 months             Jocelyne Kaplan RN 11/25/21 1:55 PM  "

## 2021-11-25 NOTE — TELEPHONE ENCOUNTER
Patient calling to request following [facility]  Does not use [facility].      Aspirin 81 mg one daily  Clopidogrel plavix 75 mg one daily  Losartan potasium cozaar 100 mg daily    Patient reports she has enough medication for about 7-9 days.      [employer] phone [phone].       Jocelyne Kaplan RN  11/25/21 1:35 PM  [facility] Nurse Advisor

## 2021-11-26 ENCOUNTER — TELEPHONE (OUTPATIENT)
Dept: FAMILY MEDICINE | Facility: CLINIC | Age: 71
End: 2021-11-26
Payer: COMMERCIAL

## 2021-11-26 RX ORDER — LOSARTAN POTASSIUM 100 MG/1
100 TABLET ORAL DAILY
Qty: 90 TABLET | Refills: 3 | Status: SHIPPED | OUTPATIENT
Start: 2021-11-26 | End: 2022-10-26

## 2021-11-26 RX ORDER — CLOPIDOGREL BISULFATE 75 MG/1
75 TABLET ORAL DAILY
Qty: 90 TABLET | Refills: 3 | Status: SHIPPED | OUTPATIENT
Start: 2021-11-26 | End: 2022-10-26

## 2021-11-26 NOTE — TELEPHONE ENCOUNTER
Reason for Call:  Form, our goal is to have forms completed with 72 hours, however, some forms may require a visit or additional information.    Type of letter, form or note:  PT physicial order soc 11/17/2021    Who is the form from?: [facility] (if other please explain)    Where did the form come from: form was faxed in    What clinic location was the form placed at?: [facility]    Where the form was placed: plosser Box/Folder    What number is listed as a contact on the form?: fax [phone]       Additional comments:     Call taken on 11/26/2021 at 2:33 PM by Luis Chowdhury

## 2021-11-30 ENCOUNTER — TELEPHONE (OUTPATIENT)
Dept: FAMILY MEDICINE | Facility: CLINIC | Age: 71
End: 2021-11-30
Payer: COMMERCIAL

## 2021-11-30 ENCOUNTER — MEDICAL CORRESPONDENCE (OUTPATIENT)
Dept: HEALTH INFORMATION MANAGEMENT | Facility: CLINIC | Age: 71
End: 2021-11-30
Payer: COMMERCIAL

## 2021-11-30 NOTE — TELEPHONE ENCOUNTER
Signed,  Faxed and sent for scanning  Hilda [facility] Unit Coordinator

## 2021-11-30 NOTE — TELEPHONE ENCOUNTER
Reason for Call:  Form, our goal is to have forms completed with 72 hours, however, some forms may require a visit or additional information.    Type of letter, form or note:  SW eval soc 11/24    Who is the form from?: [facility] care (if other please explain)    Where did the form come from: form was faxed in    What clinic location was the form placed at?: [facility]    Where the form was placed: plosser Box/Folder    What number is listed as a contact on the form?: fax [phone]       Additional comments:     Call taken on 11/30/2021 at 12:03 PM by Luis Chowdhury

## 2021-12-13 ENCOUNTER — TELEPHONE (OUTPATIENT)
Dept: FAMILY MEDICINE | Facility: CLINIC | Age: 71
End: 2021-12-13
Payer: COMMERCIAL

## 2021-12-13 NOTE — TELEPHONE ENCOUNTER
"Дмитрий-Please review and route encounter to appropriate care team pool.  Call received by [facility].  1. May Rosuvastatin and Gabapentin be discontinued?   A. Patient stopped Rosuvastatin on 12/10/21 due to myalgias   B. Patient has not taken Gabapentin \"in a long time\"  2. Patient does not wish to come in for an in-person visit due to weather/time of year.     A. If any Primary Care follow up is needed, patient prefers a phone visit    Triage-Please call JUAN Broussard, back with provider's response regarding patient's medications.  Aarti has confidential voicemail: [phone].    Call received from Aarti PT:  1. Currently with patient  2. Patient reports she stopped taking Rosuvastatin 10 mg tablet   A. Last dose was Friday, 12/10/21   B. Myalgias were not tolerable  3. Has not taken Gabapentin \"in a long time\"  4. Prefers to do phone visit if PCP needs to connect with patient   A. Due to weather, patient prefers to not go in for an office visit  5. Please call Aarti back with provider comments on patient's medications    Aarti informed message will be sent to patient's PCP and care team.    Thank you!  HANY Sheridan, RN  [facility]    "

## 2021-12-17 ENCOUNTER — TELEPHONE (OUTPATIENT)
Dept: FAMILY MEDICINE | Facility: CLINIC | Age: 71
End: 2021-12-17
Payer: COMMERCIAL

## 2021-12-17 NOTE — TELEPHONE ENCOUNTER
Reason for Call:  Form, our goal is to have forms completed with 72 hours, however, some forms may require a visit or additional information.    Type of letter, form or note:  rx order 12/9/2021     Who is the form from?: [facility]  (if other please explain)    Where did the form come from: form was faxed in    What clinic location was the form placed at?: [facility]    Where the form was placed: plosser Box/Folder    What number is listed as a contact on the form?: fax [phone]       Additional comments:     Call taken on 12/17/2021 at 1:12 PM by Luis Chowdhury

## 2021-12-18 ENCOUNTER — HEALTH MAINTENANCE LETTER (OUTPATIENT)
Age: 71
End: 2021-12-18

## 2021-12-21 ENCOUNTER — MEDICAL CORRESPONDENCE (OUTPATIENT)
Dept: HEALTH INFORMATION MANAGEMENT | Facility: CLINIC | Age: 71
End: 2021-12-21
Payer: COMMERCIAL

## 2021-12-21 NOTE — TELEPHONE ENCOUNTER
Form faxed to [facility] fax # [phone] and copy sent to stat scan.     Karo CHOWDHURY

## 2021-12-30 ENCOUNTER — TELEPHONE (OUTPATIENT)
Dept: FAMILY MEDICINE | Facility: CLINIC | Age: 71
End: 2021-12-30
Payer: COMMERCIAL

## 2021-12-30 ENCOUNTER — MEDICAL CORRESPONDENCE (OUTPATIENT)
Dept: HEALTH INFORMATION MANAGEMENT | Facility: CLINIC | Age: 71
End: 2021-12-30
Payer: COMMERCIAL

## 2022-01-08 ENCOUNTER — MEDICAL CORRESPONDENCE (OUTPATIENT)
Dept: HEALTH INFORMATION MANAGEMENT | Facility: CLINIC | Age: 72
End: 2022-01-08
Payer: COMMERCIAL

## 2022-01-18 ENCOUNTER — TELEPHONE (OUTPATIENT)
Dept: FAMILY MEDICINE | Facility: CLINIC | Age: 72
End: 2022-01-18
Payer: COMMERCIAL

## 2022-01-18 NOTE — TELEPHONE ENCOUNTER
Reason for Call:  Form, our goal is to have forms completed with 72 hours, however, some forms may require a visit or additional information.    Type of letter, form or note:    Physician orders  Date: 11/21/2021    Physician Communication:   OT eval and treat for ther ex, ther act and self care for   2x a week for 6 weeks.    Who is the [facility]?:   [facility]    Where did the form come from: form was faxed in    What clinic location was the form placed at?:   [facility]      Where the form was placed:   Дмитрий Alex's desk    What number is listed as a contact on the form?:     Fax: [phone]    Additional comments: none    Call taken on 1/18/2022 at 12:28 PM by Nataliia Castillo

## 2022-01-20 ENCOUNTER — MEDICAL CORRESPONDENCE (OUTPATIENT)
Dept: HEALTH INFORMATION MANAGEMENT | Facility: CLINIC | Age: 72
End: 2022-01-20
Payer: COMMERCIAL

## 2022-08-23 DIAGNOSIS — K21.9 GASTROESOPHAGEAL REFLUX DISEASE WITHOUT ESOPHAGITIS: Chronic | ICD-10-CM

## 2022-08-25 RX ORDER — PANTOPRAZOLE SODIUM 40 MG/1
TABLET, DELAYED RELEASE ORAL
Qty: 90 TABLET | Refills: 3 | Status: SHIPPED | OUTPATIENT
Start: 2022-08-25 | End: 2022-10-26

## 2022-08-25 NOTE — TELEPHONE ENCOUNTER
Routing refill request to provider for review/approval because:  PPI Protocol Failed: no diagnosis of osteoporosis on record  Ximena WATT RN

## 2022-10-09 ENCOUNTER — HEALTH MAINTENANCE LETTER (OUTPATIENT)
Age: 72
End: 2022-10-09

## 2022-10-12 ENCOUNTER — TELEPHONE (OUTPATIENT)
Dept: CARDIOLOGY | Facility: CLINIC | Age: 72
End: 2022-10-12

## 2022-10-12 NOTE — TELEPHONE ENCOUNTER
Called patient to review recent elevated blood pressure reading.  Per [place] Community Me[place] guidelines, patients blood pressure is out of parameters and recheck blood pressure is recommended.    Last Blood Pressure: 170/70  Last Heart Rate: 80  Date: 11/16/2021  Location: [facility]    Today's Blood Pressure: 139/75  Today's Heart Rate: None taken  Location: [facility]    Patient reported blood pressure updated in Epic. Blood pressure falls within [place] Community Me[place] guidelines.  Patient will follow up as previously advised.

## 2022-10-16 PROBLEM — I25.10 CORONARY ARTERY DISEASE INVOLVING NATIVE CORONARY ARTERY OF NATIVE HEART: Status: ACTIVE | Noted: 2022-10-16

## 2022-10-18 ENCOUNTER — OFFICE VISIT (OUTPATIENT)
Dept: FAMILY MEDICINE | Facility: CLINIC | Age: 72
End: 2022-10-18
Payer: COMMERCIAL

## 2022-10-18 VITALS
HEIGHT: 65 IN | HEART RATE: 73 BPM | WEIGHT: 221 LBS | BODY MASS INDEX: 36.82 KG/M2 | TEMPERATURE: 97.5 F | RESPIRATION RATE: 16 BRPM | DIASTOLIC BLOOD PRESSURE: 90 MMHG | SYSTOLIC BLOOD PRESSURE: 138 MMHG | OXYGEN SATURATION: 98 %

## 2022-10-18 DIAGNOSIS — M85.852 OSTEOPENIA OF BOTH HIPS: ICD-10-CM

## 2022-10-18 DIAGNOSIS — M85.851 OSTEOPENIA OF BOTH HIPS: ICD-10-CM

## 2022-10-18 DIAGNOSIS — M35.03 SJOGREN'S SYNDROME WITH MYOPATHY (H): ICD-10-CM

## 2022-10-18 DIAGNOSIS — E78.00 PURE HYPERCHOLESTEROLEMIA: ICD-10-CM

## 2022-10-18 DIAGNOSIS — N18.31 STAGE 3A CHRONIC KIDNEY DISEASE (H): ICD-10-CM

## 2022-10-18 DIAGNOSIS — I10 HYPERTENSION GOAL BP (BLOOD PRESSURE) < 130/80: ICD-10-CM

## 2022-10-18 DIAGNOSIS — I77.1 SUBCLAVIAN ARTERY STENOSIS (H): ICD-10-CM

## 2022-10-18 DIAGNOSIS — H91.90 HEARING LOSS, UNSPECIFIED HEARING LOSS TYPE, UNSPECIFIED LATERALITY: ICD-10-CM

## 2022-10-18 DIAGNOSIS — E66.01 MORBID OBESITY (H): ICD-10-CM

## 2022-10-18 DIAGNOSIS — I25.10 CORONARY ARTERY DISEASE INVOLVING NATIVE CORONARY ARTERY OF NATIVE HEART WITHOUT ANGINA PECTORIS: ICD-10-CM

## 2022-10-18 DIAGNOSIS — Z87.891 PERSONAL HISTORY OF TOBACCO USE: ICD-10-CM

## 2022-10-18 DIAGNOSIS — F33.41 RECURRENT MAJOR DEPRESSIVE DISORDER, IN PARTIAL REMISSION (H): ICD-10-CM

## 2022-10-18 DIAGNOSIS — Z00.00 ENCOUNTER FOR MEDICARE ANNUAL WELLNESS EXAM: Primary | ICD-10-CM

## 2022-10-18 DIAGNOSIS — J43.2 CENTRILOBULAR EMPHYSEMA (H): ICD-10-CM

## 2022-10-18 DIAGNOSIS — Z12.31 ENCOUNTER FOR SCREENING MAMMOGRAM FOR BREAST CANCER: ICD-10-CM

## 2022-10-18 PROBLEM — I67.1 CEREBRAL ANEURYSM, NONRUPTURED: Status: ACTIVE | Noted: 2022-10-18

## 2022-10-18 LAB
ANION GAP SERPL CALCULATED.3IONS-SCNC: 8 MMOL/L (ref 3–14)
BUN SERPL-MCNC: 20 MG/DL (ref 7–30)
CALCIUM SERPL-MCNC: 8.7 MG/DL (ref 8.5–10.1)
CHLORIDE BLD-SCNC: 106 MMOL/L (ref 94–109)
CHOLEST SERPL-MCNC: 200 MG/DL
CO2 SERPL-SCNC: 26 MMOL/L (ref 20–32)
CREAT SERPL-MCNC: 1.03 MG/DL (ref 0.52–1.04)
CREAT UR-MCNC: 27 MG/DL
FASTING STATUS PATIENT QL REPORTED: YES
GFR SERPL CREATININE-BSD FRML MDRD: 57 ML/MIN/1.73M2
GLUCOSE BLD-MCNC: 100 MG/DL (ref 70–99)
HDLC SERPL-MCNC: 59 MG/DL
HGB BLD-MCNC: 13.1 G/DL (ref 11.7–15.7)
LDLC SERPL CALC-MCNC: 122 MG/DL
MICROALBUMIN UR-MCNC: 9 MG/L
MICROALBUMIN/CREAT UR: 33.33 MG/G CR (ref 0–25)
NONHDLC SERPL-MCNC: 141 MG/DL
POTASSIUM BLD-SCNC: 4.8 MMOL/L (ref 3.4–5.3)
SODIUM SERPL-SCNC: 140 MMOL/L (ref 133–144)
TRIGL SERPL-MCNC: 95 MG/DL

## 2022-10-18 PROCEDURE — 82043 UR ALBUMIN QUANTITATIVE: CPT | Performed by: FAMILY MEDICINE

## 2022-10-18 PROCEDURE — G0439 PPPS, SUBSEQ VISIT: HCPCS | Performed by: FAMILY MEDICINE

## 2022-10-18 PROCEDURE — 80048 BASIC METABOLIC PNL TOTAL CA: CPT | Performed by: FAMILY MEDICINE

## 2022-10-18 PROCEDURE — 80061 LIPID PANEL: CPT | Performed by: FAMILY MEDICINE

## 2022-10-18 PROCEDURE — 85018 HEMOGLOBIN: CPT | Performed by: FAMILY MEDICINE

## 2022-10-18 PROCEDURE — 36415 COLL VENOUS BLD VENIPUNCTURE: CPT | Performed by: FAMILY MEDICINE

## 2022-10-18 PROCEDURE — 99214 OFFICE O/P EST MOD 30 MIN: CPT | Mod: 25 | Performed by: FAMILY MEDICINE

## 2022-10-18 ASSESSMENT — ENCOUNTER SYMPTOMS
WEAKNESS: 1
ABDOMINAL PAIN: 0
FREQUENCY: 1
PALPITATIONS: 0
DYSURIA: 0
NAUSEA: 0
NERVOUS/ANXIOUS: 1
HEMATOCHEZIA: 0
JOINT SWELLING: 1
ARTHRALGIAS: 1
DIARRHEA: 0
FEVER: 0
CONSTIPATION: 0
COUGH: 1
EYE PAIN: 0
SORE THROAT: 0
PARESTHESIAS: 1
DIZZINESS: 0
CHILLS: 0
SHORTNESS OF BREATH: 0
HEARTBURN: 1
HEADACHES: 0
MYALGIAS: 1
HEMATURIA: 0
BREAST MASS: 0

## 2022-10-18 ASSESSMENT — ACTIVITIES OF DAILY LIVING (ADL): CURRENT_FUNCTION: TRANSPORTATION REQUIRES ASSISTANCE

## 2022-10-18 ASSESSMENT — PATIENT HEALTH QUESTIONNAIRE - PHQ9
SUM OF ALL RESPONSES TO PHQ QUESTIONS 1-9: 13
10. IF YOU CHECKED OFF ANY PROBLEMS, HOW DIFFICULT HAVE THESE PROBLEMS MADE IT FOR YOU TO DO YOUR WORK, TAKE CARE OF THINGS AT HOME, OR GET ALONG WITH OTHER PEOPLE: SOMEWHAT DIFFICULT
SUM OF ALL RESPONSES TO PHQ QUESTIONS 1-9: 13

## 2022-10-18 NOTE — PATIENT INSTRUCTIONS
I recommend that patients 50 and over get the Shingrix vaccine at a pharmacy.  The pharmacist will let you know beforehand if there is a copay and can administer the vaccine.  The Shingrix vaccination is a 2-shot series.  The second dose is given 2-6 months after the first.  (It cannot be given sooner than 2 months after the first dose - at the earliest.)  You should be aware that patients are reporting feeling a bit under the weather after the injection, including fatigue, malaise, and low-grade fever that may last a couple days.  Rarely patients can get a mild, shingles-like rash.   But these symptoms are much better than the Shingles disease.    I recommend that you re-try a statin to prevent another heart attack.  If you get muscle aches from the statin you should try adding Coenzyme Q10 100 mg twice daily. That is available in the vitamin section of your drug store.        If you have MyChart:  1) I kindly request that you check your MyChart prior to all appointments with me and complete any assigned questionnaires ahead of time.    2) You may receive auto-released results from our system before I have the opportunity to review and comment.  Be assured I will review and comment on all of your results as soon as I can.      If you do not have MyChart:  1) I encourage you to sign up for [email or web address] ([email or web address]).  This will allow you to review your results, securely communicate with your care team, and schedule virtual visits as well.  2) Please be aware that result letters from the clinic can take up to 2 weeks but urgent results will be called to you.      FYI:  1) I do virtual (video) visits exclusively on Wednesdays.  I still do in-person visits at [facility] ([phone]) on Mondays, Tuesdays and Thursdays.  You can schedule a video visit for many conditions.  Please follow this link:  [email or web address]  2) My schedule has  been booking out very far in advance (2 months).  I apologize for the lack of timely access.  If you need to be seen for a chronic condition or preventive (wellness) visit, please be sure to schedule that appointment 2-3 months in advance.  If you have a concern that you feel cannot wait until my next available appointment (such as a hospital follow-up or new symptom of concern) please ask to speak to one of the [facility] nurses who may be able to access a sooner appointment.    I do ask that all patients who are taking chronic medications for conditions that I am managing schedule an in-person visit with me at least once a year.     To schedule any ordered imaging studies (including mammogram and/or DEXA scan) you can call [facility] Scheduling at [phone].         Patient Education   Personalized Prevention Plan  You are due for the preventive services outlined below.  Your care team is available to assist you in scheduling these services.  If you have already completed any of these items, please share that information with your care team to update in your medical record.  Health Maintenance Due   Topic Date Due     ANNUAL REVIEW OF HM ORDERS  Never done     Hepatitis B Vaccine (1 of 3 - 3-dose series) Never done     Zoster (Shingles) Vaccine (2 of 3) 03/26/2009     LUNG CANCER SCREENING  10/26/2010     Mammogram  12/20/2015     Flu Vaccine (1) 09/01/2022     Cholesterol Lab  09/24/2022     Kidney Microalbumin Urine Test  09/24/2022     Annual Wellness Visit  09/24/2022     Hemoglobin  11/03/2022     Basic Metabolic Panel  11/16/2022     Your Health Risk Assessment indicates you feel you are not in good health    A healthy lifestyle helps keep the body fit and the mind alert. It helps protect you from disease, helps you fight disease, and helps prevent chronic disease (disease that doesn't go away) from getting worse. This is important as you get older and begin to notice twinges in muscles and  joints and a decline in the strength and stamina you once took for granted. A healthy lifestyle includes good healthcare, good nutrition, weight control, recreation, and regular exercise. Avoid harmful substances and do what you can to keep safe. Another part of a healthy lifestyle is stay mentally active and socially involved.    Good healthcare     Have a wellness visit every year.     If you have new symptoms, let us know right away. Don't wait until the next checkup.     Take medicines exactly as prescribed and keep your medicines in a safe place. Tell us if your medicine causes problems.   Healthy diet and weight control     Eat 3 or 4 small, nutritious, low-fat, high-fiber meals a day. Include a variety of fruits, vegetables, and whole-grain foods.     Make sure you get enough calcium in your diet. Calcium, vitamin D, and exercise help prevent osteoporosis (bone thinning).     If you live alone, try eating with others when you can. That way you get a good meal and have company while you eat it.     Try to keep a healthy weight. If you eat more calories than your body uses for energy, it will be stored as fat and you will gain weight.     Recreation   Recreation is not limited to sports and team events. It includes any activity that provides relaxation, interest, enjoyment, and exercise. Recreation provides an outlet for physical, mental, and social energy. It can give a sense of worth and achievement. It can help you stay healthy.    Mental Exercise and Social Involvement  Mental and emotional health is as important as physical health. Keep in touch with friends and family. Stay as active as possible. Continue to learn and challenge yourself.   Things you can do to stay mentally active are:    Learn something new, like a foreign language or musical instrument.     Play SCRABBLE or do crossword puzzles. If you cannot find people to play these games with you at home, you can play them with others on your  computer through the Internet.     Join a games club--anything from card games to chess or checkers or lawn bowling.     Start a new hobby.     Go back to school.     Volunteer.     Read.   Keep up with world events.    Exercise for a Healthier Heart  You may wonder how you can improve the health of your heart. If you re thinking about exercise, you re on the right track. You don t need to become an athlete. But you do need a certain amount of brisk exercise to help strengthen your heart. If you have been diagnosed with a heart condition, your healthcare provider may advise exercise to help stabilize your condition. To help make exercise a habit, choose safe, fun activities.      Exercise with a friend. When activity is fun, you're more likely to stick with it.   Before you start  Check with your healthcare provider before starting an exercise program. This is especially important if you have not been active for a while. It's also important if you have a long-term (chronic) health problem such as heart disease, diabetes, or obesity. Or if you are at high risk for having these problems.   Why exercise?  Exercising regularly offers many healthy rewards. It can help you do all of the following:     Improve your blood cholesterol level to help prevent further heart trouble    Lower your blood pressure to help prevent a stroke or heart attack    Control diabetes, or reduce your risk of getting this disease    Improve your heart and lung function    Reach and stay at a healthy weight    Make your muscles stronger so you can stay active    Prevent falls and fractures by slowing the loss of bone mass (osteoporosis)    Manage stress better    Reduce your blood pressure    Improve your sense of self and your body image  Exercise tips      Ease into your routine. Set small goals. Then build on them. If you are not sure what your activity level should be, talk with your healthcare provider first before starting an exercise  routine.    Exercise on most days. Aim for a total of 150 minutes (2 hours and 30 minutes) or more of moderate-intensity aerobic activity each week. Or 75 minutes (1 hour and 15 minutes) or more of vigorous-intensity aerobic activity each week. Or try for a combination of both. Moderate activity means that you breathe heavier and your heart rate increases but you can still talk. Think about doing 40 minutes of moderate exercise, 3 to 4 times a week. For best results, activity should last for about 40 minutes to lower blood pressure and cholesterol. It's OK to work up to the 40-minute period over time. Examples of moderate-intensity activity are walking 1 mile in 15 minutes. Or doing 30 to 45 minutes of yard work.    Step up your daily activity level.  Along with your exercise program, try being more active the whole day. Walk instead of drive. Or park further away so that you take more steps each day. Do more household tasks or yard work. You may not be able to meet the advised mount of physical activity. But doing some moderate- or vigorous-intensity aerobic activity can help reduce your risk for heart disease. Your healthcare provider can help you figure out what is best for you.    Choose 1 or more activities you enjoy.  Walking is one of the easiest things you can do. You can also try swimming, riding a bike, dancing, or taking an exercise class.    When to call your healthcare provider  Call your healthcare provider if you have any of these:     Chest pain or feel dizzy or lightheaded    Burning, tightness, pressure, or heaviness in your chest, neck, shoulders, back, or arms    Abnormal shortness of breath    More joint or muscle pain    A very fast or irregular heartbeat (palpitations)  Britany last reviewed this educational content on 7/1/2019 2000-2021 [employer]. All rights reserved. This information is not intended as a substitute for professional medical care. Always follow your  healthcare professional's instructions.          Understanding USDA MyPlate  The USDA has guidelines to help you make healthy food choices. These are called MyPlate. MyPlate shows the food groups that make up healthy meals using the image of a place setting. Before you eat, think about the healthiest choices for what to put on your plate or in your cup or bowl. To learn more about building a healthy plate, visit www.choosemyplate.gov.    The food groups    Fruits. Any fruit or 100% fruit juice counts as part of the Fruit Group. Fruits may be fresh, canned, frozen, or dried, and may be whole, cut-up, or pureed. Make 1/2 of your plate fruits and vegetables.    Vegetables. Any vegetable or 100% vegetable juice counts as a member of the Vegetable Group. Vegetables may be fresh, frozen, canned, or dried. They can be served raw or cooked and may be whole, cut-up, or mashed. Make 1/2 of your plate fruits and vegetables.    Grains. All foods made from grains are part of the Grains Group. These include wheat, rice, oats, cornmeal, and barley. Grains are often used to make foods such as bread, pasta, oatmeal, cereal, tortillas, and grits. Grains should be no more than 1/4 of your plate. At least half of your grains should be whole grains.    Protein. This group includes meat, poultry, seafood, beans and peas, eggs, processed soy products (such as tofu), nuts (including nut butters), and seeds. Make protein choices no more than 1/4 of your plate. Meat and poultry choices should be lean or low fat.    Dairy. The Dairy Group includes all fluid milk products and foods made from milk that contain calcium, such as yogurt and cheese. (Foods that have little calcium, such as cream, butter, and cream cheese, are not part of this group.) Most dairy choices should be low-fat or fat-free.    Oils. Oils aren't a food group, but they do contain essential nutrients. However it's important to watch your intake of oils. These are fats that  are liquid at room temperature. They include canola, corn, olive, soybean, vegetable, and sunflower oil. Foods that are mainly oil include mayonnaise, certain salad dressings, and soft margarines. You likely already get your daily oil allowance from the foods you eat.  Things to limit  Eating healthy also means limiting these things in your diet:       Salt (sodium). Many processed foods have a lot of sodium. To keep sodium intake down, eat fresh vegetables, meats, poultry, and seafood when possible. Purchase low-sodium, reduced-sodium, or no-salt-added food products at the store. And don't add salt to your meals at home. Instead, season them with herbs and spices such as dill, oregano, cumin, and paprika. Or try adding flavor with lemon or lime zest and juice.    Saturated fat. Saturated fats are most often found in animal products such as beef, pork, and chicken. They are often solid at room temperature, such as butter. To reduce your saturated fat intake, choose leaner cuts of meat and poultry. And try healthier cooking methods such as grilling, broiling, roasting, or baking. For a simple lower-fat swap, use plain nonfat yogurt instead of mayonnaise when making potato salad or macaroni salad.    Added sugars. These are sugars added to foods. They are in foods such as ice cream, candy, soda, fruit drinks, sports drinks, energy drinks, cookies, pastries, jams, and syrups. Cut down on added sugars by sharing sweet treats with a family member or friend. You can also choose fruit for dessert, and drink water or other unsweetened beverages.     Whi last reviewed this educational content on 6/1/2020 2000-2021 [employer]. All rights reserved. This information is not intended as a substitute for professional medical care. Always follow your healthcare professional's instructions.        Activities of Daily Living    Your Health Risk Assessment indicates you have difficulties with activities of daily  living such as housework, bathing, preparing meals, taking medication, etc. Please make a follow up appointment for us to address this issue in more detail.    Signs of Hearing Loss      Hearing much better with one ear can be a sign of hearing loss.   Hearing loss is a problem shared by many people. In fact, it is one of the most common health problems, particularly as people age. Most people age 65 and older have some hearing loss. By age 80, almost everyone does. Hearing loss often occurs slowly over the years. So you may not realize your hearing has gotten worse.  Have your hearing checked  Call your healthcare provider if you:    Have to strain to hear normal conversation    Have to watch other people s faces very carefully to follow what they re saying    Need to ask people to repeat what they ve said    Often misunderstand what people are saying    Turn the volume of the television or radio up so high that others complain    Feel that people are mumbling when they re talking to you    Find that the effort to hear leaves you feeling tired and irritated    Notice, when using the phone, that you hear better with one ear than the other  [employer] last reviewed this educational content on 1/1/2020 2000-2021 [employer]. All rights reserved. This information is not intended as a substitute for professional medical care. Always follow your healthcare professional's instructions.        Your Health Risk Assessment indicates you feel you are not in good emotional health.    Recreation   Recreation is not limited to sports and team events. It includes any activity that provides relaxation, interest, enjoyment, and exercise. Recreation provides an outlet for physical, mental, and social energy. It can give a sense of worth and achievement. It can help you stay healthy.    Mental Exercise and Social Involvement  Mental and emotional health is as important as physical health. Keep in touch with friends and  "family. Stay as active as possible. Continue to learn and challenge yourself.   Things you can do to stay mentally active are:    Learn something new, like a foreign language or musical instrument.     Play SCRABBLE or do crossword puzzles. If you cannot find people to play these games with you at home, you can play them with others on your computer through the Internet.     Join a games club--anything from card games to chess or checkers or lawn bowling.     Start a new hobby.     Go back to school.     Volunteer.     Read.   Keep up with world events.    Depression and Suicide in Older Adults    Nearly 2 million older Americans have some type of depression. Some of them even take their own lives. Yet depression among older adults is often ignored. Learn the warning signs. You may help spare a loved one needless pain. You may also save a life.   What is depression?  Depression is a common and serious illness that affects the way you think and feel. It is not a normal part of aging, nor is it a sign of weakness, a character flaw, or something you can snap out of. Most people with depression need treatment to get better. The most common symptom is a feeling of deep sadness. People who are depressed also may seem tired and listless. And nothing seems to give them pleasure. It s normal to grieve or be sad sometimes. But sadness lessens or passes with time. Depression rarely goes away or improves on its own. A person with clinical depression can't \"snap out of it.\" Other symptoms of depression are:     Sleeping more or less than normal    Eating more or less than normal    Having headaches, stomachaches, or other pains that don t go away    Feeling nervous,  empty,  or worthless    Crying a great deal    Thinking or talking about suicide or death    Loss of interest in activities previously enjoyed    Social isolation    Feeling confused or forgetful  What causes it?  The causes of depression aren t fully known. But it " is thought to result from a complex blend of these factors:     Biochemistry. Certain chemicals in the brain play a role.    Genes. Depression does run in families.    Life stress. Life stresses can also trigger depression in some people. Older adults often face many stressors, such as death of friends or a spouse, health problems, and financial concerns.    Chronic conditions. This includes conditions such as diabetes, heart disease, or cancer. These can cause symptoms of depression. Medicine side effects can cause changes in thoughts and behaviors.  How you can help  Often, depressed people may not want to ask for help. When they do, they may be ignored. Or, they may receive the wrong treatment. You can help by showing parents and older friends love and support. If they seem depressed, don t lecture the person, ignore the symptoms, or discount the symptoms as a  normal  part of aging -which they are not. Get involved, listen, and show interest and support.   Help them understand that depression is a treatable illness. Tell them you can help them find the right treatment. Offer to go to their healthcare provider's appointment with them for support when the symptoms are discussed. With their approval, contact a local mental health center, social service agency, or hospital about services.   You can be an advocate for him or her at healthcare appointments. Many older adults have chronic illnesses that can cause symptoms of depression. Medicine side effects can change thoughts and behaviors. You can help make sure that the healthcare provider looks at all of these factors. He or she should refer your family member or friend to a mental healthcare provider when needed. in some cases, untreated depression can lead to a misdiagnosis. A person may be diagnosed with a brain disorder such as dementia. If the healthcare provider does not take the issue of depression seriously, help your family member or friend to find  another provider.   Don't be afraid to ask  If you think an older person you care about could be suicidal, ask,  Have you thought about suicide?  Most people will tell you the truth. If they say  yes,  they may already have a plan for how and when they will attempt it. Find out as much as you can. The more detailed the plan, and the easier it is to carry out, the more danger the person is in right now. Tell the person you are there for them and do not want them to harm him or herself. Don't wait to get help for the person. Call the person's healthcare provider, local hospital, or emergency services.   To learn more    National Suicide Prevention Lifeline (crisis hotline) 212-918-ECPS (592-044-0926)    National [place] of Mental Tkponq767-661-2765exk.[facility].nih.gov    National [place] on Mental Hcnptay247-567-1547xmf.mohit.org    Mental Health Tbwisbc149-594-6036kni.[facility].org    National Suicide Tuamkah866-HYLAUWG (823-790-8554)    Call 911  Never leave the person alone. A person who is actively suicidal needs psychiatric care right away. They will need constant supervision. Never leave the person out of sight. Call 911 or the national 24-hour suicide crisis hotline at 906-268-GSWF (837-770-8251). You can also take the person to the closest emergency room.   W[place]ya last reviewed this educational content on 5/1/2020 2000-2021 [employer]. All rights reserved. This information is not intended as a substitute for professional medical care. Always follow your healthcare professional's instructions.           Lung Cancer Screening   Frequently Asked Questions  If you are at high-risk for lung cancer, getting screened with low-dose computed tomography (LDCT) every year can help save your life. This handout offers answers to some of the most common questions about lung cancer screening. If you have other questions, please call 5-189-9-[facility]cer (1-842.128.6730).     What is it?  Lung cancer screening uses  special X-ray technology to create an image of your lung tissue. The exam is quick and easy and takes less than 10 seconds. We don t give you any medicine or use any needles. You can eat before and after the exam. You don t need to change your clothes as long as the clothing on your chest doesn t contain metal. But, you do need to be able to hold your breath for at least 6 seconds during the exam.    What is the goal of lung cancer screening?  The goal of lung cancer screening is to save lives. Many times, lung cancer is not found until a person starts having physical symptoms. Lung cancer screening can help detect lung cancer in the earliest stages when it may be easier to treat.    Who should be screened for lung cancer?  We suggest lung cancer screening for anyone who is at high-risk for lung cancer. You are in the high-risk group if you:      are between the ages of 55 and 79, and    have smoked at least 1 pack of cigarettes a day for 20 or more years, and    still smoke or have quit within the past 15 years.    However, if you have a new cough or shortness of breath, you should talk to your doctor before being screened.    Why does it matter if I have symptoms?  Certain symptoms can be a sign that you have a condition in your lungs that should be checked and treated by your doctor. These symptoms include fever, chest pain, a new or changing cough, shortness of breath that you have never felt before, coughing up blood or unexplained weight loss. Having any of these symptoms can greatly affect the results of lung cancer screening.       Should all smokers get an LDCT lung cancer screening exam?  It depends. Lung cancer screening is for a very specific group of men and women who have a history of heavy smoking over a long period of time (see  Who should be screened for lung cancer  above).  I am in the high-risk group, but have been diagnosed with cancer in the past. Is LDCT lung cancer screening right for me?  In  some cases, you should not have LDCT lung screening, such as when your doctor is already following your cancer with CT scan studies. Your doctor will help you decide if LDCT lung screening is right for you.  Do I need to have a screening exam every year?  Yes. If you are in the high-risk group described earlier, you should get an LDCT lung cancer screening exam every year until you are 79, or are no longer willing or able to undergo screening and possible procedures to diagnose and treat lung cancer.  How effective is LDCT at preventing death from lung cancer?  Studies have shown that LDCT lung cancer screening can lower the risk of death from lung cancer by 20 percent in people who are at high-risk.  What are the risks?  There are some risks and limitations of LDCT lung cancer screening. We want to make sure you understand the risks and benefits, so please let us know if you have any questions. Your doctor may want to talk with you more about these risks.    Radiation exposure: As with any exam that uses radiation, there is a very small increased risk of cancer. The amount of radiation in LDCT is small--about the same amount a person would get from a mammogram. Your doctor orders the exam when he or she feels the potential benefits outweigh the risks.    False negatives: No test is perfect, including LDCT. It is possible that you may have a medical condition, including lung cancer, that is not found during your exam. This is called a false negative result.    False positives and more testing: LDCT very often finds something in the lung that could be cancer, but in fact is not. This is called a false positive result. False positive tests often cause anxiety. To make sure these findings are not cancer, you may need to have more tests. These tests will be done only if you give us permission. Sometimes patients need a treatment that can have side effects, such as a biopsy. For more information on false positives, see   What can I expect from the results?     Findings not related to lung cancer: Your LDCT exam also takes pictures of areas of your body next to your lungs. In a very small number of cases, the CT scan will show an abnormal finding in one of these areas, such as your kidneys, adrenal glands, liver or thyroid. This finding may not be serious, but you may need more tests. Your doctor can help you decide what other tests you may need, if any.  What can I expect from the results?  About 1 out of 4 LDCT exams will find something that may need more tests. Most of the time, these findings are lung nodules. Lung nodules are very small collections of tissue in the lung. These nodules are very common, and the vast majority--more than 97 percent--are not cancer (benign). Most are normal lymph nodes or small areas of scarring from past infections.  But, if a small lung nodule is found to be cancer, the cancer can be cured more than 90 percent of the time. To know if the nodule is cancer, we may need to get more images before your next yearly screening exam. If the nodule has suspicious features (for example, it is large, has an odd shape or grows over time), we will refer you to a specialist for further testing.  Will my doctor also get the results?  Yes. Your doctor will get a copy of your results.  Is it okay to keep smoking now that there s a cancer screening exam?  No. Tobacco is one of the strongest cancer-causing agents. It causes not only lung cancer, but other cancers and cardiovascular (heart) diseases as well. The damage caused by smoking builds over time. This means that the longer you smoke, the higher your risk of disease. While it is never too late to quit, the sooner you quit, the better.  Where can I find help to quit smoking?  The best way to prevent lung cancer is to stop smoking. If you have already quit smoking, congratulations and keep it up! For help on quitting smoking, please call [facility] at  2-800-QUIT-NOW (1-516.816.1652) or the American [employer] Society at 1-208.963.5622 to find local resources near you.  One-on-one health coaching:  If you d prefer to work individually with a health care provider on tobacco cessation, we offer:      Medication Therapy Management:  Our specially trained pharmacists work closely with you and your doctor to help you quit smoking.  Call [phone] or [phone] (toll free).

## 2022-10-18 NOTE — PROGRESS NOTES
"SUBJECTIVE:   Fatou is a 72 year old who presents for Preventive Visit.      Patient has been advised of split billing requirements and indicates understanding: Yes     Are you in the first 12 months of your Medicare coverage?  No    Healthy Habits:     In general, how would you rate your overall health?  Fair    Frequency of exercise:  1 day/week    Duration of exercise:  Less than 15 minutes    Do you usually eat at least 4 servings of fruit and vegetables a day, include whole grains    & fiber and avoid regularly eating high fat or \"junk\" foods?  No    Taking medications regularly:  Yes    Medication side effects:  Other    Ability to successfully perform activities of daily living:  Transportation requires assistance    Home Safety:  No safety concerns identified    Hearing Impairment:  Difficulty following a conversation in a noisy restaurant or crowded room, feel that people are mumbling or not speaking clearly, difficulty following dialogue in the theater, difficult to understand a speaker at a public meeting or [employer] service, need to ask people to speak up or repeat themselves, difficulty understanding soft or whispered speech and difficulty understanding speech on the telephone    In the past 6 months, have you been bothered by leaking of urine?  No    In general, how would you rate your overall mental or emotional health?  Fair      PHQ-2 Total Score: 2    Additional concerns today:  No    Do you feel safe in your environment? Yes    Have you ever done Advance Care Planning? (For example, a Health Directive, POLST, or a discussion with a medical provider or your loved ones about your wishes): No, advance care planning information given to patient to review.  Patient declined advance care planning discussion at this time.       Fall risk  Fallen 2 or more times in the past year?: No  Any fall with injury in the past year?: No    Cognitive Screening   1) Repeat 3 items (Leader, Season, Table)    2) " Clock draw: NORMAL  3) 3 item recall: Recalls 3 objects  Results: 3 items recalled: COGNITIVE IMPAIRMENT LESS LIKELY    Mini-CogTM Copyright SULLY Stout. Licensed by the author for use in [facility]; reprinted with permission (izaiah@[email or web address]). All rights reserved.      Do you have sleep apnea, excessive snoring or daytime drowsiness?: yes    Reviewed and updated as needed this visit by clinical staff   Tobacco  Allergies  Meds  Problems             Reviewed and updated as needed this visit by Provider    Allergies  Meds  Problems            Social History     Tobacco Use     Smoking status: Former     Packs/day: 1.50     Years: 53.00     Pack years: 79.50     Types: Cigarettes     Start date: 4/10/1956     Quit date: 10/1/2009     Years since quittin.0     Smokeless tobacco: Never   Substance Use Topics     Alcohol use: Yes     Comment: occas.       Alcohol Use 10/18/2022   Prescreen: >3 drinks/day or >7 drinks/week? No   Prescreen: >3 drinks/day or >7 drinks/week? -       Current providers sharing in care for this patient include:   Patient Care Team:  Cassidy Alex PA-C as PCP - General (Physician Assistant)  Cassidy Alex PA-C as Assigned PCP  MAJO Herrera MD as MD (Cardiovascular Disease)  MAJO Herrera MD as Assigned Heart and Vascular Provider    The following health maintenance items are reviewed in Epic and correct as of today:  Health Maintenance   Topic Date Due     ANNUAL REVIEW OF  ORDERS  Never done     HEPATITIS B IMMUNIZATION (1 of 3 - 3-dose series) Never done     ZOSTER IMMUNIZATION (2 of 3) 2009     LUNG CANCER SCREENING  10/26/2010     MAMMO SCREENING  2015     INFLUENZA VACCINE (1) 2022     LIPID  2022     MICROALBUMIN  2022     MEDICARE ANNUAL WELLNESS VISIT  2022     BMP  2022     PHQ-9  2023     FALL RISK ASSESSMENT  10/18/2023     HEMOGLOBIN  10/18/2023     COLORECTAL CANCER SCREENING   "01/24/2024     DEXA  04/13/2025     ADVANCE CARE PLANNING  10/18/2027     DTAP/TDAP/TD IMMUNIZATION (3 - Td or Tdap) 10/27/2030     HEPATITIS C SCREENING  Completed     DEPRESSION ACTION PLAN  Completed     Pneumococcal Vaccine: 65+ Years  Completed     URINALYSIS  Completed     COVID-19 Vaccine  Completed     IPV IMMUNIZATION  Aged Out     MENINGITIS IMMUNIZATION  Aged Out     BP Readings from Last 3 Encounters:   10/18/22 (!) 138/90   11/16/21 (!) 170/70   11/09/21 (!) 148/72    Wt Readings from Last 3 Encounters:   10/18/22 100.2 kg (221 lb)   11/16/21 101.2 kg (223 lb)   11/09/21 102.2 kg (225 lb 6.4 oz)              Review of Systems   Constitutional: Negative for chills and fever.   HENT: Positive for hearing loss. Negative for congestion, ear pain and sore throat.    Eyes: Positive for visual disturbance. Negative for pain.   Respiratory: Positive for cough. Negative for shortness of breath.    Cardiovascular: Positive for peripheral edema. Negative for chest pain and palpitations.   Gastrointestinal: Positive for heartburn. Negative for abdominal pain, constipation, diarrhea, hematochezia and nausea.   Breasts:  Negative for tenderness, breast mass and discharge.   Genitourinary: Positive for frequency and urgency. Negative for dysuria, genital sores, hematuria, pelvic pain, vaginal bleeding and vaginal discharge.   Musculoskeletal: Positive for arthralgias, joint swelling and myalgias.   Skin: Negative for rash.   Neurological: Positive for weakness and paresthesias. Negative for dizziness and headaches.   Psychiatric/Behavioral: Positive for mood changes. The patient is nervous/anxious.          OBJECTIVE:   BP (!) 138/90   Pulse 73   Temp 97.5  F (36.4  C) (Temporal)   Resp 16   Ht 1.651 m (5' 5\")   Wt 100.2 kg (221 lb)   SpO2 98%   BMI 36.78 kg/m   Estimated body mass index is 36.78 kg/m  as calculated from the following:    Height as of this encounter: 1.651 m (5' 5\").    Weight as of this " encounter: 100.2 kg (221 lb).  Physical Exam  GENERAL APPEARANCE: healthy, alert and no distress  EYES: Eyes grossly normal to inspection, conjunctivae and sclerae normal  HENT: ear canals and TM's normal, very hard of hearing, poor dentition  NECK: bilateral carotid bruits, no adenopathy, no asymmetry, masses, or scars and thyroid normal to palpation  RESP: lungs clear to auscultation - no rales, rhonchi or wheezes  CV: regular rate and rhythm, normal S1 S2, no S3 or S4, no murmur, click or rub, chronic woody edema of bilateral LE's  ABDOMEN: soft, nontender, no hepatosplenomegaly, no masses   MS: no musculoskeletal defects are noted and gait is age appropriate without ataxia  SKIN: dry with thick scaling on lower extremities, no suspicious lesions or rashes  NEURO: Generalized weakness - unable to step up onto exam table, mentation intact and speech normal, uses cane for ambulation  PSYCH: mentation appears normal and affect normal/bright       ASSESSMENT / PLAN:     N.B. Patient is very hard of hearing.  I wore face mask with clear window so she could see my lips and I spoke very loudly but she still had difficulty hearing me.  I did at times resort to writing my comments.     Encounter for Medicare annual wellness exam  Reviewed/updated HM.  Recommended shingrix to be given at pharmacy.    Coronary artery disease involving native coronary artery of native heart without angina pectoris  Pure hypercholesterolemia  She has extensive atherosclerotic vascular disease and is not taking a statin due to body pains.  She has documentation of trying simvastatin, atorvastatin, and rosuvastatin in the past.  I recommended she try Coenzyme Q-10 along with a low dose statin such as pravastatin.    - Lipid panel reflex to direct LDL Non-fasting    Subclavian artery stenosis (H)  BP's need to be taken in right arm.     Personal history of tobacco use  Centrilobular emphysema (H)  She has an 80 pack-year history of smoking.  She  "has quit.  She is eligible for LDCT lung cancer screening which we discussed.  She'd like to think about that.  I did give her written material on this as I suspect she did not hear a lot of our conversation.      Hypertension goal BP (blood pressure) < 130/80  Not at goal - she plans to discuss with Cardiology at upcoming appointment.    - Albumin Random Urine Quantitative with Creat Ratio  - BASIC METABOLIC PANEL    Stage 3a chronic kidney disease (H)  - Albumin Random Urine Quantitative with Creat Ratio  - Hemoglobin  - BASIC METABOLIC PANEL    Sjogren's syndrome with myopathy (H)  She states an eye doctor discontinued her plaquenil.  She is currently not taking anything for her Sjogren's.  I recommended she see Rheumatology for recommendations.    - Adult Rheumatology  Referral    Morbid obesity (H)  Weight is down a few pounds in past year.      Recurrent major depressive disorder, in partial remission (H)  She declines medication or referral to therapist.      Osteopenia of both hips  - DX Hip/Pelvis/Spine    Encounter for screening mammogram for breast cancer  - MA Screening Digital Bilateral     Hearing loss, unspecified hearing loss type, unspecified laterality  I suggested audiology referral but she states she has tried hearing aids and they don't help her.       COUNSELING:  Reviewed preventive health counseling, as reflected in patient instructions    Estimated body mass index is 36.78 kg/m  as calculated from the following:    Height as of this encounter: 1.651 m (5' 5\").    Weight as of this encounter: 100.2 kg (221 lb).      She reports that she quit smoking about 13 years ago. Her smoking use included cigarettes. She started smoking about 66 years ago. She has a 79.50 pack-year smoking history. She has never used smokeless tobacco.      Appropriate preventive services were discussed with this patient, including applicable screening as appropriate for cardiovascular disease, diabetes, " osteopenia/osteoporosis, and glaucoma.  As appropriate for age/gender, discussed screening for colorectal cancer, prostate cancer, breast cancer, and cervical cancer. Checklist reviewing preventive services available has been given to the patient.    Reviewed patients plan of care and provided an AVS. The Basic Care Plan (routine screening as documented in Health Maintenance) for Fatou meets the Care Plan requirement. This Care Plan has been established and reviewed with the Patient.    Counseling Resources:  ATP IV Guidelines  Pooled Cohorts Equation Calculator  Breast Cancer Risk Calculator  Breast Cancer: Medication to Reduce Risk  FRAX Risk Assessment  ICSI Preventive Guidelines  Dietary Guidelines for Americans, 2010  [employer]'s MyPlate  ASA Prophylaxis  Lung CA Screening    Ольга Houston MD  [facility]    Identified Health Risks:    The patient was provided with suggestions to help her develop a healthy physical lifestyle.  She is at risk for lack of exercise and has been provided with information to increase physical activity for the benefit of her well-being.  The patient was counseled and encouraged to consider modifying their diet and eating habits. She was provided with information on recommended healthy diet options.  The patient reports that she has difficulty with activities of daily living. I have asked that the patient make a follow up appointment where this issue will be further evaluated and addressed.  The patient was provided with written information regarding signs of hearing loss.  The patient was provided with suggestions to help her develop a healthy emotional lifestyle.  The patient s PHQ-9 score is consistent with moderate depression. She was provided with information regarding depression and I offered referral to counseling or consideration of medication; she declined both at this time.

## 2022-10-18 NOTE — PROGRESS NOTES
The patient was provided with suggestions to help her develop a healthy physical lifestyle.  She is at risk for lack of exercise and has been provided with information to increase physical activity for the benefit of her well-being.  The patient was counseled and encouraged to consider modifying their diet and eating habits. She was provided with information on recommended healthy diet options.  The patient reports that she has difficulty with activities of daily living. I have asked that the patient make a follow up appointment in *** weeks where this issue will be further evaluated and addressed.  The patient was provided with written information regarding signs of hearing loss.  The patient was provided with suggestions to help her develop a healthy emotional lifestyle.  The patient s PHQ-9 score is consistent with moderate depression. She was provided with information regarding depression and was advised to schedule a follow up appointment in *** weeks to further address this issue.

## 2022-10-25 NOTE — RESULT ENCOUNTER NOTE
Ap Lainez,  Your results show that you continue to have a mildly elevated urine albumin (protein) level.  Urine albumin is a test for microscopic proteins in the urine - a sign of early kidney disease from hypertension. Keeping blood pressure controlled helps keep the kidneys healthy.  Please discuss your blood pressure regimen with cardiology.  We'd like to see your blood pressure readings under 130/80.     Your basic metabolic panel shows stable kidney function (creatinine and eGFR) - you still have chronic kidney disease Stage 3a.    Your hemoglobin is improved.    Your lipid panel (cholesterol) results are elevated and stable.  Your best option for helping to prevent another cardiovascular event is to take a statin.  As we discussed I recommend that you start taking Coenzyme Q-10 at a dose of 100 mg twice daily.  We could then have you try a milder statin like pravastatin which is often better tolerated.  Let me know if you'd like to start that.      Ольга Houston MD

## 2022-10-26 ENCOUNTER — TELEPHONE (OUTPATIENT)
Dept: FAMILY MEDICINE | Facility: CLINIC | Age: 72
End: 2022-10-26

## 2022-10-26 DIAGNOSIS — M54.40 BILATERAL LOW BACK PAIN WITH SCIATICA, SCIATICA LATERALITY UNSPECIFIED, UNSPECIFIED CHRONICITY: Chronic | ICD-10-CM

## 2022-10-26 DIAGNOSIS — R94.6 ABNORMAL FINDING ON THYROID FUNCTION TEST: ICD-10-CM

## 2022-10-26 DIAGNOSIS — E78.00 PURE HYPERCHOLESTEROLEMIA: ICD-10-CM

## 2022-10-26 DIAGNOSIS — K21.9 GASTROESOPHAGEAL REFLUX DISEASE WITHOUT ESOPHAGITIS: Chronic | ICD-10-CM

## 2022-10-26 DIAGNOSIS — M35.03 SJOGREN'S SYNDROME WITH MYOPATHY (H): ICD-10-CM

## 2022-10-26 DIAGNOSIS — J30.9 ALLERGIC RHINITIS, UNSPECIFIED SEASONALITY, UNSPECIFIED TRIGGER: Primary | ICD-10-CM

## 2022-10-26 DIAGNOSIS — I21.4 NSTEMI (NON-ST ELEVATED MYOCARDIAL INFARCTION) (H): ICD-10-CM

## 2022-10-26 DIAGNOSIS — E55.9 VITAMIN D INSUFFICIENCY: ICD-10-CM

## 2022-10-26 DIAGNOSIS — M17.0 PRIMARY OSTEOARTHRITIS OF BOTH KNEES: ICD-10-CM

## 2022-10-26 NOTE — TELEPHONE ENCOUNTER
Medication Question or Refill    Contacts       Type Contact Phone/Fax    10/26/2022 01:07 PM CDT Phone (Incoming) Fatou Brian (Self) [phone] (H)          What medication are you calling about (include dose and sig)?: Long list of medications should be on file     Controlled Substance Agreement on file:   CSA -- Patient Level:    CSA: None found at the patient level.       Who prescribed the medication?: Dr. Ольга Houston    Do you need a refill? Yes: Refill    When did you use the medication last? 10/26/2022    Patient offered an appointment? Yes: had appointment already    Do you have any questions or concerns?  Yes: Need the [facility] and [facility] on file so patient can order. She would like a call back once the prescriptions has been called in. She would also like to know why she had less lab work done the physical compare to other years    Preferred Pharmacy:   [facility] ([facility]) [facility] - [address]  [address] [facility] [address]  Phone: [phone] Fax: [phone]    [facility] - [address] [address]  Phone: [phone] Fax: [phone]      Could we send this information to you [facility] or would you prefer to receive a phone call?:   Patient would prefer a phone call   Okay to leave a detailed message?: Yes at Home number on file [phone] (home)

## 2022-10-26 NOTE — TELEPHONE ENCOUNTER
NOTE- PT IS Ramona. TALK SLOWLY ON THE PHONE.    10/18/22 OV with Dr. Houston.    1- Lab question? PT felt more labs were done last year.  BMP, hbg, LDL, and albumin done.  PT would like the Vit D, thyroid labs, and BNP.  These additional labs were done last year. CBC was also done last year.      I will call pt to ask for specific meds and doses.  This was a lengthy process.  Pended the meds after review with pt.  Noted on all meds going to [facility]- Put on file. Pt will call when she wants them. 90 day supply.    These 2 meds need to be added to the med list and are pended.  The Claritin was added as patient request. She may try to get OTC meds from [facility].    Rosuvastatin- Pt wanted this added to her med list.  She may try to take this again.  She is concerned with allergy to statins. She will try it 1-2 times per week and work up to daily.    PT didn't need the preservision or senokot filled.    PT was grateful at the end of the conversation. She appreciated going thru her med list as I requested to make sure we were sending in all the correct meds.    FELIPE Warner

## 2022-10-27 RX ORDER — NITROGLYCERIN 0.4 MG/1
TABLET SUBLINGUAL
Qty: 12 TABLET | Refills: 0 | Status: SHIPPED | OUTPATIENT
Start: 2022-10-27 | End: 2023-10-19

## 2022-10-27 RX ORDER — METOPROLOL SUCCINATE 100 MG/1
100 TABLET, EXTENDED RELEASE ORAL DAILY
Qty: 90 TABLET | Refills: 3 | Status: SHIPPED | OUTPATIENT
Start: 2022-10-27 | End: 2023-10-19

## 2022-10-27 RX ORDER — FAMOTIDINE 20 MG/1
TABLET, FILM COATED ORAL
Qty: 180 TABLET | Refills: 3 | Status: SHIPPED | OUTPATIENT
Start: 2022-10-27 | End: 2023-10-19

## 2022-10-27 RX ORDER — CALCIUM CARBONATE 500 MG/1
1 TABLET, CHEWABLE ORAL AT BEDTIME
Qty: 120 TABLET | Refills: 11 | Status: SHIPPED | OUTPATIENT
Start: 2022-10-27 | End: 2024-04-23

## 2022-10-27 RX ORDER — PILOCARPINE HYDROCHLORIDE 5 MG/1
5 TABLET, FILM COATED ORAL
Qty: 360 TABLET | Refills: 3 | Status: SHIPPED | OUTPATIENT
Start: 2022-10-27 | End: 2024-04-23

## 2022-10-27 RX ORDER — LOSARTAN POTASSIUM 100 MG/1
100 TABLET ORAL DAILY
Qty: 90 TABLET | Refills: 3 | Status: SHIPPED | OUTPATIENT
Start: 2022-10-27 | End: 2023-10-13

## 2022-10-27 RX ORDER — PANTOPRAZOLE SODIUM 40 MG/1
TABLET, DELAYED RELEASE ORAL
Qty: 90 TABLET | Refills: 3 | Status: SHIPPED | OUTPATIENT
Start: 2022-10-27 | End: 2023-10-19

## 2022-10-27 RX ORDER — CLOPIDOGREL BISULFATE 75 MG/1
75 TABLET ORAL DAILY
Qty: 90 TABLET | Refills: 3 | Status: SHIPPED | OUTPATIENT
Start: 2022-10-27 | End: 2023-10-19

## 2022-10-27 RX ORDER — SENNOSIDES 8.6 MG
1300 CAPSULE ORAL EVERY 8 HOURS PRN
Qty: 270 TABLET | Refills: 3 | Status: SHIPPED | OUTPATIENT
Start: 2022-10-27 | End: 2023-10-19

## 2022-10-27 RX ORDER — ROSUVASTATIN CALCIUM 10 MG/1
10 TABLET, COATED ORAL DAILY
Qty: 90 TABLET | Refills: 1 | Status: SHIPPED | OUTPATIENT
Start: 2022-10-27 | End: 2023-10-20 | Stop reason: DRUGHIGH

## 2022-10-27 RX ORDER — LORATADINE 10 MG/1
TABLET ORAL
Qty: 180 TABLET | Refills: 3 | Status: SHIPPED | OUTPATIENT
Start: 2022-10-27 | End: 2023-10-19

## 2022-10-27 NOTE — TELEPHONE ENCOUNTER
Left message to call back and ask to speak with an available triage nurse.  FARZANA SheridanN, RN-BC  [facility]

## 2022-10-27 NOTE — TELEPHONE ENCOUNTER
Pt returning our call.  Relayed the below information.  Pt is aware of the labs that are ordered should she opt to come in for a lab only appt.    JOHN Page RN  [facility]

## 2022-10-27 NOTE — TELEPHONE ENCOUNTER
She's had normal Vitamin D levels for the 4 years so I did not feel it needed to be rechecked.    BNP is a lab for acute heart failure.  It is not a routine lab.  She did not seem to be in acute heart failure at our visit so I do not recommend checking a BNP at this time.  As we discussed at her appointemtn she is overdue to see cardiology.  (They last saw her in November and had wanted to see her again in 6 months - May 2022.) She can discuss with Cardiology whether they see any indication for checking a BNP.    Likewise I do not see an indication to check a full CBC.  The HGB is indicated due to her CKD.    I did add TSH order because I see that she had an abnormal TSH last year.    If she starts the statin she should schedule a repeat fasting lab-only appointment in 6-8 weeks so we can see if it is impacting her lipid levels.      Ольга Houston MD  [facility]

## 2023-06-27 ENCOUNTER — OFFICE VISIT (OUTPATIENT)
Dept: CARDIOLOGY | Facility: CLINIC | Age: 73
End: 2023-06-27
Attending: INTERNAL MEDICINE
Payer: COMMERCIAL

## 2023-06-27 VITALS
OXYGEN SATURATION: 96 % | HEART RATE: 82 BPM | BODY MASS INDEX: 40.35 KG/M2 | WEIGHT: 242.5 LBS | SYSTOLIC BLOOD PRESSURE: 192 MMHG | DIASTOLIC BLOOD PRESSURE: 77 MMHG

## 2023-06-27 DIAGNOSIS — I21.4 NSTEMI (NON-ST ELEVATED MYOCARDIAL INFARCTION) (H): Primary | ICD-10-CM

## 2023-06-27 DIAGNOSIS — Z98.61 STATUS POST PERCUTANEOUS TRANSLUMINAL CORONARY ANGIOPLASTY: ICD-10-CM

## 2023-06-27 DIAGNOSIS — E78.5 HYPERLIPIDEMIA WITH TARGET LDL LESS THAN 70: ICD-10-CM

## 2023-06-27 DIAGNOSIS — I10 BENIGN ESSENTIAL HYPERTENSION: ICD-10-CM

## 2023-06-27 PROCEDURE — G0463 HOSPITAL OUTPT CLINIC VISIT: HCPCS | Performed by: INTERNAL MEDICINE

## 2023-06-27 PROCEDURE — 99213 OFFICE O/P EST LOW 20 MIN: CPT | Performed by: INTERNAL MEDICINE

## 2023-06-27 ASSESSMENT — PAIN SCALES - GENERAL: PAINLEVEL: NO PAIN (0)

## 2023-06-27 NOTE — PROGRESS NOTES
SUBJECTIVE:  Fatou Brian is a 73 year old female who presents for follow-up.  She was admitted from 11/1/2021 to 11/4/2021 with NSTEMI.  Patient had angiogram and stent to obtuse marginal 1.  Postprocedure had no complications.  Had no cardiac complaints today.  Her past medical history is significant for hypertension, dyslipidemia, depression, CKD stage 3, IBS, h/o tobacco abuse, Sjogren's syndrome with myopathy for which she takes hydroxychloroquine, GERD, chronic back pain.  Also known to have left subclavian stenosis and variation in blood pressure between both arms.   Patient had no cardiac complaints today.  Overall he is doing well.  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  Patient Active Problem List    Diagnosis Date Noted     Cerebral aneurysm, nonruptured 10/18/2022     Priority: Medium     s/p clipping of left MCA trifurcation aneurysm       Centrilobular emphysema (H) 10/18/2022     Priority: Medium     Coronary artery disease involving native coronary artery of native heart 10/16/2022     Priority: Medium     s/p PCI with LIS 11/2021 to OM1.  LAD also 20% stenosed. Plan DAPT x 12 months and high-dose statin       Chest pain, unspecified type 11/02/2021     Priority: Medium     NSTEMI (non-ST elevated myocardial infarction) (H) 11/02/2021     Priority: Medium     Morbid obesity (H) 09/24/2020     Priority: Medium     CKD (chronic kidney disease) stage 3, GFR 30-59 ml/min (H) 09/27/2019     Priority: Medium     Primary osteoarthritis of both knees 01/14/2017     Priority: Medium     Bilateral low back pain with sciatica, sciatica laterality unspecified, unspecified chronicity 01/14/2017     Priority: Medium     ACP (advance care planning) 11/12/2015     Priority: Medium     Advance Care Planning 3/20/2017: ACP Facilitation Session:    Fatou Brian presented for ACP Facilitation session at a group session. She was accompanied by no one.  Honoring Choices information provided and resources reviewed. She currently wishes to give additional consideration to ACP  She currently has the following questions or concerns about Advance Care Planning: none.  Confirmed/documented legally designated decision maker(s).  Added by Karli Aaron RN, Advance Care Planning Liaison.'  Advance Care Planning 11/12/2015: ACP Review and Resources Provided:  Reviewed chart for advance care plan.  Fatou Brian has no plan or code status on file. Discussed available resources and provided with information. Confirmed code status reflects current choices pending further ACP discussions.  Added by Theresa Bennett         Subclavian artery stenosis (H)      Priority: Medium     Left       Major depression in partial remission (H) 07/03/2014     Priority: Medium     Left arm pain 04/17/2014     Priority: Medium     Arm pain 04/17/2014     Priority: Medium     Pure hypercholesterolemia 01/16/2014     Priority: Medium     Knee pain 10/25/2013     Priority: Medium     Poor memory 09/24/2013     Priority: Medium     Low back pain 09/24/2013     Priority: Medium     Diagnosis updated by automated process. Provider to review and confirm.    gabapentin (NEURONTIN) 100 MG capsule, Tramadol  Last  check - 1/2/17           Senile keratosis 09/24/2013     Priority: Medium     Spinal stenosis 09/24/2013     Priority: Medium     H/O hysterectomy for benign disease 09/24/2013     Priority: Medium     Bilateral hearing loss 09/24/2013     Priority: Medium     Presbyopia 09/24/2013     Priority: Medium     Sjogren's syndrome with myopathy (H) 04/04/2013     Priority: Medium     Hypertension goal BP (blood pressure) < 130/80 04/04/2013     Priority: Medium     Osteoarthritis of knee 04/04/2013     Priority: Medium     GERD (gastroesophageal reflux disease) 04/04/2013     Priority: Medium     Constipation 04/04/2013     Priority: Medium     Osteopenia of both hips 04/04/2013     Priority:  Medium     DEXA 2/2018: lowest T = -2.3 (right total hip)        Irritable bowel syndrome with diarrhea 04/04/2013     Priority: Medium     HL (hearing loss) 04/04/2013     Priority: Medium     Rosacea 04/04/2013     Priority: Medium     Gastroesophageal reflux disease without esophagitis 01/19/2011     Priority: Medium    .  Current Outpatient Medications   Medication Sig     acetaminophen (TYLENOL) 650 MG CR tablet Take 2 tablets (1,300 mg) by mouth every 8 hours as needed for mild pain or pain     aspirin (ASA) 81 MG EC tablet Take 1 tablet (81 mg) by mouth daily Start tomorrow.     calcium carbonate (TUMS) 500 MG chewable tablet Take 1 tablet (500 mg) by mouth At Bedtime     clopidogrel (PLAVIX) 75 MG tablet Take 1 tablet (75 mg) by mouth daily     famotidine (PEPCID) 20 MG tablet TAKE 1 TABLET BY MOUTH TWICE DAILY AS NEEDED     loratadine (CLARITIN) 10 MG tablet Take one tab twice per day.     losartan (COZAAR) 100 MG tablet Take 1 tablet (100 mg) by mouth daily     metoprolol succinate ER (TOPROL XL) 100 MG 24 hr tablet Take 1 tablet (100 mg) by mouth daily     Multiple Vitamins-Minerals (PRESERVISION AREDS 2 PO)      nitroGLYcerin (NITROSTAT) 0.4 MG sublingual tablet For chest pain place 1 tablet under the tongue every 5 minutes for 3 doses. If symptoms persist 5 minutes after 1st dose call 911.     order for DME One pair longitudinal arch supports  One pair   Use as directed     ORDER FOR DME Equipment being ordered: RUBBER THRESHOLD /CARGO WEDGE RAMP  ONE  USE AS DIRECTED FOR THRESHOLD  TO PREVENT FALLING     pantoprazole (PROTONIX) 40 MG EC tablet TAKE 1 TABLET DAILY     pilocarpine (SALAGEN) 5 MG tablet Take 1 tablet (5 mg) by mouth 4 times daily (before meals and nightly)     rosuvastatin (CRESTOR) 10 MG tablet Take 1 tablet (10 mg) by mouth daily     senna-docusate (SENOKOT-S/PERICOLACE) 8.6-50 MG tablet Take 2 tablets by mouth 2 times daily as needed for constipation     Vitamin D3 (CHOLECALCIFEROL)  125 MCG (5000 UT) tablet Take 1 tablet (125 mcg) by mouth At Bedtime     No current facility-administered medications for this visit.     Past Medical History:   Diagnosis Date     Arthritis      Atherosclerosis     diffuse     Chest pain, unspecified type 11/02/2021     CKD (chronic kidney disease) stage 3, GFR 30-59 ml/min (H) 09/27/2019     Coronary artery disease involving native coronary artery of native heart 10/16/2022     Emphysema of lung (H)      Former smoker     Quit 2009     GERD (gastroesophageal reflux disease)      HLD (hyperlipidaemia)      Hypertension      Major depression in partial remission (H) 07/03/2014     Morbid obesity (H) 09/24/2020     NSTEMI (non-ST elevated myocardial infarction) (H) 11/02/2021    S/P LIS to OM1     Sjogren's syndrome (H)      Sjogren's syndrome with myopathy (H) 04/04/2013     Spinal stenosis      Subclavian artery stenosis (H)     Left     Past Surgical History:   Procedure Laterality Date     aneursym[  1983, 1991    Has metal in head     CHOLECYSTECTOMY       CLOSED RX PROX HUMERUS FRACTURE      10 pins placed     COLONOSCOPY       COLONOSCOPY  1/24/2014    Procedure: COMBINED COLONOSCOPY, SINGLE BIOPSY/POLYPECTOMY BY BIOPSY;  COLONOSCOPY;  Surgeon: Ranjit Pa MD;  Location:  GI     CV HEART CATHETERIZATION WITH POSSIBLE INTERVENTION N/A 11/2/2021    Procedure: Heart Catheterization with Possible Intervention;  Surgeon: Keeley Pérez MD;  Location:  HEART CARDIAC CATH LAB     CV PCI STENT DRUG ELUTING N/A 11/2/2021    Procedure: Percutaneous Coronary Intervention Stent Drug Eluting;  Surgeon: Keeley Pérez MD;  Location:  HEART CARDIAC CATH LAB     HC ECP WITH CATARACT SURGERY      both eyes     HYSTERECTOMY, JANET       Allergies   Allergen Reactions     Chantix [Varenicline]      suicidal     Influenza Virus Vaccine H5n1      Muscle aches dizzy, nauseated  Light headed     Morphine Sulfate      Sensitivity when in hospital      Omeprazole Magnesium Nausea     Intolerance       Vioxx      Cetirizine-Pseudoephedrine Er Rash     Niacin Itching and Rash     Zetia [Ezetimibe] Other (See Comments)     Muscle pain     Social History     Socioeconomic History     Marital status: Single     Spouse name: Not on file     Number of children: Not on file     Years of education: Not on file     Highest education level: Not on file   Occupational History     Not on file   Tobacco Use     Smoking status: Former     Packs/day: 1.50     Years: 53.00     Pack years: 79.50     Types: Cigarettes     Start date: 4/10/1956     Quit date: 10/1/2009     Years since quittin.7     Smokeless tobacco: Never   Substance and Sexual Activity     Alcohol use: Yes     Comment: occas.     Drug use: No     Sexual activity: Not Currently     Partners: Male   Other Topics Concern     Parent/sibling w/ CABG, MI or angioplasty before 65F 55M? Yes     Comment: brother      Service Not Asked     Blood Transfusions Not Asked     Caffeine Concern No     Occupational Exposure Not Asked     Hobby Hazards Not Asked     Sleep Concern Not Asked     Stress Concern Not Asked     Weight Concern Not Asked     Special Diet Not Asked     Back Care Not Asked     Exercise No     Bike Helmet Not Asked     Seat Belt Yes     Self-Exams Not Asked   Social History Narrative    --------------------------------------------------------------------------------    Surgical History  Return To Top     Status Surgery Time Frame Comment Record Date     Inactive  neck surgery    benign throat growths removed  2007      Inactive  Cataract  1980's  bilateral  2007      Inactive  hysterectomy  1981  TAHBSO  2007      Inactive  Brain surgery  1983;1991  brain aneurysm  2007      Inactive  cholecsystectomy  2007          --------------------------------------------------------------------------------    Food Allergy  Return To Top     Allergen Reaction Comment  Record Date     * No known food allergies      11/7/2007          --------------------------------------------------------------------------------    Drug Allergy  Return To Top     Allergen Reaction Comment Record Date     MORPHINE SULFATE    sensitivity when in hospital  9/24/2012      Vioxx      10/26/2010      NIACIN PREPARATIONS  itching; rash    10/26/2010      Zyrtec  rash    10/26/2010      OMEPRAZOLE/LANSOPRAZOLE  intolerance; nausea  PRILOSEC OTC ONLY OMEPREZOLE OK AND PREVACID OK; PRILOSEC OTC ONLY OMEPREZOLE OK AND PREVACID OK  4/10/2008          --------------------------------------------------------------------------------    Environment Allergy  Return To Top     Allergen Reaction Comment Record Date     * No known environmental allergies      11/7/2007          --------------------------------------------------------------------------------    Immunization History Return To Top     Funding Source Vaccine Type of Vaccine Date Given Route Site Given Lot#  Exp. Date Date on VIS Date Given VIS Vaccinator     Private  Herpes Zoster (Zostavax) age 60 +  Herpes Zoster  1/29/2009  SQ  RA  [id number]  MRK  04/28/2010 9/11/06 [facility]      Private  Influenza (Adult) Seasonal  Flu Adult  10/14/2010  IM  Left Deltoid; Left Deltoid  [id number]  SP  6/30/2011  8/10/2010  10/14/2010  MAJO Croley [facility]      Private  Tdap (Adacel) Adult  Tdap  10/14/2010  IM  Left Deltoid; Left Deltoid  [id number]  SP; SP  06/03/2012  11/18/2008  10/14/2010  MAJO Corley CMA          --------------------------------------------------------------------------------    Social History  Return To Top     Question Answer Comment Record Date     Marital status      9/24/2012      Emotional Abuse  No    9/16/2011      Exercise      4/9/2008      Caffeine  Yes  1 1/2 cups q.d.   4/9/2008      Physical Abuse  No    9/16/2011      Sealtbelts  Yes    4/9/2008      Sexual Abuse  No    9/16/2011      Breast/Testicle Self Check  Yes   Occasional  4/9/2008      Number of children  0    4/9/2008      Tobacco history  Quit this year  10/26/09  11/17/2009      Number of years using tobacco  35    11/11/2008      Number of cigarettes/day      11/11/2008      Alcohol history  Currently drinks alcohol    9/16/2011      Frequency of drinks  1-4 drinks per week    11/7/2007      Assist to Quit Information  Form Given to Patient    11/11/2008          --------------------------------------------------------------------------------    History - Overall Remark: 9/24/2012 Return To Top     MEDICAL HX: Collapsed lung morphine sensitivitiy last hospitalization    --------------------------------------------------------------------------------    Medical History Return To Top     Status Diagnosis Time Frame Comment Record Date     Active (389.18) - C - Sensorineural hearing loss, bilateral      9/24/2012      Active (V72.62) - I - Laboratory exam as part of general physical exam      9/24/2012      Active (V77.0) - C - Screening, thyroid disorders      9/24/2012      Active (462) - C - Sore throat      9/24/2012      Active (V82.9) - C - Screening, vitamin d deficiency      9/24/2012      Active (466.0) - C - Bronchitis, acute      8/17/2012      Active (372.72) - C - Subconjunctival hemorrhage    RIGHT EYE  11/28/2011      Active (715.16) - C - Osteoarthritis, knee    MANY YEARS BILATERAL  11/28/2011      Active (728.85) - C - Muscle spasm      11/28/2011      Active (V76.12) - C - Screening, mammogram      9/21/2011      Active (724.02) - C - Spinal stenosis, lumbar region    LUMBAR 4-5 AREA  9/21/2011      Active (782.3) - C - Edema, localized, NOS    ANKLES FEET AND CALVES  9/16/2011      Active (455.2) - C - Hemorrhoids, Internal w/Complication    INTERMITTENT BLEEDING  9/16/2011      Active (V76.51) - C - Screening, colon      9/16/2011      Active (719.42) - C - Elbow pain    RIGHT OLECRANON WITH POSSIBLE MOUSE IN BURSA  9/16/2011      Active (724.02) -  C - Spinal stenosis, lumbar region      9/16/2011      Active (722.52) - C - Degenerative disc disease, lumbar      9/16/2011      Active (564.00) - C - Constipation      8/19/2011      Active (807.00) - C - Rib fracture      8/19/2011      Active (433.10) - C - CAROTID ART OCC W/O INFARC      4/28/2011      Active (786.52) - C - Chest wall pain    LEFT CHEST WALL  3/25/2011      Active (780.4) - C - Dizziness/vertigo, NOS      1/3/2011      Active (710.2) - C - Sjogren's disease    confirmed will send to rheumatology  10/14/2010      Active (389.10) - C - Hearing loss, sensorineural    bilateral  10/14/2010      Active 528.00 Stomatits, mucositis, unpec., NOS      12/30/2009      Active 523.01 ACUTE GINGIVITIS NONPLAQUE INDUCED      12/30/2009      Active 241.1 NONTOXIC MULTINODUL GOITER      4/29/2009      Active 241.0 Thyroid nodule      4/28/2009      Active 780.79 Fatigue      11/11/2008      Active (401.1) - C - Hypertension, benign      4/10/2008      Active (729.5) - C - limb pain    HUMERUS FRACTURE WITH RESIDUAL PAIN JULY 2008 PLATE AND 10 SCREWS  4/10/2008      Active (719.46) - C - Knee pain    OSTEOARTHRITIS OF KNEES  4/10/2008      Active 728.6 Dupuytren's contracture    right hand  4/10/2008      Active (723.1) - C - Neck pain    INTERMITTENT NECK PAIN  4/10/2008      Active 796.2 Elevated blood pressure- no hypertension dx      4/10/2008      Active (272.4) - C - Hyperlipidemia      4/10/2008      Active (530.81) - C - GERD      4/10/2008      Active (780.52) - C - Insomnia    associated with adjustment  4/10/2008      Active (V70.0) - C - Routine Medical Exam      11/7/2007      Active 461.0 Sinusitis, acute, maxillary      8/14/2007      Active 733.00 Osteoporosis, unspec.      8/1/2007      Active (305.1) - C - Tobacco abuse      8/1/2007      Active 812.20 Humerus, closed, unspec.      8/1/2007      Active (V72.83) - C - Preop    humerus fx rt  8/1/2007      Inactive  (789.00) - I - Abdominal  pain      2010      Inactive  (v06.1) - C - Tdap vaccine      10/14/2010      Inactive  (V72.62) - C - Laboratory exam as part of general physical exam      10/14/2010      Inactive  (V04.81) - C - Influenza vaccination      10/14/2010      Inactive  (V76.51) - C - Screening, colon      10/14/2010      Inactive  (305.1) - C - Tobacco dependence    resolved  2009      Inactive  490 Bronchitis      2009      Inactive  466.0 Bronchitis, acute      2009      Inactive  V05.8 Immunization, other, specified      2009      Inactive  V72.31 Screening, pap      2008      Inactive  Abnormal pap      2008          --------------------------------------------------------------------------------    Medication History Return To Top                 --------------------------------------------------------------------------------    Family History  Return To Top     Status Relationship Disease Comment Record Date     Alive Brother      2007      Alive Sister      2007      Alive Brother      2007      Alive Sister      2007      Alive Sister      2007         Brother  heart    2007         Brother  kidney cancer    2007         Mother  stomach cancer  age 79  2007         Brother  heart    2007         Father  heart;AAA  age 80  2007          --------------------------------------------------------------------------------    Infection History Return To Top     Question Answer Comment Record Date     History of HPV  No    2008      Live with someone with TB or exposed to TB  No    2008      History of Hepatitis B  No    2008      History of chlamydia  No    2008      History of gonorrhea  No    2008      History of syphilis  No    2008          --------------------------------------------------------------------------------             Social Determinants of Health      Financial Resource Strain: Not on file   Food Insecurity: Not on file   Transportation Needs: Not on file   Physical Activity: Not on file   Stress: Not on file   Social Connections: Not on file   Intimate Partner Violence: Not on file   Housing Stability: Not on file     Family History   Problem Relation Age of Onset     Thyroid Disease Mother      Arthritis Mother         Rheumatoid     Osteoporosis Mother      Cancer Mother         stomach     Cerebrovascular Disease Mother      Heart Disease Father      Cancer Father      Heart Disease Brother         fibulation          REVIEW OF SYSTEMS:  General: negative, fever, chills, night sweats  Skin: negative, acne, rash and scaling  Eyes: negative, double vision, eye pain and photophobia  Ears/Nose/Throat: negative, nasal congestion and purulent rhinorrhea  Respiratory: No dyspnea on exertion, No cough, No hemoptysis and negative  Cardiovascular: negative, palpitations, tachycardia, irregular heart beat, chest pain, exertional chest pain or pressure, paroxysmal nocturnal dyspnea, dyspnea on exertion and orthopnea       OBJECTIVE:  not currently breastfeeding.  General Appearance: alert and no distress  Head: Normocephalic. No masses, lesions, tenderness or abnormalities  Eyes: conjuctiva clear, PERRL, EOM intact  Ears: External ears normal. Canals clear. TM's normal.  Nose: Nares normal  Mouth: normal  Neck: Supple, no cervical adenopathy, no thyromegaly  Lungs: clear to auscultation  Cardiac: regular rate and rhythm, normal S1 and S2, no murmur       ASSESSMENT/PLAN:  Patient here for follow-up.  NSTEMI status post stent to obtuse marginal 1 in number of 2021.  Currently patient is not very active but denied cardiac symptoms.  Overall she is doing well.  Patient is developing some blindness in her eyes and hence planning to discontinue the chloroquine.  Patient is known to have a normal echocardiogram with normal function and no regional wall motion abnormality  and also normal EKG nonspecific ST-T changes.  She had no other significant flow-limiting lesions in the angiogram.  Current cardiac medications are losartan 100 mg daily, Toprol- mg daily and Crestor 10 mg daily.  Today's blood pressure was elevated.  Patient brought a detailed home blood pressure readings.  All of them are in the 130 range.  Occasional elevation but not above 140.  No medication change was done as her home blood pressure is normal.  As patient completed over 1 year of dual antiplatelet therapy at that visit to discontinue Plavix and continue with aspirin and rest of her medications.  She will keep an yearly follow-up.  Total visit duration 20 minutes.  This included face-to-face interview, physical exam, chart review, review of echocardiogram EKG coronary angiogram and documentation.

## 2023-06-27 NOTE — LETTER
6/27/2023      RE: Fatou Brian  [address]  [facility] [address]       Dear Colleague,    Thank you for the opportunity to participate in the care of your patient, Fatou Brian, at the [facility] [place] at [facility]. Please see a copy of my visit note below.       SUBJECTIVE:  Fatou Brian is a 73 year old female who presents for follow-up.  She was admitted from 11/1/2021 to 11/4/2021 with NSTEMI.  Patient had angiogram and stent to obtuse marginal 1.  Postprocedure had no complications.  Had no cardiac complaints today.  Her past medical history is significant for hypertension, dyslipidemia, depression, CKD stage 3, IBS, h/o tobacco abuse, Sjogren's syndrome with myopathy for which she takes hydroxychloroquine, GERD, chronic back pain.  Also known to have left subclavian stenosis and variation in blood pressure between both arms.   Patient had no cardiac complaints today.  Overall he is doing well.  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  Patient Active Problem List    Diagnosis Date Noted    Cerebral aneurysm, nonruptured 10/18/2022     Priority: Medium     s/p clipping of left MCA trifurcation aneurysm      Centrilobular emphysema (H) 10/18/2022     Priority: Medium    Coronary artery disease involving native coronary artery of native heart 10/16/2022     Priority: Medium     s/p PCI with LIS 11/2021 to OM1.  LAD also 20% stenosed. Plan DAPT x 12 months and high-dose statin      Chest pain, unspecified type 11/02/2021     Priority: Medium    NSTEMI (non-ST elevated myocardial infarction) (H) 11/02/2021     Priority: Medium    Morbid obesity (H) 09/24/2020     Priority: Medium    CKD (chronic kidney disease) stage 3, GFR 30-59 ml/min (H) 09/27/2019     Priority: Medium    Primary osteoarthritis of both knees 01/14/2017     Priority: Medium    Bilateral low back pain  with sciatica, sciatica laterality unspecified, unspecified chronicity 01/14/2017     Priority: Medium    ACP (advance care planning) 11/12/2015     Priority: Medium     Advance Care Planning 3/20/2017: ACP Facilitation Session:    Fatou Brian presented for ACP Facilitation session at a group session. She was accompanied by no one. Hon[facility] Choices information provided and resources reviewed. She currently wishes to give additional consideration to ACP  She currently has the following questions or concerns about Advance Care Planning: none.  Confirmed/documented legally designated decision maker(s).  Added by Karli Aaron RN, Advance Care Planning Liaison.'  Advance Care Planning 11/12/2015: ACP Review and Resources Provided:  Reviewed chart for advance care plan.  Fatou Brian has no plan or code status on file. Discussed available resources and provided with information. Confirmed code status reflects current choices pending further ACP discussions.  Added by Theresa Bennett        Subclavian artery stenosis (H)      Priority: Medium     Left      Major depression in partial remission (H) 07/03/2014     Priority: Medium    Left arm pain 04/17/2014     Priority: Medium    Arm pain 04/17/2014     Priority: Medium    Pure hypercholesterolemia 01/16/2014     Priority: Medium    Knee pain 10/25/2013     Priority: Medium    Poor memory 09/24/2013     Priority: Medium    Low back pain 09/24/2013     Priority: Medium     Diagnosis updated by automated process. Provider to review and confirm.    gabapentin (NEURONTIN) 100 MG capsule, Tramadol  Last  check - 1/2/17          Senile keratosis 09/24/2013     Priority: Medium    Spinal stenosis 09/24/2013     Priority: Medium    H/O hysterectomy for benign disease 09/24/2013     Priority: Medium    Bilateral hearing loss 09/24/2013     Priority: Medium    Presbyopia 09/24/2013     Priority: Medium    Sjogren's syndrome with myopathy (H) 04/04/2013      Priority: Medium    Hypertension goal BP (blood pressure) < 130/80 04/04/2013     Priority: Medium    Osteoarthritis of knee 04/04/2013     Priority: Medium    GERD (gastroesophageal reflux disease) 04/04/2013     Priority: Medium    Constipation 04/04/2013     Priority: Medium    Osteopenia of both hips 04/04/2013     Priority: Medium     DEXA 2/2018: lowest T = -2.3 (right total hip)       Irritable bowel syndrome with diarrhea 04/04/2013     Priority: Medium    HL (hearing loss) 04/04/2013     Priority: Medium    Rosacea 04/04/2013     Priority: Medium    Gastroesophageal reflux disease without esophagitis 01/19/2011     Priority: Medium    .  Current Outpatient Medications   Medication Sig    acetaminophen (TYLENOL) 650 MG CR tablet Take 2 tablets (1,300 mg) by mouth every 8 hours as needed for mild pain or pain    aspirin (ASA) 81 MG EC tablet Take 1 tablet (81 mg) by mouth daily Start tomorrow.    calcium carbonate (TUMS) 500 MG chewable tablet Take 1 tablet (500 mg) by mouth At Bedtime    clopidogrel (PLAVIX) 75 MG tablet Take 1 tablet (75 mg) by mouth daily    famotidine (PEPCID) 20 MG tablet TAKE 1 TABLET BY MOUTH TWICE DAILY AS NEEDED    loratadine (CLARITIN) 10 MG tablet Take one tab twice per day.    losartan (COZAAR) 100 MG tablet Take 1 tablet (100 mg) by mouth daily    metoprolol succinate ER (TOPROL XL) 100 MG 24 hr tablet Take 1 tablet (100 mg) by mouth daily    Multiple Vitamins-Minerals (PRESERVISION AREDS 2 PO)     nitroGLYcerin (NITROSTAT) 0.4 MG sublingual tablet For chest pain place 1 tablet under the tongue every 5 minutes for 3 doses. If symptoms persist 5 minutes after 1st dose call 911.    order for DME One pair longitudinal arch supports  One pair   Use as directed    ORDER FOR DME Equipment being ordered: RUBBER THRESHOLD /CARGO WEDGE RAMP  ONE  USE AS DIRECTED FOR THRESHOLD  TO PREVENT FALLING    pantoprazole (PROTONIX) 40 MG EC tablet TAKE 1 TABLET DAILY    pilocarpine (SALAGEN) 5 MG  tablet Take 1 tablet (5 mg) by mouth 4 times daily (before meals and nightly)    rosuvastatin (CRESTOR) 10 MG tablet Take 1 tablet (10 mg) by mouth daily    senna-docusate (SENOKOT-S/PERICOLACE) 8.6-50 MG tablet Take 2 tablets by mouth 2 times daily as needed for constipation    Vitamin D3 (CHOLECALCIFEROL) 125 MCG (5000 UT) tablet Take 1 tablet (125 mcg) by mouth At Bedtime     No current facility-administered medications for this visit.     Past Medical History:   Diagnosis Date    Arthritis     Atherosclerosis     diffuse    Chest pain, unspecified type 11/02/2021    CKD (chronic kidney disease) stage 3, GFR 30-59 ml/min (H) 09/27/2019    Coronary artery disease involving native coronary artery of native heart 10/16/2022    Emphysema of lung (H)     Former smoker     Quit 2009    GERD (gastroesophageal reflux disease)     HLD (hyperlipidaemia)     Hypertension     Major depression in partial remission (H) 07/03/2014    Morbid obesity (H) 09/24/2020    NSTEMI (non-ST elevated myocardial infarction) (H) 11/02/2021    S/P LIS to OM1    Sjogren's syndrome (H)     Sjogren's syndrome with myopathy (H) 04/04/2013    Spinal stenosis     Subclavian artery stenosis (H)     Left     Past Surgical History:   Procedure Laterality Date    aneursym[  1983, 1991    Has metal in head    CHOLECYSTECTOMY      CLOSED RX PROX HUMERUS FRACTURE      10 pins placed    COLONOSCOPY      COLONOSCOPY  1/24/2014    Procedure: COMBINED COLONOSCOPY, SINGLE BIOPSY/POLYPECTOMY BY BIOPSY;  COLONOSCOPY;  Surgeon: Ranjit Pa MD;  Location:  GI    CV HEART CATHETERIZATION WITH POSSIBLE INTERVENTION N/A 11/2/2021    Procedure: Heart Catheterization with Possible Intervention;  Surgeon: Keeley Pérez MD;  Location: [facility] CARDIAC CATH LAB    CV PCI STENT DRUG ELUTING N/A 11/2/2021    Procedure: Percutaneous Coronary Intervention Stent Drug Eluting;  Surgeon: Keeley Pérez MD;  Location:  HEART CARDIAC CATH LAB     HC ECP WITH CATARACT SURGERY      both eyes    HYSTERECTOMY, JANET       Allergies   Allergen Reactions    Chantix [Varenicline]      suicidal    Influenza Virus Vaccine H5n1      Muscle aches dizzy, nauseated  Light headed    Morphine Sulfate      Sensitivity when in hospital    Omeprazole Magnesium Nausea     Intolerance      Vioxx     Cetirizine-Pseudoephedrine Er Rash    Niacin Itching and Rash    Zetia [Ezetimibe] Other (See Comments)     Muscle pain     Social History     Socioeconomic History    Marital status: Single     Spouse name: Not on file    Number of children: Not on file    Years of education: Not on file    Highest education level: Not on file   Occupational History    Not on file   Tobacco Use    Smoking status: Former     Packs/day: 1.50     Years: 53.00     Pack years: 79.50     Types: Cigarettes     Start date: 4/10/1956     Quit date: 10/1/2009     Years since quittin.7    Smokeless tobacco: Never   Substance and Sexual Activity    Alcohol use: Yes     Comment: occas.    Drug use: No    Sexual activity: Not Currently     Partners: Male   Other Topics Concern    Parent/sibling w/ CABG, MI or angioplasty before 65F 55M? Yes     Comment: brother     Service Not Asked    Blood Transfusions Not Asked    Caffeine Concern No    Occupational Exposure Not Asked    Hobby Hazards Not Asked    Sleep Concern Not Asked    Stress Concern Not Asked    Weight Concern Not Asked    Special Diet Not Asked    Back Care Not Asked    Exercise No    Bike Helmet Not Asked    Seat Belt Yes    Self-Exams Not Asked   Social History Narrative    --------------------------------------------------------------------------------    Surgical History  Return To Top     Status Surgery Time Frame Comment Record Date     Inactive  neck surgery    benign throat growths removed  2007      Inactive  Cataract  's  bilateral  2007      Inactive  hysterectomy  1981  TAHBSO  2007      Inactive  Brain  surgery  1983;1991  brain aneurysm  11/7/2007      Inactive  cholecsystectomy  1992 11/7/2007          --------------------------------------------------------------------------------    Food Allergy  Return To Top     Allergen Reaction Comment Record Date     * No known food allergies      11/7/2007          --------------------------------------------------------------------------------    Drug Allergy  Return To Top     Allergen Reaction Comment Record Date     MORPHINE SULFATE    sensitivity when in hospital  9/24/2012      Vioxx      10/26/2010      NIACIN PREPARATIONS  itching; rash    10/26/2010      Zyrtec  rash    10/26/2010      OMEPRAZOLE/LANSOPRAZOLE  intolerance; nausea  PRILOSEC OTC ONLY OMEPREZOLE OK AND PREVACID OK; PRILOSEC OTC ONLY OMEPREZOLE OK AND PREVACID OK  4/10/2008          --------------------------------------------------------------------------------    Environment Allergy  Return To Top     Allergen Reaction Comment Record Date     * No known environmental allergies      11/7/2007          --------------------------------------------------------------------------------    Immunization History Return To Top     Funding Source Vaccine Type of Vaccine Date Given Route Site Given Lot#  Exp. Date Date on VIS Date Given VIS Vaccinator     Private  Herpes Zoster (Zostavax) age 60 +  Herpes Zoster  1/29/2009  SQ  RA  [id number]  MRK  04/28/2010 9/11/06 1/29/2009  DALIA Fuentes CMA      Private  Influenza (Adult) Seasonal  Flu Adult  10/14/2010  IM  Left Deltoid; Left Deltoid  [id number]  SP  6/30/2011  8/10/2010  10/14/2010  MAJO Corley [facility]      Private  Tdap (Adacel) Adult  Tdap  10/14/2010  IM  Left Deltoid; Left Deltoid  [id number]  SP; SP  06/03/2012  11/18/2008  10/14/2010  MAJO Corley [facility]          --------------------------------------------------------------------------------    Social History  Return To Top     Question Answer Comment Record Date     Marital status      9/24/2012       Emotional Abuse  No    9/16/2011      Exercise      4/9/2008      Caffeine  Yes  1 1/2 cups q.d.   4/9/2008      Physical Abuse  No    9/16/2011      Sealtbelts  Yes    4/9/2008      Sexual Abuse  No    9/16/2011      Breast/Testicle Self Check  Yes  Occasional  4/9/2008      Number of children  0    4/9/2008      Tobacco history  Quit this year  10/26/09  11/17/2009      Number of years using tobacco  35    11/11/2008      Number of cigarettes/day      11/11/2008      Alcohol history  Currently drinks alcohol    9/16/2011      Frequency of drinks  1-4 drinks per week    11/7/2007      Assist to Quit Information  Form Given to Patient    11/11/2008          --------------------------------------------------------------------------------    History - Overall Remark: 9/24/2012 Return To Top     MEDICAL HX: Collapsed lung morphine sensitivitiy last hospitalization    --------------------------------------------------------------------------------    Medical History Return To Top     Status Diagnosis Time Frame Comment Record Date     Active (389.18) - C - Sensorineural hearing loss, bilateral      9/24/2012      Active (V02.62) - I - Laboratory exam as part of general physical exam      9/24/2012      Active (V77.0) - C - Screening, thyroid disorders      9/24/2012      Active (462) - C - Sore throat      9/24/2012      Active (V82.9) - C - Screening, vitamin d deficiency      9/24/2012      Active (466.0) - C - Bronchitis, acute      8/17/2012      Active (372.72) - C - Subconjunctival hemorrhage    RIGHT EYE  11/28/2011      Active (730.16) - C - Osteoarthritis, knee    MANY YEARS BILATERAL  11/28/2011      Active (658.85) - C - Muscle spasm      11/28/2011      Active (V76.12) - C - Screening, mammogram      9/21/2011      Active (724.02) - C - Spinal stenosis, lumbar region    LUMBAR 4-5 AREA  9/21/2011      Active (782.3) - C - Edema, localized, NOS    ANKLES FEET AND CALVES  9/16/2011      Active (455.2) - C -  Hemorrhoids, Internal w/Complication    INTERMITTENT BLEEDING  9/16/2011      Active (V76.51) - C - Screening, colon      9/16/2011      Active (719.42) - C - Elbow pain    RIGHT OLECRANON WITH POSSIBLE MOUSE IN BURSA  9/16/2011      Active (724.02) - C - Spinal stenosis, lumbar region      9/16/2011      Active (722.52) - C - Degenerative disc disease, lumbar      9/16/2011      Active (564.00) - C - Constipation      8/19/2011      Active (807.00) - C - Rib fracture      8/19/2011      Active (433.10) - C - CAROTID ART OCC W/O INFARC      4/28/2011      Active (786.52) - C - Chest wall pain    LEFT CHEST WALL  3/25/2011      Active (780.4) - C - Dizziness/vertigo, NOS      1/3/2011      Active (710.2) - C - Sjogren's disease    confirmed will send to rheumatology  10/14/2010      Active (389.10) - C - Hearing loss, sensorineural    bilateral  10/14/2010      Active 528.00 Stomatits, mucositis, unpec., NOS      12/30/2009      Active 523.01 ACUTE GINGIVITIS NONPLAQUE INDUCED      12/30/2009      Active 241.1 NONTOXIC MULTINODUL GOITER      4/29/2009      Active 241.0 Thyroid nodule      4/28/2009      Active 780.79 Fatigue      11/11/2008      Active (401.1) - C - Hypertension, benign      4/10/2008      Active (729.5) - C - limb pain    HUMERUS FRACTURE WITH RESIDUAL PAIN JULY 2008 PLATE AND 10 SCREWS  4/10/2008      Active (719.46) - C - Knee pain    OSTEOARTHRITIS OF KNEES  4/10/2008      Active 728.6 Dupuytren's contracture    right hand  4/10/2008      Active (723.1) - C - Neck pain    INTERMITTENT NECK PAIN  4/10/2008      Active 796.2 Elevated blood pressure- no hypertension dx      4/10/2008      Active (272.4) - C - Hyperlipidemia      4/10/2008      Active (530.81) - C - GERD      4/10/2008      Active (780.52) - C - Insomnia    associated with adjustment  4/10/2008      Active (V70.0) - C - Routine Medical Exam      11/7/2007      Active 461.0 Sinusitis, acute, maxillary      8/14/2007      Active 733.00  Osteoporosis, unspec.      2007      Active (305.1) - C - Tobacco abuse      2007      Active 812.20 Humerus, closed, unspec.      2007      Active (V72.83) - C - Preop    humerus fx rt  2007      Inactive  (789.00) - I - Abdominal pain      2010      Inactive  (v06.1) - C - Tdap vaccine      10/14/2010      Inactive  (V72.62) - C - Laboratory exam as part of general physical exam      10/14/2010      Inactive  (V04.81) - C - Influenza vaccination      10/14/2010      Inactive  (V76.51) - C - Screening, colon      10/14/2010      Inactive  (305.1) - C - Tobacco dependence    resolved  2009      Inactive  490 Bronchitis      2009      Inactive  466.0 Bronchitis, acute      2009      Inactive  V05.8 Immunization, other, specified      2009      Inactive  V72.31 Screening, pap      2008      Inactive  Abnormal pap      2008          --------------------------------------------------------------------------------    Medication History Return To Top                 --------------------------------------------------------------------------------    Family History  Return To Top     Status Relationship Disease Comment Record Date     Alive Brother      2007      Alive Sister      2007      Alive Brother      2007      Alive Sister      2007      Alive Sister      2007         Brother  heart    2007         Brother  kidney cancer    2007         Mother  stomach cancer  age 79  2007         Brother  heart    2007         Father  heart;AAA  age 80  2007          --------------------------------------------------------------------------------    Infection History Return To Top     Question Answer Comment Record Date     History of HPV  No    2008      Live with someone with TB or exposed to TB  No    2008      History of Hepatitis B  No    2008      History of  chlamydia  No    11/11/2008      History of gonorrhea  No    11/11/2008      History of syphilis  No    11/11/2008          --------------------------------------------------------------------------------             Social Determinants of Health     Financial Resource Strain: Not on file   Food Insecurity: Not on file   Transportation Needs: Not on file   Physical Activity: Not on file   Stress: Not on file   Social Connections: Not on file   Intimate Partner Violence: Not on file   Housing Stability: Not on file     Family History   Problem Relation Age of Onset    Thyroid Disease Mother     Arthritis Mother         Rheumatoid    Osteoporosis Mother     Cancer Mother         stomach    Cerebrovascular Disease Mother     Heart Disease Father     Cancer Father     Heart Disease Brother         fibulation          REVIEW OF SYSTEMS:  General: negative, fever, chills, night sweats  Skin: negative, acne, rash and scaling  Eyes: negative, double vision, eye pain and photophobia  Ears/Nose/Throat: negative, nasal congestion and purulent rhinorrhea  Respiratory: No dyspnea on exertion, No cough, No hemoptysis and negative  Cardiovascular: negative, palpitations, tachycardia, irregular heart beat, chest pain, exertional chest pain or pressure, paroxysmal nocturnal dyspnea, dyspnea on exertion and orthopnea       OBJECTIVE:  not currently breastfeeding.  General Appearance: alert and no distress  Head: Normocephalic. No masses, lesions, tenderness or abnormalities  Eyes: conjuctiva clear, PERRL, EOM intact  Ears: External ears normal. Canals clear. TM's normal.  Nose: Nares normal  Mouth: normal  Neck: Supple, no cervical adenopathy, no thyromegaly  Lungs: clear to auscultation  Cardiac: regular rate and rhythm, normal S1 and S2, no murmur       ASSESSMENT/PLAN:  Patient here for follow-up.  NSTEMI status post stent to obtuse marginal 1 in number of 2021.  Currently patient is not very active but denied cardiac  symptoms.  Overall she is doing well.  Patient is developing some blindness in her eyes and hence planning to discontinue the chloroquine.  Patient is known to have a normal echocardiogram with normal function and no regional wall motion abnormality and also normal EKG nonspecific ST-T changes.  She had no other significant flow-limiting lesions in the angiogram.  Current cardiac medications are losartan 100 mg daily, Toprol- mg daily and Crestor 10 mg daily.  Today's blood pressure was elevated.  Patient brought a detailed home blood pressure readings.  All of them are in the 130 range.  Occasional elevation but not above 140.  No medication change was done as her home blood pressure is normal.  As patient completed over 1 year of dual antiplatelet therapy at that visit to discontinue Plavix and continue with aspirin and rest of her medications.  She will keep an yearly follow-up.  Total visit duration 20 minutes.  This included face-to-face interview, physical exam, chart review, review of echocardiogram EKG coronary angiogram and documentation.      Please do not hesitate to contact me if you have any questions/concerns.     Sincerely,     MAJO Herrera MD

## 2023-06-27 NOTE — PATIENT INSTRUCTIONS
"Cardiology Providers you saw during your visit:  Dr. Reed     Medication changes: None    Follow up: with Dr. Reed in 1 year.     Follow the American Heart Association Diet and Lifestyle recommendations:  Limit saturated fat, trans fat, sodium, red meat, sweets and sugar-sweetened beverages. If you choose to eat red meat, compare labels and select the leanest cuts available.  Aim for at least 150 minutes of moderate physical activity or 75 minutes of vigorous physical activity - or an equal combination of both - each week.    If you have any questions, contact Justino Beaulieu LPN. We are encouraging the use of Sazze to communicate with your HealthCare Provider     To contact the [facility], please call, [phone]  To schedule an appointment or to leave a message for your Care Team Press #1  If you are a physician calling for another physician Press #2  For Billing Press #3  For Medical Records Press #4\"    "

## 2023-06-27 NOTE — NURSING NOTE
Chief Complaint   Patient presents with     Follow Up     6/27/23 [facility]     Vitals were taken and medications reconciled.    Sam Mallory, EMT  11:34 AM

## 2023-06-28 ENCOUNTER — TRANSFERRED RECORDS (OUTPATIENT)
Dept: HEALTH INFORMATION MANAGEMENT | Facility: CLINIC | Age: 73
End: 2023-06-28
Payer: COMMERCIAL

## 2023-08-02 NOTE — TELEPHONE ENCOUNTER
Changing to mail order:      Metoprolol:  Prescription approved per [facility] Refill Protocol.     Protonix:  Prescription approved per [facility] Refill Protocol.       Pilocarpine:  Routing refill request to provider for review/approval because:  Drug not on the [facility] refill protocol         
Metoprolol er 50 mg       Last Written Prescription Date: 9/16/16  Last Fill Quantity: 90, # refills: 3    Last [place] Visit with [facility] or [facility] prescribing provider:  9/22/16  Now wants to express scripts mail order    Future Office Visit:        BP Readings from Last 3 Encounters:   09/22/16 118/64   09/13/16 144/76   05/27/16 130/56       Pantoprazole dr 40 mg       Last Written Prescription Date: 9/16/16  Last Fill Quantity: 90,  # refills: 3   Last Office Visit with [facility] [employer] [facility] [employer] [facility] or [facility] prescribing provider: 9/22/16    Now wants to express scripts mail order     Pilocarpine 5 mg       Last Written Prescription Date:  9/16/16  Last Fill Quantity: 360,   # refills: 3  Last Office Visit with [facility] [employer] [facility] [employer] [facility] or [facility] prescribing provider: 9/22/16     Now wants to express scripts mail order   Future Office visit:       Routing refill request to provider for review/approval because:  [facility] or [facility] refill protocol or controlled substance                                                     
Female

## 2023-10-13 DIAGNOSIS — I21.4 NSTEMI (NON-ST ELEVATED MYOCARDIAL INFARCTION) (H): ICD-10-CM

## 2023-10-13 RX ORDER — LOSARTAN POTASSIUM 100 MG/1
100 TABLET ORAL DAILY
Qty: 90 TABLET | Refills: 1 | Status: SHIPPED | OUTPATIENT
Start: 2023-10-13 | End: 2023-10-20

## 2023-10-15 PROBLEM — R94.6 ABNORMAL FINDING ON THYROID FUNCTION TEST: Status: ACTIVE | Noted: 2023-10-15

## 2023-10-16 NOTE — PATIENT INSTRUCTIONS
"See me in the spring.  Next time bring all of your pill bottles to your appointments.      Your blood pressure is not at goal.  We'd like to see it less than 130/80.  We can have you try increasing the metoprolol dose to 150 mg.  Monitor your heart rate and let me know if it goes under 60.  You may still need another (3rd) blood pressure medication to control your blood pressure.  We'll reassess in the spring.  The two options for additional medications are HCTZ which can help your leg swelling but does need lab monitoring OR amlodipine which does not need lab monitoring but can worsen your leg swelling.        I recommend that patients 50 and over get the Shingrix vaccine.  One in 3 adults in the [place] will get shingles and the risk increases with age.  Shingles can cause debilitating and persistent nerve pain and can increase your risk for stroke.      If you are on medicare you must get this vaccine at a pharmacy.  The pharmacist will let you know beforehand if there is a copay and can administer the vaccine.  The Shingrix vaccination is a 2-shot series.  The second dose is given 2-6 months after the first.  (It cannot be given sooner than 2 months after the first dose - at the earliest.)  You should be aware that patients are reporting feeling a bit under the weather after the injection, including fatigue, malaise, and low-grade fever that may last a couple days.  Rarely patients can get a mild, shingles-like rash.   But these symptoms are much better than the Shingles disease.     You are a candidate for the new RSV vaccine.  RSV (Respiratory Syncytial Virus) is a common respiratory virus that frequently causes the \"common cold\" in healthy young adults.  Unfortunately, it can cause a severe viral pneumonia in infants and older people - especially those with other chronic medical conditions (such as diabetes, asthma, heart disease, liver and kidney disease).  If you are on medicare you must get this vaccine at a " pharmacy.  The pharmacist will let you know beforehand if there is a copay and can administer the vaccine.           Patient Education   Personalized Prevention Plan  You are due for the preventive services outlined below.  Your care team is available to assist you in scheduling these services.  If you have already completed any of these items, please share that information with your care team to update in your medical record.  Health Maintenance Due   Topic Date Due     Breathing Capacity Test  Never done     ANNUAL REVIEW OF HM ORDERS  Never done     COPD Action Plan  Never done     RSV VACCINE 60+ (1 - 1-dose 60+ series) Never done     Zoster (Shingles) Vaccine (2 of 3) 03/26/2009     LUNG CANCER SCREENING  10/26/2010     Mammogram  12/20/2015     Flu Vaccine (1) 09/01/2023     COVID-19 Vaccine (4 - 2023-24 season) 09/01/2023     Basic Metabolic Panel  10/18/2023     Cholesterol Lab  10/18/2023     Kidney Microalbumin Urine Test  10/18/2023     Annual Wellness Visit  10/18/2023     Hemoglobin  10/18/2023     Your Health Risk Assessment indicates you feel you are not in good health    A healthy lifestyle helps keep the body fit and the mind alert. It helps protect you from disease, helps you fight disease, and helps prevent chronic disease (disease that doesn't go away) from getting worse. This is important as you get older and begin to notice twinges in muscles and joints and a decline in the strength and stamina you once took for granted. A healthy lifestyle includes good healthcare, good nutrition, weight control, recreation, and regular exercise. Avoid harmful substances and do what you can to keep safe. Another part of a healthy lifestyle is stay mentally active and socially involved.    Good healthcare   Have a wellness visit every year.   If you have new symptoms, let us know right away. Don't wait until the next checkup.   Take medicines exactly as prescribed and keep your medicines in a safe place. Tell  "us if your medicine causes problems.   Healthy diet and weight control   Eat 3 or 4 small, nutritious, low-fat, high-fiber meals a day. Include a variety of fruits, vegetables, and whole-grain foods.   Make sure you get enough calcium in your diet. Calcium, vitamin D, and exercise help prevent osteoporosis (bone thinning).   If you live alone, try eating with others when you can. That way you get a good meal and have company while you eat it.   Try to keep a healthy weight. If you eat more calories than your body uses for energy, it will be stored as fat and you will gain weight.     Recreation   Recreation is not limited to sports and team events. It includes any activity that provides relaxation, interest, enjoyment, and exercise. Recreation provides an outlet for physical, mental, and social energy. It can give a sense of worth and achievement. It can help you stay healthy.    Mental Exercise and Social Involvement  Mental and emotional health is as important as physical health. Keep in touch with friends and family. Stay as active as possible. Continue to learn and challenge yourself.   Things you can do to stay mentally active are:  Learn something new, like a foreign language or musical instrument.   Play SCRABBLE or do crossword puzzles. If you cannot find people to play these games with you at home, you can play them with others on your computer through the Internet.   Join a games club--anything from card games to chess or checkers or lawn bowling.   Start a new hobby.   Go back to school.   Volunteer.   Read.   Keep up with world events.  Learning About Being Physically Active  What is physical activity?     Being physically active means doing any kind of activity that gets your body moving.  The types of physical activity that can help you get fit and stay healthy include:  Aerobic or \"cardio\" activities. These make your heart beat faster and make you breathe harder, such as brisk walking, riding a bike, " "or running. They strengthen your heart and lungs and build up your endurance.  Strength training activities. These make your muscles work against, or \"resist,\" something. Examples include lifting weights or doing push-ups. These activities help tone and strengthen your muscles and bones.  Stretches. These let you move your joints and muscles through their full range of motion. Stretching helps you be more flexible.  Reaching a balance between these three types of physical activity is important because each one contributes to your overall fitness.  What are the benefits of being active?  Being active is one of the best things you can do for your health. It helps you to:  Feel stronger and have more energy to do all the things you like to do.  Focus better at school or work.  Feel, think, and sleep better.  Reach and stay at a healthy weight.  Lose fat and build lean muscle.  Lower your risk for serious health problems, including diabetes, heart attack, high blood pressure, and some cancers.  Keep your heart, lungs, bones, muscles, and joints strong and healthy.  How can you make being active part of your life?  Start slowly. Make it your long-term goal to get at least 30 minutes of exercise on most days of the week. Walking is a good choice. You also may want to do other activities, such as running, swimming, cycling, or playing tennis or team sports.  Pick activities that you like--ones that make your heart beat faster, your muscles stronger, and your muscles and joints more flexible. If you find more than one thing you like doing, do them all. You don't have to do the same thing every day.  Get your heart pumping every day. Any activity that makes your heart beat faster and keeps it at that rate for a while counts.  Here are some great ways to get your heart beating faster:  Go for a brisk walk, run, or bike ride.  Go for a hike or swim.  Go in-line skating.  Play a game of touch football, basketball, or " "soccer.  Ride a bike.  Play tennis or racquetball.  Climb stairs.  Even some household chores can be aerobic--just do them at a faster pace. Vacuuming, raking or mowing the lawn, sweeping the garage, and washing and waxing the car all can help get your heart rate up.  Strengthen your muscles during the week. You don't have to lift heavy weights or grow big, bulky muscles to get stronger. Doing a few simple activities that make your muscles work against, or \"resist,\" something can help you get stronger.  For example, you can:  Do push-ups or sit-ups, which use your own body weight as resistance.  Lift weights or dumbbells or use stretch bands at home or in a gym or community center.  Stretch your muscles often. Stretching will help you as you become more active. It can help you stay flexible, loosen tight muscles, and avoid injury. It can also help improve your balance and posture and can be a great way to relax.  Be sure to stretch the muscles you'll be using when you work out. It's best to warm your muscles slightly before you stretch them. Walk or do some other light aerobic activity for a few minutes, and then start stretching.  When you stretch your muscles:  Do it slowly. Stretching is not about going fast or making sudden movements.  Don't push or bounce during a stretch.  Hold each stretch for at least 15 to 30 seconds, if you can. You should feel a stretch in the muscle, but not pain.  Breathe out as you do the stretch. Then breathe in as you hold the stretch. Don't hold your breath.  If you're worried about how more activity might affect your health, have a checkup before you start. Follow any special advice your doctor gives you for getting a smart start.  Where can you learn more?  Go to https://www.healthwise.net/patiented  Enter W332 in the search box to learn more about \"Learning About Being Physically Active.\"  Current as of: October 10, 2022               Content Version: 13.7    6243-0026 " [employer], [employer].   Care instructions adapted under license by your healthcare professional. If you have questions about a medical condition or this instruction, always ask your healthcare professional. "[employer] disclaims any warranty or liability for your use of this information.      Hearing Loss: Care Instructions  Overview     Hearing loss is a sudden or slow decrease in how well you hear. It can range from slight to profound. Permanent hearing loss can occur with aging. It also can happen when you are exposed long-term to loud noise. Examples include listening to loud music, riding motorcycles, or being around other loud machines.  Hearing loss can affect your work and home life. It can make you feel lonely or depressed. You may feel that you have lost your independence. But hearing aids and other devices can help you hear better and feel connected to others.  Follow-up care is a key part of your treatment and safety. Be sure to make and go to all appointments, and call your doctor if you are having problems. It's also a good idea to know your test results and keep a list of the medicines you take.  How can you care for yourself at home?  Avoid loud noises whenever possible. This helps keep your hearing from getting worse.  Always wear hearing protection around loud noises.  Wear a hearing aid as directed.  A professional can help you pick a hearing aid that will work best for you.  You can also get hearing aids over the counter for mild to moderate hearing loss.  Have hearing tests as your doctor suggests. They can show whether your hearing has changed. Your hearing aid may need to be adjusted.  Use other devices as needed. These may include:  Telephone amplifiers and hearing aids that can connect to a television, stereo, radio, or microphone.  Devices that use lights or vibrations. These alert you to the doorbell, a ringing telephone, or a baby monitor.  Television closed-captioning.  "This shows the words at the bottom of the screen. Most new TVs can do this.  TTY (text telephone). This lets you type messages back and forth on the telephone instead of talking or listening. These devices are also called TDD. When messages are typed on the keyboard, they are sent over the phone line to a receiving TTY. The message is shown on a monitor.  Use text messaging, social media, and email if it is hard for you to communicate by telephone.  Try to learn a listening technique called speechreading. It is not lipreading. You pay attention to people's gestures, expressions, posture, and tone of voice. These clues can help you understand what a person is saying. Face the person you are talking to, and have them face you. Make sure the lighting is good. You need to see the other person's face clearly.  Think about counseling if you need help to adjust to your hearing loss.  When should you call for help?  Watch closely for changes in your health, and be sure to contact your doctor if:    You think your hearing is getting worse.     You have new symptoms, such as dizziness or nausea.   Where can you learn more?  Go to https://www.[employer].net/patiented  Enter R798 in the search box to learn more about \"Hearing Loss: Care Instructions.\"  Current as of: March 1, 2023               Content Version: 13.7    6929-0524 [employer].   Care instructions adapted under license by your healthcare professional. If you have questions about a medical condition or this instruction, always ask your healthcare professional. [employer] disclaims any warranty or liability for your use of this information.      Bladder Training: Care Instructions  Your Care Instructions     Bladder training is used to treat urge incontinence and stress incontinence. Urge incontinence means that the need to urinate comes on so fast that you can't get to a toilet in time. Stress incontinence means that you leak urine " because of pressure on your bladder. For example, it may happen when you laugh, cough, or lift something heavy.  Bladder training can increase how long you can wait before you have to urinate. It can also help your bladder hold more urine. And it can give you better control over the urge to urinate.  It is important to remember that bladder training takes a few weeks to a few months to make a difference. You may not see results right away, but don't give up.  Follow-up care is a key part of your treatment and safety. Be sure to make and go to all appointments, and call your doctor if you are having problems. It's also a good idea to know your test results and keep a list of the medicines you take.  How can you care for yourself at home?  Work with your doctor to come up with a bladder training program that is right for you. You may use one or more of the following methods.  Delayed urination  In the beginning, try to keep from urinating for 5 minutes after you first feel the need to go.  While you wait, take deep, slow breaths to relax. Kegel exercises can also help you delay the need to go to the bathroom.  After some practice, when you can easily wait 5 minutes to urinate, try to wait 10 minutes before you urinate.  Slowly increase the waiting period until you are able to control when you have to urinate.  Scheduled urination  Empty your bladder when you first wake up in the morning.  Schedule times throughout the day when you will urinate.  Start by going to the bathroom every hour, even if you don't need to go.  Slowly increase the time between trips to the bathroom.  When you have found a schedule that works well for you, keep doing it.  If you wake up during the night and have to urinate, do it. Apply your schedule to waking hours only.  Kegel exercises  These tighten and strengthen pelvic muscles, which can help you control the flow of urine. (If doing these exercises causes pain, stop doing them and talk  "with your doctor.) To do Kegel exercises:  Squeeze your muscles as if you were trying not to pass gas. Or squeeze your muscles as if you were stopping the flow of urine. Your belly, legs, and buttocks shouldn't move.  Hold the squeeze for 3 seconds, then relax for 5 to 10 seconds.  Start with 3 seconds, then add 1 second each week until you are able to squeeze for 10 seconds.  Repeat the exercise 10 times a session. Do 3 to 8 sessions a day.  When should you call for help?  Watch closely for changes in your health, and be sure to contact your doctor if:    Your incontinence is getting worse.     You do not get better as expected.   Where can you learn more?  Go to https://www.[employer].net/patiented  Enter V684 in the search box to learn more about \"Bladder Training: Care Instructions.\"  Current as of: March 1, 2023               Content Version: 13.7    2093-3366 [employer].   Care instructions adapted under license by your healthcare professional. If you have questions about a medical condition or this instruction, always ask your healthcare professional. [employer] disclaims any warranty or liability for your use of this information.         "

## 2023-10-19 ENCOUNTER — OFFICE VISIT (OUTPATIENT)
Dept: FAMILY MEDICINE | Facility: CLINIC | Age: 73
End: 2023-10-19
Payer: COMMERCIAL

## 2023-10-19 VITALS
HEIGHT: 66 IN | SYSTOLIC BLOOD PRESSURE: 160 MMHG | DIASTOLIC BLOOD PRESSURE: 80 MMHG | WEIGHT: 235 LBS | RESPIRATION RATE: 20 BRPM | BODY MASS INDEX: 37.77 KG/M2 | TEMPERATURE: 97.2 F | HEART RATE: 80 BPM

## 2023-10-19 DIAGNOSIS — R33.9 INCOMPLETE BLADDER EMPTYING: ICD-10-CM

## 2023-10-19 DIAGNOSIS — M54.40 BILATERAL LOW BACK PAIN WITH SCIATICA, SCIATICA LATERALITY UNSPECIFIED, UNSPECIFIED CHRONICITY: Chronic | ICD-10-CM

## 2023-10-19 DIAGNOSIS — K21.9 GASTROESOPHAGEAL REFLUX DISEASE WITHOUT ESOPHAGITIS: Chronic | ICD-10-CM

## 2023-10-19 DIAGNOSIS — J30.9 ALLERGIC RHINITIS, UNSPECIFIED SEASONALITY, UNSPECIFIED TRIGGER: ICD-10-CM

## 2023-10-19 DIAGNOSIS — Z00.00 ENCOUNTER FOR MEDICARE ANNUAL WELLNESS EXAM: Primary | ICD-10-CM

## 2023-10-19 DIAGNOSIS — E66.01 SEVERE OBESITY (BMI 35.0-39.9) WITH COMORBIDITY (H): ICD-10-CM

## 2023-10-19 DIAGNOSIS — Z23 NEED FOR VACCINATION: ICD-10-CM

## 2023-10-19 DIAGNOSIS — N39.45 CONTINUOUS LEAKAGE OF URINE: ICD-10-CM

## 2023-10-19 DIAGNOSIS — R94.6 ABNORMAL FINDING ON THYROID FUNCTION TEST: ICD-10-CM

## 2023-10-19 DIAGNOSIS — R06.83 SNORING: ICD-10-CM

## 2023-10-19 DIAGNOSIS — M17.0 PRIMARY OSTEOARTHRITIS OF BOTH KNEES: ICD-10-CM

## 2023-10-19 DIAGNOSIS — I10 HYPERTENSION GOAL BP (BLOOD PRESSURE) < 130/80: ICD-10-CM

## 2023-10-19 DIAGNOSIS — J43.2 CENTRILOBULAR EMPHYSEMA (H): ICD-10-CM

## 2023-10-19 DIAGNOSIS — E78.00 PURE HYPERCHOLESTEROLEMIA: ICD-10-CM

## 2023-10-19 DIAGNOSIS — N18.31 STAGE 3A CHRONIC KIDNEY DISEASE (H): ICD-10-CM

## 2023-10-19 DIAGNOSIS — I25.10 CORONARY ARTERY DISEASE INVOLVING NATIVE CORONARY ARTERY OF NATIVE HEART, UNSPECIFIED WHETHER ANGINA PRESENT: ICD-10-CM

## 2023-10-19 DIAGNOSIS — I77.1 SUBCLAVIAN ARTERY STENOSIS (H): ICD-10-CM

## 2023-10-19 DIAGNOSIS — G47.19 EXCESSIVE DAYTIME SLEEPINESS: ICD-10-CM

## 2023-10-19 LAB
ANION GAP SERPL CALCULATED.3IONS-SCNC: 14 MMOL/L (ref 7–15)
BASO+EOS+MONOS # BLD AUTO: ABNORMAL 10*3/UL
BASO+EOS+MONOS NFR BLD AUTO: ABNORMAL %
BASOPHILS # BLD AUTO: 0.1 10E3/UL (ref 0–0.2)
BASOPHILS NFR BLD AUTO: 1 %
BUN SERPL-MCNC: 16.6 MG/DL (ref 8–23)
CALCIUM SERPL-MCNC: 9.6 MG/DL (ref 8.8–10.2)
CHLORIDE SERPL-SCNC: 103 MMOL/L (ref 98–107)
CHOLEST SERPL-MCNC: 206 MG/DL
CREAT SERPL-MCNC: 1.11 MG/DL (ref 0.51–0.95)
DEPRECATED HCO3 PLAS-SCNC: 23 MMOL/L (ref 22–29)
EGFRCR SERPLBLD CKD-EPI 2021: 52 ML/MIN/1.73M2
EOSINOPHIL # BLD AUTO: 0.1 10E3/UL (ref 0–0.7)
EOSINOPHIL NFR BLD AUTO: 1 %
ERYTHROCYTE [DISTWIDTH] IN BLOOD BY AUTOMATED COUNT: 14.5 % (ref 10–15)
GLUCOSE SERPL-MCNC: 99 MG/DL (ref 70–99)
HCT VFR BLD AUTO: 40.8 % (ref 35–47)
HDLC SERPL-MCNC: 56 MG/DL
HGB BLD-MCNC: 12.8 G/DL (ref 11.7–15.7)
IMM GRANULOCYTES # BLD: 0 10E3/UL
IMM GRANULOCYTES NFR BLD: 1 %
LDLC SERPL CALC-MCNC: 126 MG/DL
LYMPHOCYTES # BLD AUTO: 1.3 10E3/UL (ref 0.8–5.3)
LYMPHOCYTES NFR BLD AUTO: 17 %
MCH RBC QN AUTO: 30 PG (ref 26.5–33)
MCHC RBC AUTO-ENTMCNC: 31.4 G/DL (ref 31.5–36.5)
MCV RBC AUTO: 96 FL (ref 78–100)
MONOCYTES # BLD AUTO: 0.5 10E3/UL (ref 0–1.3)
MONOCYTES NFR BLD AUTO: 7 %
NEUTROPHILS # BLD AUTO: 6 10E3/UL (ref 1.6–8.3)
NEUTROPHILS NFR BLD AUTO: 75 %
NONHDLC SERPL-MCNC: 150 MG/DL
PLATELET # BLD AUTO: 209 10E3/UL (ref 150–450)
POTASSIUM SERPL-SCNC: 3.9 MMOL/L (ref 3.4–5.3)
RBC # BLD AUTO: 4.27 10E6/UL (ref 3.8–5.2)
SODIUM SERPL-SCNC: 140 MMOL/L (ref 135–145)
TRIGL SERPL-MCNC: 120 MG/DL
TSH SERPL DL<=0.005 MIU/L-ACNC: 2.6 UIU/ML (ref 0.3–4.2)
WBC # BLD AUTO: 8 10E3/UL (ref 4–11)

## 2023-10-19 PROCEDURE — 91320 SARSCV2 VAC 30MCG TRS-SUC IM: CPT | Performed by: FAMILY MEDICINE

## 2023-10-19 PROCEDURE — 36415 COLL VENOUS BLD VENIPUNCTURE: CPT | Performed by: FAMILY MEDICINE

## 2023-10-19 PROCEDURE — 85025 COMPLETE CBC W/AUTO DIFF WBC: CPT | Performed by: FAMILY MEDICINE

## 2023-10-19 PROCEDURE — 80061 LIPID PANEL: CPT | Performed by: FAMILY MEDICINE

## 2023-10-19 PROCEDURE — 84460 ALANINE AMINO (ALT) (SGPT): CPT | Performed by: FAMILY MEDICINE

## 2023-10-19 PROCEDURE — 80048 BASIC METABOLIC PNL TOTAL CA: CPT | Performed by: FAMILY MEDICINE

## 2023-10-19 PROCEDURE — 99214 OFFICE O/P EST MOD 30 MIN: CPT | Mod: 25 | Performed by: FAMILY MEDICINE

## 2023-10-19 PROCEDURE — G0439 PPPS, SUBSEQ VISIT: HCPCS | Performed by: FAMILY MEDICINE

## 2023-10-19 PROCEDURE — 84443 ASSAY THYROID STIM HORMONE: CPT | Performed by: FAMILY MEDICINE

## 2023-10-19 PROCEDURE — 90480 ADMN SARSCOV2 VAC 1/ONLY CMP: CPT | Performed by: FAMILY MEDICINE

## 2023-10-19 RX ORDER — LORATADINE 10 MG/1
TABLET ORAL
Qty: 180 TABLET | Refills: 3 | Status: SHIPPED | OUTPATIENT
Start: 2023-10-19

## 2023-10-19 RX ORDER — NITROGLYCERIN 0.4 MG/1
TABLET SUBLINGUAL
Qty: 12 TABLET | Refills: 0 | Status: SHIPPED | OUTPATIENT
Start: 2023-10-19

## 2023-10-19 RX ORDER — HYDROCHLOROTHIAZIDE 25 MG/1
25 TABLET ORAL DAILY
Qty: 90 TABLET | Refills: 0 | Status: SHIPPED | OUTPATIENT
Start: 2023-10-19 | End: 2023-10-19

## 2023-10-19 RX ORDER — LOSARTAN POTASSIUM 100 MG/1
100 TABLET ORAL DAILY
Qty: 90 TABLET | Refills: 1 | Status: CANCELLED | OUTPATIENT
Start: 2023-10-19

## 2023-10-19 RX ORDER — SENNOSIDES 8.6 MG
1300 CAPSULE ORAL EVERY 8 HOURS PRN
Qty: 270 TABLET | Refills: 3 | Status: SHIPPED | OUTPATIENT
Start: 2023-10-19

## 2023-10-19 RX ORDER — RESPIRATORY SYNCYTIAL VIRUS VACCINE 120MCG/0.5
0.5 KIT INTRAMUSCULAR ONCE
Qty: 1 EACH | Refills: 0 | Status: CANCELLED | OUTPATIENT
Start: 2023-10-19 | End: 2023-10-19

## 2023-10-19 RX ORDER — METOPROLOL SUCCINATE 50 MG/1
150 TABLET, EXTENDED RELEASE ORAL DAILY
Qty: 135 TABLET | Refills: 3 | Status: SHIPPED | OUTPATIENT
Start: 2023-10-19 | End: 2023-10-20

## 2023-10-19 RX ORDER — FAMOTIDINE 20 MG/1
TABLET, FILM COATED ORAL
Qty: 180 TABLET | Refills: 3 | Status: SHIPPED | OUTPATIENT
Start: 2023-10-19

## 2023-10-19 RX ORDER — PANTOPRAZOLE SODIUM 40 MG/1
TABLET, DELAYED RELEASE ORAL
Qty: 90 TABLET | Refills: 3 | Status: SHIPPED | OUTPATIENT
Start: 2023-10-19 | End: 2024-01-16

## 2023-10-19 ASSESSMENT — PATIENT HEALTH QUESTIONNAIRE - PHQ9: SUM OF ALL RESPONSES TO PHQ QUESTIONS 1-9: 3

## 2023-10-19 ASSESSMENT — PAIN SCALES - GENERAL: PAINLEVEL: NO PAIN (0)

## 2023-10-19 ASSESSMENT — ACTIVITIES OF DAILY LIVING (ADL): CURRENT_FUNCTION: NO ASSISTANCE NEEDED

## 2023-10-19 NOTE — PROGRESS NOTES
SUBJECTIVE:   Fatou is a 73 year old who presents for Preventive Visit.      10/19/2023    11:42 AM   Additional Questions   Roomed by Michelle   Accompanied by alone         10/19/2023    11:42 AM   Patient Reported Additional Medications   Patient reports taking the following new medications none       Are you in the first 12 months of your Medicare coverage?  No    Healthy Habits:     In general, how would you rate your overall health?  Fair    Frequency of exercise:  None    Duration of exercise:  Less than 15 minutes    Taking medications regularly:  Yes    Barriers to taking medications:  None    Medication side effects:  None    Ability to successfully perform activities of daily living:  No assistance needed    Home Safety:  No safety concerns identified    Hearing Impairment:  Need to ask people to speak up or repeat themselves    In the past 6 months, have you been bothered by leaking of urine? Yes    In general, how would you rate your overall mental or emotional health?  Good    Additional concerns today:  No      HTN - Was seen by cardiology in June and patient states he told her BP was ok even though it was high.  (That's not what he documented.)  She does have home wrist BP monitor and has her BP log with her.  Most home BP readings are > 140/90.  Some systolics are in the 130's.  Some are in the 150's.  Cardiology also notes that they instructed her to stop Plavix.  She is not sure if she did that or not.    [place] - She is extremely hard of hearing and states that she's tried hearing aids and they don't work for her.  She also didn't bring her reading glasses today so when I tried communicating by writing she couldn't read that either.    Have you ever done Advance Care Planning? (For example, a Health Directive, POLST, or a discussion with a medical provider or your loved ones about your wishes): No, advance care planning information given to patient to review.  Patient plans to discuss their wishes  with loved ones or provider.         Fall risk  Fallen 2 or more times in the past year?: No  Any fall with injury in the past year?: No    Cognitive Screening   1) Repeat 3 items (Leader, Season, Table)    2) Clock draw: NORMAL  3) 3 item recall: Recalls 3 objects  Results: 3 items recalled: COGNITIVE IMPAIRMENT LESS LIKELY    Mini-CogTM Copyright SULLY Stout. Licensed by the author for use in [facility]; reprinted with permission (izaiah@[facility]). All rights reserved.        Reviewed and updated as needed this visit by clinical staff   Tobacco  Allergies  Meds  Problems             Reviewed and updated as needed this visit by Provider    Allergies  Meds  Problems            Social History     Tobacco Use    Smoking status: Former     Packs/day: 1.50     Years: 53.00     Additional pack years: 0.00     Total pack years: 79.50     Types: Cigarettes     Start date: 4/10/1956     Quit date: 10/1/2009     Years since quittin.0    Smokeless tobacco: Never   Substance Use Topics    Alcohol use: Yes     Comment: occas.             10/18/2022    10:05 AM   Alcohol Use   Prescreen: >3 drinks/day or >7 drinks/week? No     Do you have a current opioid prescription? No  Do you use any other controlled substances or medications that are not prescribed by a provider? None      Current providers sharing in care for this patient include:  Patient Care Team:  Cassidy Alex PA-C as PCP - General (Physician Assistant)  MAJO Herrera MD as MD (Cardiovascular Disease)  Ольга Houston MD as Assigned PCP  MAJO Herrera MD as Assigned Heart and Vascular Provider    The following health maintenance items are reviewed in Epic and correct as of today:  Health Maintenance   Topic Date Due    SPIROMETRY  Never done    ANNUAL REVIEW OF HM ORDERS  Never done    COPD ACTION PLAN  Never done    RSV VACCINE 60+ (1 - 1-dose 60+ series) Never done    ZOSTER IMMUNIZATION (2 of 3) 2009    LUNG  "CANCER SCREENING  10/26/2010    MAMMO SCREENING  12/20/2015    INFLUENZA VACCINE (1) 09/01/2023    BMP  10/18/2023    LIPID  10/18/2023    MICROALBUMIN  10/18/2023    MEDICARE ANNUAL WELLNESS VISIT  10/18/2023    COLORECTAL CANCER SCREENING  01/24/2024    PHQ-9  04/19/2024    FALL RISK ASSESSMENT  10/19/2024    HEMOGLOBIN  10/19/2024    DEXA  04/13/2025    ADVANCE CARE PLANNING  10/19/2028    DTAP/TDAP/TD IMMUNIZATION (4 - Td or Tdap) 10/27/2030    HEPATITIS C SCREENING  Completed    DEPRESSION ACTION PLAN  Completed    Pneumococcal Vaccine: 65+ Years  Completed    URINALYSIS  Completed    COVID-19 Vaccine  Completed    IPV IMMUNIZATION  Aged Out    HPV IMMUNIZATION  Aged Out    MENINGITIS IMMUNIZATION  Aged Out     BP Readings from Last 3 Encounters:   10/19/23 (!) 160/80   06/27/23 (!) 192/77   10/18/22 (!) 138/90    Wt Readings from Last 3 Encounters:   10/19/23 106.6 kg (235 lb)   06/27/23 110 kg (242 lb 8 oz)   10/18/22 100.2 kg (221 lb)             Mammogram Screening - Patient over age 75, has elected to discontinue screenings.  Pertinent mammograms are reviewed under the imaging tab.    Review of Systems  She notes chronic urine leakage and feels she can't empty her bladder completely    OBJECTIVE:   BP (!) 160/80 (BP Location: Right arm, Patient Position: Sitting, Cuff Size: Adult Large)   Pulse 80   Temp 97.2  F (36.2  C) (Temporal)   Resp 20   Ht 1.664 m (5' 5.5\")   Wt 106.6 kg (235 lb)   HC 95 cm (37.4\")   BMI 38.51 kg/m   Estimated body mass index is 38.51 kg/m  as calculated from the following:    Height as of this encounter: 1.664 m (5' 5.5\").    Weight as of this encounter: 106.6 kg (235 lb).  Physical Exam  GENERAL: healthy, alert and no distress  EYES: Eyes grossly normal to inspection, conjunctivae and sclerae normal  HENT: ear canals and TM's normal, she is very hard of hearing and can only understand me if I shout one word at a time, pausing between words.    NECK: no adenopathy, no " asymmetry, masses, or scars and thyroid normal to palpation  RESP: lungs clear to auscultation - no rales, rhonchi or wheezes  CV: regular rate and rhythm, normal S1 S2, no S3 or S4, no murmur, click or rub, with chronic woody lower extremity lymphedema  ABDOMEN: soft, nontender, no hepatosplenomegaly, no masses  MS: no gross musculoskeletal defects noted, no edema  SKIN: no suspicious lesions or rashes  NEURO: Grossly normal strength and tone, mentation intact and speech normal  PSYCH: mentation appears normal, affect normal/bright      ASSESSMENT / PLAN:     Encounter for Medicare annual wellness exam  Reviewed/updated Health Maintenance     Need for vaccination  See patient instructions for additional recommended vaccines  - COVID-19 12+ (2023-24) (PFIZER)    Centrilobular emphysema (H)  I recommended PFT's  - General PFT Lab (Please always keep checked)  - Pulmonary Function Test    Coronary artery disease involving native coronary artery of native heart, unspecified whether angina present  Subclavian artery stenosis (H24)  Needs fresh supply SL NTG  - nitroGLYcerin (NITROSTAT) 0.4 MG sublingual tablet  Dispense: 12 tablet; Refill: 0    Pure hypercholesterolemia  Continue statin.  Awaiting lipid results to determine if dose change is indicated.     - Lipid panel reflex to direct LDL Non-fasting    Hypertension goal BP (blood pressure) < 130/80  Uncontrolled - I was going to add HCTZ but she states she cannot return to clinic for appropriate monitoring labs.  We could consider amlodipine but she already has significant leg edema.  I therefore recommended increasing her metoprolol dose to 150 mg daily.  She does not want to cut tablet so I did prescribe this as three 50 mg tablets taken together.  I instructed her on monitoring heart rate using her home blood pressure monitor.  I advised her to notify me if the heart rate is less than 60.  - metoprolol succinate ER (TOPROL XL) 50 MG 24 hr tablet  Dispense: 135  "tablet; Refill: 3    BMI 35.0-39.9 with comorbidity (H)  Severe obesity contributing to chronic conditions of hypertension and cardiovascular disease.  Wt Readings from Last 3 Encounters:   10/19/23 106.6 kg (235 lb)   06/27/23 110 kg (242 lb 8 oz)   10/18/22 100.2 kg (221 lb)      Stage 3a chronic kidney disease (H)  - BASIC METABOLIC PANEL  - Albumin Random Urine Quantitative with Creat Ratio  - CBC with platelets and differential    Abnormal finding on thyroid function test  - TSH with free T4 reflex    Incomplete bladder emptying  Continuous leakage of urine  - Adult Urology  Referral    Snoring  Excessive daytime sleepiness  She complains of significant fatigue and daytime sleepiness.  I suspect she has sleep apnea and recommended she see sleep clinic for further evaluation.  - Adult Sleep Eval & Management  Referral    Allergic rhinitis, unspecified seasonality, unspecified trigger  - loratadine (CLARITIN) 10 MG tablet  Dispense: 180 tablet; Refill: 3    Bilateral low back pain with sciatica, sciatica laterality unspecified, unspecified chronicity  - acetaminophen (TYLENOL) 650 MG CR tablet  Dispense: 270 tablet; Refill: 3    Primary osteoarthritis of both knees  Gastroesophageal reflux disease without esophagitis  - famotidine (PEPCID) 20 MG tablet  Dispense: 180 tablet; Refill: 3  - pantoprazole (PROTONIX) 40 MG EC tablet  Dispense: 90 tablet; Refill: 3     Given the number of her conditions and additional concerns, the time constraints due to communication barriers due to her hearing/vision deficits as well as her reluctance to leave her home for any appointments or tests during the winter months (starting \"tomorrow,\" she states, even though it is expected to be 65 degrees out tomorrow), I strongly recommended that she schedule a 6-month follow-up with me in the spring so we can adequately address her many issues.  I asked her to bring in all of her pill bottles with her to that " appointment.      COUNSELING:  Reviewed preventive health counseling, as reflected in patient instructions    Appropriate preventive services were discussed with this patient, including applicable screening as appropriate for fall prevention, nutrition, physical activity, Tobacco-use cessation, weight loss and cognition.  Checklist reviewing preventive services available has been given to the patient.    Reviewed patients plan of care and provided an AVS. The Basic Care Plan (routine screening as documented in Health Maintenance) for Fatou meets the Care Plan requirement. This Care Plan has been established and reviewed with the Patient.    Ольга Houston MD  [facility]      Identified Health Risks:  I have reviewed Opioid Use Disorder and Substance Use Disorder risk factors and made any needed referrals.   The patient was provided with suggestions to help her develop a healthy physical lifestyle.  She is at risk for lack of exercise and has been provided with information to increase physical activity for the benefit of her well-being.  The patient was provided with written information regarding signs of hearing loss.  Information on urinary incontinence and treatment options given to patient.

## 2023-10-19 NOTE — LETTER
October 20, 2023      Fatou Brian  [address]  [facility] [address]        Dear ,    We are writing to inform you of your test results.    Your cholesterol levels remain very uncontrolled on the 10 mg dose of rosuvastatin.  I have sent in a new prescription for a higher dose - rosuvastatin 20 mg daily.      The rest of your test results fall within the expected range(s) or remain unchanged from previous results.  Please continue with your current treatment plan.     Please see me in April as we discussed.  Please be sure to bring your glasses and all of your pill bottles.  Throw everything you take into a bag and bring that with you to clinic.    Resulted Orders   TSH with free T4 reflex   Result Value Ref Range    TSH 2.60 0.30 - 4.20 uIU/mL   BASIC METABOLIC PANEL   Result Value Ref Range    Sodium 140 135 - 145 mmol/L      Comment:      Reference intervals for this test were updated on 09/26/2023 to more accurately reflect our healthy population. There may be differences in the flagging of prior results with similar values performed with this method. Interpretation of those prior results can be made in the context of the updated reference intervals.     Potassium 3.9 3.4 - 5.3 mmol/L    Chloride 103 98 - 107 mmol/L    Carbon Dioxide (CO2) 23 22 - 29 mmol/L    Anion Gap 14 7 - 15 mmol/L    Urea Nitrogen 16.6 8.0 - 23.0 mg/dL    Creatinine 1.11 (H) 0.51 - 0.95 mg/dL    GFR Estimate 52 (L) >60 mL/min/1.73m2    Calcium 9.6 8.8 - 10.2 mg/dL    Glucose 99 70 - 99 mg/dL   Lipid panel reflex to direct LDL Non-fasting   Result Value Ref Range    Cholesterol 206 (H) <200 mg/dL    Triglycerides 120 <150 mg/dL    Direct Measure HDL 56 >=50 mg/dL    LDL Cholesterol Calculated 126 (H) <=100 mg/dL    Non HDL Cholesterol 150 (H) <130 mg/dL    Narrative    Cholesterol  Desirable:  <200 mg/dL    Triglycerides  Normal:  Less than 150 mg/dL  Borderline High:  150-199 mg/dL  High:  200-499 mg/dL  Very High:   Greater than or equal to 500 mg/dL    Direct Measure HDL  Female:  Greater than or equal to 50 mg/dL   Male:  Greater than or equal to 40 mg/dL    LDL Cholesterol  Desirable:  <100mg/dL  Above Desirable:  100-129 mg/dL   Borderline High:  130-159 mg/dL   High:  160-189 mg/dL   Very High:  >= 190 mg/dL    Non HDL Cholesterol  Desirable:  130 mg/dL  Above Desirable:  130-159 mg/dL  Borderline High:  160-189 mg/dL  High:  190-219 mg/dL  Very High:  Greater than or equal to 220 mg/dL   CBC with platelets and differential   Result Value Ref Range    WBC Count 8.0 4.0 - 11.0 10e3/uL    RBC Count 4.27 3.80 - 5.20 10e6/uL    Hemoglobin 12.8 11.7 - 15.7 g/dL    Hematocrit 40.8 35.0 - 47.0 %    MCV 96 78 - 100 fL    MCH 30.0 26.5 - 33.0 pg    MCHC 31.4 (L) 31.5 - 36.5 g/dL    RDW 14.5 10.0 - 15.0 %    Platelet Count 209 150 - 450 10e3/uL    % Neutrophils 75 %    % Lymphocytes 17 %    % Monocytes 7 %    Mids % (Monos, Eos, Basos)      % Eosinophils 1 %    % Basophils 1 %    % Immature Granulocytes 1 %    Absolute Neutrophils 6.0 1.6 - 8.3 10e3/uL    Absolute Lymphocytes 1.3 0.8 - 5.3 10e3/uL    Absolute Monocytes 0.5 0.0 - 1.3 10e3/uL    Mids Abs (Monos, Eos, Basos)      Absolute Eosinophils 0.1 0.0 - 0.7 10e3/uL    Absolute Basophils 0.1 0.0 - 0.2 10e3/uL    Absolute Immature Granulocytes 0.0 <=0.4 10e3/uL       If you have any questions or concerns, please call the clinic at the number listed above.       Sincerely,      Ольга Houston MD

## 2023-10-20 LAB — ALT SERPL W P-5'-P-CCNC: 23 U/L (ref 0–50)

## 2023-10-20 RX ORDER — ROSUVASTATIN CALCIUM 20 MG/1
20 TABLET, COATED ORAL DAILY
Qty: 90 TABLET | Refills: 1 | Status: SHIPPED | OUTPATIENT
Start: 2023-10-20 | End: 2023-11-06

## 2023-10-20 RX ORDER — LOSARTAN POTASSIUM 100 MG/1
100 TABLET ORAL DAILY
Start: 2023-10-20 | End: 2023-10-20

## 2023-10-20 RX ORDER — LOSARTAN POTASSIUM 100 MG/1
100 TABLET ORAL DAILY
Qty: 90 TABLET | Refills: 3
Start: 2023-10-20 | End: 2024-04-11

## 2023-10-20 RX ORDER — METOPROLOL SUCCINATE 50 MG/1
150 TABLET, EXTENDED RELEASE ORAL DAILY
Qty: 90 TABLET | Refills: 3
Start: 2023-10-20 | End: 2023-10-29

## 2023-10-20 RX ORDER — ASPIRIN 81 MG/1
81 TABLET ORAL DAILY
Qty: 90 TABLET | Refills: 3 | Status: SHIPPED | OUTPATIENT
Start: 2023-10-20 | End: 2024-09-03

## 2023-10-20 NOTE — RESULT ENCOUNTER NOTE
Letter done with comments below.  Routed to [facility] SUPPORT STAFF pool to be mailed:    Your cholesterol levels remain very uncontrolled on the 10 mg dose of rosuvastatin.  I have sent in a new prescription for a higher dose - rosuvastatin 20 mg daily.      The rest of your test results fall within the expected range(s) or remain unchanged from previous results.  Please continue with your current treatment plan.     Please see me in April as we discussed.  Please be sure to bring your glasses and all of your pill bottles.  Throw everything you take into a bag and bring that with you to clinic.  Ольга Houston MD

## 2023-10-29 ENCOUNTER — TELEPHONE (OUTPATIENT)
Dept: NURSING | Facility: CLINIC | Age: 73
End: 2023-10-29
Payer: COMMERCIAL

## 2023-10-29 DIAGNOSIS — E78.00 PURE HYPERCHOLESTEROLEMIA: Primary | ICD-10-CM

## 2023-10-29 DIAGNOSIS — I10 HYPERTENSION GOAL BP (BLOOD PRESSURE) < 130/80: ICD-10-CM

## 2023-10-29 DIAGNOSIS — I25.10 CORONARY ARTERY DISEASE INVOLVING NATIVE CORONARY ARTERY OF NATIVE HEART, UNSPECIFIED WHETHER ANGINA PRESENT: ICD-10-CM

## 2023-10-29 RX ORDER — METOPROLOL SUCCINATE 50 MG/1
150 TABLET, EXTENDED RELEASE ORAL DAILY
Qty: 90 TABLET | Refills: 3 | Status: SHIPPED | OUTPATIENT
Start: 2023-10-29 | End: 2024-01-29

## 2023-10-29 NOTE — TELEPHONE ENCOUNTER
Pt calling with medication concerns;     States she is frustrated and confused after [facility] informed her that metoprolol succinate ER 50 mg was discontinued.    Chart indicates that an RX was ordered on 10/20/23 for metoprolol succinate ER 50 mg 24 hr tabs  #90, 3 refills.  Will resend this RX to pharmacy.    Writer reassured Pt that Dr. Houston does care about her and did put in the order for her metoprolol with 90 days instead of 45 (as Pt reported at beginning of this call).    2.  Arthritis causes her a lot of pain and when she has taken statins in the past they caused even more pain.    Pt states she is ' willing to try taking rosuvastatin but only at 10 mg to start because she is so afraid of the statins.    Pt also reports that many of her siblings have not been able to take statins d/t muscle/body aches from them.    Message routed to Dr. Houston/Care Team    Luisa Boucher RN, Nurse Advisor 2:55 PM 10/29/2023

## 2023-10-30 NOTE — TELEPHONE ENCOUNTER
Patient called back and message was relayed. Per patient she is hard of hearing and can not catch all communications when individuals speak fast. Writer apologized for those barriers.

## 2023-10-30 NOTE — TELEPHONE ENCOUNTER
Dr elizabeth not in office today. Please update patient on this & this can wait for pcp to address when back

## 2023-10-30 NOTE — TELEPHONE ENCOUNTER
ROSALIO for pt that this cholesterol response will need to wait for Dr. Houston's return on 11/6/23.  FELIPE Warner

## 2023-11-06 RX ORDER — ROSUVASTATIN CALCIUM 10 MG/1
10 TABLET, COATED ORAL DAILY
Qty: 90 TABLET | Refills: 0 | Status: SHIPPED | OUTPATIENT
Start: 2023-11-06 | End: 2024-01-16

## 2024-01-06 ENCOUNTER — HEALTH MAINTENANCE LETTER (OUTPATIENT)
Age: 74
End: 2024-01-06

## 2024-01-08 ENCOUNTER — TELEPHONE (OUTPATIENT)
Dept: FAMILY MEDICINE | Facility: CLINIC | Age: 74
End: 2024-01-08
Payer: COMMERCIAL

## 2024-01-08 NOTE — TELEPHONE ENCOUNTER
Patient Returning Call    Reason for call:  patient requesting a call back regarding 9/3/2024 Appt.      Information relayed to patient:  N/A    Patient has additional questions:  Yes    What are your questions/concerns:  N/A    [facility] or would you prefer to receive a phone call?:   Patient would prefer a phone call   Okay to leave a detailed message?: Yes at Cell number on file:    No relevant phone numbers on file.[phone]

## 2024-01-10 ENCOUNTER — TELEPHONE (OUTPATIENT)
Dept: CARDIOLOGY | Facility: CLINIC | Age: 74
End: 2024-01-10
Payer: COMMERCIAL

## 2024-01-10 NOTE — TELEPHONE ENCOUNTER
Spoke with pt and confirmed that at this time Dr. Reed is not leaving and that she should keep her appt as scheduled in July. Pt reports understanding and denies further questions.    Ronald Griggs, RN   Cardiology Nurse Coordinator

## 2024-01-10 NOTE — TELEPHONE ENCOUNTER
[facility] Call Center    Phone Message    May a detailed message be left on voicemail: yes     Reason for Call: Other: Patient called stating they received a letter that Dr. Reed is leaving [place]. Have not heard of anything in regards to this. Patient is scheduled for annual follow up in July with Dr. Reed and wanting to know if they can still be seen then by Dr. Reed, or if he will be leaving prior to that date and this needs to be reschedule. Please call patient back to further discuss.     Action Taken: Other: Cardiology    Travel Screening: Not Applicable    Thank you!  [facility]

## 2024-01-11 DIAGNOSIS — I25.10 CORONARY ARTERY DISEASE INVOLVING NATIVE CORONARY ARTERY OF NATIVE HEART, UNSPECIFIED WHETHER ANGINA PRESENT: ICD-10-CM

## 2024-01-11 DIAGNOSIS — K21.9 GASTROESOPHAGEAL REFLUX DISEASE WITHOUT ESOPHAGITIS: Chronic | ICD-10-CM

## 2024-01-11 DIAGNOSIS — E78.00 PURE HYPERCHOLESTEROLEMIA: ICD-10-CM

## 2024-01-12 RX ORDER — ROSUVASTATIN CALCIUM 10 MG/1
10 TABLET, COATED ORAL DAILY
Qty: 90 TABLET | Refills: 0 | OUTPATIENT
Start: 2024-01-12

## 2024-01-12 RX ORDER — PANTOPRAZOLE SODIUM 40 MG/1
TABLET, DELAYED RELEASE ORAL
Qty: 90 TABLET | Refills: 3 | OUTPATIENT
Start: 2024-01-12

## 2024-01-16 DIAGNOSIS — E78.00 PURE HYPERCHOLESTEROLEMIA: ICD-10-CM

## 2024-01-16 DIAGNOSIS — I25.10 CORONARY ARTERY DISEASE INVOLVING NATIVE CORONARY ARTERY OF NATIVE HEART, UNSPECIFIED WHETHER ANGINA PRESENT: ICD-10-CM

## 2024-01-16 DIAGNOSIS — K21.9 GASTROESOPHAGEAL REFLUX DISEASE WITHOUT ESOPHAGITIS: Chronic | ICD-10-CM

## 2024-01-16 RX ORDER — ROSUVASTATIN CALCIUM 10 MG/1
10 TABLET, COATED ORAL DAILY
Qty: 90 TABLET | Refills: 0 | Status: SHIPPED | OUTPATIENT
Start: 2024-01-16 | End: 2024-01-17

## 2024-01-16 RX ORDER — PANTOPRAZOLE SODIUM 40 MG/1
TABLET, DELAYED RELEASE ORAL
Qty: 90 TABLET | Refills: 3 | Status: CANCELLED | OUTPATIENT
Start: 2024-01-16

## 2024-01-16 RX ORDER — PANTOPRAZOLE SODIUM 40 MG/1
TABLET, DELAYED RELEASE ORAL
Qty: 90 TABLET | Refills: 3 | Status: SHIPPED | OUTPATIENT
Start: 2024-01-16 | End: 2024-01-17

## 2024-01-17 RX ORDER — PANTOPRAZOLE SODIUM 40 MG/1
TABLET, DELAYED RELEASE ORAL
Qty: 90 TABLET | Refills: 3 | Status: SHIPPED | OUTPATIENT
Start: 2024-01-17

## 2024-01-17 RX ORDER — ROSUVASTATIN CALCIUM 10 MG/1
10 TABLET, COATED ORAL DAILY
Qty: 90 TABLET | Refills: 0 | Status: SHIPPED | OUTPATIENT
Start: 2024-01-17 | End: 2024-04-23 | Stop reason: SINTOL

## 2024-01-17 NOTE — TELEPHONE ENCOUNTER
Resending already filled medications to correct [facility] location in [place].    Warnings Override History for pantoprazole (PROTONIX) 40 MG EC tablet [[id number]]    Overridden by Cinda Sharma, RN on Jan 16, 2024 2:01 PM   Allergy/Contraindication   1. OMEPRAZOLE MAGNESIUM [Level: Drug Class Match]     HANY Alicea, RN  [facility]

## 2024-01-29 DIAGNOSIS — I10 HYPERTENSION GOAL BP (BLOOD PRESSURE) < 130/80: ICD-10-CM

## 2024-01-29 DIAGNOSIS — I25.10 CORONARY ARTERY DISEASE INVOLVING NATIVE CORONARY ARTERY OF NATIVE HEART, UNSPECIFIED WHETHER ANGINA PRESENT: ICD-10-CM

## 2024-01-29 RX ORDER — METOPROLOL SUCCINATE 50 MG/1
150 TABLET, EXTENDED RELEASE ORAL DAILY
Qty: 90 TABLET | Refills: 2 | Status: SHIPPED | OUTPATIENT
Start: 2024-01-29 | End: 2024-04-23

## 2024-02-06 DIAGNOSIS — I25.10 CORONARY ARTERY DISEASE INVOLVING NATIVE CORONARY ARTERY OF NATIVE HEART, UNSPECIFIED WHETHER ANGINA PRESENT: ICD-10-CM

## 2024-02-06 DIAGNOSIS — I10 HYPERTENSION GOAL BP (BLOOD PRESSURE) < 130/80: ICD-10-CM

## 2024-02-07 RX ORDER — METOPROLOL SUCCINATE 50 MG/1
150 TABLET, EXTENDED RELEASE ORAL DAILY
Qty: 90 TABLET | Refills: 2 | OUTPATIENT
Start: 2024-02-07

## 2024-02-29 NOTE — TELEPHONE ENCOUNTER
MEDICAL RECORDS REQUEST   [facility]  Urology Clinic  [address] [address]  PHONE: [phone]  Fax: [phone]        FUTURE VISIT INFORMATION                                                   Fatou BERNABE Brian, : 1950 scheduled for future visit at [facility]    APPOINTMENT INFORMATION:  Date: 2024  Provider:  Nancy Loya MD  Reason for Visit/Diagnosis: Incomplete bladder emptying, Continuous leakage of urine    REFERRAL INFORMATION:  Referring provider[facility]  [facility]      RECORDS REQUESTED FOR VISIT                                                     NOTES  STATUS/DETAILS   OFFICE NOTE from referring provider  yes, 10/19/2023 -- [facility]   MEDICATION LIST  yes     PRE-VISIT CHECKLIST      Joint diagnostic appointment coordinated correctly          (ensure right order & amount of time) Yes   RECORD COLLECTION COMPLETE Yes

## 2024-03-12 ENCOUNTER — NURSE TRIAGE (OUTPATIENT)
Dept: NURSING | Facility: CLINIC | Age: 74
End: 2024-03-12
Payer: COMMERCIAL

## 2024-03-12 NOTE — TELEPHONE ENCOUNTER
"Writer called with the recommendation that ideally she be seen in person, but PCP in agreement to see for virtual or phone visit initially if patient preferred.  Patient stated she was frustrated with our process. Stated, \"I don't want to be seen in the for this clinic. I've had this a million times before and I just need some antibiotics! I'd rather just lay down and die. This whole thing is just ridiculous!\"    Again offered the patient a telephone visit to be able to further discuss with PCP. Patient declined. Stated, \"I'm just going to start taking tylenol extra strength and I'm not interested in any visits.\"    Call ended by patient with no further care advice or plan able to be discussed.    FARZANA AliceaN, RN  [facility]    "

## 2024-03-12 NOTE — TELEPHONE ENCOUNTER
I think that this problem would require a physical exam.  We no longer typically prescribe antibiotics for a swollen gland unless there is evidence of a bacterial infection.  Does she not have anyone who could drive her to clinic?  If not I could speak with her tomorrow (virtual visit) but may still recommend she come in for an exam.    Ольга Houston MD  [facility]

## 2024-03-12 NOTE — TELEPHONE ENCOUNTER
Patient has a gland that is sore when swallowing on the left side.  Pain started two days ago.  This happened years ago when younger and was given antibiotic.  Patient is taking tylenol.  Patient states when she swallows pain is on the inside when swallowing.  Patient can only feel when swallowing.  Patient does not wish to come in as she does not drive any more and is having troubles with vision.  Patient is requesting a prescription be sent to Pharmacy,  [facility] on [address]  Please phone patient.      Nurse Triage SBAR    Is this a 2nd Level Triage? YES, LICENSED PRACTITIONER REVIEW IS REQUIRED    Situation: Pain in side of mouth on left node when swallowing.    Background: Patient states that she had this many times when younger.    Assessment: Patient is not able to come into clinic as she does not drive any more.  Patient states that she cannot read a thermometer to take her temp but does not think she has one.    Protocol Recommended Disposition:   See in Office Today    Recommendation: Clinic please phone patient.       Routed to provider    Does the patient meet one of the following criteria for ADS visit consideration? 16+ years old, with an [facility] PCP     TIP  Providers, please consider if this condition is appropriate for management at one of our Acute and Diagnostic Services sites.     If patient is a good candidate, please use dotphrase <dot>triageresponse and select Refer to ADS to document.        Reason for Disposition   Tender node in the neck and also has a sore throat with minimal/no runny nose or cough    Additional Information   Negative: Sounds like a life-threatening emergency to the triager   Negative: Node is in the neck and causes difficulty breathing   Negative: Patient sounds very sick or weak to the triager   Negative: Node is in the neck and can't swallow fluids   Negative: Fever > 103 F (39.4 C)   Negative: Lump or swelling in groin and pulsating (like heartbeat)    Negative: Single large node and size > 1 inch (2.5 cm)   Negative: Overlying skin is red   Negative: Rapid increase in size of node over several hours   Negative: Tender node in the groin and has a sore, scratch, cut, or painful red area on that leg   Negative: Tender node in the armpit and has a sore, scratch, cut, or painful red area on that arm    Protocols used: Lymph Nodes - [id number]

## 2024-03-12 NOTE — TELEPHONE ENCOUNTER
Dr. Houston-  Would a phone or video appt be appropriate for tomorrow?  You have openings.  FELIPE Warner

## 2024-03-19 ENCOUNTER — PRE VISIT (OUTPATIENT)
Dept: UROLOGY | Facility: CLINIC | Age: 74
End: 2024-03-19
Payer: COMMERCIAL

## 2024-03-19 NOTE — TELEPHONE ENCOUNTER
Reason for visit: consult     Relevant information: lower urinary tract symptoms, retention, incontinence    Records/imaging/labs/orders: available    Pt called: No need for a call    At Rooming: if stress, stress test. Ask if urine/dip/PVR    Paresh Wu  3/19/2024  3:23 PM

## 2024-04-11 DIAGNOSIS — I25.10 CORONARY ARTERY DISEASE INVOLVING NATIVE CORONARY ARTERY OF NATIVE HEART, UNSPECIFIED WHETHER ANGINA PRESENT: ICD-10-CM

## 2024-04-11 DIAGNOSIS — I10 HYPERTENSION GOAL BP (BLOOD PRESSURE) < 130/80: ICD-10-CM

## 2024-04-12 RX ORDER — LOSARTAN POTASSIUM 100 MG/1
100 TABLET ORAL DAILY
Qty: 90 TABLET | Refills: 0 | Status: SHIPPED | OUTPATIENT
Start: 2024-04-12 | End: 2024-07-07

## 2024-04-13 ENCOUNTER — APPOINTMENT (OUTPATIENT)
Dept: CT IMAGING | Facility: CLINIC | Age: 74
DRG: 682 | End: 2024-04-13
Attending: FAMILY MEDICINE
Payer: COMMERCIAL

## 2024-04-13 ENCOUNTER — APPOINTMENT (OUTPATIENT)
Dept: ULTRASOUND IMAGING | Facility: CLINIC | Age: 74
DRG: 682 | End: 2024-04-13
Attending: INTERNAL MEDICINE
Payer: COMMERCIAL

## 2024-04-13 ENCOUNTER — HOSPITAL ENCOUNTER (INPATIENT)
Facility: CLINIC | Age: 74
LOS: 2 days | Discharge: HOME | DRG: 682 | End: 2024-04-15
Attending: FAMILY MEDICINE | Admitting: INTERNAL MEDICINE
Payer: COMMERCIAL

## 2024-04-13 DIAGNOSIS — I10 HYPERTENSION, UNSPECIFIED TYPE: Primary | ICD-10-CM

## 2024-04-13 DIAGNOSIS — R53.81 PHYSICAL DECONDITIONING: ICD-10-CM
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
## 2024-04-13 DIAGNOSIS — I10 ACCELERATED HYPERTENSION: ICD-10-CM

## 2024-04-13 DIAGNOSIS — M79.602 PAIN OF LEFT UPPER EXTREMITY: ICD-10-CM
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
## 2024-04-13 DIAGNOSIS — M54.40 LOW BACK PAIN WITH SCIATICA, SCIATICA LATERALITY UNSPECIFIED, UNSPECIFIED BACK PAIN LATERALITY, UNSPECIFIED CHRONICITY: Chronic | ICD-10-CM

## 2024-04-13 DIAGNOSIS — R79.89 ELEVATED TROPONIN: ICD-10-CM

## 2024-04-13 LAB
ABO/RH(D): ABNORMAL
ALBUMIN SERPL BCG-MCNC: 3.8 G/DL (ref 3.5–5.2)
ALBUMIN SERPL BCG-MCNC: 4 G/DL (ref 3.5–5.2)
ALBUMIN UR-MCNC: NEGATIVE MG/DL
ALP SERPL-CCNC: 114 U/L (ref 40–150)
ALP SERPL-CCNC: 118 U/L (ref 40–150)
ALT SERPL W P-5'-P-CCNC: 29 U/L (ref 0–50)
ALT SERPL W P-5'-P-CCNC: ABNORMAL U/L
ANION GAP SERPL CALCULATED.3IONS-SCNC: 8 MMOL/L (ref 7–15)
ANION GAP SERPL CALCULATED.3IONS-SCNC: 8 MMOL/L (ref 7–15)
ANTIBODY SCREEN: POSITIVE
APPEARANCE UR: CLEAR
AST SERPL W P-5'-P-CCNC: 40 U/L (ref 0–45)
AST SERPL W P-5'-P-CCNC: ABNORMAL U/L
ATRIAL RATE - MUSE: 71 BPM
BACTERIA #/AREA URNS HPF: ABNORMAL /HPF
BASOPHILS # BLD AUTO: 0 10E3/UL (ref 0–0.2)
BASOPHILS NFR BLD AUTO: 0 %
BILIRUB SERPL-MCNC: 0.5 MG/DL
BILIRUB SERPL-MCNC: 0.5 MG/DL
BILIRUB UR QL STRIP: NEGATIVE
BUN SERPL-MCNC: 15.4 MG/DL (ref 8–23)
BUN SERPL-MCNC: 15.9 MG/DL (ref 8–23)
CALCIUM SERPL-MCNC: 8.7 MG/DL (ref 8.8–10.2)
CALCIUM SERPL-MCNC: 8.8 MG/DL (ref 8.8–10.2)
CHLORIDE SERPL-SCNC: 106 MMOL/L (ref 98–107)
CHLORIDE SERPL-SCNC: 107 MMOL/L (ref 98–107)
COLOR UR AUTO: ABNORMAL
CREAT SERPL-MCNC: 0.97 MG/DL (ref 0.51–0.95)
CREAT SERPL-MCNC: 1 MG/DL (ref 0.51–0.95)
DEPRECATED HCO3 PLAS-SCNC: 24 MMOL/L (ref 22–29)
DEPRECATED HCO3 PLAS-SCNC: 24 MMOL/L (ref 22–29)
DIASTOLIC BLOOD PRESSURE - MUSE: NORMAL MMHG
EGFRCR SERPLBLD CKD-EPI 2021: 58 ML/MIN/1.73M2
EGFRCR SERPLBLD CKD-EPI 2021: 60 ML/MIN/1.73M2
EOSINOPHIL # BLD AUTO: 0.1 10E3/UL (ref 0–0.7)
EOSINOPHIL NFR BLD AUTO: 1 %
ERYTHROCYTE [DISTWIDTH] IN BLOOD BY AUTOMATED COUNT: 14.4 % (ref 10–15)
GLUCOSE SERPL-MCNC: 96 MG/DL (ref 70–99)
GLUCOSE SERPL-MCNC: 99 MG/DL (ref 70–99)
GLUCOSE UR STRIP-MCNC: NEGATIVE MG/DL
HCT VFR BLD AUTO: 38.2 % (ref 35–47)
HGB BLD-MCNC: 12.3 G/DL (ref 11.7–15.7)
HGB UR QL STRIP: NEGATIVE
IMM GRANULOCYTES # BLD: 0 10E3/UL
IMM GRANULOCYTES NFR BLD: 1 %
INTERPRETATION ECG - MUSE: NORMAL
KETONES UR STRIP-MCNC: NEGATIVE MG/DL
LEUKOCYTE ESTERASE UR QL STRIP: ABNORMAL
LYMPHOCYTES # BLD AUTO: 1.1 10E3/UL (ref 0.8–5.3)
LYMPHOCYTES NFR BLD AUTO: 20 %
MCH RBC QN AUTO: 30.4 PG (ref 26.5–33)
MCHC RBC AUTO-ENTMCNC: 32.2 G/DL (ref 31.5–36.5)
MCV RBC AUTO: 94 FL (ref 78–100)
MONOCYTES # BLD AUTO: 0.5 10E3/UL (ref 0–1.3)
MONOCYTES NFR BLD AUTO: 8 %
MUCOUS THREADS #/AREA URNS LPF: PRESENT /LPF
NEUTROPHILS # BLD AUTO: 3.9 10E3/UL (ref 1.6–8.3)
NEUTROPHILS NFR BLD AUTO: 70 %
NITRATE UR QL: POSITIVE
NRBC # BLD AUTO: 0 10E3/UL
NRBC BLD AUTO-RTO: 0 /100
P AXIS - MUSE: 56 DEGREES
PH UR STRIP: 5.5 [PH] (ref 5–7)
PLATELET # BLD AUTO: 152 10E3/UL (ref 150–450)
POTASSIUM SERPL-SCNC: 4.3 MMOL/L (ref 3.4–5.3)
POTASSIUM SERPL-SCNC: 5.6 MMOL/L (ref 3.4–5.3)
PR INTERVAL - MUSE: 150 MS
PROT SERPL-MCNC: 5.9 G/DL (ref 6.4–8.3)
PROT SERPL-MCNC: 6.3 G/DL (ref 6.4–8.3)
QRS DURATION - MUSE: 90 MS
QT - MUSE: 414 MS
QTC - MUSE: 449 MS
R AXIS - MUSE: 48 DEGREES
RBC # BLD AUTO: 4.05 10E6/UL (ref 3.8–5.2)
RBC URINE: 3 /HPF
SODIUM SERPL-SCNC: 138 MMOL/L (ref 135–145)
SODIUM SERPL-SCNC: 139 MMOL/L (ref 135–145)
SP GR UR STRIP: 1.01 (ref 1–1.03)
SPECIMEN EXPIRATION DATE: ABNORMAL
SQUAMOUS EPITHELIAL: 2 /HPF
SYSTOLIC BLOOD PRESSURE - MUSE: NORMAL MMHG
T AXIS - MUSE: 79 DEGREES
TROPONIN T SERPL HS-MCNC: 27 NG/L
TROPONIN T SERPL HS-MCNC: 36 NG/L
TROPONIN T SERPL HS-MCNC: 40 NG/L
UROBILINOGEN UR STRIP-MCNC: NORMAL MG/DL
VENTRICULAR RATE- MUSE: 71 BPM
WBC # BLD AUTO: 5.6 10E3/UL (ref 4–11)
WBC URINE: 65 /HPF

## 2024-04-13 PROCEDURE — 82565 ASSAY OF CREATININE: CPT | Performed by: INTERNAL MEDICINE

## 2024-04-13 PROCEDURE — 250N000011 HC RX IP 250 OP 636: Performed by: FAMILY MEDICINE

## 2024-04-13 PROCEDURE — 999N000104 CT LUMBAR SPINE RECONSTRUCTED

## 2024-04-13 PROCEDURE — 99223 1ST HOSP IP/OBS HIGH 75: CPT | Performed by: INTERNAL MEDICINE

## 2024-04-13 PROCEDURE — 93970 EXTREMITY STUDY: CPT | Mod: 26 | Performed by: RADIOLOGY

## 2024-04-13 PROCEDURE — 86880 COOMBS TEST DIRECT: CPT | Performed by: FAMILY MEDICINE

## 2024-04-13 PROCEDURE — 86900 BLOOD TYPING SEROLOGIC ABO: CPT | Performed by: FAMILY MEDICINE

## 2024-04-13 PROCEDURE — 36415 COLL VENOUS BLD VENIPUNCTURE: CPT | Performed by: INTERNAL MEDICINE

## 2024-04-13 PROCEDURE — 250N000011 HC RX IP 250 OP 636: Performed by: INTERNAL MEDICINE

## 2024-04-13 PROCEDURE — 250N000013 HC RX MED GY IP 250 OP 250 PS 637: Performed by: INTERNAL MEDICINE

## 2024-04-13 PROCEDURE — 250N000009 HC RX 250: Performed by: FAMILY MEDICINE

## 2024-04-13 PROCEDURE — 87086 URINE CULTURE/COLONY COUNT: CPT | Performed by: FAMILY MEDICINE

## 2024-04-13 PROCEDURE — 84484 ASSAY OF TROPONIN QUANT: CPT | Performed by: INTERNAL MEDICINE

## 2024-04-13 PROCEDURE — 93970 EXTREMITY STUDY: CPT

## 2024-04-13 PROCEDURE — 250N000013 HC RX MED GY IP 250 OP 250 PS 637: Performed by: FAMILY MEDICINE

## 2024-04-13 PROCEDURE — 86870 RBC ANTIBODY IDENTIFICATION: CPT | Performed by: FAMILY MEDICINE

## 2024-04-13 PROCEDURE — 999N000104 CT THORACIC SPINE RECONSTRUCTED

## 2024-04-13 PROCEDURE — 85025 COMPLETE CBC W/AUTO DIFF WBC: CPT | Performed by: FAMILY MEDICINE

## 2024-04-13 PROCEDURE — 120N000002 HC R&B MED SURG/OB UMMC

## 2024-04-13 PROCEDURE — 36415 COLL VENOUS BLD VENIPUNCTURE: CPT | Performed by: FAMILY MEDICINE

## 2024-04-13 PROCEDURE — 81001 URINALYSIS AUTO W/SCOPE: CPT | Performed by: FAMILY MEDICINE

## 2024-04-13 PROCEDURE — 71260 CT THORAX DX C+: CPT

## 2024-04-13 PROCEDURE — 72125 CT NECK SPINE W/O DYE: CPT

## 2024-04-13 PROCEDURE — 84075 ASSAY ALKALINE PHOSPHATASE: CPT | Performed by: FAMILY MEDICINE

## 2024-04-13 PROCEDURE — 84484 ASSAY OF TROPONIN QUANT: CPT | Performed by: FAMILY MEDICINE

## 2024-04-13 RX ORDER — FAMOTIDINE 20 MG/1
20 TABLET, FILM COATED ORAL 2 TIMES DAILY PRN
Status: DISCONTINUED | OUTPATIENT
Start: 2024-04-13 | End: 2024-04-15 | Stop reason: HOSPADM

## 2024-04-13 RX ORDER — HYDROMORPHONE HYDROCHLORIDE 1 MG/ML
0.5 INJECTION, SOLUTION INTRAMUSCULAR; INTRAVENOUS; SUBCUTANEOUS ONCE
Status: DISCONTINUED | OUTPATIENT
Start: 2024-04-13 | End: 2024-04-13

## 2024-04-13 RX ORDER — CALCIUM CARBONATE 500 MG/1
1000 TABLET, CHEWABLE ORAL 4 TIMES DAILY PRN
Status: DISCONTINUED | OUTPATIENT
Start: 2024-04-13 | End: 2024-04-15 | Stop reason: HOSPADM

## 2024-04-13 RX ORDER — OXYCODONE HYDROCHLORIDE 5 MG/1
5 TABLET ORAL EVERY 4 HOURS PRN
Status: DISCONTINUED | OUTPATIENT
Start: 2024-04-13 | End: 2024-04-15

## 2024-04-13 RX ORDER — ENOXAPARIN SODIUM 100 MG/ML
40 INJECTION SUBCUTANEOUS EVERY 24 HOURS
Status: DISCONTINUED | OUTPATIENT
Start: 2024-04-13 | End: 2024-04-15 | Stop reason: HOSPADM

## 2024-04-13 RX ORDER — NALOXONE HYDROCHLORIDE 0.4 MG/ML
0.4 INJECTION, SOLUTION INTRAMUSCULAR; INTRAVENOUS; SUBCUTANEOUS
Status: DISCONTINUED | OUTPATIENT
Start: 2024-04-13 | End: 2024-04-15 | Stop reason: HOSPADM

## 2024-04-13 RX ORDER — VITAMIN B COMPLEX
125 TABLET ORAL AT BEDTIME
Status: DISCONTINUED | OUTPATIENT
Start: 2024-04-13 | End: 2024-04-15 | Stop reason: HOSPADM

## 2024-04-13 RX ORDER — HYDRALAZINE HYDROCHLORIDE 20 MG/ML
10 INJECTION INTRAMUSCULAR; INTRAVENOUS EVERY 4 HOURS PRN
Status: DISCONTINUED | OUTPATIENT
Start: 2024-04-13 | End: 2024-04-15 | Stop reason: HOSPADM

## 2024-04-13 RX ORDER — KETOROLAC TROMETHAMINE 15 MG/ML
15 INJECTION, SOLUTION INTRAMUSCULAR; INTRAVENOUS ONCE
Status: DISCONTINUED | OUTPATIENT
Start: 2024-04-13 | End: 2024-04-13

## 2024-04-13 RX ORDER — HYDRALAZINE HYDROCHLORIDE 20 MG/ML
5 INJECTION INTRAMUSCULAR; INTRAVENOUS ONCE
Status: DISCONTINUED | OUTPATIENT
Start: 2024-04-13 | End: 2024-04-13

## 2024-04-13 RX ORDER — VIT C/E/ZN/COPPR/LUTEIN/ZEAXAN 60 MG-6 MG
1 CAPSULE ORAL 2 TIMES DAILY
Status: DISCONTINUED | OUTPATIENT
Start: 2024-04-13 | End: 2024-04-15 | Stop reason: HOSPADM

## 2024-04-13 RX ORDER — ONDANSETRON 4 MG/1
4 TABLET, ORALLY DISINTEGRATING ORAL EVERY 6 HOURS PRN
Status: DISCONTINUED | OUTPATIENT
Start: 2024-04-13 | End: 2024-04-15 | Stop reason: HOSPADM

## 2024-04-13 RX ORDER — ROSUVASTATIN CALCIUM 10 MG/1
10 TABLET, COATED ORAL DAILY
Status: DISCONTINUED | OUTPATIENT
Start: 2024-04-13 | End: 2024-04-15 | Stop reason: HOSPADM

## 2024-04-13 RX ORDER — PILOCARPINE HYDROCHLORIDE 5 MG/1
5 TABLET, FILM COATED ORAL
Status: DISCONTINUED | OUTPATIENT
Start: 2024-04-13 | End: 2024-04-15 | Stop reason: HOSPADM

## 2024-04-13 RX ORDER — ACETAMINOPHEN 325 MG/1
975 TABLET ORAL 3 TIMES DAILY
Status: DISCONTINUED | OUTPATIENT
Start: 2024-04-13 | End: 2024-04-15 | Stop reason: HOSPADM

## 2024-04-13 RX ORDER — PANTOPRAZOLE SODIUM 40 MG/1
40 TABLET, DELAYED RELEASE ORAL EVERY EVENING
Status: DISCONTINUED | OUTPATIENT
Start: 2024-04-13 | End: 2024-04-15 | Stop reason: HOSPADM

## 2024-04-13 RX ORDER — LOSARTAN POTASSIUM 100 MG/1
100 TABLET ORAL DAILY
Status: DISCONTINUED | OUTPATIENT
Start: 2024-04-13 | End: 2024-04-15 | Stop reason: HOSPADM

## 2024-04-13 RX ORDER — CALCIUM CARBONATE 500 MG/1
500 TABLET, CHEWABLE ORAL AT BEDTIME
Status: DISCONTINUED | OUTPATIENT
Start: 2024-04-13 | End: 2024-04-15 | Stop reason: HOSPADM

## 2024-04-13 RX ORDER — CYCLOBENZAPRINE HCL 5 MG
5 TABLET ORAL ONCE
Status: DISCONTINUED | OUTPATIENT
Start: 2024-04-13 | End: 2024-04-13

## 2024-04-13 RX ORDER — ONDANSETRON 2 MG/ML
4 INJECTION INTRAMUSCULAR; INTRAVENOUS EVERY 6 HOURS PRN
Status: DISCONTINUED | OUTPATIENT
Start: 2024-04-13 | End: 2024-04-15 | Stop reason: HOSPADM

## 2024-04-13 RX ORDER — LORATADINE 10 MG/1
10 TABLET ORAL DAILY
Status: DISCONTINUED | OUTPATIENT
Start: 2024-04-13 | End: 2024-04-15 | Stop reason: HOSPADM

## 2024-04-13 RX ORDER — AMOXICILLIN 250 MG
1 CAPSULE ORAL 2 TIMES DAILY
Status: DISCONTINUED | OUTPATIENT
Start: 2024-04-13 | End: 2024-04-15 | Stop reason: HOSPADM

## 2024-04-13 RX ORDER — AMOXICILLIN 250 MG
1 CAPSULE ORAL 2 TIMES DAILY PRN
Status: DISCONTINUED | OUTPATIENT
Start: 2024-04-13 | End: 2024-04-15 | Stop reason: HOSPADM

## 2024-04-13 RX ORDER — AMOXICILLIN 250 MG
2 CAPSULE ORAL 2 TIMES DAILY
Status: DISCONTINUED | OUTPATIENT
Start: 2024-04-13 | End: 2024-04-15 | Stop reason: HOSPADM

## 2024-04-13 RX ORDER — METHOCARBAMOL 750 MG/1
750 TABLET, FILM COATED ORAL 3 TIMES DAILY
Status: DISCONTINUED | OUTPATIENT
Start: 2024-04-13 | End: 2024-04-15

## 2024-04-13 RX ORDER — NITROGLYCERIN 0.4 MG/1
0.4 TABLET SUBLINGUAL EVERY 5 MIN PRN
Status: DISCONTINUED | OUTPATIENT
Start: 2024-04-13 | End: 2024-04-15 | Stop reason: HOSPADM

## 2024-04-13 RX ORDER — NALOXONE HYDROCHLORIDE 0.4 MG/ML
0.2 INJECTION, SOLUTION INTRAMUSCULAR; INTRAVENOUS; SUBCUTANEOUS
Status: DISCONTINUED | OUTPATIENT
Start: 2024-04-13 | End: 2024-04-15 | Stop reason: HOSPADM

## 2024-04-13 RX ORDER — ONDANSETRON 2 MG/ML
4 INJECTION INTRAMUSCULAR; INTRAVENOUS ONCE
Status: DISCONTINUED | OUTPATIENT
Start: 2024-04-13 | End: 2024-04-13

## 2024-04-13 RX ORDER — METOPROLOL SUCCINATE 50 MG/1
150 TABLET, EXTENDED RELEASE ORAL DAILY
Status: DISCONTINUED | OUTPATIENT
Start: 2024-04-13 | End: 2024-04-15 | Stop reason: HOSPADM

## 2024-04-13 RX ORDER — IOPAMIDOL 755 MG/ML
100 INJECTION, SOLUTION INTRAVASCULAR ONCE
Status: DISCONTINUED | OUTPATIENT
Start: 2024-04-13 | End: 2024-04-13

## 2024-04-13 RX ORDER — HYDROMORPHONE HCL IN WATER/PF 6 MG/30 ML
0.2 PATIENT CONTROLLED ANALGESIA SYRINGE INTRAVENOUS EVERY 4 HOURS PRN
Status: DISCONTINUED | OUTPATIENT
Start: 2024-04-13 | End: 2024-04-15

## 2024-04-13 RX ORDER — LIDOCAINE 40 MG/G
CREAM TOPICAL
Status: DISCONTINUED | OUTPATIENT
Start: 2024-04-13 | End: 2024-04-15 | Stop reason: HOSPADM

## 2024-04-13 RX ORDER — HYDROMORPHONE HCL IN WATER/PF 6 MG/30 ML
0.2 PATIENT CONTROLLED ANALGESIA SYRINGE INTRAVENOUS ONCE
Status: DISCONTINUED | OUTPATIENT
Start: 2024-04-13 | End: 2024-04-13

## 2024-04-13 RX ORDER — ASPIRIN 81 MG/1
81 TABLET ORAL DAILY
Status: DISCONTINUED | OUTPATIENT
Start: 2024-04-13 | End: 2024-04-15 | Stop reason: HOSPADM

## 2024-04-13 RX ORDER — AMOXICILLIN 250 MG
2 CAPSULE ORAL 2 TIMES DAILY PRN
Status: DISCONTINUED | OUTPATIENT
Start: 2024-04-13 | End: 2024-04-15 | Stop reason: HOSPADM

## 2024-04-13 RX ORDER — HYDRALAZINE HYDROCHLORIDE 10 MG/1
10 TABLET, FILM COATED ORAL EVERY 4 HOURS PRN
Status: DISCONTINUED | OUTPATIENT
Start: 2024-04-13 | End: 2024-04-15 | Stop reason: HOSPADM

## 2024-04-13 RX ORDER — POLYETHYLENE GLYCOL 3350 17 G/17G
17 POWDER, FOR SOLUTION ORAL 2 TIMES DAILY PRN
Status: DISCONTINUED | OUTPATIENT
Start: 2024-04-13 | End: 2024-04-15 | Stop reason: HOSPADM

## 2024-04-13 RX ADMIN — ENOXAPARIN SODIUM 40 MG: 40 INJECTION SUBCUTANEOUS at 20:07

## 2024-04-13 RX ADMIN — SODIUM CHLORIDE 68 ML: 9 INJECTION, SOLUTION INTRAVENOUS at 13:57

## 2024-04-13 RX ADMIN — CYCLOBENZAPRINE 5 MG: 5 TABLET, FILM COATED ORAL at 12:04

## 2024-04-13 RX ADMIN — Medication 125 MCG: at 21:46

## 2024-04-13 RX ADMIN — FAMOTIDINE 20 MG: 20 TABLET ORAL at 23:46

## 2024-04-13 RX ADMIN — ACETAMINOPHEN 975 MG: 325 TABLET, FILM COATED ORAL at 21:45

## 2024-04-13 RX ADMIN — METOPROLOL SUCCINATE 150 MG: 50 TABLET, EXTENDED RELEASE ORAL at 19:51

## 2024-04-13 RX ADMIN — HYDROMORPHONE HYDROCHLORIDE 0.5 MG: 1 INJECTION, SOLUTION INTRAMUSCULAR; INTRAVENOUS; SUBCUTANEOUS at 14:58

## 2024-04-13 RX ADMIN — ASPIRIN 81 MG: 81 TABLET, COATED ORAL at 19:52

## 2024-04-13 RX ADMIN — LORATADINE 10 MG: 10 TABLET ORAL at 21:45

## 2024-04-13 RX ADMIN — ONDANSETRON 4 MG: 4 TABLET, ORALLY DISINTEGRATING ORAL at 21:59

## 2024-04-13 RX ADMIN — METHOCARBAMOL 750 MG: 750 TABLET ORAL at 21:45

## 2024-04-13 RX ADMIN — HYDRALAZINE HYDROCHLORIDE 5 MG: 20 INJECTION INTRAMUSCULAR; INTRAVENOUS at 15:53

## 2024-04-13 RX ADMIN — Medication 1 CAPSULE: at 21:45

## 2024-04-13 RX ADMIN — HYDROMORPHONE HYDROCHLORIDE 0.2 MG: 0.2 INJECTION, SOLUTION INTRAMUSCULAR; INTRAVENOUS; SUBCUTANEOUS at 12:04

## 2024-04-13 RX ADMIN — KETOROLAC TROMETHAMINE 15 MG: 15 INJECTION, SOLUTION INTRAMUSCULAR; INTRAVENOUS at 14:59

## 2024-04-13 RX ADMIN — PANTOPRAZOLE SODIUM 40 MG: 40 TABLET, DELAYED RELEASE ORAL at 21:41

## 2024-04-13 RX ADMIN — HYDROMORPHONE HYDROCHLORIDE 0.5 MG: 1 INJECTION, SOLUTION INTRAMUSCULAR; INTRAVENOUS; SUBCUTANEOUS at 13:20

## 2024-04-13 RX ADMIN — SENNOSIDES AND DOCUSATE SODIUM 1 TABLET: 8.6; 5 TABLET ORAL at 21:46

## 2024-04-13 RX ADMIN — IOPAMIDOL 112 ML: 755 INJECTION, SOLUTION INTRAVENOUS at 13:57

## 2024-04-13 RX ADMIN — LOSARTAN POTASSIUM 100 MG: 100 TABLET, FILM COATED ORAL at 19:52

## 2024-04-13 RX ADMIN — OXYCODONE HYDROCHLORIDE 5 MG: 5 TABLET ORAL at 23:45

## 2024-04-13 RX ADMIN — ONDANSETRON 4 MG: 2 INJECTION INTRAMUSCULAR; INTRAVENOUS at 15:51

## 2024-04-13 ASSESSMENT — ACTIVITIES OF DAILY LIVING (ADL)
ADLS_ACUITY_SCORE: 38

## 2024-04-13 ASSESSMENT — COLUMBIA-SUICIDE SEVERITY RATING SCALE - C-SSRS
1. IN THE PAST MONTH, HAVE YOU WISHED YOU WERE DEAD OR WISHED YOU COULD GO TO SLEEP AND NOT WAKE UP?: NO
6. HAVE YOU EVER DONE ANYTHING, STARTED TO DO ANYTHING, OR PREPARED TO DO ANYTHING TO END YOUR LIFE?: NO
2. HAVE YOU ACTUALLY HAD ANY THOUGHTS OF KILLING YOURSELF IN THE PAST MONTH?: NO

## 2024-04-13 NOTE — ED TRIAGE NOTES
Patient reports she has been taking prn tylenol for pain but is barely helping manage her pain.     Triage Assessment (Adult)       Row Name 04/13/24 1056          Triage Assessment    Airway WDL WDL        Respiratory WDL    Respiratory WDL WDL        Skin Circulation/Temperature WDL    Skin Circulation/Temperature WDL WDL        Cardiac WDL    Cardiac WDL WDL

## 2024-04-13 NOTE — ED PROVIDER NOTES
[facility] EMERGENCY DEPARTMENT ([facility])  4/13/24  ED 02      History     Chief Complaint   Patient presents with    Back Pain     Fell on 21st last month, having Left upper back & knees pain a week after the fall     HPI  Fatou Brian is a 74 year old female with a past medical history significant for chronic low back pain, osteoarthritis, CKD III, CAD, centrilobular emphysema, GERD and HTN who presents to the Emergency Department for evaluation of left upper and midline back pain.  Patient states on March 21 she fell after taking out the garbage.  She states she was walking with her walker and slipped and fell and she had to call the fire department to come help her get up.  She did not go to the hospital at this time.  She states she had a lateral knee pain at this time however, she was able to deal with this.  Then, 1 week later she has begun experiencing severe upper back pain which is constant but waxes and wanes.  She states this pain radiates from her upper back to her shoulder blades and upper arms.  Pain is exacerbated by movements of the arms in certain positions.  She has been taking Tylenol every 8 hours for relief of pain.  She denies any cough, viral symptoms, shortness of breath or fever. On palpation she also endorses rib pain and lower abdominal pain.     Past Medical History  Past Medical History:   Diagnosis Date    Arthritis     Atherosclerosis     diffuse    Chest pain, unspecified type 11/02/2021    CKD (chronic kidney disease) stage 3, GFR 30-59 ml/min (H) 09/27/2019    Coronary artery disease involving native coronary artery of native heart 10/16/2022    Emphysema of lung (H)     Former smoker     Quit 2009    GERD (gastroesophageal reflux disease)     HLD (hyperlipidaemia)     Hypertension     Major depression in partial remission (H24) 07/03/2014    Morbid obesity (H) 09/24/2020    NSTEMI (non-ST elevated myocardial infarction) (H) 11/02/2021    S/P LIS to OM1     Sjogren's syndrome (H24)     Sjogren's syndrome with myopathy (H24) 04/04/2013    Spinal stenosis     Subclavian artery stenosis (H24)     Left     Past Surgical History:   Procedure Laterality Date    aneursym[  1983, 1991    Has metal in head    CHOLECYSTECTOMY      CLOSED RX PROX HUMERUS FRACTURE      10 pins placed    COLONOSCOPY      COLONOSCOPY  1/24/2014    Procedure: COMBINED COLONOSCOPY, SINGLE BIOPSY/POLYPECTOMY BY BIOPSY;  COLONOSCOPY;  Surgeon: Ranjit Pa MD;  Location:  GI    CV HEART CATHETERIZATION WITH POSSIBLE INTERVENTION N/A 11/2/2021    Procedure: Heart Catheterization with Possible Intervention;  Surgeon: Keeley Pérez MD;  Location:  HEART CARDIAC CATH LAB    CV PCI STENT DRUG ELUTING N/A 11/2/2021    Procedure: Percutaneous Coronary Intervention Stent Drug Eluting;  Surgeon: Keeley Pérez MD;  Location:  HEART CARDIAC CATH LAB    HC ECP WITH CATARACT SURGERY      both eyes    HYSTERECTOMY, JANET       acetaminophen (TYLENOL) 650 MG CR tablet  aspirin 81 MG EC tablet  calcium carbonate (TUMS) 500 MG chewable tablet  famotidine (PEPCID) 20 MG tablet  loratadine (CLARITIN) 10 MG tablet  losartan (COZAAR) 100 MG tablet  metoprolol succinate ER (TOPROL XL) 50 MG 24 hr tablet  Multiple Vitamins-Minerals (PRESERVISION AREDS 2 PO)  nitroGLYcerin (NITROSTAT) 0.4 MG sublingual tablet  pantoprazole (PROTONIX) 40 MG EC tablet  pilocarpine (SALAGEN) 5 MG tablet  rosuvastatin (CRESTOR) 10 MG tablet  senna-docusate (SENOKOT-S/PERICOLACE) 8.6-50 MG tablet  Vitamin D3 (CHOLECALCIFEROL) 125 MCG (5000 UT) tablet      Allergies   Allergen Reactions    Chantix [Varenicline]      suicidal    Influenza Virus Vaccine H5n1      Muscle aches dizzy, nauseated  Light headed    Morphine Sulfate      Sensitivity when in hospital    Omeprazole Magnesium Nausea     Intolerance      Vioxx     Cetirizine-Pseudoephedrine Er Rash    Niacin Itching and Rash    Zetia [Ezetimibe] Other (See  "Comments)     Muscle pain     Family History  Family History   Problem Relation Age of Onset    Thyroid Disease Mother     Arthritis Mother         Rheumatoid    Osteoporosis Mother     Cancer Mother         stomach    Cerebrovascular Disease Mother     Heart Disease Father     Cancer Father     Heart Disease Brother         fibulation     Social History   Social History     Tobacco Use    Smoking status: Former     Current packs/day: 0.00     Average packs/day: 1.5 packs/day for 53.5 years (80.2 ttl pk-yrs)     Types: Cigarettes     Start date: 4/10/1956     Quit date: 10/1/2009     Years since quittin.5    Smokeless tobacco: Never   Vaping Use    Vaping status: Never Used   Substance Use Topics    Alcohol use: Yes     Comment: occas.    Drug use: No      Past medical history, past surgical history, medications, allergies, family history, and social history were reviewed with the patient. No additional pertinent items.      A medically appropriate review of systems was performed with pertinent positives and negatives noted in the HPI, and all other systems negative.    Physical Exam   BP: (!) 215/99  Pulse: 87  Temp: 98  F (36.7  C)  Resp: 16  Height: 165.1 cm (5' 5\")  Weight: 104.3 kg (230 lb)  SpO2: 96 %  Physical Exam  Vitals and nursing note reviewed.   Constitutional:       General: She is not in acute distress.     Appearance: Normal appearance. She is not diaphoretic.   HENT:      Head: Atraumatic.      Mouth/Throat:      Mouth: Mucous membranes are moist.   Eyes:      General: No scleral icterus.     Conjunctiva/sclera: Conjunctivae normal.   Cardiovascular:      Rate and Rhythm: Normal rate.      Heart sounds: Normal heart sounds.   Pulmonary:      Effort: No respiratory distress.      Breath sounds: Normal breath sounds.   Chest:      Chest wall: Tenderness present. No crepitus.   Abdominal:      General: Abdomen is flat.   Musculoskeletal:      Cervical back: Neck supple. Tenderness and bony " tenderness present.      Thoracic back: Tenderness (Patient has tenderness directly over the T-spine and in the left scapular area.) and bony tenderness present. No spasms.      Right lower leg: Edema present.      Left lower leg: Edema present.      Comments: Patient has bilateral lymphedema, symmetric with lymphedema skin changes but no evidence of cellulitis   Skin:     General: Skin is warm.      Findings: No rash.   Neurological:      General: No focal deficit present.      Mental Status: She is alert and oriented to person, place, and time.      Cranial Nerves: No cranial nerve deficit.      Sensory: No sensory deficit.      Motor: No weakness.      Deep Tendon Reflexes: Reflexes normal.           ED Course, Procedures, & Data      Procedures            EKG Interpretation:      Interpreted by Dawson Monroe MD  Time reviewed: 1109  Symptoms at time of EKG: Fall, left posterior chest wall pain  Rhythm: normal sinus   Rate: normal  Axis: normal  Ectopy: none  Conduction: normal  ST Segments/ T Waves: No ST-T wave changes  Q Waves: none  Comparison to prior: No old EKG available    Clinical Impression: normal EKG          Results for orders placed or performed during the hospital encounter of 04/13/24   CT Chest/Abdomen/Pelvis w Contrast     Status: None    Narrative    EXAM: CT CHEST/ABDOMEN/PELVIS W CONTRAST  LOCATION: [facility]  DATE: 4/13/2024    INDICATION: Trauma, chest back abd pain  COMPARISON: 11/01/2021  TECHNIQUE: CT scan of the chest, abdomen, and pelvis was performed following injection of IV contrast. Multiplanar reformats were obtained. Dose reduction techniques were used.   CONTRAST: 112 mL Isovue 370    FINDINGS:   LUNGS AND PLEURA: Some motion artifact limits detail in some areas. No pulmonary contusion or pneumothorax. No effusions.    MEDIASTINUM/AXILLAE: No adenopathy or aneurysm. Moderate hiatal hernia.    CORONARY ARTERY CALCIFICATION:  Moderate.    HEPATOBILIARY: Cholecystectomy. Low-attenuation subcentimeter liver lesion(s) compatible with benign cysts or other benign lesions. No routine follow-up is recommended in a low-risk patient.    PANCREAS: No significant mass, duct dilatation, or inflammatory change.    SPLEEN: Minimally enlarged at 13.5 cm. This is new from previous.    ADRENAL GLANDS: Bilateral adrenal nodules stable since comparison compatible with benign adenomas.    KIDNEYS/BLADDER: No significant mass, stones, or hydronephrosis. There are simple or benign cysts. No follow up is needed. Left renal atrophy again evident.    BOWEL: Diverticulosis of the colon. No acute inflammatory change. No obstruction.     LYMPH NODES: No lymphadenopathy.    VASCULATURE: 3.1 cm infrarenal abdominal aortic aneurysm increased from 2.9 cm previously.    PELVIC ORGANS: No pelvic masses.    MUSCULOSKELETAL: No frankly destructive bony lesions or fractures demonstrated.      Impression    IMPRESSION:  1.  No posttraumatic changes demonstrated.  2.  3.1 cm infrarenal abdominal aortic aneurysm.  3.  Moderate hiatal hernia.  4.  Minimal splenomegaly.   CT Thoracic Spine Reconstructed     Status: None    Narrative    EXAM: CT THORACIC SPINE RECONSTRUCTED, CT LUMBAR SPINE RECONSTRUCTED  LOCATION: [facility]  DATE: 04/13/2024    INDICATION: Trauma.  COMPARISON: Lumbar spine CT 10/03/2014.  TECHNIQUE:  1) Dedicated axial, sagittal, and coronal images of the Thoracic Spine were generated utilizing CT chest source data and are separately reviewed. Dose reduction techniques were used.   2) Dedicated axial, sagittal, and coronal images of the Lumbar Spine were generated utilizing CT abdomen pelvis source data and are separately reviewed. Dose reduction techniques were used.     FINDINGS:    THORACIC SPINE CT:  VERTEBRA: Bones appear demineralized which limits evaluation for fractures. No acute lucent fracture line  visualized. No significant loss of vertebral body height. Fused anterior osteophytes throughout the thoracic spine from T1 down to at least L1. Mild   degenerative disc changes and loss of disc height throughout the thoracic spine. No significant disc herniation appreciated by CT. Normal facets.    CANAL/FORAMINA: No high-grade spinal canal or neural foraminal narrowing.    PARASPINAL: Please see dedicated CT chest, abdomen and pelvis report.    LUMBAR SPINE CT:  VERTEBRA: Bones appear demineralized. No acute lucent fracture line appreciated. No significant loss of vertebral body height. Mild degenerative disc changes and loss of disc height at L4-L5. Facet hypertrophy in the lower lumbar spine. Rounded sclerotic   lesion in the left iliac bone which may represent a bone island.    CANAL/FORAMINA: No spinal canal narrowing at any level. Mild neural foraminal narrowings at L4-L5.    PARASPINAL: Please see dedicated CT chest, abdomen and pelvis report.      Impression    IMPRESSION:  THORACIC SPINE CT:  1.  No acute fracture appreciated in the thoracic spine.  2.  Bones appear demineralized which limits evaluation for fractures.  3.  Degenerative changes throughout the thoracic spine without significant spinal canal or neural foraminal narrowing.  4.  Fused anterior osteophytes throughout the thoracic spine.    LUMBAR SPINE CT:  1.  No acute fracture appreciated in the lumbar spine.  2.  Bones appear demineralized which limits evaluation for fractures.  3.  Degenerative changes in the lower lumbar spine as detailed above.     CT Lumbar Spine Reconstructed     Status: None    Narrative    EXAM: CT THORACIC SPINE RECONSTRUCTED, CT LUMBAR SPINE RECONSTRUCTED  LOCATION: [facility]  DATE: 04/13/2024    INDICATION: Trauma.  COMPARISON: Lumbar spine CT 10/03/2014.  TECHNIQUE:  1) Dedicated axial, sagittal, and coronal images of the Thoracic Spine were generated utilizing CT chest  source data and are separately reviewed. Dose reduction techniques were used.   2) Dedicated axial, sagittal, and coronal images of the Lumbar Spine were generated utilizing CT abdomen pelvis source data and are separately reviewed. Dose reduction techniques were used.     FINDINGS:    THORACIC SPINE CT:  VERTEBRA: Bones appear demineralized which limits evaluation for fractures. No acute lucent fracture line visualized. No significant loss of vertebral body height. Fused anterior osteophytes throughout the thoracic spine from T1 down to at least L1. Mild   degenerative disc changes and loss of disc height throughout the thoracic spine. No significant disc herniation appreciated by CT. Normal facets.    CANAL/FORAMINA: No high-grade spinal canal or neural foraminal narrowing.    PARASPINAL: Please see dedicated CT chest, abdomen and pelvis report.    LUMBAR SPINE CT:  VERTEBRA: Bones appear demineralized. No acute lucent fracture line appreciated. No significant loss of vertebral body height. Mild degenerative disc changes and loss of disc height at L4-L5. Facet hypertrophy in the lower lumbar spine. Rounded sclerotic   lesion in the left iliac bone which may represent a bone island.    CANAL/FORAMINA: No spinal canal narrowing at any level. Mild neural foraminal narrowings at L4-L5.    PARASPINAL: Please see dedicated CT chest, abdomen and pelvis report.      Impression    IMPRESSION:  THORACIC SPINE CT:  1.  No acute fracture appreciated in the thoracic spine.  2.  Bones appear demineralized which limits evaluation for fractures.  3.  Degenerative changes throughout the thoracic spine without significant spinal canal or neural foraminal narrowing.  4.  Fused anterior osteophytes throughout the thoracic spine.    LUMBAR SPINE CT:  1.  No acute fracture appreciated in the lumbar spine.  2.  Bones appear demineralized which limits evaluation for fractures.  3.  Degenerative changes in the lower lumbar spine as  detailed above.     CT Cervical Spine w/o Contrast     Status: None    Narrative    EXAM: CT CERVICAL SPINE WITHOUT CONTRAST  LOCATION: [facility]  DATE: 04/13/2024    INDICATION: Trauma.  COMPARISON: None.  TECHNIQUE: Routine CT Cervical Spine without IV contrast. Multiplanar reformats. Dose reduction techniques were used.    FINDINGS:  VERTEBRA: Images are degraded by motion artifact which limits evaluation for fractures, particularly subtle fractures. No obvious loss of vertebral body height. No definite lucent fracture line visualized. Grade 1 anterolisthesis of C4 on C5. Mild to   moderate degenerative endplate changes and loss of disc height throughout the cervical spine, most pronounced at C4-C5, C5-C6, and C6-C7. Marked facet hypertrophy throughout the cervical spine. Fusion of the facets on the right at C2-C3 and left at   C5-C6.    CANAL/FORAMINA: No significant spinal canal narrowing at any level. Multiple levels of neural foraminal narrowing particularly from C3-C4 through C5-C6.    PARASPINAL: Heterogeneous thyroid gland nodule in the isthmus measuring up to 1.3 cm. This typically would not require further follow-up in a patient of this age. Atherosclerotic calcifications along the carotid arteries.      Impression    IMPRESSION:  1.  Motion degraded images which limits evaluation for fractures, particularly subtle fractures. No definite fracture identified noting limitations by motion.  2.  Multilevel degenerative changes throughout the cervical spine as detailed above.     Comprehensive metabolic panel     Status: Abnormal   Result Value Ref Range    Sodium 138 135 - 145 mmol/L    Potassium 5.6 (H) 3.4 - 5.3 mmol/L    Carbon Dioxide (CO2) 24 22 - 29 mmol/L    Anion Gap 8 7 - 15 mmol/L    Urea Nitrogen 15.9 8.0 - 23.0 mg/dL    Creatinine 1.00 (H) 0.51 - 0.95 mg/dL    GFR Estimate 58 (L) >60 mL/min/1.73m2    Calcium 8.7 (L) 8.8 - 10.2 mg/dL    Chloride 106  98 - 107 mmol/L    Glucose 99 70 - 99 mg/dL    Alkaline Phosphatase 118 40 - 150 U/L    AST      ALT      Protein Total 6.3 (L) 6.4 - 8.3 g/dL    Albumin 4.0 3.5 - 5.2 g/dL    Bilirubin Total 0.5 <=1.2 mg/dL   CBC with platelets and differential     Status: None   Result Value Ref Range    WBC Count 5.6 4.0 - 11.0 10e3/uL    RBC Count 4.05 3.80 - 5.20 10e6/uL    Hemoglobin 12.3 11.7 - 15.7 g/dL    Hematocrit 38.2 35.0 - 47.0 %    MCV 94 78 - 100 fL    MCH 30.4 26.5 - 33.0 pg    MCHC 32.2 31.5 - 36.5 g/dL    RDW 14.4 10.0 - 15.0 %    Platelet Count 152 150 - 450 10e3/uL    % Neutrophils 70 %    % Lymphocytes 20 %    % Monocytes 8 %    % Eosinophils 1 %    % Basophils 0 %    % Immature Granulocytes 1 %    NRBCs per 100 WBC 0 <1 /100    Absolute Neutrophils 3.9 1.6 - 8.3 10e3/uL    Absolute Lymphocytes 1.1 0.8 - 5.3 10e3/uL    Absolute Monocytes 0.5 0.0 - 1.3 10e3/uL    Absolute Eosinophils 0.1 0.0 - 0.7 10e3/uL    Absolute Basophils 0.0 0.0 - 0.2 10e3/uL    Absolute Immature Granulocytes 0.0 <=0.4 10e3/uL    Absolute NRBCs 0.0 10e3/uL   Comprehensive metabolic panel     Status: Abnormal   Result Value Ref Range    Sodium 139 135 - 145 mmol/L    Potassium 4.3 3.4 - 5.3 mmol/L    Carbon Dioxide (CO2) 24 22 - 29 mmol/L    Anion Gap 8 7 - 15 mmol/L    Urea Nitrogen 15.4 8.0 - 23.0 mg/dL    Creatinine 0.97 (H) 0.51 - 0.95 mg/dL    GFR Estimate 60 (L) >60 mL/min/1.73m2    Calcium 8.8 8.8 - 10.2 mg/dL    Chloride 107 98 - 107 mmol/L    Glucose 96 70 - 99 mg/dL    Alkaline Phosphatase 114 40 - 150 U/L    AST 40 0 - 45 U/L    ALT 29 0 - 50 U/L    Protein Total 5.9 (L) 6.4 - 8.3 g/dL    Albumin 3.8 3.5 - 5.2 g/dL    Bilirubin Total 0.5 <=1.2 mg/dL   Troponin T, High Sensitivity     Status: Abnormal   Result Value Ref Range    Troponin T, High Sensitivity 27 (H) <=14 ng/L   Troponin T, High Sensitivity     Status: Abnormal   Result Value Ref Range    Troponin T, High Sensitivity 36 (H) <=14 ng/L   EKG 12-lead, tracing only      Status: None (Preliminary result)   Result Value Ref Range    Systolic Blood Pressure  mmHg    Diastolic Blood Pressure  mmHg    Ventricular Rate 71 BPM    Atrial Rate 71 BPM    DE Interval 150 ms    QRS Duration 90 ms     ms    QTc 449 ms    P Axis 56 degrees    R AXIS 48 degrees    T Axis 79 degrees    Interpretation ECG Sinus rhythm  Normal ECG      Adult Type and Screen     Status: Abnormal   Result Value Ref Range    ABO/RH(D) A POS     Antibody Screen Positive (A) Negative    SPECIMEN EXPIRATION DATE [id number]    CBC with platelets differential     Status: None    Narrative    The following orders were created for panel order CBC with platelets differential.  Procedure                               Abnormality         Status                     ---------                               -----------         ------                     CBC with platelets and d...[[id number]]                      Final result                 Please view results for these tests on the individual orders.   ABO/Rh type and screen     Status: Abnormal    Narrative    The following orders were created for panel order ABO/Rh type and screen.  Procedure                               Abnormality         Status                     ---------                               -----------         ------                     Adult Type and Screen[[id number]]        Abnormal            Final result                 Please view results for these tests on the individual orders.     Medications   ondansetron (ZOFRAN) injection 4 mg (4 mg Intravenous $Given 4/13/24 1551)   hydrALAZINE (APRESOLINE) injection 5 mg (has no administration in time range)   HYDROmorphone (DILAUDID) injection 0.2 mg (0.2 mg Intravenous $Given 4/13/24 1204)   cyclobenzaprine (FLEXERIL) tablet 5 mg (5 mg Oral $Given 4/13/24 1204)   HYDROmorphone (PF) (DILAUDID) injection 0.5 mg (0.5 mg Intravenous $Given 4/13/24 1320)   iopamidol (ISOVUE-370) solution 100 mL (112 mLs  Intravenous $Given 4/13/24 1356)   sodium chloride 0.9 % bag 100mL for CT scan flush use (68 mLs Intravenous $Given 4/13/24 1357)   ketorolac (TORADOL) injection 15 mg (15 mg Intravenous $Given 4/13/24 8565)   HYDROmorphone (PF) (DILAUDID) injection 0.5 mg (0.5 mg Intravenous $Given 4/13/24 1450)     Labs Ordered and Resulted from Time of ED Arrival to Time of ED Departure   COMPREHENSIVE METABOLIC PANEL - Abnormal       Result Value    Sodium 138      Potassium 5.6 (*)     Carbon Dioxide (CO2) 24      Anion Gap 8      Urea Nitrogen 15.9      Creatinine 1.00 (*)     GFR Estimate 58 (*)     Calcium 8.7 (*)     Chloride 106      Glucose 99      Alkaline Phosphatase 118      AST        ALT        Protein Total 6.3 (*)     Albumin 4.0      Bilirubin Total 0.5     COMPREHENSIVE METABOLIC PANEL - Abnormal    Sodium 139      Potassium 4.3      Carbon Dioxide (CO2) 24      Anion Gap 8      Urea Nitrogen 15.4      Creatinine 0.97 (*)     GFR Estimate 60 (*)     Calcium 8.8      Chloride 107      Glucose 96      Alkaline Phosphatase 114      AST 40      ALT 29      Protein Total 5.9 (*)     Albumin 3.8      Bilirubin Total 0.5     TROPONIN T, HIGH SENSITIVITY - Abnormal    Troponin T, High Sensitivity 27 (*)    TROPONIN T, HIGH SENSITIVITY - Abnormal    Troponin T, High Sensitivity 36 (*)    TYPE AND SCREEN, ADULT - Abnormal    ABO/RH(D) A POS      Antibody Screen Positive (*)     SPECIMEN EXPIRATION DATE [id number]     CBC WITH PLATELETS AND DIFFERENTIAL    WBC Count 5.6      RBC Count 4.05      Hemoglobin 12.3      Hematocrit 38.2      MCV 94      MCH 30.4      MCHC 32.2      RDW 14.4      Platelet Count 152      % Neutrophils 70      % Lymphocytes 20      % Monocytes 8      % Eosinophils 1      % Basophils 0      % Immature Granulocytes 1      NRBCs per 100 WBC 0      Absolute Neutrophils 3.9      Absolute Lymphocytes 1.1      Absolute Monocytes 0.5      Absolute Eosinophils 0.1      Absolute Basophils 0.0       Absolute Immature Granulocytes 0.0      Absolute NRBCs 0.0     ROUTINE UA WITH MICROSCOPIC REFLEX TO CULTURE   ABO/RH TYPE AND SCREEN     CT Lumbar Spine Reconstructed   Final Result   IMPRESSION:   THORACIC SPINE CT:   1.  No acute fracture appreciated in the thoracic spine.   2.  Bones appear demineralized which limits evaluation for fractures.   3.  Degenerative changes throughout the thoracic spine without significant spinal canal or neural foraminal narrowing.   4.  Fused anterior osteophytes throughout the thoracic spine.      LUMBAR SPINE CT:   1.  No acute fracture appreciated in the lumbar spine.   2.  Bones appear demineralized which limits evaluation for fractures.   3.  Degenerative changes in the lower lumbar spine as detailed above.         CT Thoracic Spine Reconstructed   Final Result   IMPRESSION:   THORACIC SPINE CT:   1.  No acute fracture appreciated in the thoracic spine.   2.  Bones appear demineralized which limits evaluation for fractures.   3.  Degenerative changes throughout the thoracic spine without significant spinal canal or neural foraminal narrowing.   4.  Fused anterior osteophytes throughout the thoracic spine.      LUMBAR SPINE CT:   1.  No acute fracture appreciated in the lumbar spine.   2.  Bones appear demineralized which limits evaluation for fractures.   3.  Degenerative changes in the lower lumbar spine as detailed above.         CT Cervical Spine w/o Contrast   Final Result   IMPRESSION:   1.  Motion degraded images which limits evaluation for fractures, particularly subtle fractures. No definite fracture identified noting limitations by motion.   2.  Multilevel degenerative changes throughout the cervical spine as detailed above.         CT Chest/Abdomen/Pelvis w Contrast   Final Result   IMPRESSION:   1.  No posttraumatic changes demonstrated.   2.  3.1 cm infrarenal abdominal aortic aneurysm.   3.  Moderate hiatal hernia.   4.  Minimal splenomegaly.             Critical  care was not performed.     Medical Decision Making  The patient's presentation was of high complexity (an acute health issue posing potential threat to life or bodily function).    The patient's evaluation involved:  an assessment requiring an independent historian (patient's neighbor provides collateral information)  review of external note(s) from 2 sources (reviewed notes from prior clinic encounters including most recently March 2024)  review of 2 test result(s) ordered prior to this encounter (reviewed prior labs, imaging for comparison to today's results)  ordering and/or review of 3+ test(s) in this encounter (see separate area of note for details)  independent interpretation of testing performed by another health professional (images from CT chest abdomen pelvis as well as TC and L-spine reviewed as well as radiologist interpretation reviewed)  discussion of management or test interpretation with another health professional (discussed with hospitalist)    The patient's management necessitated moderate risk (prescription drug management including medications given in the ED), high risk (a parenteral controlled substance), high risk (drug therapy requiring intensive monitoring (IV hydralazine)), and high risk (a decision regarding hospitalization).    Assessment & Plan    Elderly patient with multiple medical problems including COPD, poorly controlled hypertension, NSTEMI, lymphedema, osteoarthritis presenting with diffuse back and abdominal pain after a fall which actually occurred several weeks ago.  Differential diagnosis includes C, T or L-spine fracture, rib fracture, musculoskeletal strain, exacerbation of osteoarthritis, pneumothorax, rib contusion, less likely intra-abdominal or intrathoracic vascular catastrophe, atypical presentation of ACS.  On exam the patient was very hypertensive (220/115) and appeared uncomfortable, has a great deal of difficulty moving around in the car without experiencing  back and neck pain.  Her mental status is normal though she is hard of hearing.  Her neck is diffusely tender including in the midline but no point vertebral tenderness.  She has midline tenderness of her thoracic and lumbar spine as well and also in the thoracic paraspinals particularly on the left and near the left scapula are tender.  The strength, reflex sensation and pulses were normal in all 4 extremities though she has difficulty with strength testing due to discomfort when moving her extremities.  She has significant lymphedema which appears chronic of the lower extremities.  Abdomen tender in the left lower quadrant but no peritoneal signs.  EKG does not have any acute ischemic changes.  Her initial troponin is 27.  I did repeat this and is slightly elevated at 36.  Was treated for pain with Dilaudid, Tylenol and Toradol.  It was difficult to obtain pain control she is now more comfortable.  She needed Zofran for nausea.  Given history of trauma in elderly patient with multiple medical problems imaging was obtained which does not show any acute fracture of the C-spine, T-spine or L-spine.  She has a small infrarenal abdominal aorta without signs of acute complication.  Her imaging is otherwise negative for any significant acute traumatic injury.  Blood pressure remained elevated despite pain control.  Ultimately received some hydralazine.  Elderly patient who is very frail and is having difficulty functioning in her own home due to multiple painful areas following a subacute fall which actually occurred 3 weeks ago.  In addition her blood pressure is very elevated which I believe conjunction with some chronic kidney disease is resulting in mild troponin elevations.  Cannot rule out the possibility of atypical presentation of ACS however this seems unlikely given the entire clinical scenario.  Would like to admit the patient for monitoring, serial troponin, blood pressure management, pain control, will  likely need a PT evaluation before discharge and may require a TCU or other rehabilitation facility based on her entire clinical status.    I have reviewed the nursing notes. I have reviewed the findings, diagnosis, plan and need for follow up with the patient.    New Prescriptions    No medications on file       Final diagnoses:   Fall at home, initial encounter   Accelerated hypertension   Elevated troponin   Physical deconditioning   ISHANNEN, am serving as a trained medical scribe to document services personally performed by Dawson Monroe MD, based on the provider's statements to me.      I, Dawson Monroe MD, was physically present and have reviewed and verified the accuracy of this note documented by SHANNEN WAED.     Dawson Monroe MD  [facility] EMERGENCY DEPARTMENT  4/13/2024        Dawson Monroe MD  04/13/24 [id number]

## 2024-04-13 NOTE — H&P
[facility]    History and Physical - Hospitalist Service, GOLD TEAM        Date of Admission:  4/13/2024    Assessment & Plan      Fatou Brian is a 74 year old female with morbid obesity, CAD, HTN, HLD, COPD, CKD stage III, chronic low back and bilateral knee pain with gait instability who presented to the ED 4/13 with uncontrolled thoracic back pain localizing between the shoulder blades and radiating off-and-on into the bilateral shoulders and upper arms.  Patient reported a fall 3/21/2024 resulting increase in her chronic bilateral knee pain, but no back pain at that time.  The back pain started about 1 week PTA without any further precipitating trauma or falls.  Was noted to have accelerated hypertension with SBP greater than 200 in the setting of pain, but persisted despite improved pain control.  Received hydralazine with some improvement.  Noted to have mild troponin elevation, but no acute EKG changes or anginal symptoms.  Admitted to hospitalist service for pain management, PT, OT and blood pressure control.      Accelerated hypertension:  Echo 11/2/2021: EF 60-65%.  Mild LVH, grade 1 diastolic dysfunction.  Normal RV size and function.  No significant valve disease.  Accelerated hypertension in the setting of acute uncontrolled upper back pain.  Initial  in ED, improved to 185/65 with IV hydralazine and improved pain control.  No anginal symptoms or EKG changes.  Mild troponin elevation likely due to demand ischemia, CKD.  Chronic lymphedema stable, otherwise appears intravascularly euvolemic.  -Resume PTA Toprol-XL, losartan  -Hydralazine as needed for SBP greater than 180  -Optimize pain control  -Echo ordered for 4/14      CAD, HLD, mild troponin elevation:  Cath 11/2/2021: Single-vessel CAD of the OM1, LIS placed.  Mild troponin elevation in the setting of accelerated hypertension, CKD.  EKG personally reviewed, no ischemic changes, NSR.  No  anginal symptoms.  -Repeat troponin, if trajectory flat no further interventions  -Resume PTA ASA, Toprol-XL, losartan, statin    Chronic lymphedema, morbid obesity:  Chronic bilateral lymphedema, per patient this is stable and perhaps improved from her prior baseline.  Lungs CTA, no evidence of CHF clinically.  -See above  -Lymphedema consult  -Check BLE venous ultrasound, rule out DVT      CKD stage III:  Baseline creatinine 1-1.2.  BMP stable.  -Resume PTA losartan  -BMP/14    Chronic urge incontinence, abnormal UA:  Wears depends undergarments.  UA positive, however asymptomatic with no dysuria, fevers or leukocytosis.  -UCx pending, hold off on antibiotics given absence of symptoms.  May be asymptomatic bacteriuria.      Upper back pain, chronic low back pain, bilateral knee osteoarthritis, gait instability:  Chronic low back pain, bilateral knee osteoarthritis.  Requires a 3 wheeled walker, lives independently in a house.  Reported a fall 3/21/2024 while coming in from taking out the garbage, landed on her chest and against her 3 wheel walker.  At that time her only symptoms were bilateral knee pain, acute on chronic.  Required EMS to come to help her get up but did not go to the ED.  Had no increased back pain at that time.  1 week PTA developed intermittent, progressively severe thoracic back pain between the scapula, radiating intermittently to both shoulders and arms, worse with movement.  CT of the cervical, thoracic and lumbar spine showed no acute fractures or changes.  Neurologically intact.  SLR normal bilaterally.  Pain improved with IV Dilaudid, muscle relaxant in the ED.  -Schedule Tylenol 3 times daily  -PT, OT  -Apply heat as needed  -Start Robaxin-750 milligrams 3 times daily for 3 days  -Oxycodone 5 mg every 4 hours as needed moderate pain  -IV Dilaudid 0.2 mg every 4 hours as needed for severe pain  -May require TCU as patient lives alone, baseline gait instability and lack of resources at  "home    COPD, former smoker:  75-pack-year history of smoking, quit 2009.  Lungs CTA, no cough or wheezing.  Oxygenating well on room air.  Not on any bronchodilators PTA.  -IS  -Mobilize patient, advance activity as tolerated    Suspected SARINA/OHS:  Patient states her PCP had recommended referral for sleep study, however patient adamantly opposed to pursuing this stating \"I would never wear CPAP, and it does not sound like a bad way to die\".  -Judicious use of opioids, CNS depressing medication    GERD:  Compensated.  -Continue PTA Protonix, Pepcid as needed    Presbycusis:  Able to communicate in a loud voice    Macular degeneration:  Receives intermittent ocular injections, next planned for Monday, 4/15      Groin intertrigo:        Diet: Regular Diet Adult    DVT Prophylaxis: Enoxaparin (Lovenox) SQ  Shah Catheter: Not present  Lines: None     Cardiac Monitoring: None  Code Status:  DNR, DNI    Clinically Significant Risk Factors Present on Admission        # Hyperkalemia: Highest K = 5.6 mmol/L in last 2 days, will monitor as appropriate         # Drug Induced Platelet Defect: home medication list includes an antiplatelet medication   # Hypertension: Noted on problem list      # Obesity: Estimated body mass index is 38.27 kg/m  as calculated from the following:    Height as of this encounter: 1.651 m (5' 5\").    Weight as of this encounter: 104.3 kg (230 lb).              Disposition Plan     Medically Ready for Discharge: Anticipated in 2-4 Days           Scar Wang MD  Hospitalist Service, [facility]  Securely message with [employer] (more info)  Text page via [facility] Paging/Directory   See signed in provider for up to date coverage information    ______________________________________________________________________    Chief Complaint   Uncontrolled, progressive thoracic back pain.        History of Present Illness   Fatou Brian is a 74 " year old female with a history of morbid obesity, COPD, CAD, hypertension reports onset of severe thoracic back pain about 1 week ago.  The patient states the pain has waxed and waned, intermittently radiating into both shoulders and both arms, worse with movement.  She had fallen on 3/21/2024 while entering her house after taking out the garbage, falling against her 3 wheeled walker and then landing on the floor on her chest.  At that time her only pain involved increase in her chronic bilateral knee pain.  She required EMS to come and help her get up but she did not seek attention.  She lives independently in a house, has no children and uses a 3 wheeled walker at baseline.  About 1 week ago she started having thoracic back pain, intermittent, but progressively severe and radiating to both shoulders and arms.  She is taking Tylenol 3 times daily, without adequate relief.  No NSAID use aside from her daily aspirin.  Due to uncontrolled pain she presented to the ED.  Received multiple doses of Dilaudid, a muscle relaxant.  Also noted to have elevated SBP in the 200s initially, did not adequately improve with pain management and received 5 mg of IV hydralazine with improvement to 185/65.  She denies any chest pain or dyspnea.  No cough.  No fevers, chills or night sweats.  No nausea, vomiting or diarrhea.  She does have intermittent constipation but had a bowel movement this morning.  Denies any new falls or trauma since the event on 3/21.  Does have chronic low back pain and bilateral knee arthritis which are stable.  Has chronic urge incontinence, wears depends undergarments but has not had any dysuria or hematuria.      Past Medical History    Past Medical History:   Diagnosis Date    Arthritis     Atherosclerosis     diffuse    Chest pain, unspecified type 11/02/2021    CKD (chronic kidney disease) stage 3, GFR 30-59 ml/min (H) 09/27/2019    Coronary artery disease involving native coronary artery of native heart  10/16/2022    Emphysema of lung (H)     Former smoker     Quit 2009    GERD (gastroesophageal reflux disease)     HLD (hyperlipidaemia)     Hypertension     Major depression in partial remission (H24) 07/03/2014    Morbid obesity (H) 09/24/2020    NSTEMI (non-ST elevated myocardial infarction) (H) 11/02/2021    S/P LIS to OM1    Sjogren's syndrome (H24)     Sjogren's syndrome with myopathy (H24) 04/04/2013    Spinal stenosis     Subclavian artery stenosis (H24)     Left       Past Surgical History   Past Surgical History:   Procedure Laterality Date    aneursym[  1983, 1991    Has metal in head    CHOLECYSTECTOMY      CLOSED RX PROX HUMERUS FRACTURE      10 pins placed    COLONOSCOPY      COLONOSCOPY  1/24/2014    Procedure: COMBINED COLONOSCOPY, SINGLE BIOPSY/POLYPECTOMY BY BIOPSY;  COLONOSCOPY;  Surgeon: Ranjit Pa MD;  Location:  GI    CV HEART CATHETERIZATION WITH POSSIBLE INTERVENTION N/A 11/2/2021    Procedure: Heart Catheterization with Possible Intervention;  Surgeon: Keeley Pérez MD;  Location:  HEART CARDIAC CATH LAB    CV PCI STENT DRUG ELUTING N/A 11/2/2021    Procedure: Percutaneous Coronary Intervention Stent Drug Eluting;  Surgeon: Keeley Pérez MD;  Location:  HEART CARDIAC CATH LAB    HC ECP WITH CATARACT SURGERY      both eyes    HYSTERECTOMY, JANET         Prior to Admission Medications   Prior to Admission Medications   Prescriptions Last Dose Informant Patient Reported? Taking?   Multiple Vitamins-Minerals (PRESERVISION AREDS 2 PO)   Yes No   Patient not taking: Reported on 10/19/2023   Vitamin D3 (CHOLECALCIFEROL) 125 MCG (5000 UT) tablet   No No   Sig: Take 1 tablet (125 mcg) by mouth At Bedtime   acetaminophen (TYLENOL) 650 MG CR tablet 4/13/2024  No Yes   Sig: Take 2 tablets (1,300 mg) by mouth every 8 hours as needed for mild pain or pain   aspirin 81 MG EC tablet   No No   Sig: Take 1 tablet (81 mg) by mouth daily Start tomorrow.   calcium carbonate  (TUMS) 500 MG chewable tablet   No No   Sig: Take 1 tablet (500 mg) by mouth At Bedtime   famotidine (PEPCID) 20 MG tablet   No No   Sig: TAKE 1 TABLET BY MOUTH TWICE DAILY AS NEEDED   loratadine (CLARITIN) 10 MG tablet   No No   Sig: Take one tab twice per day.   losartan (COZAAR) 100 MG tablet   No No   Sig: Take 1 tablet (100 mg) by mouth daily   metoprolol succinate ER (TOPROL XL) 50 MG 24 hr tablet   No No   Sig: TAKE 3 TABLETS DAILY   nitroGLYcerin (NITROSTAT) 0.4 MG sublingual tablet   No No   Sig: For chest pain place 1 tablet under the tongue every 5 minutes for 3 doses. If symptoms persist 5 minutes after 1st dose call 911.   pantoprazole (PROTONIX) 40 MG EC tablet   No No   Sig: TAKE 1 TABLET DAILY   pilocarpine (SALAGEN) 5 MG tablet   No No   Sig: Take 1 tablet (5 mg) by mouth 4 times daily (before meals and nightly)   rosuvastatin (CRESTOR) 10 MG tablet   No No   Sig: Take 1 tablet (10 mg) by mouth daily   senna-docusate (SENOKOT-S/PERICOLACE) 8.6-50 MG tablet   Yes No   Sig: Take 2 tablets by mouth 2 times daily as needed for constipation      Facility-Administered Medications: None           Physical Exam   Vital Signs: Temp: 98.5  F (36.9  C) Temp src: Oral BP: (!) 185/65 Pulse: 81   Resp: 16 SpO2: 99 % O2 Device: None (Room air)    Weight: 230 lbs 0 oz  General: Alert and oriented x 4, very pleasant.  Hard of hearing but able to communicate in a loud voice.  Speech, affect normal.  HEENT: EOM intact.  OP moist and clear.  No cervical tenderness or lymphadenopathy.  Chest: CTA bilaterally.  No wheezes.  CV: RRR.  No murmurs.  Abdomen: Morbidly obese.  NABS.  Soft, nontender, nondistended.  : Depends undergarment on.  Left greater than right groin intertrigo present without skin ulceration.  Extremities: 3+ bilateral lymphedema below the knees with chronic stasis and mild bilateral erythema in the anterior tibia, but no warmth or tenderness.  Changes and hypertrophic skin changes on the feet and  ankles.  No foot ulcers.    MS: No pain to palpation of the back.  SLR negative bilaterally.  No active synovitis of the peripheral joints.  Bilateral shoulder and hip range of motion intact  Neuro: CN II through XII intact.  Strength 5/5 symmetrically.  Normal finger-nose bilaterally.        80 MINUTES SPENT BY ME on the date of service doing chart review, history, exam, documentation & further activities per the note.      Data   Imaging results reviewed over the past 24 hrs:   Recent Results (from the past 24 hour(s))   CT Chest/Abdomen/Pelvis w Contrast    Narrative    EXAM: CT CHEST/ABDOMEN/PELVIS W CONTRAST  LOCATION: [facility]  DATE: 4/13/2024    INDICATION: Trauma, chest back abd pain  COMPARISON: 11/01/2021  TECHNIQUE: CT scan of the chest, abdomen, and pelvis was performed following injection of IV contrast. Multiplanar reformats were obtained. Dose reduction techniques were used.   CONTRAST: 112 mL Isovue 370    FINDINGS:   LUNGS AND PLEURA: Some motion artifact limits detail in some areas. No pulmonary contusion or pneumothorax. No effusions.    MEDIASTINUM/AXILLAE: No adenopathy or aneurysm. Moderate hiatal hernia.    CORONARY ARTERY CALCIFICATION: Moderate.    HEPATOBILIARY: Cholecystectomy. Low-attenuation subcentimeter liver lesion(s) compatible with benign cysts or other benign lesions. No routine follow-up is recommended in a low-risk patient.    PANCREAS: No significant mass, duct dilatation, or inflammatory change.    SPLEEN: Minimally enlarged at 13.5 cm. This is new from previous.    ADRENAL GLANDS: Bilateral adrenal nodules stable since comparison compatible with benign adenomas.    KIDNEYS/BLADDER: No significant mass, stones, or hydronephrosis. There are simple or benign cysts. No follow up is needed. Left renal atrophy again evident.    BOWEL: Diverticulosis of the colon. No acute inflammatory change. No obstruction.     LYMPH NODES: No  lymphadenopathy.    VASCULATURE: 3.1 cm infrarenal abdominal aortic aneurysm increased from 2.9 cm previously.    PELVIC ORGANS: No pelvic masses.    MUSCULOSKELETAL: No frankly destructive bony lesions or fractures demonstrated.      Impression    IMPRESSION:  1.  No posttraumatic changes demonstrated.  2.  3.1 cm infrarenal abdominal aortic aneurysm.  3.  Moderate hiatal hernia.  4.  Minimal splenomegaly.   CT Cervical Spine w/o Contrast    Narrative    EXAM: CT CERVICAL SPINE WITHOUT CONTRAST  LOCATION: [facility]  DATE: 04/13/2024    INDICATION: Trauma.  COMPARISON: None.  TECHNIQUE: Routine CT Cervical Spine without IV contrast. Multiplanar reformats. Dose reduction techniques were used.    FINDINGS:  VERTEBRA: Images are degraded by motion artifact which limits evaluation for fractures, particularly subtle fractures. No obvious loss of vertebral body height. No definite lucent fracture line visualized. Grade 1 anterolisthesis of C4 on C5. Mild to   moderate degenerative endplate changes and loss of disc height throughout the cervical spine, most pronounced at C4-C5, C5-C6, and C6-C7. Marked facet hypertrophy throughout the cervical spine. Fusion of the facets on the right at C2-C3 and left at   C5-C6.    CANAL/FORAMINA: No significant spinal canal narrowing at any level. Multiple levels of neural foraminal narrowing particularly from C3-C4 through C5-C6.    PARASPINAL: Heterogeneous thyroid gland nodule in the isthmus measuring up to 1.3 cm. This typically would not require further follow-up in a patient of this age. Atherosclerotic calcifications along the carotid arteries.      Impression    IMPRESSION:  1.  Motion degraded images which limits evaluation for fractures, particularly subtle fractures. No definite fracture identified noting limitations by motion.  2.  Multilevel degenerative changes throughout the cervical spine as detailed above.     CT Thoracic  Spine Reconstructed    Narrative    EXAM: CT THORACIC SPINE RECONSTRUCTED, CT LUMBAR SPINE RECONSTRUCTED  LOCATION: [facility]  DATE: 04/13/2024    INDICATION: Trauma.  COMPARISON: Lumbar spine CT 10/03/2014.  TECHNIQUE:  1) Dedicated axial, sagittal, and coronal images of the Thoracic Spine were generated utilizing CT chest source data and are separately reviewed. Dose reduction techniques were used.   2) Dedicated axial, sagittal, and coronal images of the Lumbar Spine were generated utilizing CT abdomen pelvis source data and are separately reviewed. Dose reduction techniques were used.     FINDINGS:    THORACIC SPINE CT:  VERTEBRA: Bones appear demineralized which limits evaluation for fractures. No acute lucent fracture line visualized. No significant loss of vertebral body height. Fused anterior osteophytes throughout the thoracic spine from T1 down to at least L1. Mild   degenerative disc changes and loss of disc height throughout the thoracic spine. No significant disc herniation appreciated by CT. Normal facets.    CANAL/FORAMINA: No high-grade spinal canal or neural foraminal narrowing.    PARASPINAL: Please see dedicated CT chest, abdomen and pelvis report.    LUMBAR SPINE CT:  VERTEBRA: Bones appear demineralized. No acute lucent fracture line appreciated. No significant loss of vertebral body height. Mild degenerative disc changes and loss of disc height at L4-L5. Facet hypertrophy in the lower lumbar spine. Rounded sclerotic   lesion in the left iliac bone which may represent a bone island.    CANAL/FORAMINA: No spinal canal narrowing at any level. Mild neural foraminal narrowings at L4-L5.    PARASPINAL: Please see dedicated CT chest, abdomen and pelvis report.      Impression    IMPRESSION:  THORACIC SPINE CT:  1.  No acute fracture appreciated in the thoracic spine.  2.  Bones appear demineralized which limits evaluation for fractures.  3.  Degenerative  changes throughout the thoracic spine without significant spinal canal or neural foraminal narrowing.  4.  Fused anterior osteophytes throughout the thoracic spine.    LUMBAR SPINE CT:  1.  No acute fracture appreciated in the lumbar spine.  2.  Bones appear demineralized which limits evaluation for fractures.  3.  Degenerative changes in the lower lumbar spine as detailed above.     CT Lumbar Spine Reconstructed    Narrative    EXAM: CT THORACIC SPINE RECONSTRUCTED, CT LUMBAR SPINE RECONSTRUCTED  LOCATION: [facility]  DATE: 04/13/2024    INDICATION: Trauma.  COMPARISON: Lumbar spine CT 10/03/2014.  TECHNIQUE:  1) Dedicated axial, sagittal, and coronal images of the Thoracic Spine were generated utilizing CT chest source data and are separately reviewed. Dose reduction techniques were used.   2) Dedicated axial, sagittal, and coronal images of the Lumbar Spine were generated utilizing CT abdomen pelvis source data and are separately reviewed. Dose reduction techniques were used.     FINDINGS:    THORACIC SPINE CT:  VERTEBRA: Bones appear demineralized which limits evaluation for fractures. No acute lucent fracture line visualized. No significant loss of vertebral body height. Fused anterior osteophytes throughout the thoracic spine from T1 down to at least L1. Mild   degenerative disc changes and loss of disc height throughout the thoracic spine. No significant disc herniation appreciated by CT. Normal facets.    CANAL/FORAMINA: No high-grade spinal canal or neural foraminal narrowing.    PARASPINAL: Please see dedicated CT chest, abdomen and pelvis report.    LUMBAR SPINE CT:  VERTEBRA: Bones appear demineralized. No acute lucent fracture line appreciated. No significant loss of vertebral body height. Mild degenerative disc changes and loss of disc height at L4-L5. Facet hypertrophy in the lower lumbar spine. Rounded sclerotic   lesion in the left iliac bone which may  represent a bone island.    CANAL/FORAMINA: No spinal canal narrowing at any level. Mild neural foraminal narrowings at L4-L5.    PARASPINAL: Please see dedicated CT chest, abdomen and pelvis report.      Impression    IMPRESSION:  THORACIC SPINE CT:  1.  No acute fracture appreciated in the thoracic spine.  2.  Bones appear demineralized which limits evaluation for fractures.  3.  Degenerative changes throughout the thoracic spine without significant spinal canal or neural foraminal narrowing.  4.  Fused anterior osteophytes throughout the thoracic spine.    LUMBAR SPINE CT:  1.  No acute fracture appreciated in the lumbar spine.  2.  Bones appear demineralized which limits evaluation for fractures.  3.  Degenerative changes in the lower lumbar spine as detailed above.       Most Recent 3 CBC's:  Recent Labs   Lab Test 04/13/24  1118 10/19/23  1259 10/18/22  1108 11/03/21  0618   WBC 5.6 8.0  --  6.5   HGB 12.3 12.8 13.1 10.8*   MCV 94 96  --  90    209  --  143*     Most Recent 3 BMP's:  Recent Labs   Lab Test 04/13/24  1321 04/13/24  1224 10/19/23  1259    138 140   POTASSIUM 4.3 5.6* 3.9   CHLORIDE 107 106 103   CO2 24 24 23   BUN 15.4 15.9 16.6   CR 0.97* 1.00* 1.11*   ANIONGAP 8 8 14   HARIKA 8.8 8.7* 9.6   GLC 96 99 99     Most Recent 2 LFT's:  Recent Labs   Lab Test 04/13/24  1321 04/13/24  1224 10/19/23  1259 11/01/21  2330   AST 40  --   --  16   ALT 29  --  23 17   ALKPHOS 114 118  --  89   BILITOTAL 0.5 0.5  --  0.4     Most Recent 3 Troponin's:  Recent Labs   Lab Test 11/02/21  0851 11/02/21  0352 11/01/21  2330 08/23/17  1030   TROPI  --   --   --  <0.015   TROPONIN 6.980* 3.509* <0.015  --      Most Recent 3 BNP's:  Recent Labs   Lab Test 09/24/21  1137 09/24/20  1152   NTBNP 730* 703*     Most Recent TSH and T4:  Recent Labs   Lab Test 10/19/23  1259 11/02/21  0352 09/24/21  1137   TSH 2.60   < > 4.09*   T4  --   --  1.25    < > = values in this interval not displayed.     Most Recent  Hemoglobin A1c:  Recent Labs   Lab Test 08/29/17  1148   A1C 5.1     Most Recent Urinalysis:  Recent Labs   Lab Test 04/13/24  1611 08/23/17  1100 09/13/16  1159   COLOR Light Yellow   < > Yellow   APPEARANCE Clear   < > Clear   URINEGLC Negative   < > Negative   URINEBILI Negative   < > Negative   URINEKETONE Negative   < > Negative   SG 1.010   < > 1.025   UBLD Negative   < > Trace*   URINEPH 5.5   < > 5.0   PROTEIN Negative   < > Negative   UROBILINOGEN  --   --  0.2   NITRITE Positive*   < > Negative   LEUKEST Large*   < > Negative   RBCU 3*   < > 2-5*   WBCU 65*   < > O - 2    < > = values in this interval not displayed.

## 2024-04-14 ENCOUNTER — APPOINTMENT (OUTPATIENT)
Dept: PHYSICAL THERAPY | Facility: CLINIC | Age: 74
DRG: 682 | End: 2024-04-14
Attending: INTERNAL MEDICINE
Payer: COMMERCIAL

## 2024-04-14 ENCOUNTER — BPA (OUTPATIENT)
Dept: PEDIATRICS | Facility: CLINIC | Age: 74
End: 2024-04-14
Payer: COMMERCIAL

## 2024-04-14 LAB
ANION GAP SERPL CALCULATED.3IONS-SCNC: 9 MMOL/L (ref 7–15)
ANTIBODY ID: NORMAL
BUN SERPL-MCNC: 17.8 MG/DL (ref 8–23)
CALCIUM SERPL-MCNC: 8.9 MG/DL (ref 8.8–10.2)
CHLORIDE SERPL-SCNC: 105 MMOL/L (ref 98–107)
CREAT SERPL-MCNC: 1.06 MG/DL (ref 0.51–0.95)
CREAT SERPL-MCNC: 1.07 MG/DL (ref 0.51–0.95)
DAT POLY: NORMAL
DAT, ANTI-C3: NORMAL
DAT, ANTI-IGG, TUBE: NEGATIVE
DEPRECATED HCO3 PLAS-SCNC: 26 MMOL/L (ref 22–29)
EGFRCR SERPLBLD CKD-EPI 2021: 54 ML/MIN/1.73M2
EGFRCR SERPLBLD CKD-EPI 2021: 54 ML/MIN/1.73M2
GLUCOSE BLDC GLUCOMTR-MCNC: 106 MG/DL (ref 70–99)
GLUCOSE SERPL-MCNC: 86 MG/DL (ref 70–99)
POTASSIUM SERPL-SCNC: 4.1 MMOL/L (ref 3.4–5.3)
SODIUM SERPL-SCNC: 140 MMOL/L (ref 135–145)
SPECIMEN EXPIRATION DATE: NORMAL
TROPONIN T SERPL HS-MCNC: 47 NG/L
TROPONIN T SERPL HS-MCNC: 61 NG/L

## 2024-04-14 PROCEDURE — 97530 THERAPEUTIC ACTIVITIES: CPT | Mod: GP

## 2024-04-14 PROCEDURE — 120N000002 HC R&B MED SURG/OB UMMC

## 2024-04-14 PROCEDURE — 84484 ASSAY OF TROPONIN QUANT: CPT | Performed by: ORTHOPAEDIC SURGERY

## 2024-04-14 PROCEDURE — 36415 COLL VENOUS BLD VENIPUNCTURE: CPT | Performed by: INTERNAL MEDICINE

## 2024-04-14 PROCEDURE — 97161 PT EVAL LOW COMPLEX 20 MIN: CPT | Mod: GP

## 2024-04-14 PROCEDURE — 250N000013 HC RX MED GY IP 250 OP 250 PS 637: Performed by: INTERNAL MEDICINE

## 2024-04-14 PROCEDURE — 93005 ELECTROCARDIOGRAM TRACING: CPT

## 2024-04-14 PROCEDURE — 99232 SBSQ HOSP IP/OBS MODERATE 35: CPT | Performed by: INTERNAL MEDICINE

## 2024-04-14 PROCEDURE — 250N000011 HC RX IP 250 OP 636: Performed by: INTERNAL MEDICINE

## 2024-04-14 PROCEDURE — 999N000111 HC STATISTIC OT IP EVAL DEFER

## 2024-04-14 PROCEDURE — 80048 BASIC METABOLIC PNL TOTAL CA: CPT | Performed by: INTERNAL MEDICINE

## 2024-04-14 RX ORDER — LIDOCAINE 4 G/G
1 PATCH TOPICAL
Status: DISCONTINUED | OUTPATIENT
Start: 2024-04-14 | End: 2024-04-15 | Stop reason: HOSPADM

## 2024-04-14 RX ORDER — HYDROCHLOROTHIAZIDE 25 MG/1
25 TABLET ORAL DAILY
Status: DISCONTINUED | OUTPATIENT
Start: 2024-04-14 | End: 2024-04-15 | Stop reason: HOSPADM

## 2024-04-14 RX ADMIN — Medication 125 MCG: at 21:18

## 2024-04-14 RX ADMIN — METOPROLOL SUCCINATE 150 MG: 50 TABLET, EXTENDED RELEASE ORAL at 08:00

## 2024-04-14 RX ADMIN — PILOCARPINE HYDROCHLORIDE 5 MG: 5 TABLET, FILM COATED ORAL at 07:59

## 2024-04-14 RX ADMIN — PANTOPRAZOLE SODIUM 40 MG: 40 TABLET, DELAYED RELEASE ORAL at 20:34

## 2024-04-14 RX ADMIN — LOSARTAN POTASSIUM 100 MG: 100 TABLET, FILM COATED ORAL at 08:01

## 2024-04-14 RX ADMIN — METHOCARBAMOL 750 MG: 750 TABLET ORAL at 13:20

## 2024-04-14 RX ADMIN — PILOCARPINE HYDROCHLORIDE 5 MG: 5 TABLET, FILM COATED ORAL at 11:40

## 2024-04-14 RX ADMIN — ACETAMINOPHEN 975 MG: 325 TABLET, FILM COATED ORAL at 13:20

## 2024-04-14 RX ADMIN — LORATADINE 10 MG: 10 TABLET ORAL at 08:01

## 2024-04-14 RX ADMIN — ROSUVASTATIN 10 MG: 10 TABLET, FILM COATED ORAL at 07:59

## 2024-04-14 RX ADMIN — ENOXAPARIN SODIUM 40 MG: 40 INJECTION SUBCUTANEOUS at 20:34

## 2024-04-14 RX ADMIN — METHOCARBAMOL 750 MG: 750 TABLET ORAL at 20:34

## 2024-04-14 RX ADMIN — MICONAZOLE NITRATE: 20 POWDER TOPICAL at 21:17

## 2024-04-14 RX ADMIN — OXYCODONE HYDROCHLORIDE 5 MG: 5 TABLET ORAL at 17:38

## 2024-04-14 RX ADMIN — OXYCODONE HYDROCHLORIDE 5 MG: 5 TABLET ORAL at 03:50

## 2024-04-14 RX ADMIN — SENNOSIDES AND DOCUSATE SODIUM 2 TABLET: 8.6; 5 TABLET ORAL at 07:59

## 2024-04-14 RX ADMIN — ACETAMINOPHEN 975 MG: 325 TABLET, FILM COATED ORAL at 20:34

## 2024-04-14 RX ADMIN — METHOCARBAMOL 750 MG: 750 TABLET ORAL at 08:00

## 2024-04-14 RX ADMIN — HYDROMORPHONE HYDROCHLORIDE 0.2 MG: 0.2 INJECTION, SOLUTION INTRAMUSCULAR; INTRAVENOUS; SUBCUTANEOUS at 03:01

## 2024-04-14 RX ADMIN — Medication 1 CAPSULE: at 07:59

## 2024-04-14 RX ADMIN — ASPIRIN 81 MG: 81 TABLET, COATED ORAL at 08:01

## 2024-04-14 RX ADMIN — OXYCODONE HYDROCHLORIDE 5 MG: 5 TABLET ORAL at 09:16

## 2024-04-14 RX ADMIN — Medication 1 CAPSULE: at 20:34

## 2024-04-14 RX ADMIN — ACETAMINOPHEN 975 MG: 325 TABLET, FILM COATED ORAL at 07:59

## 2024-04-14 RX ADMIN — PILOCARPINE HYDROCHLORIDE 5 MG: 5 TABLET, FILM COATED ORAL at 21:18

## 2024-04-14 RX ADMIN — NITROGLYCERIN 0.4 MG: 0.4 TABLET SUBLINGUAL at 02:28

## 2024-04-14 RX ADMIN — HYDROCHLOROTHIAZIDE 25 MG: 25 TABLET ORAL at 13:20

## 2024-04-14 ASSESSMENT — ACTIVITIES OF DAILY LIVING (ADL)
ADLS_ACUITY_SCORE: 34
ADLS_ACUITY_SCORE: 27
ADLS_ACUITY_SCORE: 34
ADLS_ACUITY_SCORE: 27
ADLS_ACUITY_SCORE: 34
ADLS_ACUITY_SCORE: 34
ADLS_ACUITY_SCORE: 27
ADLS_ACUITY_SCORE: 34

## 2024-04-14 NOTE — PLAN OF CARE
Physical Therapy Discharge Summary    Reason for therapy discharge:    Discharged from physical therapy, demo'd safe amb w/ [facility] but not willing to implement any education or strategies provided by PT.    Progress towards therapy goal(s). See goals on Care Plan in [facility] electronic health record for goal details.  Goals partially met.  Barriers to achieving goals:   limited tolerance for therapy.Refusing to work and participate in physical therapy.     Therapy recommendation(s):    No further therapy is recommended.Pt will not participate w/ skilled PT, met basic mobility tasks to discharge home.

## 2024-04-14 NOTE — UTILIZATION REVIEW
[facility] Utilization Review  Admission Status; Secondary Review Determination     Admission Date: 4/13/2024 10:45 AM      Under the authority of the Utilization Management Committee, the utilization review process indicated a secondary review on the above patient.  The review outcome is based on review of the medical records, discussions with staff, and applying clinical experience noted on the date of the review.        (X)      Inpatient Status Appropriate - This patient's medical care is consistent with medical management for inpatient care and reasonable inpatient medical practice.          RATIONALE FOR DETERMINATION   74-year-old female with history of morbid obesity, coronary artery disease, hypertension, hyperlipidemia, COPD, CKD 3, chronic low back pain and bilateral knee pain with gait instability, admitted with uncontrolled thoracic back pain and localizing between the shoulder blades and radiating into the bilateral shoulders and upper arms.  Patient had recent fall in March, found to have accelerated hypertension, persistent despite improved pain control, no EKG changes or anginal symptoms.  Resumed on Toprol and losartan, hydralazine as needed, echocardiogram pending.  Patient started on Robaxin, oxycodone, IV Dilaudid for pain control.  Complex patient who is requiring multiple interventions for aggressive blood pressure management, pain control, needs close monitoring in the hospital with ongoing interventions, recommend continue inpatient status      The severity of illness, intensity of service provided, expected LOS and risk for adverse outcome make the care complex, high risk and appropriate for hospital admission.The patient requires hospital based medical care which is anticipated to require a stay of 2 or more midnights; according to CMS guidelines the patient should be admitted as inpatient        The information on this document is developed by the utilization review team in  order for the business office to ensure compliance.  This only denotes the appropriateness of proper admission status and does not reflect the quality of care rendered.         The definitions of Inpatient Status and Observation Status used in making the determination above are those provided in the CMS Coverage Manual, Chapter 1 and Chapter 6, section 70.4.      Sincerely,       Lesly Ding MD  Physician Advisor  Utilization Review-[place]    Phone: [phone]

## 2024-04-14 NOTE — CARE PLAN
End of shift Summary: See flowsheet for VS and detail assessments.  Changes this Shift:   Pulmonary:Pt is sating adequately on RA. Pt denies chest pain and SOB.  Output: last BM 4/14. Pt is voiding adequately.  Activity:Assist of 1 with walker and gait belt.   Skin: Redness on coccyx area.  Pain: Pt rated pain 8-10/10 managed with PRN.  Neuro/CMS: Alert and oriented x 4.   Dressings/Drains: None.  IV: Right PIV saline locked.  Additional info:Tele. Pt is on q4 vitals.  Plan: Monitor BP.

## 2024-04-14 NOTE — PHARMACY-ADMISSION MEDICATION HISTORY
Pharmacist Admission Medication History    Admission medication history is complete. The information provided in this note is only as accurate as the sources available at the time of the update.    Information Source(s): Patient and CareEverywhere/SureScripts via in-person    Pertinent Information: Patient reports that she goes off and on the pilocarpine intermittently at home. She reported she was taking it last week, but hadn't been taking it this week.     Changes made to PTA medication list:  Added: None  Deleted: None  Changed:   Preservision AREDS ->1 capsule BID    Allergies reviewed with patient and updates made in EHR: yes    Medication History Completed By: Dann Lara [facility] 4/13/2024 8:28 PM    PTA Med List   Medication Sig Last Dose    acetaminophen (TYLENOL) 650 MG CR tablet Take 2 tablets (1,300 mg) by mouth every 8 hours as needed for mild pain or pain 4/13/2024    aspirin 81 MG EC tablet Take 1 tablet (81 mg) by mouth daily Start tomorrow. 4/12/2024    calcium carbonate (TUMS) 500 MG chewable tablet Take 1 tablet (500 mg) by mouth At Bedtime 4/12/2024    famotidine (PEPCID) 20 MG tablet TAKE 1 TABLET BY MOUTH TWICE DAILY AS NEEDED 4/12/2024    loratadine (CLARITIN) 10 MG tablet Take one tab twice per day. 4/12/2024    losartan (COZAAR) 100 MG tablet Take 1 tablet (100 mg) by mouth daily 4/12/2024    metoprolol succinate ER (TOPROL XL) 50 MG 24 hr tablet TAKE 3 TABLETS DAILY 4/12/2024    Multiple Vitamins-Minerals (PRESERVISION AREDS 2 PO) Take 1 capsule by mouth 2 times daily 4/12/2024    nitroGLYcerin (NITROSTAT) 0.4 MG sublingual tablet For chest pain place 1 tablet under the tongue every 5 minutes for 3 doses. If symptoms persist 5 minutes after 1st dose call 911. Unknown    pantoprazole (PROTONIX) 40 MG EC tablet TAKE 1 TABLET DAILY 4/12/2024    pilocarpine (SALAGEN) 5 MG tablet Take 1 tablet (5 mg) by mouth 4 times daily (before meals and nightly) Past Week    rosuvastatin (CRESTOR) 10 MG  tablet Take 1 tablet (10 mg) by mouth daily 4/12/2024    senna-docusate (SENOKOT-S/PERICOLACE) 8.6-50 MG tablet Take 2 tablets by mouth 2 times daily as needed for constipation Unknown    Vitamin D3 (CHOLECALCIFEROL) 125 MCG (5000 UT) tablet Take 1 tablet (125 mcg) by mouth At Bedtime 4/12/2024

## 2024-04-14 NOTE — PROGRESS NOTES
04/14/24 1400   Appointment Info   Signing Clinician's Name / Credentials (PT) Marilu Francis, ADRIAN   Student Supervision On-site supervision provided       Present no   Language English   Living Environment   People in Home alone   Current Living Arrangements house   Home Accessibility no concerns   Transportation Anticipated   (Not asked d/t pt agitation. Pt did not express any concerns getting home.)   Living Environment Comments pt endorses that she has stairs but that she does them her own way and does not really answer further questions about home set up   Self-Care   Usual Activity Tolerance good   Current Activity Tolerance moderate   Regular Exercise No   Equipment Currently Used at Home cane, quad;raised toilet seat;walker, rolling   Fall history within last six months yes   Number of times patient has fallen within last six months 1   Activity/Exercise/Self-Care Comment Pt declined most of my questions. Pt getting aggitated easy, decided to focus on movement to encourage as much participation as possible.   General Information   Onset of Illness/Injury or Date of Surgery 04/13/24   Referring Physician Lesly Ding MD   Patient/Family Therapy Goals Statement (PT) to go home   Pertinent History of Current Problem (include personal factors and/or comorbidities that impact the POC) Fatou Brian is a 74 year old female with a past medical history significant for chronic low back pain, osteoarthritis, CKD III, CAD, centrilobular emphysema, GERD and HTN who presents to the Emergency Department for evaluation of left upper and midline back pain.   Existing Precautions/Restrictions fall   Weight-Bearing Status - LUE full weight-bearing   Weight-Bearing Status - RUE full weight-bearing   Weight-Bearing Status - LLE full weight-bearing   Weight-Bearing Status - RLE full weight-bearing   General Observations Pt greeted in chair upon arrival, reluctant to participate in PT. Not  "enthused to move.   Cognition   Affect/Mental Status (Cognition) WFL   Pain Assessment   Patient Currently in Pain Yes, see Vital Sign flowsheet   Integumentary/Edema   Integumentary/Edema Comments noteably LE edema bilaterally - pt reports better than baseline, can follow up outpatient   Posture    Posture Forward head position;Protracted shoulders;Kyphosis   Range of Motion (ROM)   Range of Motion ROM deficits secondary to pain;ROM deficits secondary to swelling;ROM deficits secondary to weakness;Neck (Group);Trunk (Group)   Strength (Manual Muscle Testing)   Strength (Manual Muscle Testing) Deficits observed during functional mobility   Bed Mobility   Comment, (Bed Mobility) not able to assess - pt refused   Transfers   Transfers sit-stand transfer   Comment, (Transfers) slow getting up from stair. Pt stated \"I can do this on my own\". Pt told therapist and visitor to back off   Sit-Stand Transfer   Sit-Stand [place] (Transfers) modified independence   Gait/Stairs (Locomotion)   Comment, (Gait/Stairs) S w/ FWW.   Clinical Impression   Criteria for Skilled Therapeutic Intervention Yes, treatment indicated   PT Diagnosis (PT) impaired functional mobility, and ambulation   Influenced by the following impairments pain, motivation, agitation, refusal to participate in therapy.   Functional limitations due to impairments bed mobility, transfers, ambulation   Clinical Presentation (PT Evaluation Complexity) stable   Clinical Presentation Rationale per clinical judgement   Clinical Decision Making (Complexity) low complexity   Planned Therapy Interventions (PT) bed mobility training;gait training;strengthening;transfer training   Risk & Benefits of therapy have been explained evaluation/treatment results reviewed;care plan/treatment goals reviewed;risks/benefits reviewed;current/potential barriers reviewed;participants voiced agreement with care plan;participants included;patient   Clinical Impression Comments Pt " "initially against physical therapy in every aspect. Able to move around in room w/o physical assistance but high fall risk. CGA sit>stand to FWW but declined having help to get up. Stand>sit S. Bed mobility assessment refused as well as stairs per home setup.   PT Total Evaluation Time   PT Eval, Low Complexity Minutes (91090) 2   Physical Therapy Goals   PT Frequency One time eval and treatment only   PT Predicted Duration/Target Date for Goal Attainment 04/19/24   PT Goals Bed Mobility;Transfers;Gait;Stairs   PT: Bed Mobility Completed;Modified independent  (refused)   PT: Transfers Modified independent;Sit to/from stand;Goal Met   PT: Gait Rolling walker;50 feet;Goal Met;Supervision/stand-by assist   PT: Stairs Completed;Assistive device;Modified independent;3 stairs  (refused)   Interventions   Interventions Quick Adds Therapeutic Activity   Therapeutic Activity   Therapeutic Activities: dynamic activities to improve functional performance Minutes (49083) 14   Symptoms Noted During/After Treatment Increased pain   Treatment Detail/Skilled Intervention Pt greeted in chair upon arrival w/ 2 visitors present. PT explained what the plan for physical therapy was for the day. Pt very hesitent to engaged but able to encourage movement without being too \"overbearing.\" Pt instructed to move feet posteriorly in chair to increase stability for STS transfer which pt replied, \"I know my body better than you.\" PT took the hint to decrease VCing and allowed pt to STS transfer w/ as little assistance as possible. Pt able to STS transfer w/ lurdes to FWW. Noteably increase in thoracic back pain upon standing. Gait belt placed on pt despite hatred for the tool. Pt able to ambulate ~75ft in hallway w/ lurdes w/ FWW. Shortened step/stride length, gait speed, and major forward head/shoulder posture that leans over the walker. Pt educated on different types of walkers but pt will use whatever walker she has at home per her reports as she " "prefers 4WW and not interested in changing despite education and encouragement. Pt returned to room stand>sit on couch w/ visitors present. No further questions.   PT Discharge Planning   PT Plan DC home.   PT Discharge Recommendation (DC Rec) home with assist   PT Rationale for DC Rec Pt initially against physical therapy in every aspect. Able to move around in room w/o physical assistance but high fall risk. CGA sit>stand to FWW but declined having help to get up. Stand>sit S. Bed mobility assessment refused as well as stairs per home setup. Pt wants to go home and said \"I will do what I want\" when she gets there.   PT Brief overview of current status S w/ FWW nursing   Total Session Time   Timed Code Treatment Minutes 14   Total Session Time (sum of timed and untimed services) 16     "

## 2024-04-14 NOTE — PROGRESS NOTES
[facility]    Medicine Progress Note - Hospitalist Service, GOLD TEAM 19    Date of Admission:  4/13/2024    Assessment & Plan     Fatou Brian is a 74 year old female with morbid obesity, CAD, HTN, HLD, COPD, CKD stage III, chronic low back and bilateral knee pain with gait instability who presented to the ED 4/13 with uncontrolled thoracic back pain localizing between the shoulder blades and radiating off-and-on into the bilateral shoulders and upper arms.  Patient reported a fall 3/21/2024 resulting increase in her chronic bilateral knee pain, but no back pain at that time.  The back pain started about 1 week PTA without any further precipitating trauma or falls.  Was noted to have accelerated hypertension with SBP greater than 200 in the setting of pain, but persisted despite improved pain control.  Received hydralazine with some improvement.  Noted to have mild troponin elevation, but no acute EKG changes or anginal symptoms.  Admitted to hospitalist service for pain management, PT, OT and blood pressure control.        Accelerated hypertension  Ling standing hypertension    -Echo 11/2/2021: EF 60-65%.  Mild LVH, grade 1 diastolic dysfunction.  Normal RV size and function.  No significant valve disease.   - repeat echo   - Accelerated hypertension in the setting of acute uncontrolled upper back pain.  Initial  in ED, improved to 185/65 with IV hydralazine and improved pain control.    - blood pressure  still up, added hydrochlorothiazide, discussed with  patient  monitor renal functions    - Chronic lymphedema stable, otherwise appears intravascularly euvolemic.  -Resumed PTA Toprol-XL, losartan  -Hydralazine as needed for SBP greater than 180  -Optimize pain control    History brain aneurysm  - status post  clipping of left middle cerebral aneurysm       CAD  HLD  mild troponin elevation:  PAD  -Cath 11/2/2021: Single-vessel CAD of the OM1, LIS  "placed.  -mild troponin elevation in the setting of accelerated hypertension, CKD  - troponin 27--36--40--61--47.  -No anginal symptoms or EKG changes, repeat EKG also without any new ischemic changed .   - Mild troponin elevation likely due to demand ischemia, CKD.  - Resumed PTA ASA, Toprol-XL, losartan, statin   - history of web-like stenosis of left subclavian artery  and mild stenosis of prox left vertebral artery stenosis     Upper back pain  chronic low back pain, spinal stenosis   bilateral knee osteoarthritis  gait instability:  - this pain has been ongoing for over 2 weeks, if continue with sever pain consider checking for dissection, she has history of different blood pressure  in arms ,  -non dissection CT chest abdomen pelvis with contrast  done and no posttraumatic changes noted,  no aneurysm noted in mediastinum , also \"MUSCULOSKELETAL: No frankly destructive bony lesions or fractures demonstrated. \"  - 4/13 CT c spine had motion artifact    - 4/13 CT thoracic and lumbar spine showed no acute fracture, has some bone demonetization and DJD     -Chronic low back pain, bilateral knee osteoarthritis.  Requires a 3 wheeled walker, lives independently in a house.  - Reported a fall 3/21/2024 while coming in from taking out the garbage, landed on her chest and against her 3 wheel walker.  At that time her only symptoms were bilateral knee pain, acute on chronic.  Required EMS to come to help her get up but did not go to the ED.  Had no increased back pain at that time.  1 week PTA developed intermittent, progressively severe thoracic back pain between the scapula, radiating intermittently to both shoulders and arms, worse with movement.   SLR normal bilaterally.  Pain improved with IV Dilaudid, muscle relaxant in the ED.  - Tylenol 3 times daily OTC   -PT, OT  -Apply heat as needed  -Started Robaxin-750 milligrams 3 times daily for 3 days  -Oxycodone 5 mg every 4 hours as needed moderate pain  -IV Dilaudid " "0.2 mg every 4 hours as needed for severe pain  -May require TCU as patient lives alone, baseline gait instability and lack of resources at home    Inferior  abdominal aortic aneurysm  - per CT size has increased form 2.9 cm to 3.1 cm, needs out patient  follow up  scans       Chronic lymphedema  Elevated BMI   -per patient this is stable and perhaps improved from her prior baseline.   - Lungs CTA, no evidence of CHF clinically.  -See above  -Lymphedema consult  -no DVT on US 4/13      COPD  Centrilobular Emphysema   former smoker:  75-pack-year history of smoking, quit 2009.  Lungs CTA, no cough or wheezing.  Oxygenating well on room air.  Not on any bronchodilators PTA.  -IS  -Mobilize patient, advance activity as tolerated     Suspected SARINA/OHS:  Patient states her PCP had recommended referral for sleep study, however patient adamantly opposed to pursuing this stating \"I would never wear CPAP, and it does not sound like a bad way to die\".  -Judicious use of opioids, CNS depressing medication     CKD stage III:  Baseline creatinine 1-1.2.  BMP stable. Di dget IV contrats with CT   -continue PTA losartan       Minimal splenomegaly on abdominal CT  - plt ok ok,        Chronic urge incontinence, abnormal UA:  Wears depends undergarments.  UA positive, however asymptomatic with no dysuria, fevers or leukocytosis.  -UCx pending, hold off on antibiotics given absence of symptoms.  May be asymptomatic bacteriuria.      Sjogren syndrome  -  had been on hydroxychloroquine in past but stopped as developed visual changes    Bilateral adrenal nodules  - stable when compared with CT 11/2021      GERD:  Compensated.  -Continue PTA Protonix, Pepcid as needed     Presbycusis:  Able to communicate in a loud voice     Macular degeneration:  Receives intermittent ocular injections, next planned for Monday, 4/15     Depression  - not on meds     Groin intertrigo:  -intertrigo      [place]        HIEU ant I IgG -C3d +    HIEU Anti -C3 + " "  HIEU anti IgG negative   - not sure why has type and cross, hg has been stable   - not sure what prompted these labs             Diet: Combination Diet Regular Diet Adult; 2 gm NA Diet    DVT Prophylaxis: Ambulate every shift  Shah Catheter: Not present  Lines: None     Cardiac Monitoring: ACTIVE order. Indication: Chest pain/ ACS rule out (24 hours)  Code Status: No CPR- Do NOT Intubate      Clinically Significant Risk Factors Present on Admission        # Hyperkalemia: Highest K = 5.6 mmol/L in last 2 days, will monitor as appropriate         # Drug Induced Platelet Defect: home medication list includes an antiplatelet medication   # Hypertension: Noted on problem list      # Obesity: Estimated body mass index is 38.27 kg/m  as calculated from the following:    Height as of this encounter: 1.651 m (5' 5\").    Weight as of this encounter: 104.3 kg (230 lb).              Disposition Plan     Medically Ready for Discharge: Anticipated Tomorrow             Alexia Ellis MD  Hospitalist Service, GOLD TEAM 19  [facility]  Securely message with [employer] (more info)  Text page via [employer] Paging/Directory   See signed in provider for up to date coverage information  ______________________________________________________________________    Interval History   Still having some pain in back also left shoulder, same for past 2-2.5 weeks,no chest pain  per say but states everything hurts   Initially told me she did not want to work with therapies, does not see the point,   Np nausea vomiting     Physical Exam   Vital Signs: Temp: 97.9  F (36.6  C) Temp src: Oral BP: (!) 182/59 Pulse: 69   Resp: 18 SpO2: 94 % O2 Device: None (Room air)    Weight: 230 lbs 0 oz  General appearence: awake alert   no apparent distress    RESPIRATORY: lungs clear   CARDIOVASCULAR:S1 S2 regular rate and rhythm  GASTROINTESTINAL:soft, non-distended , non-tender , + bowel sounds,   SKIN: warm and dry, no " mottling noted   NEURO: awake alert and oriented   EXTREMITIES: no clubbing, cyanosis or edema , moves all extremity,        Data     I have personally reviewed the following data over the past 24 hrs:    5.6  \   12.3   / 152     140 105 17.8 /  86   4.1 26 1.06 (H) \     ALT: 29 AST: 40 AP: 114 TBILI: 0.5   ALB: 3.8 TOT PROTEIN: 5.9 (L) LIPASE: N/A     Trop: 47 (H) BNP: N/A       Imaging results reviewed over the past 24 hrs:   Recent Results (from the past 24 hour(s))   CT Chest/Abdomen/Pelvis w Contrast    Narrative    EXAM: CT CHEST/ABDOMEN/PELVIS W CONTRAST  LOCATION: [facility]  DATE: 4/13/2024    INDICATION: Trauma, chest back abd pain  COMPARISON: 11/01/2021  TECHNIQUE: CT scan of the chest, abdomen, and pelvis was performed following injection of IV contrast. Multiplanar reformats were obtained. Dose reduction techniques were used.   CONTRAST: 112 mL Isovue 370    FINDINGS:   LUNGS AND PLEURA: Some motion artifact limits detail in some areas. No pulmonary contusion or pneumothorax. No effusions.    MEDIASTINUM/AXILLAE: No adenopathy or aneurysm. Moderate hiatal hernia.    CORONARY ARTERY CALCIFICATION: Moderate.    HEPATOBILIARY: Cholecystectomy. Low-attenuation subcentimeter liver lesion(s) compatible with benign cysts or other benign lesions. No routine follow-up is recommended in a low-risk patient.    PANCREAS: No significant mass, duct dilatation, or inflammatory change.    SPLEEN: Minimally enlarged at 13.5 cm. This is new from previous.    ADRENAL GLANDS: Bilateral adrenal nodules stable since comparison compatible with benign adenomas.    KIDNEYS/BLADDER: No significant mass, stones, or hydronephrosis. There are simple or benign cysts. No follow up is needed. Left renal atrophy again evident.    BOWEL: Diverticulosis of the colon. No acute inflammatory change. No obstruction.     LYMPH NODES: No lymphadenopathy.    VASCULATURE: 3.1 cm infrarenal  abdominal aortic aneurysm increased from 2.9 cm previously.    PELVIC ORGANS: No pelvic masses.    MUSCULOSKELETAL: No frankly destructive bony lesions or fractures demonstrated.      Impression    IMPRESSION:  1.  No posttraumatic changes demonstrated.  2.  3.1 cm infrarenal abdominal aortic aneurysm.  3.  Moderate hiatal hernia.  4.  Minimal splenomegaly.   CT Cervical Spine w/o Contrast    Narrative    EXAM: CT CERVICAL SPINE WITHOUT CONTRAST  LOCATION: [facility]  DATE: 04/13/2024    INDICATION: Trauma.  COMPARISON: None.  TECHNIQUE: Routine CT Cervical Spine without IV contrast. Multiplanar reformats. Dose reduction techniques were used.    FINDINGS:  VERTEBRA: Images are degraded by motion artifact which limits evaluation for fractures, particularly subtle fractures. No obvious loss of vertebral body height. No definite lucent fracture line visualized. Grade 1 anterolisthesis of C4 on C5. Mild to   moderate degenerative endplate changes and loss of disc height throughout the cervical spine, most pronounced at C4-C5, C5-C6, and C6-C7. Marked facet hypertrophy throughout the cervical spine. Fusion of the facets on the right at C2-C3 and left at   C5-C6.    CANAL/FORAMINA: No significant spinal canal narrowing at any level. Multiple levels of neural foraminal narrowing particularly from C3-C4 through C5-C6.    PARASPINAL: Heterogeneous thyroid gland nodule in the isthmus measuring up to 1.3 cm. This typically would not require further follow-up in a patient of this age. Atherosclerotic calcifications along the carotid arteries.      Impression    IMPRESSION:  1.  Motion degraded images which limits evaluation for fractures, particularly subtle fractures. No definite fracture identified noting limitations by motion.  2.  Multilevel degenerative changes throughout the cervical spine as detailed above.     CT Thoracic Spine Reconstructed    Narrative    EXAM: CT THORACIC  SPINE RECONSTRUCTED, CT LUMBAR SPINE RECONSTRUCTED  LOCATION: [facility]  DATE: 04/13/2024    INDICATION: Trauma.  COMPARISON: Lumbar spine CT 10/03/2014.  TECHNIQUE:  1) Dedicated axial, sagittal, and coronal images of the Thoracic Spine were generated utilizing CT chest source data and are separately reviewed. Dose reduction techniques were used.   2) Dedicated axial, sagittal, and coronal images of the Lumbar Spine were generated utilizing CT abdomen pelvis source data and are separately reviewed. Dose reduction techniques were used.     FINDINGS:    THORACIC SPINE CT:  VERTEBRA: Bones appear demineralized which limits evaluation for fractures. No acute lucent fracture line visualized. No significant loss of vertebral body height. Fused anterior osteophytes throughout the thoracic spine from T1 down to at least L1. Mild   degenerative disc changes and loss of disc height throughout the thoracic spine. No significant disc herniation appreciated by CT. Normal facets.    CANAL/FORAMINA: No high-grade spinal canal or neural foraminal narrowing.    PARASPINAL: Please see dedicated CT chest, abdomen and pelvis report.    LUMBAR SPINE CT:  VERTEBRA: Bones appear demineralized. No acute lucent fracture line appreciated. No significant loss of vertebral body height. Mild degenerative disc changes and loss of disc height at L4-L5. Facet hypertrophy in the lower lumbar spine. Rounded sclerotic   lesion in the left iliac bone which may represent a bone island.    CANAL/FORAMINA: No spinal canal narrowing at any level. Mild neural foraminal narrowings at L4-L5.    PARASPINAL: Please see dedicated CT chest, abdomen and pelvis report.      Impression    IMPRESSION:  THORACIC SPINE CT:  1.  No acute fracture appreciated in the thoracic spine.  2.  Bones appear demineralized which limits evaluation for fractures.  3.  Degenerative changes throughout the thoracic spine without  significant spinal canal or neural foraminal narrowing.  4.  Fused anterior osteophytes throughout the thoracic spine.    LUMBAR SPINE CT:  1.  No acute fracture appreciated in the lumbar spine.  2.  Bones appear demineralized which limits evaluation for fractures.  3.  Degenerative changes in the lower lumbar spine as detailed above.     CT Lumbar Spine Reconstructed    Narrative    EXAM: CT THORACIC SPINE RECONSTRUCTED, CT LUMBAR SPINE RECONSTRUCTED  LOCATION: [facility]  DATE: 04/13/2024    INDICATION: Trauma.  COMPARISON: Lumbar spine CT 10/03/2014.  TECHNIQUE:  1) Dedicated axial, sagittal, and coronal images of the Thoracic Spine were generated utilizing CT chest source data and are separately reviewed. Dose reduction techniques were used.   2) Dedicated axial, sagittal, and coronal images of the Lumbar Spine were generated utilizing CT abdomen pelvis source data and are separately reviewed. Dose reduction techniques were used.     FINDINGS:    THORACIC SPINE CT:  VERTEBRA: Bones appear demineralized which limits evaluation for fractures. No acute lucent fracture line visualized. No significant loss of vertebral body height. Fused anterior osteophytes throughout the thoracic spine from T1 down to at least L1. Mild   degenerative disc changes and loss of disc height throughout the thoracic spine. No significant disc herniation appreciated by CT. Normal facets.    CANAL/FORAMINA: No high-grade spinal canal or neural foraminal narrowing.    PARASPINAL: Please see dedicated CT chest, abdomen and pelvis report.    LUMBAR SPINE CT:  VERTEBRA: Bones appear demineralized. No acute lucent fracture line appreciated. No significant loss of vertebral body height. Mild degenerative disc changes and loss of disc height at L4-L5. Facet hypertrophy in the lower lumbar spine. Rounded sclerotic   lesion in the left iliac bone which may represent a bone island.    CANAL/FORAMINA: No  spinal canal narrowing at any level. Mild neural foraminal narrowings at L4-L5.    PARASPINAL: Please see dedicated CT chest, abdomen and pelvis report.      Impression    IMPRESSION:  THORACIC SPINE CT:  1.  No acute fracture appreciated in the thoracic spine.  2.  Bones appear demineralized which limits evaluation for fractures.  3.  Degenerative changes throughout the thoracic spine without significant spinal canal or neural foraminal narrowing.  4.  Fused anterior osteophytes throughout the thoracic spine.    LUMBAR SPINE CT:  1.  No acute fracture appreciated in the lumbar spine.  2.  Bones appear demineralized which limits evaluation for fractures.  3.  Degenerative changes in the lower lumbar spine as detailed above.     US Lower Extremity Venous Duplex Bilateral    Narrative    EXAMINATION: DOPPLER VENOUS ULTRASOUND OF BILATERAL LOWER EXTREMITIES,  4/13/2024 11:34 PM     COMPARISON: 8/9/2007    HISTORY: Bilateral lower extremity lymphedema    TECHNIQUE:  Gray-scale evaluation with compression, spectral flow and  color Doppler assessment of the deep venous system of both legs from  groin to knee, and then at the ankles.    FINDINGS:  In both lower extremities, the common femoral, profunda femoral,  greater saphenous, femoral, and popliteal veins demonstrate normal  compressibility and blood flow. The posterior tibial and peroneal  veins demonstrate normal compressibility.    Subcutaneous edema over the area of the patient's pain in the left  calf.      Impression    IMPRESSION:  No evidence of deep venous thrombosis in either lower extremity.  Subcutaneous edema at the area of the patient's pain in the left calf.    I have personally reviewed the examination and initial interpretation  and I agree with the findings.    ABDIFATAH SRIVASTAVA DO         SYSTEM ID:  [id number]     Recent Labs   Lab 04/14/24  0653 04/13/24  2341 04/13/24  1321 04/13/24  1224 04/13/24  1224 04/13/24  1118   WBC  --   --   --   --   --   5.6   HGB  --   --   --   --   --  12.3   MCV  --   --   --   --   --  94   PLT  --   --   --   --   --  152     --  139  --  138  --    POTASSIUM 4.1  --  4.3  --  5.6*  --    CHLORIDE 105  --  107  --  106  --    CO2 26  --  24  --  24  --    BUN 17.8  --  15.4  --  15.9  --    CR 1.06* 1.07* 0.97*   < > 1.00*  --    ANIONGAP 9  --  8  --  8  --    HARIKA 8.9  --  8.8  --  8.7*  --    GLC 86  --  96  --  99  --    ALBUMIN  --   --  3.8  --  4.0  --    PROTTOTAL  --   --  5.9*  --  6.3*  --    BILITOTAL  --   --  0.5  --  0.5  --    ALKPHOS  --   --  114  --  118  --    ALT  --   --  29  --   --   --    AST  --   --  40  --   --   --     < > = values in this interval not displayed.

## 2024-04-14 NOTE — PLAN OF CARE
"Goal Outcome Evaluation:           Overall Patient Progress: [facility] Patient Progress: improving     Year old patient admitted 4/13for hypertension and pain in the \"back, shoulder, and left rib area\". Patient is on Tele with Q4 vitals. Patients BP has been fluctuating, O2 decreases occasionally \"no sign of needed oxygen support\". All other vitals are stable. On room air, alert x4. Last BM was on 4/13, patient is able to up and ambulate to bathroom, uses commode.. Patient is on a 2g sodium diet. Assist of 1 with walker and gait belt or walker. Patient has some redness on the coccyx, and has bilateral leg edema \"which has pain if touched\". Right PIV. Prn: Hydralazine, nitroglycerine \"chest pain\", and Pepcid. History of STEMI, coronary artery disease, hyperlipidemia. Troponin was elevated provider notified. DVT was ruled out, ECHO preformed in the AM. Plan is to monitor and control BP, cardiovascular status, and pain management. Pain is controlled with oxycodone around the clock Q4. Provided dose not want IV dilaudid used unless necessary. Patient is on fall precautions \"bed alarm on\", and has a no CPR/no intubation code status.       "

## 2024-04-14 NOTE — PLAN OF CARE
[id number]    Goal Outcome Evaluation:           Overall Patient Progress: no changeOverall Patient Progress: no change    Outcome Evaluation: BP has improved but continues to have intermittent sharp pain to left side    Pt is Aox4. Up Ax1 to [facility] with straight cane. BP 200s/100s on arrival to unit around 1900. Scheduled BP meds given and BP trended down. No prn hydralazine given this shift. Around 0200, pt began complaining of a sharp intermittent pain below left breast. Prn nitroglycerin SL given x1 with slight relief. 12 lead EKG done with no noted abnormalities. Pain remains intermittent ranging from 2-3 one moment to 10/10 the next. Reports intermittent pain to the LUE and left shoulder as well. BLE edema. U/S done this shift, DVT ruled out. Plan for echo 4/14. R PIV SL. Continue POC.

## 2024-04-14 NOTE — PLAN OF CARE
"Occupational Therapy: Orders received. Chart reviewed and discussed with care team.? Occupational Therapy not indicated due to pt declining services. Extended conversation with pt in her room this date. Pt reports \"I don't really go in for therapy. It seems like a fad.\"? Pt reports having all needed AE/DME (reacher, shower chair, walker, cane wheelchair, toilet aid) at home, often multiples of the same device (for instance, pt reports having 4 shower chairs and 20 canes). Pt declining TCU/LTC, reports \"I plan on dying in my house.\" Pt does report she would be opening to hire someone to assist her at home if she felt like she needed it. Pt does note difficulty getting in/out of bathtub, however declines working with therapy to address. Educated on HH therapies, and pt reports she has had in the past but is not interested now. Pt is working with physical therapy on mobility which at this time seems biggest barrier. Defer discharge recommendations to physical therapy, medical team.? Will complete orders. Please re-consult if there is a change in status and pt is interested in participating in OT.    Edema: Consult received. B LE assessed briefly, note skin changes likely indicating chronic lymphedema. Pt reports she tried compression stockings but no longer uses them. Educated pt on lymphedema therapy both IP and HH, strongly encouraged pt to participate however pt declines interest. She is interested in seeing a podiatrist however for foot care. As pt is declining IP edema services, will complete orders however may re-consult if pt desires AND if there is a plan for long term management given the chronicity of the edema. Strongly recommend HH OT and Lymphedema however pt declining both at this time.    "

## 2024-04-15 ENCOUNTER — APPOINTMENT (OUTPATIENT)
Dept: CARDIOLOGY | Facility: CLINIC | Age: 74
DRG: 682 | End: 2024-04-15
Attending: INTERNAL MEDICINE
Payer: COMMERCIAL

## 2024-04-15 VITALS
DIASTOLIC BLOOD PRESSURE: 54 MMHG | BODY MASS INDEX: 38.32 KG/M2 | RESPIRATION RATE: 16 BRPM | OXYGEN SATURATION: 92 % | TEMPERATURE: 98 F | SYSTOLIC BLOOD PRESSURE: 135 MMHG | HEIGHT: 65 IN | WEIGHT: 230 LBS | HEART RATE: 74 BPM

## 2024-04-15 LAB
ATRIAL RATE - MUSE: 65 BPM
ATRIAL RATE - MUSE: 73 BPM
BACTERIA UR CULT: ABNORMAL
BACTERIA UR CULT: ABNORMAL
DIASTOLIC BLOOD PRESSURE - MUSE: NORMAL MMHG
DIASTOLIC BLOOD PRESSURE - MUSE: NORMAL MMHG
INTERPRETATION ECG - MUSE: NORMAL
INTERPRETATION ECG - MUSE: NORMAL
LVEF ECHO: NORMAL
P AXIS - MUSE: 47 DEGREES
P AXIS - MUSE: 54 DEGREES
PR INTERVAL - MUSE: 154 MS
PR INTERVAL - MUSE: 158 MS
QRS DURATION - MUSE: 92 MS
QRS DURATION - MUSE: 92 MS
QT - MUSE: 420 MS
QT - MUSE: 424 MS
QTC - MUSE: 440 MS
QTC - MUSE: 462 MS
R AXIS - MUSE: 50 DEGREES
R AXIS - MUSE: 57 DEGREES
SYSTOLIC BLOOD PRESSURE - MUSE: NORMAL MMHG
SYSTOLIC BLOOD PRESSURE - MUSE: NORMAL MMHG
T AXIS - MUSE: 75 DEGREES
T AXIS - MUSE: 78 DEGREES
VENTRICULAR RATE- MUSE: 65 BPM
VENTRICULAR RATE- MUSE: 73 BPM

## 2024-04-15 PROCEDURE — 250N000013 HC RX MED GY IP 250 OP 250 PS 637: Performed by: INTERNAL MEDICINE

## 2024-04-15 PROCEDURE — 250N000013 HC RX MED GY IP 250 OP 250 PS 637: Performed by: ORTHOPAEDIC SURGERY

## 2024-04-15 PROCEDURE — 999N000208 ECHOCARDIOGRAM COMPLETE

## 2024-04-15 PROCEDURE — 93306 TTE W/DOPPLER COMPLETE: CPT | Mod: 26 | Performed by: INTERNAL MEDICINE

## 2024-04-15 PROCEDURE — 255N000002 HC RX 255 OP 636: Performed by: INTERNAL MEDICINE

## 2024-04-15 PROCEDURE — 99239 HOSP IP/OBS DSCHRG MGMT >30: CPT | Performed by: INTERNAL MEDICINE

## 2024-04-15 RX ORDER — AMLODIPINE BESYLATE 5 MG/1
5 TABLET ORAL DAILY
Status: DISCONTINUED | OUTPATIENT
Start: 2024-04-15 | End: 2024-04-15 | Stop reason: HOSPADM

## 2024-04-15 RX ORDER — METHOCARBAMOL 500 MG/1
500 TABLET, FILM COATED ORAL 3 TIMES DAILY PRN
Qty: 15 TABLET | Refills: 0 | Status: SHIPPED | OUTPATIENT
Start: 2024-04-15

## 2024-04-15 RX ORDER — METHOCARBAMOL 500 MG/1
500 TABLET, FILM COATED ORAL 3 TIMES DAILY PRN
Status: DISCONTINUED | OUTPATIENT
Start: 2024-04-15 | End: 2024-04-15 | Stop reason: HOSPADM

## 2024-04-15 RX ORDER — HYDROCHLOROTHIAZIDE 25 MG/1
12.5 TABLET ORAL DAILY
Qty: 30 TABLET | Refills: 0 | Status: SHIPPED | OUTPATIENT
Start: 2024-04-16 | End: 2024-04-23

## 2024-04-15 RX ORDER — AMLODIPINE BESYLATE 5 MG/1
5 TABLET ORAL DAILY
Qty: 30 TABLET | Refills: 0 | Status: SHIPPED | OUTPATIENT
Start: 2024-04-16 | End: 2024-04-23

## 2024-04-15 RX ORDER — LIDOCAINE 4 G/G
1 PATCH TOPICAL EVERY 24 HOURS
Qty: 14 PATCH | Refills: 0 | Status: SHIPPED | OUTPATIENT
Start: 2024-04-15

## 2024-04-15 RX ADMIN — HYDROCHLOROTHIAZIDE 25 MG: 25 TABLET ORAL at 07:58

## 2024-04-15 RX ADMIN — LIDOCAINE 1 PATCH: 4 PATCH TOPICAL at 08:09

## 2024-04-15 RX ADMIN — LOSARTAN POTASSIUM 100 MG: 100 TABLET, FILM COATED ORAL at 07:59

## 2024-04-15 RX ADMIN — AMLODIPINE BESYLATE 5 MG: 5 TABLET ORAL at 08:12

## 2024-04-15 RX ADMIN — METOPROLOL SUCCINATE 150 MG: 50 TABLET, EXTENDED RELEASE ORAL at 07:58

## 2024-04-15 RX ADMIN — ROSUVASTATIN 10 MG: 10 TABLET, FILM COATED ORAL at 07:58

## 2024-04-15 RX ADMIN — Medication 1 CAPSULE: at 07:58

## 2024-04-15 RX ADMIN — PILOCARPINE HYDROCHLORIDE 5 MG: 5 TABLET, FILM COATED ORAL at 16:23

## 2024-04-15 RX ADMIN — OXYCODONE HYDROCHLORIDE 5 MG: 5 TABLET ORAL at 11:40

## 2024-04-15 RX ADMIN — PILOCARPINE HYDROCHLORIDE 5 MG: 5 TABLET, FILM COATED ORAL at 07:57

## 2024-04-15 RX ADMIN — LORATADINE 10 MG: 10 TABLET ORAL at 07:59

## 2024-04-15 RX ADMIN — ACETAMINOPHEN 975 MG: 325 TABLET, FILM COATED ORAL at 13:47

## 2024-04-15 RX ADMIN — ASPIRIN 81 MG: 81 TABLET, COATED ORAL at 07:59

## 2024-04-15 RX ADMIN — ACETAMINOPHEN 975 MG: 325 TABLET, FILM COATED ORAL at 07:58

## 2024-04-15 RX ADMIN — PILOCARPINE HYDROCHLORIDE 5 MG: 5 TABLET, FILM COATED ORAL at 10:35

## 2024-04-15 RX ADMIN — METHOCARBAMOL 750 MG: 750 TABLET ORAL at 07:58

## 2024-04-15 RX ADMIN — OXYCODONE HYDROCHLORIDE 5 MG: 5 TABLET ORAL at 02:41

## 2024-04-15 RX ADMIN — HUMAN ALBUMIN MICROSPHERES AND PERFLUTREN 6 ML: 10; .22 INJECTION, SOLUTION INTRAVENOUS at 11:22

## 2024-04-15 ASSESSMENT — ACTIVITIES OF DAILY LIVING (ADL)
ADLS_ACUITY_SCORE: 34

## 2024-04-15 NOTE — DISCHARGE SUMMARY
"[facility]  Hospitalist Discharge Summary      Date of Admission:  4/13/2024  Date of Discharge:  4/14/2024   Discharging Provider: Alexia Ellis MD  Discharge Service: Hospitalist Service, GOLD TEAM 19    Discharge Diagnoses   Accelerated hypertension  Ling standing hypertension    History brain aneurysm  CAD  HLD  Type II MI,mild troponin elevation likely due to demand ischemia, CKD   PAD  Upper back pain  chronic low back pain, spinal stenosis   bilateral knee osteoarthritis  gait instability:  Inferior  abdominal aortic aneurysm  Chronic lymphedema  Elevated BMI   COPD  Centrilobular Emphysema   former smoker:  Suspected SARINA/OHS:  CKD stage III:  Minimal splenomegaly on abdominal CT  Chronic urge incontinence, abnormal UA  Asymptomatic bacteruria :  Sjogren syndrome  Bilateral adrenal nodules  GERD:  Presbycusis:  Macular degeneration:  Depression  intertrigo    [place]  Clinically Significant Risk Factors     # Obesity: Estimated body mass index is 38.27 kg/m  as calculated from the following:    Height as of this encounter: 1.651 m (5' 5\").    Weight as of this encounter: 104.3 kg (230 lb).       Follow-ups Needed After Discharge   Follow-up Appointments     Adult [facility] Follow-up and recommended labs and tests      Follow up with primary care provider, Ольга Houston, within 7 days   for hospital follow- up.  The following labs/tests are recommended: BMP,   follow up  on blood pressure  management and pain management .      Appointments on [place] and/or [facility] (with [facility] or [facility]   provider or service). Call [phone] if you haven't heard regarding   these appointments within 7 days of discharge.          Discharge Disposition   Discharged to home  Condition at discharge: Stable    Hospital Course     Fatou Brian is a 74 year old female with morbid obesity, CAD, HTN, HLD, COPD, CKD stage III, chronic low back and bilateral knee pain with " gait instability who presented to the ED 4/13 with uncontrolled thoracic back pain localizing between the shoulder blades and radiating off-and-on into the bilateral shoulders and upper arms.  Patient reported a fall 3/21/2024 resulting increase in her chronic bilateral knee pain, but no back pain at that time.  The back pain started about 1 week PTA without any further precipitating trauma or falls.  Was noted to have accelerated hypertension with SBP greater than 200 in the setting of pain, but persisted despite improved pain control.  Received hydralazine with some improvement.  Noted to have mild troponin elevation, but no acute EKG changes or anginal symptoms.  Admitted to hospitalist service for pain management, PT, OT and blood pressure control.        Accelerated hypertension  Ling standing hypertension    -Echo 11/2/2021: EF 60-65%.  Mild LVH, grade 1 diastolic dysfunction.  Normal RV size and function.  No significant valve disease.   - Accelerated hypertension in the setting of acute uncontrolled upper back pain.  Initial  in ED, improved to 185/65 with IV hydralazine and improved pain control.    -Resumed PTA Toprol-XL, losartan  - blood pressures were   still up, added hydrochlorothiazide 4/14 added amlodipine 5m,g 4/15   - discussed with  patient  monitor renal functions as out patient . Blood pressure  better    - Chronic lymphedema stable, otherwise appears intravascularly euvolemic.  - she has a blood pressure  machine at home  and will take in her reading on follow up       History brain aneurysm  - status post  clipping of left middle cerebral aneurysm       CAD  HLD  mild troponin elevation:  PAD   Mild troponin elevation likely due to demand ischemia, CKD  - echo  with EF 60-65% , no wall motion abnormality noted   -Cath 11/2/2021: Single-vessel CAD of the OM1, LIS placed.  -mild troponin elevation in the setting of accelerated hypertension, CKD  - troponin 27--36--40--61--47.  -No  "anginal symptoms or EKG changes, repeat EKG also without any new ischemic changed .   - continue  PTA ASA, Toprol-XL, losartan, statin   - history of web-like stenosis of left subclavian artery  and mild stenosis of prox left vertebral artery stenosis      Upper back pain  chronic low back pain, spinal stenosis   bilateral knee osteoarthritis  gait instability:  - this pain has been ongoing for over 2 weeks, now much better, she does have tenderness when I press over wolf thoracic spine ~ T 4-7  so  dissection is very unlikely , she has history of different blood pressure  in arms ,  -non dissection CT chest abdomen pelvis with contrast  done and no posttraumatic changes noted,  no aneurysm noted in mediastinum , also \"MUSCULOSKELETAL: No frankly destructive bony lesions or fractures demonstrated. \"  - 4/13 CT c spine had motion artifact    - 4/13 CT thoracic and lumbar spine showed no acute fracture, has some bone demonetization and DJD     -Chronic low back pain, bilateral knee osteoarthritis.  Requires a 3 wheeled walker, lives independently in a house.  - Reported a fall 3/21/2024 while coming in from taking out the garbage, landed on her chest and against her 3 wheel walker.  At that time her only symptoms were bilateral knee pain, acute on chronic.  Required EMS to come to help her get up but did not go to the ED.  Had no increased back pain at that time.  1 week PTA developed intermittent, progressively severe thoracic back pain between the scapula, radiating intermittently to both shoulders and arms, worse with movement.   SLR normal bilaterally.  Pain improved with IV Dilaudid, muscle relaxant in the ED.  - Tylenol 3 times daily OTC   -Apply heat as needed  -Started Robaxin-750 milligrams 3 times daily for 3 days, written fo 500 mg tid PRN on discharge  , will not discharge  on  oxycodone   - she had been ambulating well with physical therapy , no TCU requirements      Inferior  abdominal aortic aneurysm  - " "per CT size has increased form 2.9 cm to 3.1 cm, needs out patient  follow up  scans         Chronic lymphedema  Elevated BMI   -per patient this is stable and perhaps improved from her prior baseline.   - Lungs CTA, no evidence of CHF clinically.  -See above  -Lymphedema consult  -no DVT on US 4/13      COPD  Centrilobular Emphysema   former smoker:  75-pack-year history of smoking, quit 2009.  Lungs CTA, no cough or wheezing.  Oxygenating well on room air.  Not on any bronchodilators PTA.  -IS       Suspected SARINA/OHS:  Patient states her PCP had recommended referral for sleep study, however patient adamantly opposed to pursuing this stating \"I would never wear CPAP, and it does not sound like a bad way to die\".       CKD stage III:  Baseline creatinine 1-1.2.  BMP stable. Did get IV contrats with CT   -continue PTA losartan  - follow up  BMP with PCP         Minimal splenomegaly on abdominal CT  - plt ok ok,          Chronic urge incontinence,   Asymptomatic bacteruria :  Wears depends undergarments.  UA positive, however asymptomatic with no dysuria, fevers or leukocytosis.  -UCx with 2 strains of e coli , she remaisn asymptomatic  ,  antibiotics were  held off  given absence of symptoms.  May be asymptomatic bacteriuria.      Sjogren syndrome  -  had been on hydroxychloroquine in past but stopped as developed visual changes     Bilateral adrenal nodules  - stable when compared with CT 11/2021       GERD:  Compensated.  -Continue PTA Protonix, Pepcid as needed     Presbycusis:  Able to communicate in a loud voice     Macular degeneration:  Receives intermittent ocular injections, next planned for Monday, 4/15     Depression  - not on meds     Groin intertrigo:  -intertrigo      [place]  Consultations This Hospital Stay   PHYSICAL THERAPY ADULT IP CONSULT  OCCUPATIONAL THERAPY ADULT IP CONSULT  LYMPHEDEMA THERAPY IP CONSULT    Code Status   No CPR- Do NOT Intubate    Time Spent on this Encounter   I, Alexia Ellis, " MD, personally saw the patient today and spent greater than 30 minutes discharging this patient.       Alexia Ellis MD  [facility] MED SURG  [facility] [facility] [address]  Phone: [phone]  Fax: [phone]  ______________________________________________________________________    Physical Exam   Vital Signs: Temp: 98  F (36.7  C) Temp src: Oral BP: 135/54 Pulse: 74   Resp: 16 SpO2: 92 % O2 Device: None (Room air) Oxygen Delivery: 2 LPM  Weight: 230 lbs 0 oz     General appearence: awake alert   no apparent distress   tenderness when I pressed over her T spine ~ T4-7   RESPIRATORY: lungs clear   CARDIOVASCULAR:S1 S2 regular rate and rhythm  GASTROINTESTINAL:soft, non-distended , non-tender , + bowel sounds,   SKIN: warm and dry, no mottling noted   NEURO: awake alert and oriented   EXTREMITIES: no clubbing, cyanosis or edema , moves all extremity,      Primary Care Physician   Ольга Houston    Discharge Orders      Reason for your hospital stay    Back pain, hypertension     Activity    Your activity upon discharge: activity as tolerated     Adult [facility] Follow-up and recommended labs and tests    Follow up with primary care provider, Ольга Houston, within 7 days for hospital follow- up.  The following labs/tests are recommended: BMP, follow up  on blood pressure  management and pain management .      Appointments on [place] and/or [facility] (with [facility] or [facility] provider or service). Call [phone] if you haven't heard regarding these appointments within 7 days of discharge.     Diet    Follow this diet upon discharge: as tolerated       Significant Results and Procedures   Most Recent 3 CBC's:  Recent Labs   Lab Test 04/13/24  1118 10/19/23  1259 10/18/22  1108 11/03/21  0618   WBC 5.6 8.0  --  6.5   HGB 12.3 12.8 13.1 10.8*   MCV 94 96  --  90    209  --  143*     Most Recent 3 BMP's:  Recent Labs   Lab Test 04/14/24  1602 04/14/24  0653  04/13/24  2341 04/13/24  1321 04/13/24  1224   NA  --  140  --  139 138   POTASSIUM  --  4.1  --  4.3 5.6*   CHLORIDE  --  105  --  107 106   CO2  --  26  --  24 24   BUN  --  17.8  --  15.4 15.9   CR  --  1.06* 1.07* 0.97* 1.00*   ANIONGAP  --  9  --  8 8   HARIKA  --  8.9  --  8.8 8.7*   * 86  --  96 99     Most Recent 2 LFT's:  Recent Labs   Lab Test 04/13/24  1321 04/13/24  1224 10/19/23  1259 11/01/21  2330   AST 40  --   --  16   ALT 29  --  23 17   ALKPHOS 114 118  --  89   BILITOTAL 0.5 0.5  --  0.4     Most Recent 3 Troponin's:  Recent Labs   Lab Test 11/02/21  0851 11/02/21  0352 11/01/21  2330 08/23/17  1030   TROPI  --   --   --  <0.015   TROPONIN 6.980* 3.509* <0.015  --      Most Recent D-dimer:No lab results found.  7-Day Micro Results       Collected Updated Procedure Result Status      04/13/2024 1611 04/15/2024 0810 Urine Culture [[id number]]   (Abnormal)   Urine, Clean Catch    Preliminary result Component Value   Culture >100,000 CFU/mL Escherichia coli  [P]     >100,000 CFU/mL Escherichia coli  [P]                      Most Recent Urinalysis:  Recent Labs   Lab Test 04/13/24  1611 08/23/17  1100 09/13/16  1159   COLOR Light Yellow   < > Yellow   APPEARANCE Clear   < > Clear   URINEGLC Negative   < > Negative   URINEBILI Negative   < > Negative   URINEKETONE Negative   < > Negative   SG 1.010   < > 1.025   UBLD Negative   < > Trace*   URINEPH 5.5   < > 5.0   PROTEIN Negative   < > Negative   UROBILINOGEN  --   --  0.2   NITRITE Positive*   < > Negative   LEUKEST Large*   < > Negative   RBCU 3*   < > 2-5*   WBCU 65*   < > O - 2    < > = values in this interval not displayed.   ,   Results for orders placed or performed during the hospital encounter of 04/13/24   CT Chest/Abdomen/Pelvis w Contrast    Narrative    EXAM: CT CHEST/ABDOMEN/PELVIS W CONTRAST  LOCATION: [facility]  DATE: 4/13/2024    INDICATION: Trauma, chest back abd pain  COMPARISON:  11/01/2021  TECHNIQUE: CT scan of the chest, abdomen, and pelvis was performed following injection of IV contrast. Multiplanar reformats were obtained. Dose reduction techniques were used.   CONTRAST: 112 mL Isovue 370    FINDINGS:   LUNGS AND PLEURA: Some motion artifact limits detail in some areas. No pulmonary contusion or pneumothorax. No effusions.    MEDIASTINUM/AXILLAE: No adenopathy or aneurysm. Moderate hiatal hernia.    CORONARY ARTERY CALCIFICATION: Moderate.    HEPATOBILIARY: Cholecystectomy. Low-attenuation subcentimeter liver lesion(s) compatible with benign cysts or other benign lesions. No routine follow-up is recommended in a low-risk patient.    PANCREAS: No significant mass, duct dilatation, or inflammatory change.    SPLEEN: Minimally enlarged at 13.5 cm. This is new from previous.    ADRENAL GLANDS: Bilateral adrenal nodules stable since comparison compatible with benign adenomas.    KIDNEYS/BLADDER: No significant mass, stones, or hydronephrosis. There are simple or benign cysts. No follow up is needed. Left renal atrophy again evident.    BOWEL: Diverticulosis of the colon. No acute inflammatory change. No obstruction.     LYMPH NODES: No lymphadenopathy.    VASCULATURE: 3.1 cm infrarenal abdominal aortic aneurysm increased from 2.9 cm previously.    PELVIC ORGANS: No pelvic masses.    MUSCULOSKELETAL: No frankly destructive bony lesions or fractures demonstrated.      Impression    IMPRESSION:  1.  No posttraumatic changes demonstrated.  2.  3.1 cm infrarenal abdominal aortic aneurysm.  3.  Moderate hiatal hernia.  4.  Minimal splenomegaly.   CT Thoracic Spine Reconstructed    Narrative    EXAM: CT THORACIC SPINE RECONSTRUCTED, CT LUMBAR SPINE RECONSTRUCTED  LOCATION: [facility]  DATE: 04/13/2024    INDICATION: Trauma.  COMPARISON: Lumbar spine CT 10/03/2014.  TECHNIQUE:  1) Dedicated axial, sagittal, and coronal images of the Thoracic Spine were  generated utilizing CT chest source data and are separately reviewed. Dose reduction techniques were used.   2) Dedicated axial, sagittal, and coronal images of the Lumbar Spine were generated utilizing CT abdomen pelvis source data and are separately reviewed. Dose reduction techniques were used.     FINDINGS:    THORACIC SPINE CT:  VERTEBRA: Bones appear demineralized which limits evaluation for fractures. No acute lucent fracture line visualized. No significant loss of vertebral body height. Fused anterior osteophytes throughout the thoracic spine from T1 down to at least L1. Mild   degenerative disc changes and loss of disc height throughout the thoracic spine. No significant disc herniation appreciated by CT. Normal facets.    CANAL/FORAMINA: No high-grade spinal canal or neural foraminal narrowing.    PARASPINAL: Please see dedicated CT chest, abdomen and pelvis report.    LUMBAR SPINE CT:  VERTEBRA: Bones appear demineralized. No acute lucent fracture line appreciated. No significant loss of vertebral body height. Mild degenerative disc changes and loss of disc height at L4-L5. Facet hypertrophy in the lower lumbar spine. Rounded sclerotic   lesion in the left iliac bone which may represent a bone island.    CANAL/FORAMINA: No spinal canal narrowing at any level. Mild neural foraminal narrowings at L4-L5.    PARASPINAL: Please see dedicated CT chest, abdomen and pelvis report.      Impression    IMPRESSION:  THORACIC SPINE CT:  1.  No acute fracture appreciated in the thoracic spine.  2.  Bones appear demineralized which limits evaluation for fractures.  3.  Degenerative changes throughout the thoracic spine without significant spinal canal or neural foraminal narrowing.  4.  Fused anterior osteophytes throughout the thoracic spine.    LUMBAR SPINE CT:  1.  No acute fracture appreciated in the lumbar spine.  2.  Bones appear demineralized which limits evaluation for fractures.  3.  Degenerative changes in the  lower lumbar spine as detailed above.     CT Lumbar Spine Reconstructed    Narrative    EXAM: CT THORACIC SPINE RECONSTRUCTED, CT LUMBAR SPINE RECONSTRUCTED  LOCATION: [facility]  DATE: 04/13/2024    INDICATION: Trauma.  COMPARISON: Lumbar spine CT 10/03/2014.  TECHNIQUE:  1) Dedicated axial, sagittal, and coronal images of the Thoracic Spine were generated utilizing CT chest source data and are separately reviewed. Dose reduction techniques were used.   2) Dedicated axial, sagittal, and coronal images of the Lumbar Spine were generated utilizing CT abdomen pelvis source data and are separately reviewed. Dose reduction techniques were used.     FINDINGS:    THORACIC SPINE CT:  VERTEBRA: Bones appear demineralized which limits evaluation for fractures. No acute lucent fracture line visualized. No significant loss of vertebral body height. Fused anterior osteophytes throughout the thoracic spine from T1 down to at least L1. Mild   degenerative disc changes and loss of disc height throughout the thoracic spine. No significant disc herniation appreciated by CT. Normal facets.    CANAL/FORAMINA: No high-grade spinal canal or neural foraminal narrowing.    PARASPINAL: Please see dedicated CT chest, abdomen and pelvis report.    LUMBAR SPINE CT:  VERTEBRA: Bones appear demineralized. No acute lucent fracture line appreciated. No significant loss of vertebral body height. Mild degenerative disc changes and loss of disc height at L4-L5. Facet hypertrophy in the lower lumbar spine. Rounded sclerotic   lesion in the left iliac bone which may represent a bone island.    CANAL/FORAMINA: No spinal canal narrowing at any level. Mild neural foraminal narrowings at L4-L5.    PARASPINAL: Please see dedicated CT chest, abdomen and pelvis report.      Impression    IMPRESSION:  THORACIC SPINE CT:  1.  No acute fracture appreciated in the thoracic spine.  2.  Bones appear demineralized which  limits evaluation for fractures.  3.  Degenerative changes throughout the thoracic spine without significant spinal canal or neural foraminal narrowing.  4.  Fused anterior osteophytes throughout the thoracic spine.    LUMBAR SPINE CT:  1.  No acute fracture appreciated in the lumbar spine.  2.  Bones appear demineralized which limits evaluation for fractures.  3.  Degenerative changes in the lower lumbar spine as detailed above.     CT Cervical Spine w/o Contrast    Narrative    EXAM: CT CERVICAL SPINE WITHOUT CONTRAST  LOCATION: [facility]  DATE: 04/13/2024    INDICATION: Trauma.  COMPARISON: None.  TECHNIQUE: Routine CT Cervical Spine without IV contrast. Multiplanar reformats. Dose reduction techniques were used.    FINDINGS:  VERTEBRA: Images are degraded by motion artifact which limits evaluation for fractures, particularly subtle fractures. No obvious loss of vertebral body height. No definite lucent fracture line visualized. Grade 1 anterolisthesis of C4 on C5. Mild to   moderate degenerative endplate changes and loss of disc height throughout the cervical spine, most pronounced at C4-C5, C5-C6, and C6-C7. Marked facet hypertrophy throughout the cervical spine. Fusion of the facets on the right at C2-C3 and left at   C5-C6.    CANAL/FORAMINA: No significant spinal canal narrowing at any level. Multiple levels of neural foraminal narrowing particularly from C3-C4 through C5-C6.    PARASPINAL: Heterogeneous thyroid gland nodule in the isthmus measuring up to 1.3 cm. This typically would not require further follow-up in a patient of this age. Atherosclerotic calcifications along the carotid arteries.      Impression    IMPRESSION:  1.  Motion degraded images which limits evaluation for fractures, particularly subtle fractures. No definite fracture identified noting limitations by motion.  2.  Multilevel degenerative changes throughout the cervical spine as detailed  above.     US Lower Extremity Venous Duplex Bilateral    Narrative    EXAMINATION: DOPPLER VENOUS ULTRASOUND OF BILATERAL LOWER EXTREMITIES,  4/13/2024 11:34 PM     COMPARISON: 8/9/2007    HISTORY: Bilateral lower extremity lymphedema    TECHNIQUE:  Gray-scale evaluation with compression, spectral flow and  color Doppler assessment of the deep venous system of both legs from  groin to knee, and then at the ankles.    FINDINGS:  In both lower extremities, the common femoral, profunda femoral,  greater saphenous, femoral, and popliteal veins demonstrate normal  compressibility and blood flow. The posterior tibial and peroneal  veins demonstrate normal compressibility.    Subcutaneous edema over the area of the patient's pain in the left  calf.      Impression    IMPRESSION:  No evidence of deep venous thrombosis in either lower extremity.  Subcutaneous edema at the area of the patient's pain in the left calf.    I have personally reviewed the examination and initial interpretation  and I agree with the findings.    ABDIFATAH SRIVASTAVA DO         SYSTEM ID:  [id number]       Discharge Medications   Current Discharge Medication List        START taking these medications    Details   amLODIPine (NORVASC) 5 MG tablet Take 1 tablet (5 mg) by mouth daily  Qty: 30 tablet, Refills: 0    Associated Diagnoses: Hypertension, unspecified type      hydrochlorothiazide (HYDRODIURIL) 25 MG tablet Take 0.5 tablets (12.5 mg) by mouth daily  Qty: 30 tablet, Refills: 0    Associated Diagnoses: Hypertension, unspecified type      Lidocaine (LIDOCARE) 4 % Patch Place 1 patch onto the skin every 24 hours To prevent lidocaine toxicity, patient should be patch free for 12 hrs daily.  Qty: 14 patch, Refills: 0    Associated Diagnoses: Pain of left upper extremity      methocarbamol (ROBAXIN) 500 MG tablet Take 1 tablet (500 mg) by mouth 3 times daily as needed for muscle spasms  Qty: 15 tablet, Refills: 0    Associated Diagnoses: Low back pain with  sciatica, sciatica laterality unspecified, unspecified back pain laterality, unspecified chronicity           CONTINUE these medications which have NOT CHANGED    Details   acetaminophen (TYLENOL) 650 MG CR tablet Take 2 tablets (1,300 mg) by mouth every 8 hours as needed for mild pain or pain  Qty: 270 tablet, Refills: 3    Comments: PHARMACY - PLEASE CANCEL SCRIPT FOR HCTZ THAT I JUST SENT.  Associated Diagnoses: Bilateral low back pain with sciatica, sciatica laterality unspecified, unspecified chronicity; Primary osteoarthritis of both knees      aspirin 81 MG EC tablet Take 1 tablet (81 mg) by mouth daily Start tomorrow.  Qty: 90 tablet, Refills: 3    Associated Diagnoses: Coronary artery disease involving native coronary artery of native heart, unspecified whether angina present      calcium carbonate (TUMS) 500 MG chewable tablet Take 1 tablet (500 mg) by mouth At Bedtime  Qty: 120 tablet, Refills: 11    Comments: Put on file. Pt will call when she wants them.  Associated Diagnoses: Vitamin D insufficiency      famotidine (PEPCID) 20 MG tablet TAKE 1 TABLET BY MOUTH TWICE DAILY AS NEEDED  Qty: 180 tablet, Refills: 3    Comments: PHARMACY - PLEASE CANCEL SCRIPT FOR HCTZ THAT I JUST SENT.  Associated Diagnoses: Gastroesophageal reflux disease without esophagitis      loratadine (CLARITIN) 10 MG tablet Take one tab twice per day.  Qty: 180 tablet, Refills: 3    Comments: PHARMACY - PLEASE CANCEL SCRIPT FOR HCTZ THAT I JUST SENT.  Associated Diagnoses: Allergic rhinitis, unspecified seasonality, unspecified trigger      losartan (COZAAR) 100 MG tablet Take 1 tablet (100 mg) by mouth daily  Qty: 90 tablet, Refills: 0    Associated Diagnoses: Coronary artery disease involving native coronary artery of native heart, unspecified whether angina present; Hypertension goal BP (blood pressure) < 130/80      metoprolol succinate ER (TOPROL XL) 50 MG 24 hr tablet TAKE 3 TABLETS DAILY  Qty: 90 tablet, Refills: 2     Associated Diagnoses: Coronary artery disease involving native coronary artery of native heart, unspecified whether angina present; Hypertension goal BP (blood pressure) < 130/80      Multiple Vitamins-Minerals (PRESERVISION AREDS 2 PO) Take 1 capsule by mouth 2 times daily      nitroGLYcerin (NITROSTAT) 0.4 MG sublingual tablet For chest pain place 1 tablet under the tongue every 5 minutes for 3 doses. If symptoms persist 5 minutes after 1st dose call 911.  Qty: 12 tablet, Refills: 0    Comments: PHARMACY - PLEASE CANCEL SCRIPT FOR HCTZ THAT I JUST SENT.  Associated Diagnoses: Coronary artery disease involving native coronary artery of native heart, unspecified whether angina present      pantoprazole (PROTONIX) 40 MG EC tablet TAKE 1 TABLET DAILY  Qty: 90 tablet, Refills: 3    Associated Diagnoses: Gastroesophageal reflux disease without esophagitis      pilocarpine (SALAGEN) 5 MG tablet Take 1 tablet (5 mg) by mouth 4 times daily (before meals and nightly)  Qty: 360 tablet, Refills: 3    Comments: Put on file. Pt will call when she wants them.  Associated Diagnoses: Sjogren's syndrome with myopathy (H24)      rosuvastatin (CRESTOR) 10 MG tablet Take 1 tablet (10 mg) by mouth daily  Qty: 90 tablet, Refills: 0    Associated Diagnoses: Coronary artery disease involving native coronary artery of native heart, unspecified whether angina present; Pure hypercholesterolemia      senna-docusate (SENOKOT-S/PERICOLACE) 8.6-50 MG tablet Take 2 tablets by mouth 2 times daily as needed for constipation      Vitamin D3 (CHOLECALCIFEROL) 125 MCG (5000 UT) tablet Take 1 tablet (125 mcg) by mouth At Bedtime  Qty: 100 tablet, Refills: 3    Comments: Put on file. Pt will call when she wants them.  Associated Diagnoses: Vitamin D insufficiency           Allergies   Allergies   Allergen Reactions    Blood Transfusion Related (Informational Only) Other (See Comments)     Patient has a history of a clinically significant antibody  against RBC antigens.  A delay in compatible RBCs may occur.    Chantix [Varenicline]      suicidal    Influenza Virus Vaccine H5n1      Muscle aches dizzy, nauseated  Light headed    Morphine Sulfate      Sensitivity when in hospital    Omeprazole Magnesium Nausea     Intolerance      Vioxx     Cetirizine-Pseudoephedrine Er Rash    Niacin Itching and Rash    Zetia [Ezetimibe] Other (See Comments)     Muscle pain

## 2024-04-15 NOTE — DISCHARGE SUMMARY
DISCHARGE SUMMARY  [id number]    Pt discharging to: Home  Transportation: ride home to friend of the Pt. CANO  AVS given and discussed: Pt was given AVS and pt states understanding of content. Pt has no further questions.   Medications given: Yes, discussed. No further questions.   Belongings returned: Yes, ensured all belongings packed and sent with pt. No items in security.

## 2024-04-15 NOTE — PLAN OF CARE
"Goal Outcome Evaluation:      Plan of Care Reviewed With: patient    Overall Patient Progress: improvingOverall Patient Progress: improving         SBP <180 during shift. Occasional desat to 84-88% on room air while sleeping; per patient, has no previous dx of sleep apnea but PCP \"wanted a sleep study\". Placed on 2L O2 while sleeping via nc, saturation improved to 92-97%. Continues to report pain in side with movement.  "

## 2024-04-15 NOTE — PLAN OF CARE
"Goal Outcome Evaluation:           Overall Patient Progress: no changeOverall Patient Progress: no change     74 year old patient admitted with hypertension (200/100). Vitals have been stable except BP which was elevated in the /61. Patient treated with AM BP meds and provider notified. On room air, and alert x4. Last BM was on 4/14, and she is up to commode in the bathroom. On a 2g sodium diet. Assist of 1 with walker, has cane for smaller tasks. Bed alarm on, patient non-compliant in requesting help to ambulate. Redness on the coccyx, bilateral leg edema \"patient educated on the importance of trying compression devices on the legs\". Suggested patient brush their teeth today. Patient replied that she had not brushed in the past two years. That her plan was to allow them to fall out ,so that she can get dentures. Right PIV. History of coronary artery disease, STEMI, heard of hearing. Patient reports back, left arm, left rib area pain. Treated with PRN medications. Patient reports neck pain/swelling, patient assessed and there were no indication of swelling \"provider notified\". Code status of no CPR no intubation. Monitoring patient while trying to control BP. Possible discharge.      "

## 2024-04-19 ENCOUNTER — OFFICE VISIT (OUTPATIENT)
Dept: PULMONOLOGY | Facility: CLINIC | Age: 74
End: 2024-04-19
Attending: FAMILY MEDICINE
Payer: COMMERCIAL

## 2024-04-19 DIAGNOSIS — J43.2 CENTRILOBULAR EMPHYSEMA (H): ICD-10-CM

## 2024-04-19 LAB
ANTIBODY SCREEN, TUBE: NORMAL
SPECIMEN EXPIRATION DATE: NORMAL

## 2024-04-19 PROCEDURE — 99207 PR NO CHARGE LOS: CPT

## 2024-04-19 PROCEDURE — 94375 RESPIRATORY FLOW VOLUME LOOP: CPT | Performed by: INTERNAL MEDICINE

## 2024-04-21 LAB
ERV-%PRED-PRE: 13 %
ERV-PRE: 0.13 L
ERV-PRED: 0.96 L
EXPTIME-PRE: 2.72 SEC
FEF2575-%PRED-PRE: 108 %
FEF2575-PRE: 1.75 L/SEC
FEF2575-PRED: 1.62 L/SEC
FEFMAX-%PRED-PRE: 101 %
FEFMAX-PRE: 5.37 L/SEC
FEFMAX-PRED: 5.31 L/SEC
FEV1-%PRED-PRE: 97 %
FEV1-PRE: 1.92 L
FEV1FEV6-PRE: 81 %
FEV1FEV6-PRED: 78 %
FEV1FVC-PRE: 81 %
FEV1FVC-PRED: 78 %
FEV1SVC-PRE: 86 %
FEV1SVC-PRED: 63 %
FIFMAX-PRE: 4.67 L/SEC
FVC-%PRED-PRE: 93 %
FVC-PRE: 2.37 L
FVC-PRED: 2.53 L
IC-%PRED-PRE: 110 %
IC-PRE: 2.11 L
IC-PRED: 1.91 L
VC-%PRED-PRE: 71 %
VC-PRE: 2.24 L
VC-PRED: 3.12 L

## 2024-04-23 ENCOUNTER — OFFICE VISIT (OUTPATIENT)
Dept: FAMILY MEDICINE | Facility: CLINIC | Age: 74
End: 2024-04-23
Payer: COMMERCIAL

## 2024-04-23 VITALS
DIASTOLIC BLOOD PRESSURE: 80 MMHG | HEART RATE: 89 BPM | TEMPERATURE: 98.1 F | WEIGHT: 231.7 LBS | BODY MASS INDEX: 38.56 KG/M2 | RESPIRATION RATE: 20 BRPM | SYSTOLIC BLOOD PRESSURE: 118 MMHG | OXYGEN SATURATION: 98 %

## 2024-04-23 DIAGNOSIS — I25.10 CORONARY ARTERY DISEASE INVOLVING NATIVE CORONARY ARTERY OF NATIVE HEART, UNSPECIFIED WHETHER ANGINA PRESENT: ICD-10-CM

## 2024-04-23 DIAGNOSIS — J43.2 CENTRILOBULAR EMPHYSEMA (H): ICD-10-CM

## 2024-04-23 DIAGNOSIS — E66.01 SEVERE OBESITY (BMI 35.0-39.9) WITH COMORBIDITY (H): ICD-10-CM

## 2024-04-23 DIAGNOSIS — Z09 HOSPITAL DISCHARGE FOLLOW-UP: Primary | ICD-10-CM

## 2024-04-23 DIAGNOSIS — E67.3 HIGH VITAMIN D LEVEL: ICD-10-CM

## 2024-04-23 DIAGNOSIS — Z23 NEED FOR VACCINATION: ICD-10-CM

## 2024-04-23 DIAGNOSIS — I71.43 INFRARENAL ABDOMINAL AORTIC ANEURYSM (AAA) WITHOUT RUPTURE (H): ICD-10-CM

## 2024-04-23 DIAGNOSIS — M35.03 SJOGREN'S SYNDROME WITH MYOPATHY (H): ICD-10-CM

## 2024-04-23 DIAGNOSIS — I10 HYPERTENSION GOAL BP (BLOOD PRESSURE) < 130/80: ICD-10-CM

## 2024-04-23 DIAGNOSIS — H60.501 ACUTE OTITIS EXTERNA OF RIGHT EAR, UNSPECIFIED TYPE: ICD-10-CM

## 2024-04-23 DIAGNOSIS — N18.31 STAGE 3A CHRONIC KIDNEY DISEASE (H): ICD-10-CM

## 2024-04-23 DIAGNOSIS — M54.6 ACUTE BILATERAL THORACIC BACK PAIN: ICD-10-CM

## 2024-04-23 PROCEDURE — 99214 OFFICE O/P EST MOD 30 MIN: CPT | Mod: 25 | Performed by: FAMILY MEDICINE

## 2024-04-23 PROCEDURE — 36415 COLL VENOUS BLD VENIPUNCTURE: CPT | Performed by: FAMILY MEDICINE

## 2024-04-23 PROCEDURE — 90480 ADMN SARSCOV2 VAC 1/ONLY CMP: CPT | Performed by: FAMILY MEDICINE

## 2024-04-23 PROCEDURE — 80048 BASIC METABOLIC PNL TOTAL CA: CPT | Performed by: FAMILY MEDICINE

## 2024-04-23 PROCEDURE — 91320 SARSCV2 VAC 30MCG TRS-SUC IM: CPT | Performed by: FAMILY MEDICINE

## 2024-04-23 PROCEDURE — 82306 VITAMIN D 25 HYDROXY: CPT | Performed by: FAMILY MEDICINE

## 2024-04-23 RX ORDER — RESPIRATORY SYNCYTIAL VIRUS VACCINE 120MCG/0.5
0.5 KIT INTRAMUSCULAR ONCE
Qty: 1 EACH | Refills: 0 | Status: CANCELLED | OUTPATIENT
Start: 2024-04-23 | End: 2024-04-23

## 2024-04-23 RX ORDER — AMLODIPINE BESYLATE 5 MG/1
5 TABLET ORAL DAILY
Qty: 90 TABLET | Refills: 1 | Status: SHIPPED | OUTPATIENT
Start: 2024-04-23 | End: 2024-09-03

## 2024-04-23 RX ORDER — HYDROCHLOROTHIAZIDE 12.5 MG/1
12.5 TABLET ORAL DAILY
Qty: 90 TABLET | Refills: 1 | Status: SHIPPED | OUTPATIENT
Start: 2024-04-23 | End: 2024-09-03

## 2024-04-23 RX ORDER — OFLOXACIN 3 MG/ML
10 SOLUTION AURICULAR (OTIC) DAILY
Qty: 10 ML | Refills: 0 | Status: SHIPPED | OUTPATIENT
Start: 2024-04-23 | End: 2024-04-24

## 2024-04-23 RX ORDER — METOPROLOL SUCCINATE 50 MG/1
150 TABLET, EXTENDED RELEASE ORAL DAILY
Qty: 270 TABLET | Refills: 2 | Status: SHIPPED | OUTPATIENT
Start: 2024-04-23 | End: 2024-09-03

## 2024-04-23 ASSESSMENT — PATIENT HEALTH QUESTIONNAIRE - PHQ9
SUM OF ALL RESPONSES TO PHQ QUESTIONS 1-9: 0
10. IF YOU CHECKED OFF ANY PROBLEMS, HOW DIFFICULT HAVE THESE PROBLEMS MADE IT FOR YOU TO DO YOUR WORK, TAKE CARE OF THINGS AT HOME, OR GET ALONG WITH OTHER PEOPLE: NOT DIFFICULT AT ALL
SUM OF ALL RESPONSES TO PHQ QUESTIONS 1-9: 0

## 2024-04-23 ASSESSMENT — PAIN SCALES - GENERAL: PAINLEVEL: NO PAIN (0)

## 2024-04-23 NOTE — LETTER
April 24, 2024      Fatou Brian  [address]  [facility] [address]        Dear ,    We are writing to inform you of your test results.    Fatou Flores It was nice to see you.  Your lab results show that your kidney function (creatinine and eGFR) has worsened a bit.  That may be due to mild dehydration.  I recommend that you increase your water/fluid intake.  The kidneys like to be well-hydrated.    Your Vitamin D level has risen so I would like you to discontinue the Vitamin D tablets you've been taking.  Once your Vitamin D level comes back into the normal range we can consider if you should take any supplemental Vitamin D.    If your right ear/head pain persists despite the ear drops, please contact clinic to let me know.    Resulted Orders   Basic metabolic panel  (Ca, Cl, CO2, Creat, Gluc, K, Na, BUN)   Result Value Ref Range    Sodium 141 135 - 145 mmol/L      Comment:      Reference intervals for this test were updated on 09/26/2023 to more accurately reflect our healthy population. There may be differences in the flagging of prior results with similar values performed with this method. Interpretation of those prior results can be made in the context of the updated reference intervals.     Potassium 4.8 3.4 - 5.3 mmol/L    Chloride 102 98 - 107 mmol/L    Carbon Dioxide (CO2) 24 22 - 29 mmol/L    Anion Gap 15 7 - 15 mmol/L    Urea Nitrogen 26.2 (H) 8.0 - 23.0 mg/dL    Creatinine 1.28 (H) 0.51 - 0.95 mg/dL    GFR Estimate 43 (L) >60 mL/min/1.73m2    Calcium 10.1 8.8 - 10.2 mg/dL    Glucose 109 (H) 70 - 99 mg/dL   Vitamin D Deficiency   Result Value Ref Range    Vitamin D, Total (25-Hydroxy) 63 (H) 20 - 50 ng/mL      Comment:      indicates supplementation, with increased risk of hypercalciuria    Narrative    Season, race, dietary intake, and treatment affect the concentration of 25-hydroxy-Vitamin D. Values may decrease during winter months and increase during summer months.    Vitamin D  determination is routinely performed by an immunoassay specific for 25 hydroxyvitamin D3.  If an individual is on vitamin D2(ergocalciferol) supplementation, please specify 25 OH vitamin D2 and D3 level determination by LCMSMS test VITD23.         If you have any questions or concerns, please call the clinic at the number listed above.       Sincerely,      Ольга Houston MD

## 2024-04-23 NOTE — NURSING NOTE
Prior to immunization administration, verified patients identity using patient s name and date of birth. Please see Immunization Activity for additional information.     Screening Questionnaire for Adult Immunization    Are you sick today?   No   Do you have allergies to medications, food, a vaccine component or latex?   No   Have you ever had a serious reaction after receiving a vaccination?   No   Do you have a long-term health problem with heart, lung, kidney, or metabolic disease (e.g., diabetes), asthma, a blood disorder, no spleen, complement component deficiency, a cochlear implant, or a spinal fluid leak?  Are you on long-term aspirin therapy?   No   Do you have cancer, leukemia, HIV/AIDS, or any other immune system problem?   No   Do you have a parent, brother, or sister with an immune system problem?   No   In the past 3 months, have you taken medications that affect  your immune system, such as prednisone, other steroids, or anticancer drugs; drugs for the treatment of rheumatoid arthritis, Crohn s disease, or psoriasis; or have you had radiation treatments?   No   Have you had a seizure, or a brain or other nervous system problem?   No   During the past year, have you received a transfusion of blood or blood    products, or been given immune (gamma) globulin or antiviral drug?   No   For women: Are you pregnant or is there a chance you could become       pregnant during the next month?   No   Have you received any vaccinations in the past 4 weeks?   No     Immunization questionnaire answers were all negative.      Patient instructed to remain in clinic for 15 minutes afterwards, and to report any adverse reactions.     Screening performed by Sona Fitzgerald MA on 4/23/2024 at 12:08 PM.

## 2024-04-23 NOTE — PROGRESS NOTES
Assessment & Plan     Hospital discharge follow-up    Acute bilateral thoracic back pain  s/p fall - improving    Acute otitis externa of right ear, unspecified type  - ofloxacin (FLOXIN) 0.3 % otic solution  Dispense: 10 mL; Refill: 0    Hypertension goal BP (blood pressure) < 130/80  Improved control - continue current regimen  - Basic metabolic panel  (Ca, Cl, CO2, Creat, Gluc, K, Na, BUN)  - metoprolol succinate ER (TOPROL XL) 50 MG 24 hr tablet  Dispense: 270 tablet; Refill: 2  - hydroCHLOROthiazide 12.5 MG tablet  Dispense: 90 tablet; Refill: 1  - amLODIPine (NORVASC) 5 MG tablet  Dispense: 90 tablet; Refill: 1    Infrarenal abdominal aortic aneurysm (AAA) without rupture (H24)  Noted on CT scan.  At 3.1 cm in greatest diam we'll plan on next surveillance scan in 3 years    Coronary artery disease involving native coronary artery of native heart, unspecified whether angina present  Seeing Cardiology (Dr. Reed) in July. She stopped her statin because it caused leg pain.    - metoprolol succinate ER (TOPROL XL) 50 MG 24 hr tablet  Dispense: 270 tablet; Refill: 2    High vitamin D level  She's taking her Vitamin D (5000 units) a few times per week.  - Vitamin D Deficiency    Sjogren's syndrome with myopathy (H24)  She was followed by Dr. Shelton/Rheumatology but hasn't been seen in a number of years.  She wonders why she was never prescribed anything after Plaquenil was discontinued (due to vision changes). I advised her that I could refer her to Rheumatology to discuss those concerns but she declined referral.      Centrilobular emphysema (H)  Noted on imaging.  She did complete PFT's but was noted to have invalid effort and trouble following directions.      Severe obesity (BMI 35.0-39.9) with comorbidity (H)  Severe obesity contributing to chronic conditions including HTN, CAD, OA.      Stage 3a chronic kidney disease (H)  - Basic metabolic panel  (Ca, Cl, CO2, Creat, Gluc, K, Na, BUN)    Need for  vaccination  - COVID-19 12+ (2023-24) (PFIZER)     MED REC REQUIRED  Post Medication Reconciliation Status:  Discharge medications reconciled and changed, see notes/orders      See Patient Instructions    Future Appointments 4/23/2024 - 10/20/2024        Date Visit Type Length Department Provider     7/2/2024 11:15 AM RETURN CARDIOLOGY 30 min  [facility] MAJO Herrera MD    Location Instructions:      [facility] [place] with multiple transportation and parking options. You may wish to review options for  service and self-parking in more detail on the [facility] s website at [email or web address].               9/3/2024 11:10 AM ANNUAL WELLNESS 30 min [facility] FP/IM Ольга Mead MD    Location Instructions:     [facility] is across from the [facility]. There is free parking on the surface lot on the south side of the building, with 2 additional parking levels below the surface lot.                        Arti Lainez is a 74 year old, presenting for the following health issues:  Hospital F/U        4/23/2024    10:52 AM   Additional Questions   Roomed by Michelle   Accompanied by alone         4/23/2024    10:52 AM   Patient Reported Additional Medications   Patient reports taking the following new medications none     History of Present Illness       Reason for visit:  Hosp/f/u    She eats 0-1 servings of fruits and vegetables daily.She consumes 0 sweetened beverage(s) daily.She exercises with enough effort to increase her heart rate 9 or less minutes per day.  She exercises with enough effort to increase her heart rate 3 or less days per week.   She is taking medications regularly.           Hospital Follow-up Visit:    Hospital/Nursing Home/IP Rehab Facility: [facility]  Date of Admission: 4/13/24  Date of Discharge: 4/14/24  Reason(s) for Admission:  hypertension/fall  Was the patient in the ICU or did the patient experience delirium during hospitalization?  No  Do you have any other stressors you would like to discuss with your provider? No    Problems taking medications regularly:  None  Medication changes since discharge: None  Problems adhering to non-medication therapy:  None    Summary of hospitalization:  [facility] discharge summary reviewed  Diagnostic Tests/Treatments reviewed.  Follow up needed: none  Other Healthcare Providers Involved in Patient s Care:         None  Update since discharge: stable.   Plan of care communicated with patient       Fell on 3/21 in her kitchen, landing onto carpeted nohelia  Knees hurt right away  Later she developed back pain and pain all over - including left lower ribs  Had CT scans of Chest/Abd/Pelvis, C-spine, T-spine, L-spine in ER.  Admitted for pain control and uncontrolled HTN.  Inpatient team added amlodipine 5 mg and HCTZ to her regimen of Toprol and losartan.  Was on opioids for pain control but was not discharged with opioids. She was discharged with Robaxin.  Since discharge she's had right-sided head pain - throbbing pain radiating from her right ear. Moving or touching right pinna exacerbates ear and head pain.  She's been using lidocaine patches but can't reach around to her back to place them there.  She's used some on her knees.          Objective    /80 (BP Location: Right arm, Patient Position: Sitting, Cuff Size: Adult Large)   Pulse 89   Temp 98.1  F (36.7  C) (Temporal)   Resp 20   Wt 105.1 kg (231 lb 11.2 oz)   SpO2 98%   BMI 38.56 kg/m    Body mass index is 38.56 kg/m .  Physical Exam   GENERAL: healthy, alert and no distress, has a cane and a 4WW with her  EYES: Eyes grossly normal to inspection, conjunctivae and sclerae normal  HEAD: Normocephalic with no ecchymosis, she is wearing hearing amplifier with headphones  ENT: right marlene and canal is inflamed, right TM  is intact and normal in appearance, left ear canal and TM normal  RESP: lungs clear to auscultation - no rales, rhonchi or wheezes  CV: regular rate and rhythm, normal S1 S2, no S3 or S4, no murmur, click or rub  SKIN: no suspicious lesions or rashes, woody edema with scaling and lichenification of bilateral lower extremities           Signed Electronically by: Ольга Houston MD

## 2024-04-24 ENCOUNTER — TELEPHONE (OUTPATIENT)
Dept: FAMILY MEDICINE | Facility: CLINIC | Age: 74
End: 2024-04-24
Payer: COMMERCIAL

## 2024-04-24 DIAGNOSIS — H60.501 ACUTE OTITIS EXTERNA OF RIGHT EAR, UNSPECIFIED TYPE: ICD-10-CM

## 2024-04-24 LAB
ANION GAP SERPL CALCULATED.3IONS-SCNC: 15 MMOL/L (ref 7–15)
BUN SERPL-MCNC: 26.2 MG/DL (ref 8–23)
CALCIUM SERPL-MCNC: 10.1 MG/DL (ref 8.8–10.2)
CHLORIDE SERPL-SCNC: 102 MMOL/L (ref 98–107)
CREAT SERPL-MCNC: 1.28 MG/DL (ref 0.51–0.95)
DEPRECATED HCO3 PLAS-SCNC: 24 MMOL/L (ref 22–29)
EGFRCR SERPLBLD CKD-EPI 2021: 43 ML/MIN/1.73M2
GLUCOSE SERPL-MCNC: 109 MG/DL (ref 70–99)
POTASSIUM SERPL-SCNC: 4.8 MMOL/L (ref 3.4–5.3)
SODIUM SERPL-SCNC: 141 MMOL/L (ref 135–145)
VIT D+METAB SERPL-MCNC: 63 NG/ML (ref 20–50)

## 2024-04-24 RX ORDER — OFLOXACIN 3 MG/ML
10 SOLUTION AURICULAR (OTIC) DAILY
Qty: 10 ML | Refills: 0 | Status: SHIPPED | OUTPATIENT
Start: 2024-04-24

## 2024-04-24 NOTE — TELEPHONE ENCOUNTER
Call received from patient:  Appt with PCP yesterday  Diagnosed with right ear infection  Ear drops were sent to [facility] and needs them sent to [facility] pharmacy at [address] [place] [address] and [facility] in [place], [place]    Ear drops sent to requested pharmacy.    Patient verbalized understanding and in agreement with plan.    FARZANA SheridanN, RN-[facility]

## 2024-04-24 NOTE — RESULT ENCOUNTER NOTE
Letter done with comments below.  Routed to [facility] SUPPORT STAFF pool to be mailed:    Fatou - It was nice to see you.  Your lab results show that your kidney function (creatinine and eGFR) has worsened a bit.  That may be due to mild dehydration.  I recommend that you increase your water/fluid intake.  The kidneys like to be well-hydrated.    Your Vitamin D level has risen so I would like you to discontinue the Vitamin D tablets you've been taking.  Once your Vitamin D level comes back into the normal range we can consider if you should take any supplemental Vitamin D.    If your right ear/head pain persists despite the ear drops, please contact clinic to let me know.  Ольга Houston MD

## 2024-04-26 ENCOUNTER — PRE VISIT (OUTPATIENT)
Dept: UROLOGY | Facility: CLINIC | Age: 74
End: 2024-04-26

## 2024-07-02 ENCOUNTER — OFFICE VISIT (OUTPATIENT)
Dept: CARDIOLOGY | Facility: CLINIC | Age: 74
End: 2024-07-02
Attending: INTERNAL MEDICINE
Payer: COMMERCIAL

## 2024-07-02 VITALS
SYSTOLIC BLOOD PRESSURE: 149 MMHG | OXYGEN SATURATION: 96 % | DIASTOLIC BLOOD PRESSURE: 54 MMHG | BODY MASS INDEX: 37.94 KG/M2 | HEART RATE: 76 BPM | WEIGHT: 228 LBS

## 2024-07-02 DIAGNOSIS — E78.5 HYPERLIPIDEMIA WITH TARGET LDL LESS THAN 70: ICD-10-CM

## 2024-07-02 DIAGNOSIS — I21.4 NSTEMI (NON-ST ELEVATED MYOCARDIAL INFARCTION) (H): Primary | ICD-10-CM

## 2024-07-02 PROCEDURE — G0463 HOSPITAL OUTPT CLINIC VISIT: HCPCS | Performed by: INTERNAL MEDICINE

## 2024-07-02 PROCEDURE — 99213 OFFICE O/P EST LOW 20 MIN: CPT | Performed by: INTERNAL MEDICINE

## 2024-07-02 ASSESSMENT — PAIN SCALES - GENERAL: PAINLEVEL: NO PAIN (0)

## 2024-07-02 NOTE — PROGRESS NOTES
SUBJECTIVE:  Fatou Brian is a 74 year old female who presents for follow-up.She was admitted from 11/1/2021 to 11/4/2021 with NSTEMI.  Patient had angiogram and stent to obtuse marginal 1.  Postprocedure had no complications.    Her past medical history is significant for hypertension, dyslipidemia, depression, CKD stage 3, IBS, h/o tobacco abuse, Sjogren's syndrome with myopathy for which she takes hydroxychloroquine, GERD, chronic back pain.  Also known to have left subclavian stenosis and variation in blood pressure between both arms.    Today patient had no new complaints.  His mobility is progressively decreasing and she is barely ambulating at home.  Denied chest pain but do have some shortness of breath.  No other new cardiac symptoms.  Patient's home blood pressure is completely normal.  At clinic she do have mild elevation of blood pressure.  Especially today she was very upset with the [employer] mobility  and he was stressed out.  Also her vision is progressively decreasing.  In April of this year patient was admitted to hospital with complaint of chest pain in between the shoulder blades.  This followed after a fall.  Patient had minimal troponin elevation.  Peak troponin was 61.  Patient Active Problem List    Diagnosis Date Noted    Infrarenal abdominal aortic aneurysm (AAA) without rupture (H24) 04/23/2024     Priority: Medium     3.1 cm 4/2024.  Repeat scan in 3 years      Accelerated hypertension 04/13/2024     Priority: Medium    Physical deconditioning 04/13/2024     Priority: Medium    Elevated troponin 04/13/2024     Priority: Medium    Fall at home, initial encounter 04/13/2024     Priority: Medium    Incomplete bladder emptying 10/19/2023     Priority: Medium    Continuous leakage of urine 10/19/2023     Priority: Medium    Snoring 10/19/2023     Priority: Medium    Excessive daytime sleepiness 10/19/2023     Priority: Medium    Abnormal finding on thyroid function test 10/15/2023      Priority: Medium    Cerebral aneurysm, nonruptured 10/18/2022     Priority: Medium     s/p clipping of left MCA trifurcation aneurysm      Centrilobular emphysema (H) 10/18/2022     Priority: Medium    Coronary artery disease involving native coronary artery of native heart 10/16/2022     Priority: Medium     s/p PCI with LIS 11/2021 to OM1.  LAD also 20% stenosed. Plan DAPT x 12 months and high-dose statin      Chest pain, unspecified type 11/02/2021     Priority: Medium    NSTEMI (non-ST elevated myocardial infarction) (H) 11/02/2021     Priority: Medium    BMI 35.0-39.9 with comorbidity (H) 09/24/2020     Priority: Medium    CKD (chronic kidney disease) stage 3, GFR 30-59 ml/min (H) 09/27/2019     Priority: Medium    Primary osteoarthritis of both knees 01/14/2017     Priority: Medium    Bilateral low back pain with sciatica, sciatica laterality unspecified, unspecified chronicity 01/14/2017     Priority: Medium    ACP (advance care planning) 11/12/2015     Priority: Medium     Advance Care Planning 3/20/2017: ACP Facilitation Session:    Fatou Brian presented for ACP Facilitation session at a group session. She was accompanied by no one. [facility] information provided and resources reviewed. She currently wishes to give additional consideration to ACP  She currently has the following questions or concerns about Advance Care Planning: none.  Confirmed/documented legally designated decision maker(s).  Added by Karli Aaron RN, Advance Care Planning Liaison.'  Advance Care Planning 11/12/2015: ACP Review and Resources Provided:  Reviewed chart for advance care plan.  Fatou Brian has no plan or code status on file. Discussed available resources and provided with information. Confirmed code status reflects current choices pending further ACP discussions.  Added by Theresa Bennett        Subclavian artery stenosis (H24)      Priority: Medium     Left      Left arm pain 04/17/2014      Priority: Medium    Arm pain 04/17/2014     Priority: Medium    Pure hypercholesterolemia 01/16/2014     Priority: Medium    Knee pain 10/25/2013     Priority: Medium    Poor memory 09/24/2013     Priority: Medium    Low back pain 09/24/2013     Priority: Medium     Diagnosis updated by automated process. Provider to review and confirm.    gabapentin (NEURONTIN) 100 MG capsule, Tramadol  Last  check - 1/2/17          Senile keratosis 09/24/2013     Priority: Medium    Spinal stenosis 09/24/2013     Priority: Medium    H/O hysterectomy for benign disease 09/24/2013     Priority: Medium    Bilateral hearing loss 09/24/2013     Priority: Medium    Presbyopia 09/24/2013     Priority: Medium    Sjogren's syndrome with myopathy (H24) 04/04/2013     Priority: Medium    Hypertension goal BP (blood pressure) < 130/80 04/04/2013     Priority: Medium    Osteoarthritis of knee 04/04/2013     Priority: Medium    GERD (gastroesophageal reflux disease) 04/04/2013     Priority: Medium    Constipation 04/04/2013     Priority: Medium    Osteopenia of both hips 04/04/2013     Priority: Medium     DEXA 2/2018: lowest T = -2.3 (right total hip)       Irritable bowel syndrome with diarrhea 04/04/2013     Priority: Medium    HL (hearing loss) 04/04/2013     Priority: Medium    Rosacea 04/04/2013     Priority: Medium    Gastroesophageal reflux disease without esophagitis 01/19/2011     Priority: Medium    .  Current Outpatient Medications   Medication Sig Dispense Refill    acetaminophen (TYLENOL) 650 MG CR tablet Take 2 tablets (1,300 mg) by mouth every 8 hours as needed for mild pain or pain 270 tablet 3    amLODIPine (NORVASC) 5 MG tablet Take 1 tablet (5 mg) by mouth daily 90 tablet 1    aspirin 81 MG EC tablet Take 1 tablet (81 mg) by mouth daily Start tomorrow. 90 tablet 3    famotidine (PEPCID) 20 MG tablet TAKE 1 TABLET BY MOUTH TWICE DAILY AS NEEDED 180 tablet 3    hydroCHLOROthiazide 12.5 MG tablet Take 1 tablet (12.5 mg) by  mouth daily 90 tablet 1    Lidocaine (LIDOCARE) 4 % Patch Place 1 patch onto the skin every 24 hours To prevent lidocaine toxicity, patient should be patch free for 12 hrs daily. 14 patch 0    loratadine (CLARITIN) 10 MG tablet Take one tab twice per day. 180 tablet 3    losartan (COZAAR) 100 MG tablet Take 1 tablet (100 mg) by mouth daily 90 tablet 0    methocarbamol (ROBAXIN) 500 MG tablet Take 1 tablet (500 mg) by mouth 3 times daily as needed for muscle spasms 15 tablet 0    metoprolol succinate ER (TOPROL XL) 50 MG 24 hr tablet Take 3 tablets (150 mg) by mouth daily 270 tablet 2    Multiple Vitamins-Minerals (PRESERVISION AREDS 2 PO) Take 1 capsule by mouth 2 times daily (Patient not taking: Reported on 4/23/2024)      nitroGLYcerin (NITROSTAT) 0.4 MG sublingual tablet For chest pain place 1 tablet under the tongue every 5 minutes for 3 doses. If symptoms persist 5 minutes after 1st dose call 911. (Patient not taking: Reported on 4/23/2024) 12 tablet 0    ofloxacin (FLOXIN) 0.3 % otic solution Place 10 drops into the right ear daily 10 mL 0    pantoprazole (PROTONIX) 40 MG EC tablet TAKE 1 TABLET DAILY 90 tablet 3    senna-docusate (SENOKOT-S/PERICOLACE) 8.6-50 MG tablet Take 2 tablets by mouth 2 times daily as needed for constipation       No current facility-administered medications for this visit.     Past Medical History:   Diagnosis Date    Arthritis     Atherosclerosis     diffuse    Chest pain, unspecified type 11/02/2021    CKD (chronic kidney disease) stage 3, GFR 30-59 ml/min (H) 09/27/2019    Coronary artery disease involving native coronary artery of native heart 10/16/2022    Emphysema of lung (H)     Former smoker     Quit 2009    GERD (gastroesophageal reflux disease)     HLD (hyperlipidaemia)     Hypertension     Major depression in partial remission (H24) 07/03/2014    Morbid obesity (H) 09/24/2020    NSTEMI (non-ST elevated myocardial infarction) (H) 11/02/2021    S/P LIS to OM1    Sjogren's  syndrome (H24)     Sjogren's syndrome with myopathy (H24) 04/04/2013    Spinal stenosis     Subclavian artery stenosis (H24)     Left     Past Surgical History:   Procedure Laterality Date    aneursym[  1983, 1991    Has metal in head    CHOLECYSTECTOMY      CLOSED RX PROX HUMERUS FRACTURE      10 pins placed    COLONOSCOPY      COLONOSCOPY  1/24/2014    Procedure: COMBINED COLONOSCOPY, SINGLE BIOPSY/POLYPECTOMY BY BIOPSY;  COLONOSCOPY;  Surgeon: Ranjit Pa MD;  Location:  GI    CV HEART CATHETERIZATION WITH POSSIBLE INTERVENTION N/A 11/2/2021    Procedure: Heart Catheterization with Possible Intervention;  Surgeon: Keeley Pérez MD;  Location:  HEART CARDIAC CATH LAB    CV PCI STENT DRUG ELUTING N/A 11/2/2021    Procedure: Percutaneous Coronary Intervention Stent Drug Eluting;  Surgeon: Keeley Pérez MD;  Location:  HEART CARDIAC CATH LAB    HC ECP WITH CATARACT SURGERY      both eyes    HYSTERECTOMY, JANET       Allergies   Allergen Reactions    Blood Transfusion Related (Informational Only) Other (See Comments)     Patient has a history of a clinically significant antibody against RBC antigens.  A delay in compatible RBCs may occur.    Chantix [Varenicline]      suicidal    Influenza Virus Vaccine H5n1      Muscle aches dizzy, nauseated  Light headed    Morphine Sulfate      Sensitivity when in hospital    Omeprazole Magnesium Nausea     Intolerance      Vioxx     Cetirizine-Pseudoephedrine Er Rash    Niacin Itching and Rash    Zetia [Ezetimibe] Other (See Comments)     Muscle pain     Social History     Socioeconomic History    Marital status: Single     Spouse name: Not on file    Number of children: Not on file    Years of education: Not on file    Highest education level: Not on file   Occupational History    Not on file   Tobacco Use    Smoking status: Former     Current packs/day: 0.00     Average packs/day: 1.5 packs/day for 53.5 years (80.2 ttl pk-yrs)     Types:  Cigarettes     Start date: 4/10/1956     Quit date: 10/1/2009     Years since quittin.7    Smokeless tobacco: Never   Vaping Use    Vaping status: Never Used   Substance and Sexual Activity    Alcohol use: Yes     Comment: occas.    Drug use: No    Sexual activity: Not Currently     Partners: Male   Other Topics Concern    Parent/sibling w/ CABG, MI or angioplasty before 65F 55M? Yes     Comment: brother     Service Not Asked    Blood Transfusions Not Asked    Caffeine Concern No    Occupational Exposure Not Asked    Hobby Hazards Not Asked    Sleep Concern Not Asked    Stress Concern Not Asked    Weight Concern Not Asked    Special Diet Not Asked    Back Care Not Asked    Exercise No    Bike Helmet Not Asked    Seat Belt Yes    Self-Exams Not Asked   Social History Narrative    --------------------------------------------------------------------------------    Surgical History  Return To Top     Status Surgery Time Frame Comment Record Date     Inactive  neck surgery    benign throat growths removed  2007      Inactive  Cataract  1980's  bilateral  2007      Inactive  hysterectomy  1981  TAHBSO  2007      Inactive  Brain surgery  ;  brain aneurysm  2007      Inactive  cholecsystectomy  1992    2007          --------------------------------------------------------------------------------    Food Allergy  Return To Top     Allergen Reaction Comment Record Date     * No known food allergies      2007          --------------------------------------------------------------------------------    Drug Allergy  Return To Top     Allergen Reaction Comment Record Date     MORPHINE SULFATE    sensitivity when in hospital  2012      Vioxx      10/26/2010      NIACIN PREPARATIONS  itching; rash    10/26/2010      Zyrtec  rash    10/26/2010      OMEPRAZOLE/LANSOPRAZOLE  intolerance; nausea  PRILOSEC OTC ONLY OMEPREZOLE OK AND PREVACID OK; PRILOSEC OTC ONLY OMEPREZOLE OK  AND PREVACID OK  4/10/2008          --------------------------------------------------------------------------------    Environment Allergy  Return To Top     Allergen Reaction Comment Record Date     * No known environmental allergies      11/7/2007          --------------------------------------------------------------------------------    Immunization History Return To Top     Funding Source Vaccine Type of Vaccine Date Given Route Site Given Lot#  Exp. Date Date on VIS Date Given VIS Vaccinator     Private  Herpes Zoster (Zostavax) age 60 +  Herpes Zoster  1/29/2009  SQ  RA  [id number]  MRK  04/28/2010 9/11/06 [facility]      Private  Influenza (Adult) Seasonal  Flu Adult  10/14/2010  IM  Left Deltoid; Left Deltoid  [id number]  SP  6/30/2011  8/10/2010  10/14/2010  MAJO [facility]      Private  Tdap (Adacel) Adult  Tdap  10/14/2010  IM  Left Deltoid; Left Deltoid  [id number]  SP; SP  06/03/2012  11/18/2008  10/14/2010  MAJO Corley [facility]          --------------------------------------------------------------------------------    Social History  Return To Top     Question Answer Comment Record Date     Marital status      9/24/2012      Emotional Abuse  No    9/16/2011      Exercise      4/9/2008      Caffeine  Yes  1 1/2 cups q.d.   4/9/2008      Physical Abuse  No    9/16/2011      Sealtbelts  Yes    4/9/2008      Sexual Abuse  No    9/16/2011      Breast/Testicle Self Check  Yes  Occasional  4/9/2008      Number of children  0    4/9/2008      Tobacco history  Quit this year  10/26/09  11/17/2009      Number of years using tobacco  35    11/11/2008      Number of cigarettes/day      11/11/2008      Alcohol history  Currently drinks alcohol    9/16/2011      Frequency of drinks  1-4 drinks per week    11/7/2007      Assist to Quit Information  Form Given to Patient    11/11/2008          --------------------------------------------------------------------------------    History - Overall Remark:  9/24/2012 Return To Top     MEDICAL HX: Collapsed lung morphine sensitivitiy last hospitalization    --------------------------------------------------------------------------------    Medical History Return To Top     Status Diagnosis Time Frame Comment Record Date     Active (389.18) - C - Sensorineural hearing loss, bilateral      9/24/2012      Active (V72.62) - I - Laboratory exam as part of general physical exam      9/24/2012      Active (V77.0) - C - Screening, thyroid disorders      9/24/2012      Active (462) - C - Sore throat      9/24/2012      Active (V82.9) - C - Screening, vitamin d deficiency      9/24/2012      Active (466.0) - C - Bronchitis, acute      8/17/2012      Active (372.72) - C - Subconjunctival hemorrhage    RIGHT EYE  11/28/2011      Active (715.16) - C - Osteoarthritis, knee    MANY YEARS BILATERAL  11/28/2011      Active (728.85) - C - Muscle spasm      11/28/2011      Active (V76.12) - C - Screening, mammogram      9/21/2011      Active (724.02) - C - Spinal stenosis, lumbar region    LUMBAR 4-5 AREA  9/21/2011      Active (782.3) - C - Edema, localized, NOS    ANKLES FEET AND CALVES  9/16/2011      Active (455.2) - C - Hemorrhoids, Internal w/Complication    INTERMITTENT BLEEDING  9/16/2011      Active (V76.51) - C - Screening, colon      9/16/2011      Active (719.42) - C - Elbow pain    RIGHT OLECRANON WITH POSSIBLE MOUSE IN BURSA  9/16/2011      Active (724.02) - C - Spinal stenosis, lumbar region      9/16/2011      Active (722.52) - C - Degenerative disc disease, lumbar      9/16/2011      Active (564.00) - C - Constipation      8/19/2011      Active (807.00) - C - Rib fracture      8/19/2011      Active (433.10) - C - CAROTID ART OCC W/O INFARC      4/28/2011      Active (786.52) - C - Chest wall pain    LEFT CHEST WALL  3/25/2011      Active (780.4) - C - Dizziness/vertigo, NOS      1/3/2011      Active (710.2) - C - Sjogren's disease    confirmed will send to rheumatology   10/14/2010      Active (389.10) - C - Hearing loss, sensorineural    bilateral  10/14/2010      Active 528.00 Stomatits, mucositis, unpec., NOS      12/30/2009      Active 523.01 ACUTE GINGIVITIS NONPLAQUE INDUCED      12/30/2009      Active 241.1 NONTOXIC MULTINODUL GOITER      4/29/2009      Active 241.0 Thyroid nodule      4/28/2009      Active 780.79 Fatigue      11/11/2008      Active (401.1) - C - Hypertension, benign      4/10/2008      Active (729.5) - C - limb pain    HUMERUS FRACTURE WITH RESIDUAL PAIN JULY 2008 PLATE AND 10 SCREWS  4/10/2008      Active (719.46) - C - Knee pain    OSTEOARTHRITIS OF KNEES  4/10/2008      Active 728.6 Dupuytren's contracture    right hand  4/10/2008      Active (723.1) - C - Neck pain    INTERMITTENT NECK PAIN  4/10/2008      Active 796.2 Elevated blood pressure- no hypertension dx      4/10/2008      Active (272.4) - C - Hyperlipidemia      4/10/2008      Active (530.81) - C - GERD      4/10/2008      Active (780.52) - C - Insomnia    associated with adjustment  4/10/2008      Active (V70.0) - C - Routine Medical Exam      11/7/2007      Active 461.0 Sinusitis, acute, maxillary      8/14/2007      Active 733.00 Osteoporosis, unspec.      8/1/2007      Active (305.1) - C - Tobacco abuse      8/1/2007      Active 812.20 Humerus, closed, unspec.      8/1/2007      Active (V72.83) - C - Preop    humerus fx rt  8/1/2007      Inactive  (789.00) - I - Abdominal pain      11/11/2010      Inactive  (v06.1) - C - Tdap vaccine      10/14/2010      Inactive  (V72.62) - C - Laboratory exam as part of general physical exam      10/14/2010      Inactive  (V04.81) - C - Influenza vaccination      10/14/2010      Inactive  (V76.51) - C - Screening, colon      10/14/2010      Inactive  (305.1) - C - Tobacco dependence    resolved  11/17/2009      Inactive  490 Bronchitis      4/28/2009      Inactive  466.0 Bronchitis, acute      1/29/2009      Inactive  V05.8 Immunization, other, specified       2009      Inactive  V72.31 Screening, pap      2008      Inactive  Abnormal pap      2008          --------------------------------------------------------------------------------    Medication History Return To Top                 --------------------------------------------------------------------------------    Family History  Return To Top     Status Relationship Disease Comment Record Date     Alive Brother      2007      Alive Sister      2007      Alive Brother      2007      Alive Sister      2007      Alive Sister      2007         Brother  heart    2007         Brother  kidney cancer    2007         Mother  stomach cancer  age 79  2007         Brother  heart    2007         Father  heart;AAA  age 80  2007          --------------------------------------------------------------------------------    Infection History Return To Top     Question Answer Comment Record Date     History of HPV  No    2008      Live with someone with TB or exposed to TB  No    2008      History of Hepatitis B  No    2008      History of chlamydia  No    2008      History of gonorrhea  No    2008      History of syphilis  No    2008          --------------------------------------------------------------------------------             Social Determinants of Health     Financial Resource Strain: Not on file   Food Insecurity: Not on file   Transportation Needs: Not on file   Physical Activity: Not on file   Stress: Not on file   Social Connections: Not on file   Interpersonal Safety: Not on file   Housing Stability: Not on file     Family History   Problem Relation Age of Onset    Thyroid Disease Mother     Arthritis Mother         Rheumatoid    Osteoporosis Mother     Cancer Mother         stomach    Cerebrovascular Disease Mother     Heart Disease Father     Cancer Father     Heart Disease  Brother         fibulation          REVIEW OF SYSTEMS:  General: negative, fever, chills, night sweats  Skin: negative, acne, rash, and scaling  Eyes: negative, double vision, eye pain, and photophobia  Ears/Nose/Throat: negative, nasal congestion, and purulent rhinorrhea  Respiratory: No cough, No hemoptysis, and negative  Cardiovascular: negative, palpitations, tachycardia, irregular heart beat, chest pain, exertional chest pain or pressure, paroxysmal nocturnal dyspnea, and orthopnea       OBJECTIVE:  not currently breastfeeding.  General Appearance: alert and no distress  Head: Normocephalic. No masses, lesions, tenderness or abnormalities  Eyes: conjuctiva clear, PERRL, EOM intact  Ears: External ears normal. Canals clear. TM's normal.  Nose: Nares normal  Mouth: normal  Neck: Supple, no cervical adenopathy, no thyromegaly  Lungs: clear to auscultation  Cardiac: regular rate and rhythm, normal S1 and S2, no murmur       ASSESSMENT/PLAN:  Patient here for follow-up.  She was admitted in November 2021 with NSTEMI.  Had coronary angiogram and PCI as shown below.                   Her past medical history is significant for hypertension, dyslipidemia, depression, CKD stage 3, IBS, h/o tobacco abuse, Sjogren's syndrome with myopathy for which she takes hydroxychloroquine, patient discontinued hydroxychloroquine due to decreasing vision.  GERD, chronic back pain.  Also known to have left subclavian stenosis and variation in blood pressure between both arms.   Patient had no definitive cardiac complaints apart from shortness of breath on exertion which is not new.  Her vision is decreasing significantly.  She states she has macular degeneration and her visibility is poor.  This also makes ambulation difficult.  She barely walks around at home.  Denied chest pain.  Her current cardiac medications are Toprol- mg daily, hydrochlorothiazide 12.5 mg daily amlodipine 5 mg daily losartan 100 mg daily.  Also takes  aspirin 81 mg daily.  Patient had an admission in April of this year.  This followed after a fall and presented with interscapular chest pain and increased blood pressure.  Had mild troponin elevation of 61.  Patient had an echocardiogram which was completely normal.  With no regional wall motion abnormalities.  Prior angiogram did not show any other significant flow-limiting lesions.  Her intervention was in a small branch.  As patient remain asymptomatic from cardiac standpoint she is advised to continue her current medications and return to clinic in 1 year for a follow-up.  Total visit duration 20 minutes.  This included face-to-face interview, physical exam, chart review, review of recent hospitalization records, echocardiogram, lab results and documentation.

## 2024-07-02 NOTE — LETTER
7/2/2024      RE: Fatou Brian  [address]  [facility] [address]       Dear Colleague,    Thank you for the opportunity to participate in the care of your patient, Fatou Brian, at the [facility] [place] at [facility]. Please see a copy of my visit note below.       SUBJECTIVE:  Fatou Brian is a 74 year old female who presents for follow-up.She was admitted from 11/1/2021 to 11/4/2021 with NSTEMI.  Patient had angiogram and stent to obtuse marginal 1.  Postprocedure had no complications.    Her past medical history is significant for hypertension, dyslipidemia, depression, CKD stage 3, IBS, h/o tobacco abuse, Sjogren's syndrome with myopathy for which she takes hydroxychloroquine, GERD, chronic back pain.  Also known to have left subclavian stenosis and variation in blood pressure between both arms.    Today patient had no new complaints.  His mobility is progressively decreasing and she is barely ambulating at home.  Denied chest pain but do have some shortness of breath.  No other new cardiac symptoms.  Patient's home blood pressure is completely normal.  At clinic she do have mild elevation of blood pressure.  Especially today she was very upset with the [employer] mobility  and he was stressed out.  Also her vision is progressively decreasing.  In April of this year patient was admitted to hospital with complaint of chest pain in between the shoulder blades.  This followed after a fall.  Patient had minimal troponin elevation.  Peak troponin was 61.  Patient Active Problem List    Diagnosis Date Noted     Infrarenal abdominal aortic aneurysm (AAA) without rupture (H24) 04/23/2024     Priority: Medium     3.1 cm 4/2024.  Repeat scan in 3 years       Accelerated hypertension 04/13/2024     Priority: Medium     Physical deconditioning 04/13/2024     Priority: Medium     Elevated troponin 04/13/2024     Priority: Medium      Fall at home, initial encounter 04/13/2024     Priority: Medium     Incomplete bladder emptying 10/19/2023     Priority: Medium     Continuous leakage of urine 10/19/2023     Priority: Medium     Snoring 10/19/2023     Priority: Medium     Excessive daytime sleepiness 10/19/2023     Priority: Medium     Abnormal finding on thyroid function test 10/15/2023     Priority: Medium     Cerebral aneurysm, nonruptured 10/18/2022     Priority: Medium     s/p clipping of left MCA trifurcation aneurysm       Centrilobular emphysema (H) 10/18/2022     Priority: Medium     Coronary artery disease involving native coronary artery of native heart 10/16/2022     Priority: Medium     s/p PCI with LIS 11/2021 to OM1.  LAD also 20% stenosed. Plan DAPT x 12 months and high-dose statin       Chest pain, unspecified type 11/02/2021     Priority: Medium     NSTEMI (non-ST elevated myocardial infarction) (H) 11/02/2021     Priority: Medium     BMI 35.0-39.9 with comorbidity (H) 09/24/2020     Priority: Medium     CKD (chronic kidney disease) stage 3, GFR 30-59 ml/min (H) 09/27/2019     Priority: Medium     Primary osteoarthritis of both knees 01/14/2017     Priority: Medium     Bilateral low back pain with sciatica, sciatica laterality unspecified, unspecified chronicity 01/14/2017     Priority: Medium     ACP (advance care planning) 11/12/2015     Priority: Medium     Advance Care Planning 3/20/2017: ACP Facilitation Session:    Fatou Brian presented for ACP Facilitation session at a group session. She was accompanied by no one. [facility] information provided and resources reviewed. She currently wishes to give additional consideration to ACP  She currently has the following questions or concerns about Advance Care Planning: none.  Confirmed/documented legally designated decision maker(s).  Added by Karli Aaron RN, Advance Care Planning Liaison.'  Advance Care Planning 11/12/2015: ACP Review and Resources Provided:   Reviewed chart for advance care plan.  Ftaou Brian has no plan or code status on file. Discussed available resources and provided with information. Confirmed code status reflects current choices pending further ACP discussions.  Added by Theresa Bennett         Subclavian artery stenosis (H24)      Priority: Medium     Left       Left arm pain 04/17/2014     Priority: Medium     Arm pain 04/17/2014     Priority: Medium     Pure hypercholesterolemia 01/16/2014     Priority: Medium     Knee pain 10/25/2013     Priority: Medium     Poor memory 09/24/2013     Priority: Medium     Low back pain 09/24/2013     Priority: Medium     Diagnosis updated by automated process. Provider to review and confirm.    gabapentin (NEURONTIN) 100 MG capsule, Tramadol  Last  check - 1/2/17           Senile keratosis 09/24/2013     Priority: Medium     Spinal stenosis 09/24/2013     Priority: Medium     H/O hysterectomy for benign disease 09/24/2013     Priority: Medium     Bilateral hearing loss 09/24/2013     Priority: Medium     Presbyopia 09/24/2013     Priority: Medium     Sjogren's syndrome with myopathy (H24) 04/04/2013     Priority: Medium     Hypertension goal BP (blood pressure) < 130/80 04/04/2013     Priority: Medium     Osteoarthritis of knee 04/04/2013     Priority: Medium     GERD (gastroesophageal reflux disease) 04/04/2013     Priority: Medium     Constipation 04/04/2013     Priority: Medium     Osteopenia of both hips 04/04/2013     Priority: Medium     DEXA 2/2018: lowest T = -2.3 (right total hip)        Irritable bowel syndrome with diarrhea 04/04/2013     Priority: Medium     HL (hearing loss) 04/04/2013     Priority: Medium     Rosacea 04/04/2013     Priority: Medium     Gastroesophageal reflux disease without esophagitis 01/19/2011     Priority: Medium    .  Current Outpatient Medications   Medication Sig Dispense Refill     acetaminophen (TYLENOL) 650 MG CR tablet Take 2 tablets (1,300 mg) by mouth every 8  hours as needed for mild pain or pain 270 tablet 3     amLODIPine (NORVASC) 5 MG tablet Take 1 tablet (5 mg) by mouth daily 90 tablet 1     aspirin 81 MG EC tablet Take 1 tablet (81 mg) by mouth daily Start tomorrow. 90 tablet 3     famotidine (PEPCID) 20 MG tablet TAKE 1 TABLET BY MOUTH TWICE DAILY AS NEEDED 180 tablet 3     hydroCHLOROthiazide 12.5 MG tablet Take 1 tablet (12.5 mg) by mouth daily 90 tablet 1     Lidocaine (LIDOCARE) 4 % Patch Place 1 patch onto the skin every 24 hours To prevent lidocaine toxicity, patient should be patch free for 12 hrs daily. 14 patch 0     loratadine (CLARITIN) 10 MG tablet Take one tab twice per day. 180 tablet 3     losartan (COZAAR) 100 MG tablet Take 1 tablet (100 mg) by mouth daily 90 tablet 0     methocarbamol (ROBAXIN) 500 MG tablet Take 1 tablet (500 mg) by mouth 3 times daily as needed for muscle spasms 15 tablet 0     metoprolol succinate ER (TOPROL XL) 50 MG 24 hr tablet Take 3 tablets (150 mg) by mouth daily 270 tablet 2     Multiple Vitamins-Minerals (PRESERVISION AREDS 2 PO) Take 1 capsule by mouth 2 times daily (Patient not taking: Reported on 4/23/2024)       nitroGLYcerin (NITROSTAT) 0.4 MG sublingual tablet For chest pain place 1 tablet under the tongue every 5 minutes for 3 doses. If symptoms persist 5 minutes after 1st dose call 911. (Patient not taking: Reported on 4/23/2024) 12 tablet 0     ofloxacin (FLOXIN) 0.3 % otic solution Place 10 drops into the right ear daily 10 mL 0     pantoprazole (PROTONIX) 40 MG EC tablet TAKE 1 TABLET DAILY 90 tablet 3     senna-docusate (SENOKOT-S/PERICOLACE) 8.6-50 MG tablet Take 2 tablets by mouth 2 times daily as needed for constipation       No current facility-administered medications for this visit.     Past Medical History:   Diagnosis Date     Arthritis      Atherosclerosis     diffuse     Chest pain, unspecified type 11/02/2021     CKD (chronic kidney disease) stage 3, GFR 30-59 ml/min (H) 09/27/2019     Coronary  artery disease involving native coronary artery of native heart 10/16/2022     Emphysema of lung (H)      Former smoker     Quit 2009     GERD (gastroesophageal reflux disease)      HLD (hyperlipidaemia)      Hypertension      Major depression in partial remission (H24) 07/03/2014     Morbid obesity (H) 09/24/2020     NSTEMI (non-ST elevated myocardial infarction) (H) 11/02/2021    S/P LIS to OM1     Sjogren's syndrome (H24)      Sjogren's syndrome with myopathy (H24) 04/04/2013     Spinal stenosis      Subclavian artery stenosis (H24)     Left     Past Surgical History:   Procedure Laterality Date     aneursym[  1983, 1991    Has metal in head     CHOLECYSTECTOMY       CLOSED RX PROX HUMERUS FRACTURE      10 pins placed     COLONOSCOPY       COLONOSCOPY  1/24/2014    Procedure: COMBINED COLONOSCOPY, SINGLE BIOPSY/POLYPECTOMY BY BIOPSY;  COLONOSCOPY;  Surgeon: Ranjit Pa MD;  Location:  GI     CV HEART CATHETERIZATION WITH POSSIBLE INTERVENTION N/A 11/2/2021    Procedure: Heart Catheterization with Possible Intervention;  Surgeon: Keeley Pérez MD;  Location:  HEART CARDIAC CATH LAB     CV PCI STENT DRUG ELUTING N/A 11/2/2021    Procedure: Percutaneous Coronary Intervention Stent Drug Eluting;  Surgeon: Keeley Pérez MD;  Location:  HEART CARDIAC CATH LAB     HC ECP WITH CATARACT SURGERY      both eyes     HYSTERECTOMY, JANET       Allergies   Allergen Reactions     Blood Transfusion Related (Informational Only) Other (See Comments)     Patient has a history of a clinically significant antibody against RBC antigens.  A delay in compatible RBCs may occur.     Chantix [Varenicline]      suicidal     Influenza Virus Vaccine H5n1      Muscle aches dizzy, nauseated  Light headed     Morphine Sulfate      Sensitivity when in hospital     Omeprazole Magnesium Nausea     Intolerance       Vioxx      Cetirizine-Pseudoephedrine Er Rash     Niacin Itching and Rash     Zetia [Ezetimibe]  Other (See Comments)     Muscle pain     Social History     Socioeconomic History     Marital status: Single     Spouse name: Not on file     Number of children: Not on file     Years of education: Not on file     Highest education level: Not on file   Occupational History     Not on file   Tobacco Use     Smoking status: Former     Current packs/day: 0.00     Average packs/day: 1.5 packs/day for 53.5 years (80.2 ttl pk-yrs)     Types: Cigarettes     Start date: 4/10/1956     Quit date: 10/1/2009     Years since quittin.7     Smokeless tobacco: Never   Vaping Use     Vaping status: Never Used   Substance and Sexual Activity     Alcohol use: Yes     Comment: occas.     Drug use: No     Sexual activity: Not Currently     Partners: Male   Other Topics Concern     Parent/sibling w/ CABG, MI or angioplasty before 65F 55M? Yes     Comment: brother      Service Not Asked     Blood Transfusions Not Asked     Caffeine Concern No     Occupational Exposure Not Asked     Hobby Hazards Not Asked     Sleep Concern Not Asked     Stress Concern Not Asked     Weight Concern Not Asked     Special Diet Not Asked     Back Care Not Asked     Exercise No     Bike Helmet Not Asked     Seat Belt Yes     Self-Exams Not Asked   Social History Narrative    --------------------------------------------------------------------------------    Surgical History  Return To Top     Status Surgery Time Frame Comment Record Date     Inactive  neck surgery    benign throat growths removed  2007      Inactive  Cataract  1980's  bilateral  2007      Inactive  hysterectomy  1981  TAHBSO  2007      Inactive  Brain surgery  ;1991  brain aneurysm  2007      Inactive  cholecsystectomy  2007          --------------------------------------------------------------------------------    Food Allergy  Return To Top     Allergen Reaction Comment Record Date     * No known food allergies      2007           --------------------------------------------------------------------------------    Drug Allergy  Return To Top     Allergen Reaction Comment Record Date     MORPHINE SULFATE    sensitivity when in hospital  9/24/2012      Vioxx      10/26/2010      NIACIN PREPARATIONS  itching; rash    10/26/2010      Zyrtec  rash    10/26/2010      OMEPRAZOLE/LANSOPRAZOLE  intolerance; nausea  PRILOSEC OTC ONLY OMEPREZOLE OK AND PREVACID OK; PRILOSEC OTC ONLY OMEPREZOLE OK AND PREVACID OK  4/10/2008          --------------------------------------------------------------------------------    Environment Allergy  Return To Top     Allergen Reaction Comment Record Date     * No known environmental allergies      11/7/2007          --------------------------------------------------------------------------------    Immunization History Return To Top     Funding Source Vaccine Type of Vaccine Date Given Route Site Given Lot#  Exp. Date Date on VIS Date Given VIS Vaccinator     Private  Herpes Zoster (Zostavax) age 60 +  Herpes Zoster  1/29/2009  SQ  RA  [id number]  MRK  04/28/2010 9/11/06 [facility]29[facility]      Private  Influenza (Adult) Seasonal  Flu Adult  10/14/2010  IM  Left Deltoid; Left Deltoid  [id number]  SP  6/30/2011  8/10/2010  10/14/2010  MAJO Corley [facility]      Private  Tdap (Adacel) Adult  Tdap  10/14/2010  IM  Left Deltoid; Left Deltoid  [id number]  SP; SP  06/03/2012  11/18/2008  10/14/2010  MAJO Corley CMA          --------------------------------------------------------------------------------    Social History  Return To Top     Question Answer Comment Record Date     Marital status      9/24/2012      Emotional Abuse  No    9/16/2011      Exercise      4/9/2008      Caffeine  Yes  1 1/2 cups q.d.   4/9/2008      Physical Abuse  No    9/16/2011      Sealtbelts  Yes    4/9/2008      Sexual Abuse  No    9/16/2011      Breast/Testicle Self Check  Yes  Occasional  4/9/2008      Number of children  0    4/9/2008       Tobacco history  Quit this year  10/26/09  11/17/2009      Number of years using tobacco  35    11/11/2008      Number of cigarettes/day      11/11/2008      Alcohol history  Currently drinks alcohol    9/16/2011      Frequency of drinks  1-4 drinks per week    11/7/2007      Assist to Quit Information  Form Given to Patient    11/11/2008          --------------------------------------------------------------------------------    History - Overall Remark: 9/24/2012 Return To Top     MEDICAL HX: Collapsed lung morphine sensitivitiy last hospitalization    --------------------------------------------------------------------------------    Medical History Return To Top     Status Diagnosis Time Frame Comment Record Date     Active (389.18) - C - Sensorineural hearing loss, bilateral      9/24/2012      Active (V72.62) - I - Laboratory exam as part of general physical exam      9/24/2012      Active (V77.0) - C - Screening, thyroid disorders      9/24/2012      Active (462) - C - Sore throat      9/24/2012      Active (V82.9) - C - Screening, vitamin d deficiency      9/24/2012      Active (466.0) - C - Bronchitis, acute      8/17/2012      Active (372.72) - C - Subconjunctival hemorrhage    RIGHT EYE  11/28/2011      Active (715.16) - C - Osteoarthritis, knee    MANY YEARS BILATERAL  11/28/2011      Active (728.85) - C - Muscle spasm      11/28/2011      Active (V76.12) - C - Screening, mammogram      9/21/2011      Active (724.02) - C - Spinal stenosis, lumbar region    LUMBAR 4-5 AREA  9/21/2011      Active (782.3) - C - Edema, localized, NOS    ANKLES FEET AND CALVES  9/16/2011      Active (455.2) - C - Hemorrhoids, Internal w/Complication    INTERMITTENT BLEEDING  9/16/2011      Active (V76.51) - C - Screening, colon      9/16/2011      Active (719.42) - C - Elbow pain    RIGHT OLECRANON WITH POSSIBLE MOUSE IN BURSA  9/16/2011      Active (724.02) - C - Spinal stenosis, lumbar region      9/16/2011      Active  (722.52) - C - Degenerative disc disease, lumbar      9/16/2011      Active (564.00) - C - Constipation      8/19/2011      Active (807.00) - C - Rib fracture      8/19/2011      Active (433.10) - C - CAROTID ART OCC W/O INFARC      4/28/2011      Active (786.52) - C - Chest wall pain    LEFT CHEST WALL  3/25/2011      Active (780.4) - C - Dizziness/vertigo, NOS      1/3/2011      Active (710.2) - C - Sjogren's disease    confirmed will send to rheumatology  10/14/2010      Active (389.10) - C - Hearing loss, sensorineural    bilateral  10/14/2010      Active 528.00 Stomatits, mucositis, unpec., NOS      12/30/2009      Active 523.01 ACUTE GINGIVITIS NONPLAQUE INDUCED      12/30/2009      Active 241.1 NONTOXIC MULTINODUL GOITER      4/29/2009      Active 241.0 Thyroid nodule      4/28/2009      Active 780.79 Fatigue      11/11/2008      Active (401.1) - C - Hypertension, benign      4/10/2008      Active (729.5) - C - limb pain    HUMERUS FRACTURE WITH RESIDUAL PAIN JULY 2008 PLATE AND 10 SCREWS  4/10/2008      Active (719.46) - C - Knee pain    OSTEOARTHRITIS OF KNEES  4/10/2008      Active 728.6 Dupuytren's contracture    right hand  4/10/2008      Active (723.1) - C - Neck pain    INTERMITTENT NECK PAIN  4/10/2008      Active 796.2 Elevated blood pressure- no hypertension dx      4/10/2008      Active (272.4) - C - Hyperlipidemia      4/10/2008      Active (530.81) - C - GERD      4/10/2008      Active (780.52) - C - Insomnia    associated with adjustment  4/10/2008      Active (V70.0) - C - Routine Medical Exam      11/7/2007      Active 461.0 Sinusitis, acute, maxillary      8/14/2007      Active 733.00 Osteoporosis, unspec.      8/1/2007      Active (305.1) - C - Tobacco abuse      8/1/2007      Active 812.20 Humerus, closed, unspec.      8/1/2007      Active (V72.83) - C - Preop    humerus fx rt  8/1/2007      Inactive  (789.00) - I - Abdominal pain      11/11/2010      Inactive  (v06.1) - C - Tdap vaccine       10/14/2010      Inactive  (V72.62) - C - Laboratory exam as part of general physical exam      10/14/2010      Inactive  (V04.81) - C - Influenza vaccination      10/14/2010      Inactive  (V76.51) - C - Screening, colon      10/14/2010      Inactive  (305.1) - C - Tobacco dependence    resolved  2009      Inactive  490 Bronchitis      2009      Inactive  466.0 Bronchitis, acute      2009      Inactive  V05.8 Immunization, other, specified      2009      Inactive  V72.31 Screening, pap      2008      Inactive  Abnormal pap      2008          --------------------------------------------------------------------------------    Medication History Return To Top                 --------------------------------------------------------------------------------    Family History  Return To Top     Status Relationship Disease Comment Record Date     Alive Brother      2007      Alive Sister      2007      Alive Brother      2007      Alive Sister      2007      Alive Sister      2007         Brother  heart    2007         Brother  kidney cancer    2007         Mother  stomach cancer  age 79  2007         Brother  heart    2007         Father  heart;AAA  age 80  2007          --------------------------------------------------------------------------------    Infection History Return To Top     Question Answer Comment Record Date     History of HPV  No    2008      Live with someone with TB or exposed to TB  No    2008      History of Hepatitis B  No    2008      History of chlamydia  No    2008      History of gonorrhea  No    2008      History of syphilis  No    2008          --------------------------------------------------------------------------------             Social Determinants of Health     Financial Resource Strain: Not on file   Food Insecurity: Not on file    Transportation Needs: Not on file   Physical Activity: Not on file   Stress: Not on file   Social Connections: Not on file   Interpersonal Safety: Not on file   Housing Stability: Not on file     Family History   Problem Relation Age of Onset     Thyroid Disease Mother      Arthritis Mother         Rheumatoid     Osteoporosis Mother      Cancer Mother         stomach     Cerebrovascular Disease Mother      Heart Disease Father      Cancer Father      Heart Disease Brother         fibulation          REVIEW OF SYSTEMS:  General: negative, fever, chills, night sweats  Skin: negative, acne, rash, and scaling  Eyes: negative, double vision, eye pain, and photophobia  Ears/Nose/Throat: negative, nasal congestion, and purulent rhinorrhea  Respiratory: No cough, No hemoptysis, and negative  Cardiovascular: negative, palpitations, tachycardia, irregular heart beat, chest pain, exertional chest pain or pressure, paroxysmal nocturnal dyspnea, and orthopnea       OBJECTIVE:  not currently breastfeeding.  General Appearance: alert and no distress  Head: Normocephalic. No masses, lesions, tenderness or abnormalities  Eyes: conjuctiva clear, PERRL, EOM intact  Ears: External ears normal. Canals clear. TM's normal.  Nose: Nares normal  Mouth: normal  Neck: Supple, no cervical adenopathy, no thyromegaly  Lungs: clear to auscultation  Cardiac: regular rate and rhythm, normal S1 and S2, no murmur       ASSESSMENT/PLAN:  Patient here for follow-up.  She was admitted in November 2021 with NSTEMI.  Had coronary angiogram and PCI as shown below.                   Her past medical history is significant for hypertension, dyslipidemia, depression, CKD stage 3, IBS, h/o tobacco abuse, Sjogren's syndrome with myopathy for which she takes hydroxychloroquine, patient discontinued hydroxychloroquine due to decreasing vision.  GERD, chronic back pain.  Also known to have left subclavian stenosis and variation in blood pressure between both  arms.   Patient had no definitive cardiac complaints apart from shortness of breath on exertion which is not new.  Her vision is decreasing significantly.  She states she has macular degeneration and her visibility is poor.  This also makes ambulation difficult.  She barely walks around at home.  Denied chest pain.  Her current cardiac medications are Toprol- mg daily, hydrochlorothiazide 12.5 mg daily amlodipine 5 mg daily losartan 100 mg daily.  Also takes aspirin 81 mg daily.  Patient had an admission in April of this year.  This followed after a fall and presented with interscapular chest pain and increased blood pressure.  Had mild troponin elevation of 61.  Patient had an echocardiogram which was completely normal.  With no regional wall motion abnormalities.  Prior angiogram did not show any other significant flow-limiting lesions.  Her intervention was in a small branch.  As patient remain asymptomatic from cardiac standpoint she is advised to continue her current medications and return to clinic in 1 year for a follow-up.  Total visit duration 20 minutes.  This included face-to-face interview, physical exam, chart review, review of recent hospitalization records, echocardiogram, lab results and documentation.      Please do not hesitate to contact me if you have any questions/concerns.     Sincerely,     MAJO Herrera MD

## 2024-07-02 NOTE — NURSING NOTE
Chief Complaint   Patient presents with    Follow Up      [facility] annual return       Vitals were taken and medications reconciled.    Hilario Donis, EMT  11:36 AM

## 2024-07-05 DIAGNOSIS — I10 HYPERTENSION GOAL BP (BLOOD PRESSURE) < 130/80: ICD-10-CM

## 2024-07-05 DIAGNOSIS — I25.10 CORONARY ARTERY DISEASE INVOLVING NATIVE CORONARY ARTERY OF NATIVE HEART, UNSPECIFIED WHETHER ANGINA PRESENT: ICD-10-CM

## 2024-07-07 RX ORDER — LOSARTAN POTASSIUM 100 MG/1
100 TABLET ORAL DAILY
Qty: 90 TABLET | Refills: 0 | Status: SHIPPED | OUTPATIENT
Start: 2024-07-07 | End: 2024-09-03

## 2024-09-03 ENCOUNTER — OFFICE VISIT (OUTPATIENT)
Dept: FAMILY MEDICINE | Facility: CLINIC | Age: 74
End: 2024-09-03
Payer: COMMERCIAL

## 2024-09-03 VITALS
DIASTOLIC BLOOD PRESSURE: 72 MMHG | TEMPERATURE: 97.9 F | HEART RATE: 83 BPM | BODY MASS INDEX: 36.82 KG/M2 | RESPIRATION RATE: 16 BRPM | SYSTOLIC BLOOD PRESSURE: 161 MMHG | HEIGHT: 65 IN | OXYGEN SATURATION: 99 % | WEIGHT: 221 LBS

## 2024-09-03 DIAGNOSIS — I10 HYPERTENSION GOAL BP (BLOOD PRESSURE) < 130/80: ICD-10-CM

## 2024-09-03 DIAGNOSIS — R35.0 INCREASED FREQUENCY OF URINATION: ICD-10-CM

## 2024-09-03 DIAGNOSIS — I25.10 ASCVD (ARTERIOSCLEROTIC CARDIOVASCULAR DISEASE): ICD-10-CM

## 2024-09-03 DIAGNOSIS — H35.3230 BILATERAL EXUDATIVE AGE-RELATED MACULAR DEGENERATION, UNSPECIFIED STAGE (H): ICD-10-CM

## 2024-09-03 DIAGNOSIS — R94.6 ABNORMAL FINDING ON THYROID FUNCTION TEST: ICD-10-CM

## 2024-09-03 DIAGNOSIS — R60.0 BILATERAL LOWER EXTREMITY EDEMA: ICD-10-CM

## 2024-09-03 DIAGNOSIS — Z00.00 ENCOUNTER FOR MEDICARE ANNUAL WELLNESS EXAM: Primary | ICD-10-CM

## 2024-09-03 DIAGNOSIS — E67.3 HYPERVITAMINOSIS D: ICD-10-CM

## 2024-09-03 PROBLEM — N18.32 STAGE 3B CHRONIC KIDNEY DISEASE (H): Status: ACTIVE | Noted: 2019-09-27

## 2024-09-03 LAB
ALBUMIN UR-MCNC: NEGATIVE MG/DL
ANION GAP SERPL CALCULATED.3IONS-SCNC: 17 MMOL/L (ref 7–15)
APPEARANCE UR: ABNORMAL
BACTERIA #/AREA URNS HPF: ABNORMAL /HPF
BILIRUB UR QL STRIP: NEGATIVE
BUN SERPL-MCNC: 24.6 MG/DL (ref 8–23)
CALCIUM SERPL-MCNC: 9.5 MG/DL (ref 8.8–10.4)
CHLORIDE SERPL-SCNC: 100 MMOL/L (ref 98–107)
COLOR UR AUTO: YELLOW
CREAT SERPL-MCNC: 1.43 MG/DL (ref 0.51–0.95)
EGFRCR SERPLBLD CKD-EPI 2021: 38 ML/MIN/1.73M2
GLUCOSE SERPL-MCNC: 108 MG/DL (ref 70–99)
GLUCOSE UR STRIP-MCNC: NEGATIVE MG/DL
HCO3 SERPL-SCNC: 23 MMOL/L (ref 22–29)
HGB UR QL STRIP: ABNORMAL
KETONES UR STRIP-MCNC: NEGATIVE MG/DL
LEUKOCYTE ESTERASE UR QL STRIP: ABNORMAL
MUCOUS THREADS #/AREA URNS LPF: PRESENT /LPF
NITRATE UR QL: NEGATIVE
PH UR STRIP: 5 [PH] (ref 5–7)
POTASSIUM SERPL-SCNC: 3.7 MMOL/L (ref 3.4–5.3)
RBC #/AREA URNS AUTO: ABNORMAL /HPF
SODIUM SERPL-SCNC: 140 MMOL/L (ref 135–145)
SP GR UR STRIP: 1.01 (ref 1–1.03)
SQUAMOUS #/AREA URNS AUTO: ABNORMAL /LPF
TSH SERPL DL<=0.005 MIU/L-ACNC: 2.75 UIU/ML (ref 0.3–4.2)
UROBILINOGEN UR STRIP-ACNC: 0.2 E.U./DL
VIT D+METAB SERPL-MCNC: 46 NG/ML (ref 20–50)
WBC #/AREA URNS AUTO: ABNORMAL /HPF
WBC CLUMPS #/AREA URNS HPF: PRESENT /HPF

## 2024-09-03 PROCEDURE — 99214 OFFICE O/P EST MOD 30 MIN: CPT | Mod: 25 | Performed by: FAMILY MEDICINE

## 2024-09-03 PROCEDURE — 87086 URINE CULTURE/COLONY COUNT: CPT | Performed by: FAMILY MEDICINE

## 2024-09-03 PROCEDURE — 36415 COLL VENOUS BLD VENIPUNCTURE: CPT | Performed by: FAMILY MEDICINE

## 2024-09-03 PROCEDURE — 84443 ASSAY THYROID STIM HORMONE: CPT | Performed by: FAMILY MEDICINE

## 2024-09-03 PROCEDURE — 82306 VITAMIN D 25 HYDROXY: CPT | Performed by: FAMILY MEDICINE

## 2024-09-03 PROCEDURE — 81001 URINALYSIS AUTO W/SCOPE: CPT | Performed by: FAMILY MEDICINE

## 2024-09-03 PROCEDURE — 99397 PER PM REEVAL EST PAT 65+ YR: CPT | Performed by: FAMILY MEDICINE

## 2024-09-03 PROCEDURE — 80048 BASIC METABOLIC PNL TOTAL CA: CPT | Performed by: FAMILY MEDICINE

## 2024-09-03 RX ORDER — AMLODIPINE BESYLATE 5 MG/1
5 TABLET ORAL DAILY
Qty: 90 TABLET | Refills: 3 | Status: SHIPPED | OUTPATIENT
Start: 2024-09-03 | End: 2024-09-16

## 2024-09-03 RX ORDER — HYDROCHLOROTHIAZIDE 12.5 MG/1
12.5 TABLET ORAL DAILY
Qty: 90 TABLET | Refills: 3 | Status: SHIPPED | OUTPATIENT
Start: 2024-09-03 | End: 2024-09-16

## 2024-09-03 RX ORDER — LOSARTAN POTASSIUM 100 MG/1
100 TABLET ORAL DAILY
Qty: 90 TABLET | Refills: 3 | Status: SHIPPED | OUTPATIENT
Start: 2024-09-03 | End: 2024-09-16

## 2024-09-03 RX ORDER — ASPIRIN 81 MG/1
81 TABLET ORAL DAILY
Qty: 90 TABLET | Refills: 3 | Status: SHIPPED | OUTPATIENT
Start: 2024-09-03 | End: 2024-09-16

## 2024-09-03 RX ORDER — METOPROLOL SUCCINATE 50 MG/1
150 TABLET, EXTENDED RELEASE ORAL DAILY
Qty: 270 TABLET | Refills: 3 | Status: SHIPPED | OUTPATIENT
Start: 2024-09-03 | End: 2024-09-16

## 2024-09-03 ASSESSMENT — COLUMBIA-SUICIDE SEVERITY RATING SCALE - C-SSRS
1. WITHIN THE PAST MONTH, HAVE YOU WISHED YOU WERE DEAD OR WISHED YOU COULD GO TO SLEEP AND NOT WAKE UP?: YES
2. IN THE PAST MONTH, HAVE YOU ACTUALLY HAD ANY THOUGHTS OF KILLING YOURSELF?: NO
6. HAVE YOU EVER DONE ANYTHING, STARTED TO DO ANYTHING, OR PREPARED TO DO ANYTHING TO END YOUR LIFE?: NO

## 2024-09-03 ASSESSMENT — PATIENT HEALTH QUESTIONNAIRE - PHQ9
10. IF YOU CHECKED OFF ANY PROBLEMS, HOW DIFFICULT HAVE THESE PROBLEMS MADE IT FOR YOU TO DO YOUR WORK, TAKE CARE OF THINGS AT HOME, OR GET ALONG WITH OTHER PEOPLE: SOMEWHAT DIFFICULT
SUM OF ALL RESPONSES TO PHQ QUESTIONS 1-9: 13
SUM OF ALL RESPONSES TO PHQ QUESTIONS 1-9: 13

## 2024-09-03 ASSESSMENT — PAIN SCALES - GENERAL: PAINLEVEL: NO PAIN (0)

## 2024-09-03 NOTE — LETTER
September 5, 2024      Fatouarthur Camaraterry  [address]  [facility] [id number]        Dear ,    We are writing to inform you of your test results.    Fatou - your results showed:  1) Urinary tract infection (UTI).  Unfortunately we were not able to isolate the bacteria causing your UTI.  Your urine culture is showing contamination with genital/vaginal bacteria (i.e., not a successful clean, mid-stream urine collection).  Hopefully the antibiotic I sent for you (cephalexin) helped to improve your symptoms.  Let us know if your urinary symptoms persist.    2) Your kidney function (creatinine and eGFR) is slightly worse and I'd like to keep a close eye on that.  I'd like to see you again in about 6 months (spring).    The rest of your test results fall within the expected range(s) or remain unchanged from previous results.  Your Vitamin D level is good and your TSH (thyroid function test) is normal.  Please continue with your current treatment plan.     Resulted Orders   TSH with free T4 reflex   Result Value Ref Range    TSH 2.75 0.30 - 4.20 uIU/mL   Basic metabolic panel  (Ca, Cl, CO2, Creat, Gluc, K, Na, BUN)   Result Value Ref Range    Sodium 140 135 - 145 mmol/L    Potassium 3.7 3.4 - 5.3 mmol/L    Chloride 100 98 - 107 mmol/L    Carbon Dioxide (CO2) 23 22 - 29 mmol/L    Anion Gap 17 (H) 7 - 15 mmol/L    Urea Nitrogen 24.6 (H) 8.0 - 23.0 mg/dL    Creatinine 1.43 (H) 0.51 - 0.95 mg/dL    GFR Estimate 38 (L) >60 mL/min/1.73m2      Comment:      eGFR calculated using 2021 CKD-EPI equation.    Calcium 9.5 8.8 - 10.4 mg/dL      Comment:      Reference intervals for this test were updated on 7/16/2024 to reflect our healthy population more accurately. There may be differences in the flagging of prior results with similar values performed with this method. Those prior results can be interpreted in the context of the updated reference intervals.    Glucose 108 (H) 70 - 99 mg/dL   Vitamin D Deficiency    Result Value Ref Range    Vitamin D, Total (25-Hydroxy) 46 20 - 50 ng/mL      Comment:      optimum levels    Narrative    Season, race, dietary intake, and treatment affect the concentration of 25-hydroxy-Vitamin D. Values may decrease during winter months and increase during summer months.    Vitamin D determination is routinely performed by an immunoassay specific for 25 hydroxyvitamin D3.  If an individual is on vitamin D2(ergocalciferol) supplementation, please specify 25 OH vitamin D2 and D3 level determination by LCMSMS test VITD23.     UA Macroscopic with reflex to Microscopic and Culture   Result Value Ref Range    Color Urine Yellow Colorless, Straw, Light Yellow, Yellow    Appearance Urine Slightly Cloudy (A) Clear    Glucose Urine Negative Negative mg/dL    Bilirubin Urine Negative Negative    Ketones Urine Negative Negative mg/dL    Specific Gravity Urine 1.010 1.003 - 1.035    Blood Urine Small (A) Negative    pH Urine 5.0 5.0 - 7.0    Protein Albumin Urine Negative Negative mg/dL    Urobilinogen Urine 0.2 0.2, 1.0 E.U./dL    Nitrite Urine Negative Negative    Leukocyte Esterase Urine Large (A) Negative   Urine Microscopic Exam   Result Value Ref Range    Bacteria Urine Moderate (A) None Seen /HPF    RBC Urine 2-5 (A) 0-2 /HPF /HPF    WBC Urine  (A) 0-5 /HPF /HPF    Squamous Epithelials Urine Few (A) None Seen /LPF    WBC Clumps Urine Present (A) None Seen /HPF    Mucus Urine Present (A) None Seen /LPF   Urine Culture   Result Value Ref Range    Culture >100,000 CFU/mL Mixture of urogenital doris     Narrative    Multiple morphotypes present with no predominant organism.  Growth consistent with probable contamination during collection.  Suggest repeat specimen if clinically indicated.        If you have any questions or concerns, please call the clinic at the number listed above.       Sincerely,      Ольга Houston MD

## 2024-09-03 NOTE — PATIENT INSTRUCTIONS
"You are a candidate for the new RSV vaccine.  RSV (Respiratory Syncytial Virus) is a common respiratory virus that frequently causes the \"common cold\" in healthy young adults.  Unfortunately, it can cause a severe viral pneumonia in infants and older people - especially those with other chronic medical conditions (such as diabetes, asthma, heart disease, liver and kidney disease).  If you are on medicare you must get this vaccine at a pharmacy.  The pharmacist will let you know beforehand if there is a copay and can administer the vaccine.       I recommend that patients 50 and over get the Shingrix vaccine.  One in 3 adults in the [place] will get shingles and the risk increases with age.  Shingles can cause debilitating and persistent nerve pain and can increase your risk for stroke.  If you are on medicare you must get this vaccine at a pharmacy.  The second (final) dose is given 2-6 months after the first dose and cannot be given any sooner than 2 months after the first dose.  You should be aware that patients often report feeling a bit under the weather after the injection, including fatigue, malaise, and low-grade fever that may last a couple days.  Rarely patients can get a mild, shingles-like rash.   But these symptoms are much better than the Shingles disease.     If you have MyChart:  1) I kindly request that you check your MyChart prior to all appointments with me and complete any assigned questionnaires ahead of time.    2) You may receive auto-released results from our system before I have the opportunity to review and comment.  Be assured I will review and comment on all of your results as soon as I can.      I do ask that all patients who are taking chronic medications for conditions that I am managing schedule an in-person visit with me at least once a year.    Patient Education   Preventive Care Advice   This is general advice given by our system to help you stay healthy. However, your care team may " have specific advice just for you. Please talk to your care team about your preventive care needs.  Nutrition  Eat 5 or more servings of fruits and vegetables each day.  Try wheat bread, brown rice and whole grain pasta (instead of white bread, rice, and pasta).  Get enough calcium and vitamin D. Check the label on foods and aim for 100% of the RDA (recommended daily allowance).  Lifestyle  Exercise at least 150 minutes each week  (30 minutes a day, 5 days a week).  Do muscle strengthening activities 2 days a week. These help control your weight and prevent disease.  No smoking.  Wear sunscreen to prevent skin cancer.  Have a dental exam and cleaning every 6 months.  Yearly exams  See your health care team every year to talk about:  Any changes in your health.  Any medicines your care team has prescribed.  Preventive care, family planning, and ways to prevent chronic diseases.  Shots (vaccines)   HPV shots (up to age 26), if you've never had them before.  Hepatitis B shots (up to age 59), if you've never had them before.  COVID-19 shot: Get this shot when it's due.  Flu shot: Get a flu shot every year.  Tetanus shot: Get a tetanus shot every 10 years.  Pneumococcal, hepatitis A, and RSV shots: Ask your care team if you need these based on your risk.  Shingles shot (for age 50 and up)  General health tests  Diabetes screening:  Starting at age 35, Get screened for diabetes at least every 3 years.  If you are younger than age 35, ask your care team if you should be screened for diabetes.  Cholesterol test: At age 39, start having a cholesterol test every 5 years, or more often if advised.  Bone density scan (DEXA): At age 50, ask your care team if you should have this scan for osteoporosis (brittle bones).  Hepatitis C: Get tested at least once in your life.  STIs (sexually transmitted infections)  Before age 24: Ask your care team if you should be screened for STIs.  After age 24: Get screened for STIs if you're at  risk. You are at risk for STIs (including HIV) if:  You are sexually active with more than one person.  You don't use condoms every time.  You or a partner was diagnosed with a sexually transmitted infection.  If you are at risk for HIV, ask about PrEP medicine to prevent HIV.  Get tested for HIV at least once in your life, whether you are at risk for HIV or not.  Cancer screening tests  Cervical cancer screening: If you have a cervix, begin getting regular cervical cancer screening tests starting at age 21.  Breast cancer scan (mammogram): If you've ever had breasts, begin having regular mammograms starting at age 40. This is a scan to check for breast cancer.  Colon cancer screening: It is important to start screening for colon cancer at age 45.  Have a colonoscopy test every 10 years (or more often if you're at risk) Or, ask your provider about stool tests like a FIT test every year or Cologuard test every 3 years.  To learn more about your testing options, visit:   .  For help making a decision, visit:   https://bit.ly/bj92709.  Prostate cancer screening test: If you have a prostate, ask your care team if a prostate cancer screening test (PSA) at age 55 is right for you.  Lung cancer screening: If you are a current or former smoker ages 50 to 80, ask your care team if ongoing lung cancer screenings are right for you.  For informational purposes only. Not to replace the advice of your health care provider. Copyright   2023 [facility]. All rights reserved. Clinically reviewed by the [facility]. [employer] [id number] - REV 01/24.  Preventing Falls: Care Instructions  Injuries and health problems such as trouble walking or poor eyesight can increase your risk of falling. So can some medicines. But there are things you can do to help prevent falls. You can exercise to get stronger. You can also arrange your home to make it safer.    Talk to your doctor about the medicines you  "take. Ask if any of them increase the risk of falls and whether they can be changed or stopped.   Try to exercise regularly. It can help improve your strength and balance. This can help lower your risk of falling.     Practice fall safety and prevention.    Wear low-heeled shoes that fit well and give your feet good support. Talk to your doctor if you have foot problems that make this hard.  Carry a cellphone or wear a medical alert device that you can use to call for help.  Use stepladders instead of chairs to reach high objects. Don't climb if you're at risk for falls. Ask for help, if needed.  Wear the correct eyeglasses, if you need them.    Make your home safer.    Remove rugs, cords, clutter, and furniture from walkways.  Keep your house well lit. Use night-lights in hallways and bathrooms.  Install and use sturdy handrails on stairways.  Wear nonskid footwear, even inside. Don't walk barefoot or in socks without shoes.    Be safe outside.    Use handrails, curb cuts, and ramps whenever possible.  Keep your hands free by using a shoulder bag or backpack.  Try to walk in well-lit areas. Watch out for uneven ground, changes in pavement, and debris.  Be careful in the winter. Walk on the grass or gravel when sidewalks are slippery. Use de-icer on steps and walkways. Add non-slip devices to shoes.    Put grab bars and nonskid mats in your shower or tub and near the toilet. Try to use a shower chair or bath bench when bathing.   Get into a tub or shower by putting in your weaker leg first. Get out with your strong side first. Have a phone or medical alert device in the bathroom with you.   Where can you learn more?  Go to https://www.[employer].net/patiented  Enter G117 in the search box to learn more about \"Preventing Falls: Care Instructions.\"  Current as of: July 17, 2023               Content Version: 14.0    2021-3036 [employer].   Care instructions adapted under license by your healthcare " professional. If you have questions about a medical condition or this instruction, always ask your healthcare professional. Healthwise, [facility] disclaims any warranty or liability for your use of this information.      Hearing Loss: Care Instructions  Overview     Hearing loss is a sudden or slow decrease in how well you hear. It can range from slight to profound. Permanent hearing loss can occur with aging. It also can happen when you are exposed long-term to loud noise. Examples include listening to loud music, riding motorcycles, or being around other loud machines.  Hearing loss can affect your work and home life. It can make you feel lonely or depressed. You may feel that you have lost your independence. But hearing aids and other devices can help you hear better and feel connected to others.  Follow-up care is a key part of your treatment and safety. Be sure to make and go to all appointments, and call your doctor if you are having problems. It's also a good idea to know your test results and keep a list of the medicines you take.  How can you care for yourself at home?  Avoid loud noises whenever possible. This helps keep your hearing from getting worse.  Always wear hearing protection around loud noises.  Wear a hearing aid as directed.  A professional can help you pick a hearing aid that will work best for you.  You can also get hearing aids over the counter for mild to moderate hearing loss.  Have hearing tests as your doctor suggests. They can show whether your hearing has changed. Your hearing aid may need to be adjusted.  Use other devices as needed. These may include:  Telephone amplifiers and hearing aids that can connect to a television, stereo, radio, or microphone.  Devices that use lights or vibrations. These alert you to the doorbell, a ringing telephone, or a baby monitor.  Television closed-captioning. This shows the words at the bottom of the screen. Most new TVs can do this.  TTY (text  "telephone). This lets you type messages back and forth on the telephone instead of talking or listening. These devices are also called TDD. When messages are typed on the keyboard, they are sent over the phone line to a receiving TTY. The message is shown on a monitor.  Use text messaging, social media, and email if it is hard for you to communicate by telephone.  Try to learn a listening technique called speechreading. It is not lipreading. You pay attention to people's gestures, expressions, posture, and tone of voice. These clues can help you understand what a person is saying. Face the person you are talking to, and have them face you. Make sure the lighting is good. You need to see the other person's face clearly.  Think about counseling if you need help to adjust to your hearing loss.  When should you call for help?  Watch closely for changes in your health, and be sure to contact your doctor if:    You think your hearing is getting worse.     You have new symptoms, such as dizziness or nausea.   Where can you learn more?  Go to https://www.[employer].net/patiented  Enter R798 in the search box to learn more about \"Hearing Loss: Care Instructions.\"  Current as of: September 27, 2023               Content Version: 14.0    3030-9470 [employer].   Care instructions adapted under license by your healthcare professional. If you have questions about a medical condition or this instruction, always ask your healthcare professional. [employer] disclaims any warranty or liability for your use of this information.      Bladder Training: Care Instructions  Your Care Instructions     Bladder training is used to treat urge incontinence and stress incontinence. Urge incontinence means that the need to urinate comes on so fast that you can't get to a toilet in time. Stress incontinence means that you leak urine because of pressure on your bladder. For example, it may happen when you laugh, cough, " or lift something heavy.  Bladder training can increase how long you can wait before you have to urinate. It can also help your bladder hold more urine. And it can give you better control over the urge to urinate.  It is important to remember that bladder training takes a few weeks to a few months to make a difference. You may not see results right away, but don't give up.  Follow-up care is a key part of your treatment and safety. Be sure to make and go to all appointments, and call your doctor if you are having problems. It's also a good idea to know your test results and keep a list of the medicines you take.  How can you care for yourself at home?  Work with your doctor to come up with a bladder training program that is right for you. You may use one or more of the following methods.  Delayed urination  In the beginning, try to keep from urinating for 5 minutes after you first feel the need to go.  While you wait, take deep, slow breaths to relax. Kegel exercises can also help you delay the need to go to the bathroom.  After some practice, when you can easily wait 5 minutes to urinate, try to wait 10 minutes before you urinate.  Slowly increase the waiting period until you are able to control when you have to urinate.  Scheduled urination  Empty your bladder when you first wake up in the morning.  Schedule times throughout the day when you will urinate.  Start by going to the bathroom every hour, even if you don't need to go.  Slowly increase the time between trips to the bathroom.  When you have found a schedule that works well for you, keep doing it.  If you wake up during the night and have to urinate, do it. Apply your schedule to waking hours only.  Kegel exercises  These tighten and strengthen pelvic muscles, which can help you control the flow of urine. (If doing these exercises causes pain, stop doing them and talk with your doctor.) To do Kegel exercises:  Squeeze your muscles as if you were trying not  "to pass gas. Or squeeze your muscles as if you were stopping the flow of urine. Your belly, legs, and buttocks shouldn't move.  Hold the squeeze for 3 seconds, then relax for 5 to 10 seconds.  Start with 3 seconds, then add 1 second each week until you are able to squeeze for 10 seconds.  Repeat the exercise 10 times a session. Do 3 to 8 sessions a day.  When should you call for help?  Watch closely for changes in your health, and be sure to contact your doctor if:    Your incontinence is getting worse.     You do not get better as expected.   Where can you learn more?  Go to https://www.[employer].net/patiented  Enter V684 in the search box to learn more about \"Bladder Training: Care Instructions.\"  Current as of: November 15, 2023               Content Version: 14.0    8402-9607 [employer].   Care instructions adapted under license by your healthcare professional. If you have questions about a medical condition or this instruction, always ask your healthcare professional. [employer] disclaims any warranty or liability for your use of this information.      Learning About Depression Screening  What is depression screening?  Depression screening is a way to see if you have depression symptoms. It may be done by a doctor or counselor. It's often part of a routine checkup. That's because your mental health is just as important as your physical health.  Depression is a mental health condition that affects how you feel, think, and act. You may:  Have less energy.  Lose interest in your daily activities.  Feel sad and grouchy for a long time.  Depression is very common. It affects people of all ages.  Many things can lead to depression. Some people become depressed after they have a stroke or find out they have a major illness like cancer or heart disease. The death of a loved one or a breakup may lead to depression. It can run in families. Most experts believe that a combination of inherited " "genes and stressful life events can cause it.  What happens during screening?  You may be asked to fill out a form about your depression symptoms. You and the doctor will discuss your answers. The doctor may ask you more questions to learn more about how you think, act, and feel.  What happens after screening?  If you have symptoms of depression, your doctor will talk to you about your options.  Doctors usually treat depression with medicines or counseling. Often, combining the two works best. Many people don't get help because they think that they'll get over the depression on their own. But people with depression may not get better unless they get treatment.  The cause of depression is not well understood. There may be many factors involved. But if you have depression, it's not your fault.  A serious symptom of depression is thinking about death or suicide. If you or someone you care about talks about this or about feeling hopeless, get help right away.  It's important to know that depression can be treated. Medicine, counseling, and self-care may help.  Where can you learn more?  Go to https://www.[employer].net/patiented  Enter T185 in the search box to learn more about \"Learning About Depression Screening.\"  Current as of: June 24, 2023  Content Version: 14.1 2006-2024 [employer].   Care instructions adapted under license by your healthcare professional. If you have questions about a medical condition or this instruction, always ask your healthcare professional. [employer] disclaims any warranty or liability for your use of this information.       "

## 2024-09-03 NOTE — PROGRESS NOTES
Preventive Care Visit  [facility]  Ольга [place] MD, Family Medicine  Sep 3, 2024      Assessment & Plan     Encounter for Medicare annual wellness exam  Reviewed/updated Health Maintenance     Increased frequency of urination  Likely UTI - will await urine culture results to direct treatment.  - UA Macroscopic with reflex to Microscopic and Culture  - Urine Microscopic Exam  - Urine Culture    ASCVD (arteriosclerotic cardiovascular disease)  s/p NSTEMI.  She stopped her statin last spring due to side effects and is adamant about not taking a statin.  She saw cardiology in July but I'm uncertain if non-statin options were discussed (bempedoic acid, ezetimibe, PCSK9 inhibitor).   - losartan (COZAAR) 100 MG tablet  Dispense: 90 tablet; Refill: 3  - metoprolol succinate ER (TOPROL XL) 50 MG 24 hr tablet  Dispense: 270 tablet; Refill: 3  - aspirin 81 MG EC tablet  Dispense: 90 tablet; Refill: 3    Hypertension goal BP (blood pressure) < 130/80  With white coat hypertension.  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  She has discontinued Vit D supplement  - Vitamin D Deficiency    Abnormal finding on thyroid function test  Elevated TSH in 2021 with subsequent normal results.   - TSH with free T4 reflex    Bilateral exudative age-related macular degeneration, unspecified stage (H)  She is followed closely by ophthalmology but her vision loss has progressed.    Bilateral lower extremity edema  End-stage chronic edema.  I  suggested lymphedema therapy which she declined.        Depression Screening Follow Up      9/3/2024    11:34 AM   PHQ   PHQ-9 Total Score 13   Q9: Thoughts of better off dead/self-harm past 2 weeks Several days   F/U: Thoughts of suicide or self-harm No   F/U: Safety concerns No         9/3/2024    11:34 AM   Last PHQ-9   1.  Little interest or pleasure in doing things 3   2.  Feeling down, depressed, or hopeless 0   3.  Trouble falling or staying asleep, or sleeping too much 3   4.  Feeling tired or having little energy 3   5.  Poor appetite or overeating 3   6.  Feeling bad about yourself 0   7.  Trouble concentrating 0   8.  Moving slowly or restless 0   Q9: Thoughts of better off dead/self-harm past 2 weeks 1   PHQ-9 Total Score 13   In the past two weeks have you had thoughts of suicide or self harm? No   Do you have concerns about your personal safety or the safety of others? No         9/3/2024    12:20 PM   C-SSRS (Brief [place])   Within the last month, have you wished you were dead or wished you could go to sleep and not wake up? Yes   Within the last month, have you had any actual thoughts of killing yourself? No   Within the last month, have you ever done anything, started to do anything, or prepared to do anything to end your life? No     Follow Up Actions Taken  Crisis resource information provided in the After Visit Summary  Medication and therapy referral offered which she declined.      Counseling  Appropriate preventive services were addressed with this patient via screening, questionnaire, or discussion as appropriate for fall prevention, nutrition, physical activity, Tobacco-use cessation, social engagement, weight loss and cognition.  Checklist reviewing preventive services available has been given to the patient.  Reviewed patient's diet, addressing concerns and/or questions.   Patient is at risk for social isolation and has been provided with information about the benefit of social  connection.   The patient was instructed to see the dentist every 6 months.   She is at risk for psychosocial distress and has been provided with information to reduce risk.   Discussed possible causes of fatigue. Updated plan of care.  Patient reported difficulty with activities of daily living were addressed today.The patient was provided with written information regarding signs of hearing loss.   Information on urinary incontinence and treatment options given to patient.   The patient's PHQ-9 score is consistent with moderate depression. She was provided with information regarding depression.       See Patient Instructions    Arti Lainez is a 74 year old, presenting for the following:  Annual Visit (Annual Wellness / Physical ) and UTI (UTI )        9/3/2024    11:29 AM   Additional Questions   [facility]  Patient does not have a Health Care Directive or Living Will: Discussed advance care planning with patient; information given to patient to review.    UTI    Urinary frequency and urgency  Longstanding nocturia      HTN   She notes white coat hypertension and brings in her home BP maching.  BP on home machine is 161/80 here in clinic today (same as clinic reading).  Her home readings show average of 129/61.          9/3/2024   General Health   How would you rate your overall physical health? (!) FAIR   Feel stress (tense, anxious, or unable to sleep) Only a little      (!) STRESS CONCERN      9/3/2024   Nutrition   Diet: Regular (no restrictions)            9/3/2024   Exercise   Days per week of moderate/strenous exercise 0 days   Average minutes spent exercising at this level 0 min      (!) EXERCISE CONCERN      9/3/2024   Social Factors   Frequency of gathering with friends or relatives Never   Worry food won't last until get money to buy more Patient declined   Food not last or not have enough money for food? Patient declined   Do you have housing? (Housing  is defined as stable permanent housing and does not include staying ouside in a car, in a tent, in an abandoned building, in an overnight shelter, or couch-surfing.) Patient declined   Are you worried about losing your housing? Patient declined   Lack of transportation? Patient declined   Unable to get utilities (heat,electricity)? Patient declined      (!) SOCIAL CONNECTIONS CONCERN      9/3/2024   Fall Risk   Fallen 2 or more times in the past year? No   Trouble with walking or balance? Yes   Reason Gait Speed Test Not Completed Patient declines             9/3/2024   Activities of Daily Living- Home Safety   Needs help with the following daily activites Transportation   Safety concerns in the home None of the above            9/3/2024   Dental   Dentist two times every year? (!) NO            9/3/2024   Hearing Screening   Hearing concerns? (!) I FEEL THAT PEOPLE ARE MUMBLING OR NOT SPEAKING CLEARLY.    (!) I NEED TO ASK PEOPLE TO SPEAK UP OR REPEAT THEMSELVES.    (!) IT'S HARDER TO UNDERSTAND WOMEN'S VOICES THAN MEN'S VOICES.    (!) IT'S HARD TO FOLLOW A CONVERSATION IN A NOISY RESTAURANT OR CROWDED ROOM.    (!) TROUBLE UNDESTANDING A SPEAKER IN A PUBLIC MEETING OR [employer] SERVICE.    (!) TROUBLE FOLLOWING DIALOGUE IN THE THEATHER.    (!) TROUBLE UNDERSTANDING SOFT OR WHISPERED SPEECH.    (!) TROUBLE UNDERSTANDING SPEECH ON THE TELEPHONE       Multiple values from one day are sorted in reverse-chronological order         9/3/2024   Driving Risk Screening   Patient/family members have concerns about driving (!) DECLINE            9/3/2024   General Alertness/Fatigue Screening   Have you been more tired than usual lately? (!) YES            9/3/2024   Urinary Incontinence Screening   Bothered by leaking urine in past 6 months Yes            9/3/2024   TB Screening   Were you born outside of the [place]? No          Today's PHQ-9 Score:       9/3/2024    11:34 AM   PHQ-9 SCORE   PHQ-9 Total Score Anjanahart 13 (Moderate  depression)   PHQ-9 Total Score 13         9/3/2024   Substance Use   Alcohol more than 3/day or more than 7/wk Not Applicable   Do you have a current opioid prescription? No   How severe/bad is pain from 1 to 10? 5/10   Do you use any other substances recreationally? No        Social History     Tobacco Use    Smoking status: Former     Current packs/day: 0.00     Average packs/day: 1.5 packs/day for 53.5 years (80.2 ttl pk-yrs)     Types: Cigarettes     Start date: 4/10/1956     Quit date: 10/1/2009     Years since quittin.9    Smokeless tobacco: Never   Vaping Use    Vaping status: Never Used   Substance Use Topics    Alcohol use: Yes     Comment: occas.    Drug use: No          Mammogram Screening - Mammography discussed and declined    ASCVD Risk   The ASCVD Risk score (Radha CROSS, et al., 2019) failed to calculate for the following reasons:    The patient has a prior MI or stroke diagnosis        Reviewed and updated as needed this visit by Provider   Tobacco  Allergies  Meds  Problems  Med Hx  Surg Hx  Fam Hx     Sexual Activity          BP Readings from Last 3 Encounters:   24 (!) 161/72   24 (!) 149/54   24 118/80    Wt Readings from Last 3 Encounters:   24 100.2 kg (221 lb)   24 103.4 kg (228 lb)   24 105.1 kg (231 lb 11.2 oz)              Patient Active Problem List   Diagnosis    Sjogren's syndrome with myopathy (H24)    Hypertension goal BP (blood pressure) < 130/80    Primary osteoarthritis of both knees    Constipation    Osteopenia of both hips    Irritable bowel syndrome with diarrhea    Rosacea    Poor memory    Low back pain    Senile keratosis    Spinal stenosis    H/O hysterectomy for benign disease    Bilateral hearing loss    Presbyopia    Knee pain    Pure hypercholesterolemia    Left arm pain    Subclavian artery stenosis (H24)    ACP (advance care planning)    Gastroesophageal reflux disease without esophagitis    Bilateral low back  pain with sciatica, sciatica laterality unspecified, unspecified chronicity    Stage 3b chronic kidney disease (H)    BMI 35.0-39.9 with comorbidity (H)    Chest pain, unspecified type    NSTEMI (non-ST elevated myocardial infarction) (H)    Coronary artery disease involving native coronary artery of native heart    Cerebral aneurysm, nonruptured    Centrilobular emphysema (H)    Abnormal finding on thyroid function test    Incomplete bladder emptying    Continuous leakage of urine    Snoring    Excessive daytime sleepiness    Accelerated hypertension    Physical deconditioning    Elevated troponin    Fall at home, initial encounter    Infrarenal abdominal aortic aneurysm (AAA) without rupture (H24)    Bilateral exudative age-related macular degeneration (H)     Past Surgical History:   Procedure Laterality Date    aneursym[  ,     Has metal in head    CHOLECYSTECTOMY      CLOSED RX PROX HUMERUS FRACTURE      10 pins placed    COLONOSCOPY      COLONOSCOPY  2014    Procedure: COMBINED COLONOSCOPY, SINGLE BIOPSY/POLYPECTOMY BY BIOPSY;  COLONOSCOPY;  Surgeon: Ranjit Pa MD;  Location:  GI    CV HEART CATHETERIZATION WITH POSSIBLE INTERVENTION N/A 2021    Procedure: Heart Catheterization with Possible Intervention;  Surgeon: Keeley Pérez MD;  Location:  HEART CARDIAC CATH LAB    CV PCI STENT DRUG ELUTING N/A 2021    Procedure: Percutaneous Coronary Intervention Stent Drug Eluting;  Surgeon: Keeley Pérez MD;  Location:  HEART CARDIAC CATH LAB    HC ECP WITH CATARACT SURGERY      both eyes    HYSTERECTOMY, JANET         Social History     Tobacco Use    Smoking status: Former     Current packs/day: 0.00     Average packs/day: 1.5 packs/day for 53.5 years (80.2 ttl pk-yrs)     Types: Cigarettes     Start date: 4/10/1956     Quit date: 10/1/2009     Years since quittin.9    Smokeless tobacco: Never   Substance Use Topics    Alcohol use: Yes     Comment:  occas.     Family History   Problem Relation Age of Onset    Thyroid Disease Mother     Arthritis Mother         Rheumatoid    Osteoporosis Mother     Cancer Mother         stomach    Cerebrovascular Disease Mother     Heart Disease Father     Cancer Father     Heart Disease Brother         fibulation         Current providers sharing in care for this patient include:  Patient Care Team:  Ольга [place] MD as PCP - General (Family Medicine)  MAJO Herrera MD as MD (Cardiovascular Disease)  MAJO Herrera MD as Assigned Heart and Vascular Provider  Nancy Loya MD as MD (OB/Gyn)  Ольга [place] MD as Assigned PCP    The following health maintenance items are reviewed in Epic and correct as of today:  Health Maintenance   Topic Date Due    ANNUAL REVIEW OF HM ORDERS  Never done    COPD ACTION PLAN  Never done    RSV VACCINE (1 - 1-dose 60+ series) Never done    ZOSTER IMMUNIZATION (2 of 3) 03/26/2009    MICROALBUMIN  10/18/2023    INFLUENZA VACCINE (1) 09/01/2024    LIPID  10/19/2024    PHQ-9  03/03/2025    DEXA  04/13/2025    HEMOGLOBIN  04/13/2025    LUNG CANCER SCREENING  04/13/2025    MEDICARE ANNUAL WELLNESS VISIT  09/03/2025    BMP  09/03/2025    FALL RISK ASSESSMENT  09/03/2025    VITAMIN D  09/03/2025    GLUCOSE  09/03/2027    ADVANCE CARE PLANNING  09/03/2029    DTAP/TDAP/TD IMMUNIZATION (4 - Td or Tdap) 10/27/2030    SPIROMETRY  Completed    HEPATITIS C SCREENING  Completed    DEPRESSION ACTION PLAN  Completed    Pneumococcal Vaccine: 65+ Years  Completed    URINALYSIS  Completed    COVID-19 Vaccine  Completed    HPV IMMUNIZATION  Aged Out    MENINGITIS IMMUNIZATION  Aged Out    RSV MONOCLONAL ANTIBODY  Aged Out    MAMMO SCREENING  Discontinued    COLORECTAL CANCER SCREENING  Discontinued            Objective    Exam  BP (!) 161/72 (BP Location: Right arm, Patient Position: Sitting, Cuff Size: Adult Regular)   Pulse 83   Temp 97.9  F (36.6  C) (Temporal)   Resp 16   Ht 1.651 m (5'  "5\")   Wt 100.2 kg (221 lb)   SpO2 99%   BMI 36.78 kg/m     Estimated body mass index is 36.78 kg/m  as calculated from the following:    Height as of this encounter: 1.651 m (5' 5\").    Weight as of this encounter: 100.2 kg (221 lb).    Physical Exam  GENERAL: healthy, alert and no distress  EYES: Eyes grossly normal to inspection, conjunctivae and sclerae normal  HENT: ear canals and TM's normal, very hard of hearing  NECK: no adenopathy, no asymmetry, masses, or scars and thyroid normal to palpation  RESP: lungs clear to auscultation - no rales, rhonchi or wheezes  CV: regular rate and rhythm, normal S1 S2, no S3 or S4, no murmur, click or rub  ABDOMEN: soft, nontender, no hepatosplenomegaly, no masses  MS: no gross musculoskeletal defects noted, no edema  EXTREM: lower legs enlarged with hardened edema and extensive thickened scale  NEURO: Grossly normal strength and tone, mentation intact and speech normal  PSYCH: mentation appears normal, affect normal/bright        9/3/2024   Mini Cog   Mini-Cog Not Completed (choose reason) Patient declines          Not complete due to visual/hearing impairment           Signed Electronically by: Ольга [place] MD    Answers submitted by the patient for this visit:  Patient Health Questionnaire (Submitted on 9/3/2024)  If you checked off any problems, how difficult have these problems made it for you to do your work, take care of things at home, or get along with other people?: Somewhat difficult  PHQ9 TOTAL SCORE: 13    "

## 2024-09-04 ENCOUNTER — TELEPHONE (OUTPATIENT)
Dept: FAMILY MEDICINE | Facility: CLINIC | Age: 74
End: 2024-09-04
Payer: COMMERCIAL

## 2024-09-04 DIAGNOSIS — N39.0 ACUTE UTI: Primary | ICD-10-CM

## 2024-09-04 RX ORDER — CEPHALEXIN 500 MG/1
500 CAPSULE ORAL 2 TIMES DAILY
Qty: 10 CAPSULE | Refills: 0 | Status: SHIPPED | OUTPATIENT
Start: 2024-09-04 | End: 2024-09-04

## 2024-09-04 RX ORDER — CEPHALEXIN 500 MG/1
500 CAPSULE ORAL 2 TIMES DAILY
Qty: 10 CAPSULE | Refills: 0 | Status: SHIPPED | OUTPATIENT
Start: 2024-09-04

## 2024-09-04 NOTE — TELEPHONE ENCOUNTER
Please call patient with preliminary results.  She has macular degeneration so can't read her MyChart.  Unfortunately she is also very hard of hearing and doesn't have hearing aids - but requested that we call.    Please let her know that her urinalysis is showing a UTI and I have sent a prescription for cephalexin for her to start while we await the culture result.      I'm also concerned that her kidney function (creatinine and eGFR) is a bit worse and would like to see her again in 6 months.  Please schedule that.  If she can't afford an office visit please schedule a lab-only appointment and route back to me for orders.    I will send her a result letter once all results are back.      Ольга [place] MD  [facility]     Office Visit on 09/03/2024   Component Date Value Ref Range Status    TSH 09/03/2024 2.75  0.30 - 4.20 uIU/mL Final    Sodium 09/03/2024 140  135 - 145 mmol/L Final    Potassium 09/03/2024 3.7  3.4 - 5.3 mmol/L Final    Chloride 09/03/2024 100  98 - 107 mmol/L Final    Carbon Dioxide (CO2) 09/03/2024 23  22 - 29 mmol/L Final    Anion Gap 09/03/2024 17 (H)  7 - 15 mmol/L Final    Urea Nitrogen 09/03/2024 24.6 (H)  8.0 - 23.0 mg/dL Final    Creatinine 09/03/2024 1.43 (H)  0.51 - 0.95 mg/dL Final    GFR Estimate 09/03/2024 38 (L)  >60 mL/min/1.73m2 Final    eGFR calculated using 2021 CKD-EPI equation.    Calcium 09/03/2024 9.5  8.8 - 10.4 mg/dL Final    Reference intervals for this test were updated on 7/16/2024 to reflect our healthy population more accurately. There may be differences in the flagging of prior results with similar values performed with this method. Those prior results can be interpreted in the context of the updated reference intervals.    Glucose 09/03/2024 108 (H)  70 - 99 mg/dL Final    Vitamin D, Total (25-Hydroxy) 09/03/2024 46  20 - 50 ng/mL Final    optimum levels    Color Urine 09/03/2024 Yellow  Colorless, Straw, Light Yellow, Yellow Final     Appearance Urine 09/03/2024 Slightly Cloudy (A)  Clear Final    Glucose Urine 09/03/2024 Negative  Negative mg/dL Final    Bilirubin Urine 09/03/2024 Negative  Negative Final    Ketones Urine 09/03/2024 Negative  Negative mg/dL Final    Specific Gravity Urine 09/03/2024 1.010  1.003 - 1.035 Final    Blood Urine 09/03/2024 Small (A)  Negative Final    pH Urine 09/03/2024 5.0  5.0 - 7.0 Final    Protein Albumin Urine 09/03/2024 Negative  Negative mg/dL Final    Urobilinogen Urine 09/03/2024 0.2  0.2, 1.0 E.U./dL Final    Nitrite Urine 09/03/2024 Negative  Negative Final    Leukocyte Esterase Urine 09/03/2024 Large (A)  Negative Final    Bacteria Urine 09/03/2024 Moderate (A)  None Seen /HPF Final    RBC Urine 09/03/2024 2-5 (A)  0-2 /HPF /HPF Final    WBC Urine 09/03/2024  (A)  0-5 /HPF /HPF Final    Squamous Epithelials Urine 09/03/2024 Few (A)  None Seen /LPF Final    WBC Clumps Urine 09/03/2024 Present (A)  None Seen /HPF Final    Mucus Urine 09/03/2024 Present (A)  None Seen /LPF Final    Culture 09/03/2024 Culture in progress   Preliminary

## 2024-09-04 NOTE — TELEPHONE ENCOUNTER
Spoke with patient. Relayed UTI results and abx - patient requests this be sent to alternate pharmacy so she can get a ride tomorrow to pick it up. Resent as ordered to [facility].    Advised pt of need for visit in 6 months. Pt in agreement, but not until April, as she states she does not travel for appointments in the winter. Scheduled for 4/8/2025.    FARZANA AliceaN, RN (she/her)  [facility] RN

## 2024-09-05 LAB — BACTERIA UR CULT: NORMAL

## 2024-09-05 NOTE — RESULT ENCOUNTER NOTE
Letter done with comments below.  Routed to [facility] POOL to be mailed:    Fatou - your results showed:  1) Urinary tract infection (UTI).  Unfortunately we were not able to isolate the bacteria causing your UTI.  Your urine culture is showing contamination with genital/vaginal bacteria (i.e., not a successful clean, mid-stream urine collection).  Hopefully the antibiotic I sent for you (cephalexin) helped to improve your symptoms.  Let us know if your urinary symptoms persist.    2) Your kidney function (creatinine and eGFR) is slightly worse and I'd like to keep a close eye on that.  I'd like to see you again in about 6 months (spring).    The rest of your test results fall within the expected range(s) or remain unchanged from previous results.  Your Vitamin D level is good and your TSH (thyroid function test) is normal.  Please continue with your current treatment plan.     Ольга [place] MD

## 2024-09-16 ENCOUNTER — TELEPHONE (OUTPATIENT)
Dept: FAMILY MEDICINE | Facility: CLINIC | Age: 74
End: 2024-09-16
Payer: COMMERCIAL

## 2024-09-16 DIAGNOSIS — I25.10 ASCVD (ARTERIOSCLEROTIC CARDIOVASCULAR DISEASE): ICD-10-CM

## 2024-09-16 DIAGNOSIS — I10 HYPERTENSION GOAL BP (BLOOD PRESSURE) < 130/80: ICD-10-CM

## 2024-09-16 RX ORDER — ASPIRIN 81 MG/1
81 TABLET ORAL DAILY
Qty: 90 TABLET | Refills: 3 | Status: SHIPPED | OUTPATIENT
Start: 2024-09-16

## 2024-09-16 RX ORDER — METOPROLOL SUCCINATE 50 MG/1
150 TABLET, EXTENDED RELEASE ORAL DAILY
Qty: 270 TABLET | Refills: 3 | Status: SHIPPED | OUTPATIENT
Start: 2024-09-16

## 2024-09-16 RX ORDER — AMLODIPINE BESYLATE 5 MG/1
5 TABLET ORAL DAILY
Qty: 90 TABLET | Refills: 3 | Status: SHIPPED | OUTPATIENT
Start: 2024-09-16

## 2024-09-16 RX ORDER — LOSARTAN POTASSIUM 100 MG/1
100 TABLET ORAL DAILY
Qty: 90 TABLET | Refills: 3 | Status: SHIPPED | OUTPATIENT
Start: 2024-09-16

## 2024-09-16 RX ORDER — HYDROCHLOROTHIAZIDE 12.5 MG/1
12.5 TABLET ORAL DAILY
Qty: 90 TABLET | Refills: 3 | Status: SHIPPED | OUTPATIENT
Start: 2024-09-16

## 2024-09-16 NOTE — TELEPHONE ENCOUNTER
Medication Question or Refill    Contacts       Contact Date/Time Type Contact Phone/Fax    09/16/2024 04:17 PM CDT Phone (Incoming) [facility] Fatou G (Self) [phone] (H)            What medication are you calling about (include dose and sig)?: amLODIPine (NORVASC) 5 MG tablet, hydroCHLOROthiazide 12.5 MG tablet, losartan (COZAAR) 100 MG tablet, metoprolol succinate ER (TOPROL XL) 50 MG 24 hr tablet, aspirin 81 MG EC tablet     Preferred Pharmacy:     [facility] [place] [facility] [address] [place] [address]  [address] [place] [id number]  Phone: [phone] Fax: [phone]        Controlled Substance Agreement on file:   CSA -- Patient Level:    CSA: None found at the patient level.       Who prescribed the medication?: Dr. [place]    Do you need a refill? No    When did you use the medication last? Not use medications yet.    Patient offered an appointment? No    Do you have any questions or concerns?  Yes: Patient requested prescriptions for all medications above to be sent to [facility] order pharmacy.    Patient would like Dr. [place] to know that patient had some side effect from cephALEXin (KEFLEX) 500 MG capsule where patient leg muscle was feeling week. Patient state patient does not ever want to take this medication ever again. Per patient due to UTI patient ended up finishing the medication.      Could we send this information to you in [facility] or would you prefer to receive a phone call?:   Patient would prefer a phone call   Okay to leave a detailed message?: Yes at Cell number on file:    No relevant phone numbers on file.

## 2024-09-16 NOTE — TELEPHONE ENCOUNTER
"Resent prescriptions to mail order pharmacy as patient requested.     Called and relayed this to patient who \"does not believe we actually did it so we will just have to wait and see\". Informed patient that they were transferred and the pharmacy confirmed that they received them so to please contact the pharmacy to arrange receipt of medications.     Blank Connors RN  [facility]    "

## 2024-09-17 NOTE — TELEPHONE ENCOUNTER
"Patient calling to verify that we want \"her or [facility] to contact us every time she runs out of refills?!\"     Informed patient that yes per clinician assessment and decision there is the initial amount of refills sent and when those are up a new request is needed and can come from pharmacy or her. She wants to know why only 5 medications were sent to [facility] yesterday. Informed patient that we only received a request for those 5 to be transferred. Patient would not state which medications she is missing besides she needs 4 more. \"I will just have to have [facility] request them then if you guys did not send them!\". Assured patient that yes, that is an appropriate way to have [facility] request the refills and we will await their message.     Blank Connors RN  [facility]    "

## 2024-12-03 DIAGNOSIS — K21.9 GASTROESOPHAGEAL REFLUX DISEASE WITHOUT ESOPHAGITIS: Chronic | ICD-10-CM

## 2024-12-03 RX ORDER — FAMOTIDINE 20 MG/1
TABLET, FILM COATED ORAL
Qty: 180 TABLET | Refills: 0 | Status: SHIPPED | OUTPATIENT
Start: 2024-12-03

## 2025-02-25 DIAGNOSIS — K21.9 GASTROESOPHAGEAL REFLUX DISEASE WITHOUT ESOPHAGITIS: Chronic | ICD-10-CM

## 2025-02-25 DIAGNOSIS — I10 HYPERTENSION GOAL BP (BLOOD PRESSURE) < 130/80: ICD-10-CM

## 2025-02-26 DIAGNOSIS — I10 HYPERTENSION GOAL BP (BLOOD PRESSURE) < 130/80: ICD-10-CM

## 2025-02-26 DIAGNOSIS — K21.9 GASTROESOPHAGEAL REFLUX DISEASE WITHOUT ESOPHAGITIS: Chronic | ICD-10-CM

## 2025-02-26 RX ORDER — PANTOPRAZOLE SODIUM 40 MG/1
TABLET, DELAYED RELEASE ORAL
Qty: 90 TABLET | Refills: 0 | OUTPATIENT
Start: 2025-02-26

## 2025-02-26 RX ORDER — HYDROCHLOROTHIAZIDE 12.5 MG/1
12.5 TABLET ORAL DAILY
Qty: 90 TABLET | Refills: 3 | OUTPATIENT
Start: 2025-02-26

## 2025-02-26 RX ORDER — LOSARTAN POTASSIUM 100 MG/1
100 TABLET ORAL DAILY
Qty: 90 TABLET | Refills: 0 | Status: SHIPPED | OUTPATIENT
Start: 2025-02-26

## 2025-02-26 RX ORDER — METOPROLOL SUCCINATE 50 MG/1
150 TABLET, EXTENDED RELEASE ORAL DAILY
Qty: 270 TABLET | Refills: 3 | OUTPATIENT
Start: 2025-02-26

## 2025-02-26 RX ORDER — FAMOTIDINE 20 MG/1
TABLET, FILM COATED ORAL
Qty: 180 TABLET | Refills: 1 | Status: SHIPPED | OUTPATIENT
Start: 2025-02-26

## 2025-02-26 RX ORDER — PANTOPRAZOLE SODIUM 40 MG/1
TABLET, DELAYED RELEASE ORAL
Qty: 90 TABLET | Refills: 0 | Status: SHIPPED | OUTPATIENT
Start: 2025-02-26

## 2025-02-26 NOTE — TELEPHONE ENCOUNTER
Due to see me for 6-month follow-up.  Scheduled for April:    Future Appointments 2/26/2025 - 8/25/2025        Date Visit Type Length Department Provider     4/8/2025 10:40 AM OFFICE VISIT 30 min [facility]/Ольга Kendrick MD    Location Instructions:     [facility] is across from the [place]. There is free parking on the surface lot on the south side of the building, with 2 additional parking levels below the surface lot.              7/8/2025 11:15 AM RETURN CARDIOLOGY 30 min  [facility]ARDI[facility]ASCULAR [facility] MAJO Herrera MD    Location Instructions:     [facility] ([facility]) is [place] a [place] urban area with multiple transportation and parking options. You may wish to review options for  service and self-parking in more detail on the [facility] s website at [email or web address].   This appointment is in a hospital-based location.&nbsp; Before your visit, you may want to check with your insurance company for coverage and referral options, including cost differences between services provided in different clinic settings.&nbsp; For more information visit this link on the [employer] Website:&nbsp; [email or web address]

## 2025-02-26 NOTE — TELEPHONE ENCOUNTER
Medication Question or Refill    Contacts       Contact Date/Time Type Contact Phone/Fax    02/26/2025 10:38 AM CST Phone (Incoming) [id number] - [place], [place] - [address] [place] [address] (Pharmacy) [phone]            What medication are you calling about (include dose and sig)?:   losartan 100mg  Metoprolol 50mg  Hydrochlorothiazide 12.5   Famotidine 20 mg    Preferred Pharmacy:       [id number] - [place], [place] - [address] [place] [id number]  Phone: [phone] Fax: [phone]        Controlled Substance Agreement on file:   CSA -- Patient Level:    CSA: None found at the patient level.       Who prescribed the medication?: PCP    Do you need a refill? Yes    When did you use the medication last? N/a    Patient offered an appointment? No    Do you have any questions or concerns?  No      Could we send this information to you in [facility] or would you prefer to receive a phone call?:   Patient would prefer a phone call   Okay to leave a detailed message?: N/A at Home number on file [phone] ([place])

## 2025-03-31 DIAGNOSIS — I10 HYPERTENSION GOAL BP (BLOOD PRESSURE) < 130/80: ICD-10-CM

## 2025-03-31 RX ORDER — METOPROLOL SUCCINATE 50 MG/1
150 TABLET, EXTENDED RELEASE ORAL
Qty: 270 TABLET | Refills: 0 | OUTPATIENT
Start: 2025-03-31

## 2025-04-08 ENCOUNTER — OFFICE VISIT (OUTPATIENT)
Dept: FAMILY MEDICINE | Facility: CLINIC | Age: 75
End: 2025-04-08
Payer: COMMERCIAL

## 2025-04-08 VITALS
TEMPERATURE: 98.1 F | RESPIRATION RATE: 16 BRPM | HEART RATE: 78 BPM | SYSTOLIC BLOOD PRESSURE: 133 MMHG | WEIGHT: 211 LBS | BODY MASS INDEX: 35.11 KG/M2 | OXYGEN SATURATION: 96 % | DIASTOLIC BLOOD PRESSURE: 73 MMHG

## 2025-04-08 DIAGNOSIS — N18.32 STAGE 3B CHRONIC KIDNEY DISEASE (H): Primary | ICD-10-CM

## 2025-04-08 DIAGNOSIS — H35.3230 BILATERAL EXUDATIVE AGE-RELATED MACULAR DEGENERATION, UNSPECIFIED STAGE (H): ICD-10-CM

## 2025-04-08 DIAGNOSIS — N39.41 URGE INCONTINENCE OF URINE: ICD-10-CM

## 2025-04-08 DIAGNOSIS — E66.01 SEVERE OBESITY (BMI 35.0-39.9) WITH COMORBIDITY (H): ICD-10-CM

## 2025-04-08 DIAGNOSIS — R30.0 DYSURIA: ICD-10-CM

## 2025-04-08 LAB
ALBUMIN UR-MCNC: 100 MG/DL
ANION GAP SERPL CALCULATED.3IONS-SCNC: 15 MMOL/L (ref 7–15)
APPEARANCE UR: ABNORMAL
BACTERIA #/AREA URNS HPF: ABNORMAL /HPF
BILIRUB UR QL STRIP: NEGATIVE
BUN SERPL-MCNC: 27.1 MG/DL (ref 8–23)
CALCIUM SERPL-MCNC: 9.4 MG/DL (ref 8.8–10.4)
CHLORIDE SERPL-SCNC: 104 MMOL/L (ref 98–107)
CHOLEST SERPL-MCNC: 200 MG/DL
COLOR UR AUTO: YELLOW
CREAT SERPL-MCNC: 1.18 MG/DL (ref 0.51–0.95)
CREAT UR-MCNC: 54.7 MG/DL
EGFRCR SERPLBLD CKD-EPI 2021: 48 ML/MIN/1.73M2
FASTING STATUS PATIENT QL REPORTED: NO
FASTING STATUS PATIENT QL REPORTED: NO
GLUCOSE SERPL-MCNC: 114 MG/DL (ref 70–99)
GLUCOSE UR STRIP-MCNC: NEGATIVE MG/DL
HCO3 SERPL-SCNC: 22 MMOL/L (ref 22–29)
HDLC SERPL-MCNC: 48 MG/DL
HGB BLD-MCNC: 11.9 G/DL (ref 11.7–15.7)
HGB UR QL STRIP: ABNORMAL
KETONES UR STRIP-MCNC: NEGATIVE MG/DL
LDLC SERPL CALC-MCNC: 123 MG/DL
LEUKOCYTE ESTERASE UR QL STRIP: ABNORMAL
MAGNESIUM SERPL-MCNC: 1.8 MG/DL (ref 1.7–2.3)
MICROALBUMIN UR-MCNC: 186 MG/L
MICROALBUMIN/CREAT UR: 340.04 MG/G CR (ref 0–25)
NITRATE UR QL: NEGATIVE
NONHDLC SERPL-MCNC: 152 MG/DL
PH UR STRIP: 5 [PH] (ref 5–7)
PHOSPHATE SERPL-MCNC: 3.6 MG/DL (ref 2.5–4.5)
POTASSIUM SERPL-SCNC: 4 MMOL/L (ref 3.4–5.3)
PTH-INTACT SERPL-MCNC: 83 PG/ML (ref 15–65)
RBC #/AREA URNS AUTO: ABNORMAL /HPF
SODIUM SERPL-SCNC: 141 MMOL/L (ref 135–145)
SP GR UR STRIP: 1.01 (ref 1–1.03)
SQUAMOUS #/AREA URNS AUTO: ABNORMAL /LPF
TRIGL SERPL-MCNC: 147 MG/DL
UROBILINOGEN UR STRIP-ACNC: 0.2 E.U./DL
WBC #/AREA URNS AUTO: >100 /HPF
WBC CLUMPS #/AREA URNS HPF: PRESENT /HPF

## 2025-04-08 PROCEDURE — 82570 ASSAY OF URINE CREATININE: CPT | Performed by: FAMILY MEDICINE

## 2025-04-08 PROCEDURE — 83735 ASSAY OF MAGNESIUM: CPT | Performed by: FAMILY MEDICINE

## 2025-04-08 PROCEDURE — 80061 LIPID PANEL: CPT | Performed by: FAMILY MEDICINE

## 2025-04-08 PROCEDURE — 85018 HEMOGLOBIN: CPT | Performed by: FAMILY MEDICINE

## 2025-04-08 PROCEDURE — 84100 ASSAY OF PHOSPHORUS: CPT | Performed by: FAMILY MEDICINE

## 2025-04-08 PROCEDURE — 82043 UR ALBUMIN QUANTITATIVE: CPT | Performed by: FAMILY MEDICINE

## 2025-04-08 PROCEDURE — 3075F SYST BP GE 130 - 139MM HG: CPT | Performed by: FAMILY MEDICINE

## 2025-04-08 PROCEDURE — 81001 URINALYSIS AUTO W/SCOPE: CPT | Performed by: FAMILY MEDICINE

## 2025-04-08 PROCEDURE — 36415 COLL VENOUS BLD VENIPUNCTURE: CPT | Performed by: FAMILY MEDICINE

## 2025-04-08 PROCEDURE — 83970 ASSAY OF PARATHORMONE: CPT | Performed by: FAMILY MEDICINE

## 2025-04-08 PROCEDURE — 80048 BASIC METABOLIC PNL TOTAL CA: CPT | Performed by: FAMILY MEDICINE

## 2025-04-08 PROCEDURE — 99214 OFFICE O/P EST MOD 30 MIN: CPT | Performed by: FAMILY MEDICINE

## 2025-04-08 PROCEDURE — 87186 SC STD MICRODIL/AGAR DIL: CPT | Performed by: FAMILY MEDICINE

## 2025-04-08 PROCEDURE — 3078F DIAST BP <80 MM HG: CPT | Performed by: FAMILY MEDICINE

## 2025-04-08 PROCEDURE — G2211 COMPLEX E/M VISIT ADD ON: HCPCS | Performed by: FAMILY MEDICINE

## 2025-04-08 NOTE — PATIENT INSTRUCTIONS
Adequate collection technique for a clean-catch, midstream urine specimen in women includes:  1) Open a wet wipe.  Open the urine cup.  2) While sitting on toilet, hold labia apart with one hand and wipe from front to back with moist wipe 3 times.  Discard wet wipe in the trash - not the toilet.    3) While keeping labia held apart, start to urinate into toilet.  4) In the middle of the urine stream catch a sample of urine in the cup.        Patient Education    Overactive Bladder includes symptoms of urinary frequency (including nighttime urination), urgency and often incontinence.      If an office urinalysis is normal, the first step in evaluation is to keep a voiding diary for two 24-hour periods.  Note details of your fluid intake: time, type of beverage, and amount.  Note details of your urine output: time, amount, and any accidents.  For accidents note the time, if urge was present, the activity at the time of the accident, and a rough estimate of the amount (on a scale of 1-4).      Remember that normal voiding occurs every 2.5 to 4 hours with 8-14 ounces voided at a time.  Normal fluid intake (in ounces) is 1/2 your weight (in pounds).  For example, a 140-pound woman should drink 70 ounces per day.    Treatment of Overactive Bladder includes:  1) Avoiding triggers.  These include caffeine, alcohol, nutrasweet, acidic foods, spicy foods, vitamins B & C.  Eliminate all of the above and then gradually add them back, monitoring your symptoms.  2) Urge suppression.  When you feel a sudden urge do not run to find a bathroom.  Do not move.  Stay still and do a pulsating kegel for 15-30 seconds.    3) Bladder Drill.  Start by emptying your bladder every 60 minutes (regardless of the urge) and increase the interval by 15-30 minutes each week until a goal of every 2-3 hours has been reached.    4) Medications:  *Magnesium 350 mg twice daily.  *Consider local estrogen.  *Consider prescription medication.

## 2025-04-08 NOTE — PROGRESS NOTES
Assessment & Plan     Stage 3b chronic kidney disease (H)  I encouraged her to drink more fluids for her kidney health.  We'll do monitoring labs  - Basic metabolic panel  (Ca, Cl, CO2, Creat, Gluc, K, Na, BUN)  - Albumin Random Urine Quantitative with Creat Ratio  - Hemoglobin  - Parathyroid Hormone Intact  - Phosphorus  - Magnesium  - Lipid panel reflex to direct LDL Non-fasting    Dysuria  Recheck UA/UC  - UA Macroscopic with reflex to Microscopic and Culture - Lab Collect  - Urine Microscopic Exam  - Urine Culture    Urge incontinence of urine  Consider medication but I'd also like to do pelvic exam - deferred to next appointment    Bilateral exudative age-related macular degeneration, unspecified stage (H)  Lucentis injections per ophthalmology    BMI 35.0-39.9 with comorbidity (H)  Severe obesity contributing to chronic conditions including GERD, CKD, CAD, HLD, HTN, OA.         See Patient Instructions      Arti Lainez is a 74 year old, presenting for the following health issues:  Follow Up and Kidney Problem      4/8/2025    10:15 AM   Additional Questions   Roomed by hector   Accompanied by self     Kidney Problem    History of Present Illness       Reason for visit:  Kidney and bladder problem    She eats 2-3 servings of fruits and vegetables daily.She consumes 0 sweetened beverage(s) daily.She exercises with enough effort to increase her heart rate 10 to 19 minutes per day.  She exercises with enough effort to increase her heart rate 3 or less days per week.   She is taking medications regularly.      Mac degeneration - Getting eye injections through ophthalmology - Lucentis 0.5 mg every 3 months    CKD  Concerned about ongoing UTI  Nocturia  Dysuria  I treated her empirically with cephalexin in January (UCx grew out mixed doris).    She prefers fosfomycin which is what her sister gets.  She wonders about medication for urinary incontinence.  Recent Labs   Lab Test 09/03/24  1247 04/23/24  1220  04/14/24  1602 04/14/24  0653    141  --  140   POTASSIUM 3.7 4.8  --  4.1   CHLORIDE 100 102  --  105   CO2 23 24  --  26   ANIONGAP 17* 15  --  9   BUN 24.6* 26.2*  --  17.8   CR 1.43* 1.28*  --  1.06*   HARIKA 9.5 10.1  --  8.9   * 109* 106* 86   GFRESTIMATED 38* 43*  --  54*            Objective    /73   Pulse 78   Temp 98.1  F (36.7  C) (Temporal)   Resp 16   Wt 95.7 kg (211 lb)   SpO2 96%   BMI 35.11 kg/m    Body mass index is 35.11 kg/m .  Physical Exam   GEN:  no apparent distress         Signed Electronically by: Ольга Houston MD

## 2025-04-09 ENCOUNTER — TELEPHONE (OUTPATIENT)
Dept: FAMILY MEDICINE | Facility: CLINIC | Age: 75
End: 2025-04-09
Payer: COMMERCIAL

## 2025-04-09 NOTE — TELEPHONE ENCOUNTER
Patient called stating that she does not have [employer] access at the moment due to the website being down last night. She is not able to see her lab results and wants the results to be sent to her or have someone call her. Patient wanted to know if PCP has sent the prescription of the Monurol (fosfomycin) medication to her pharmacy as well. Writer informed that all but 1 test has been released to her at the moment and the medication was not found in her chart. Will pass this to PCP and care team to review but will call her once there is an update.     Patient would need a phone call as she is having an issue with [employer] on her end.

## 2025-04-10 LAB
BACTERIA UR CULT: ABNORMAL
BACTERIA UR CULT: ABNORMAL

## 2025-04-10 NOTE — TELEPHONE ENCOUNTER
Writer called patient and relayed message from Dr. Houston.   - results  - UTI.    FARZANA PickardN RN  [facility]

## 2025-04-10 NOTE — TELEPHONE ENCOUNTER
1)  Please remind patient about process of auto-released results:   You may receive auto-released results from our system before I have the opportunity to review and comment.  Be assured I will review and comment on all of your results as soon as I can.  This may take up to a week.      2)  Her urine results are not back yet (still pending/preliminary).  I will treat her UTI appropriately once I get the final urine culture results with antibiotic sensitivity testing.    Ольга Houston MD  [facility]

## 2025-04-10 NOTE — TELEPHONE ENCOUNTER
Dr. Houston --    Please review and advise:     Patient would like input on her lab results. Please send [facility]Ns [facility]. We will call patient.   Monurol (fosfomycin). Patient thought you were sending this medication to pharmacy. Writer did not see mention of this medication in last visit note on 4/8/2025.     Thank you,  FARZANA PickardN RN  [facility]

## 2025-04-20 ENCOUNTER — APPOINTMENT (OUTPATIENT)
Dept: ULTRASOUND IMAGING | Facility: CLINIC | Age: 75
End: 2025-04-20
Attending: EMERGENCY MEDICINE
Payer: COMMERCIAL

## 2025-04-20 ENCOUNTER — HOSPITAL ENCOUNTER (OUTPATIENT)
Facility: CLINIC | Age: 75
Setting detail: OBSERVATION
Discharge: HOME | End: 2025-04-21
Attending: EMERGENCY MEDICINE | Admitting: INTERNAL MEDICINE
Payer: COMMERCIAL

## 2025-04-20 DIAGNOSIS — R53.1 WEAKNESS: ICD-10-CM

## 2025-04-20 DIAGNOSIS — I12.9 BENIGN HYPERTENSIVE KIDNEY DISEASE WITH CHRONIC KIDNEY DISEASE STAGE I THROUGH STAGE IV, OR UNSPECIFIED(403.10): ICD-10-CM

## 2025-04-20 DIAGNOSIS — N39.0 COMPLICATED UTI (URINARY TRACT INFECTION): ICD-10-CM

## 2025-04-20 DIAGNOSIS — N18.30 STAGE 3 CHRONIC KIDNEY DISEASE, UNSPECIFIED WHETHER STAGE 3A OR 3B CKD (H): ICD-10-CM

## 2025-04-20 DIAGNOSIS — R53.81 PHYSICAL DECONDITIONING: Primary | ICD-10-CM

## 2025-04-20 LAB
ALBUMIN SERPL BCG-MCNC: 3.8 G/DL (ref 3.5–5.2)
ALBUMIN UR-MCNC: NEGATIVE MG/DL
ALP SERPL-CCNC: 105 U/L (ref 40–150)
ALT SERPL W P-5'-P-CCNC: 9 U/L (ref 0–50)
AMORPH CRY #/AREA URNS HPF: ABNORMAL /HPF
ANION GAP SERPL CALCULATED.3IONS-SCNC: 14 MMOL/L (ref 7–15)
APPEARANCE UR: ABNORMAL
AST SERPL W P-5'-P-CCNC: 17 U/L (ref 0–45)
BACTERIA #/AREA URNS HPF: ABNORMAL /HPF
BASOPHILS # BLD AUTO: 0 10E3/UL (ref 0–0.2)
BASOPHILS NFR BLD AUTO: 0 %
BILIRUB SERPL-MCNC: 0.3 MG/DL
BILIRUB UR QL STRIP: NEGATIVE
BUN SERPL-MCNC: 23.5 MG/DL (ref 8–23)
CALCIUM SERPL-MCNC: 8.6 MG/DL (ref 8.8–10.4)
CHLORIDE SERPL-SCNC: 108 MMOL/L (ref 98–107)
COLOR UR AUTO: ABNORMAL
CREAT SERPL-MCNC: 1.14 MG/DL (ref 0.51–0.95)
EGFRCR SERPLBLD CKD-EPI 2021: 49 ML/MIN/1.73M2
EOSINOPHIL # BLD AUTO: 0.1 10E3/UL (ref 0–0.7)
EOSINOPHIL NFR BLD AUTO: 1 %
ERYTHROCYTE [DISTWIDTH] IN BLOOD BY AUTOMATED COUNT: 13.3 % (ref 10–15)
EST. AVERAGE GLUCOSE BLD GHB EST-MCNC: 91 MG/DL
GLUCOSE SERPL-MCNC: 97 MG/DL (ref 70–99)
GLUCOSE UR STRIP-MCNC: NEGATIVE MG/DL
HBA1C MFR BLD: 4.8 %
HCO3 SERPL-SCNC: 22 MMOL/L (ref 22–29)
HCT VFR BLD AUTO: 34.3 % (ref 35–47)
HGB BLD-MCNC: 11.1 G/DL (ref 11.7–15.7)
HGB UR QL STRIP: ABNORMAL
HOLD SPECIMEN: NORMAL
IMM GRANULOCYTES # BLD: 0 10E3/UL
IMM GRANULOCYTES NFR BLD: 0 %
KETONES UR STRIP-MCNC: NEGATIVE MG/DL
LACTATE SERPL-SCNC: 1.6 MMOL/L (ref 0.7–2)
LEUKOCYTE ESTERASE UR QL STRIP: ABNORMAL
LYMPHOCYTES # BLD AUTO: 1.2 10E3/UL (ref 0.8–5.3)
LYMPHOCYTES NFR BLD AUTO: 17 %
MCH RBC QN AUTO: 31.2 PG (ref 26.5–33)
MCHC RBC AUTO-ENTMCNC: 32.4 G/DL (ref 31.5–36.5)
MCV RBC AUTO: 96 FL (ref 78–100)
MONOCYTES # BLD AUTO: 0.6 10E3/UL (ref 0–1.3)
MONOCYTES NFR BLD AUTO: 9 %
NEUTROPHILS # BLD AUTO: 4.8 10E3/UL (ref 1.6–8.3)
NEUTROPHILS NFR BLD AUTO: 72 %
NITRATE UR QL: NEGATIVE
NRBC # BLD AUTO: 0 10E3/UL
NRBC BLD AUTO-RTO: 0 /100
PH UR STRIP: 5.5 [PH] (ref 5–7)
PLATELET # BLD AUTO: 178 10E3/UL (ref 150–450)
POTASSIUM SERPL-SCNC: 3.5 MMOL/L (ref 3.4–5.3)
PROCALCITONIN SERPL IA-MCNC: 0.06 NG/ML
PROT SERPL-MCNC: 6 G/DL (ref 6.4–8.3)
RBC # BLD AUTO: 3.56 10E6/UL (ref 3.8–5.2)
RBC URINE: 1 /HPF
SODIUM SERPL-SCNC: 144 MMOL/L (ref 135–145)
SP GR UR STRIP: 1 (ref 1–1.03)
UROBILINOGEN UR STRIP-MCNC: NORMAL MG/DL
WBC # BLD AUTO: 6.7 10E3/UL (ref 4–11)
WBC CLUMPS #/AREA URNS HPF: PRESENT /HPF
WBC URINE: 166 /HPF

## 2025-04-20 PROCEDURE — 99285 EMERGENCY DEPT VISIT HI MDM: CPT | Performed by: EMERGENCY MEDICINE

## 2025-04-20 PROCEDURE — 81001 URINALYSIS AUTO W/SCOPE: CPT | Performed by: EMERGENCY MEDICINE

## 2025-04-20 PROCEDURE — G0378 HOSPITAL OBSERVATION PER HR: HCPCS

## 2025-04-20 PROCEDURE — 250N000013 HC RX MED GY IP 250 OP 250 PS 637: Performed by: INTERNAL MEDICINE

## 2025-04-20 PROCEDURE — 96361 HYDRATE IV INFUSION ADD-ON: CPT

## 2025-04-20 PROCEDURE — 83605 ASSAY OF LACTIC ACID: CPT | Performed by: EMERGENCY MEDICINE

## 2025-04-20 PROCEDURE — 96365 THER/PROPH/DIAG IV INF INIT: CPT

## 2025-04-20 PROCEDURE — 84145 PROCALCITONIN (PCT): CPT | Performed by: EMERGENCY MEDICINE

## 2025-04-20 PROCEDURE — 250N000011 HC RX IP 250 OP 636: Mod: JZ | Performed by: EMERGENCY MEDICINE

## 2025-04-20 PROCEDURE — 250N000013 HC RX MED GY IP 250 OP 250 PS 637: Performed by: PHYSICIAN ASSISTANT

## 2025-04-20 PROCEDURE — 99222 1ST HOSP IP/OBS MODERATE 55: CPT | Mod: FS | Performed by: HOSPITALIST

## 2025-04-20 PROCEDURE — 258N000003 HC RX IP 258 OP 636: Performed by: PHYSICIAN ASSISTANT

## 2025-04-20 PROCEDURE — 258N000003 HC RX IP 258 OP 636: Performed by: EMERGENCY MEDICINE

## 2025-04-20 PROCEDURE — 80051 ELECTROLYTE PANEL: CPT | Performed by: EMERGENCY MEDICINE

## 2025-04-20 PROCEDURE — 99285 EMERGENCY DEPT VISIT HI MDM: CPT | Mod: 25

## 2025-04-20 PROCEDURE — 36415 COLL VENOUS BLD VENIPUNCTURE: CPT | Performed by: EMERGENCY MEDICINE

## 2025-04-20 PROCEDURE — 82310 ASSAY OF CALCIUM: CPT | Performed by: EMERGENCY MEDICINE

## 2025-04-20 PROCEDURE — 83036 HEMOGLOBIN GLYCOSYLATED A1C: CPT | Performed by: PHYSICIAN ASSISTANT

## 2025-04-20 PROCEDURE — 76770 US EXAM ABDO BACK WALL COMP: CPT

## 2025-04-20 PROCEDURE — 87186 SC STD MICRODIL/AGAR DIL: CPT | Performed by: EMERGENCY MEDICINE

## 2025-04-20 PROCEDURE — 99207 PR APP CREDIT; MD BILLING SHARED VISIT: CPT | Mod: FS | Performed by: PHYSICIAN ASSISTANT

## 2025-04-20 PROCEDURE — 96375 TX/PRO/DX INJ NEW DRUG ADDON: CPT

## 2025-04-20 PROCEDURE — 85004 AUTOMATED DIFF WBC COUNT: CPT | Performed by: EMERGENCY MEDICINE

## 2025-04-20 RX ORDER — METHOCARBAMOL 500 MG/1
500 TABLET, FILM COATED ORAL 3 TIMES DAILY PRN
Status: DISCONTINUED | OUTPATIENT
Start: 2025-04-20 | End: 2025-04-21 | Stop reason: HOSPADM

## 2025-04-20 RX ORDER — CEFTRIAXONE 2 G/1
2 INJECTION, POWDER, FOR SOLUTION INTRAMUSCULAR; INTRAVENOUS EVERY 24 HOURS
Status: DISCONTINUED | OUTPATIENT
Start: 2025-04-21 | End: 2025-04-21 | Stop reason: HOSPADM

## 2025-04-20 RX ORDER — LOSARTAN POTASSIUM 100 MG/1
100 TABLET ORAL DAILY
Status: DISCONTINUED | OUTPATIENT
Start: 2025-04-21 | End: 2025-04-21 | Stop reason: HOSPADM

## 2025-04-20 RX ORDER — AMOXICILLIN 250 MG
2 CAPSULE ORAL 2 TIMES DAILY PRN
Status: DISCONTINUED | OUTPATIENT
Start: 2025-04-20 | End: 2025-04-21 | Stop reason: HOSPADM

## 2025-04-20 RX ORDER — ACETAMINOPHEN 325 MG/1
650 TABLET ORAL EVERY 4 HOURS PRN
Status: DISCONTINUED | OUTPATIENT
Start: 2025-04-20 | End: 2025-04-21 | Stop reason: HOSPADM

## 2025-04-20 RX ORDER — SODIUM CHLORIDE 9 MG/ML
INJECTION, SOLUTION INTRAVENOUS CONTINUOUS
Status: DISCONTINUED | OUTPATIENT
Start: 2025-04-20 | End: 2025-04-21

## 2025-04-20 RX ORDER — CEFTRIAXONE 2 G/1
2 INJECTION, POWDER, FOR SOLUTION INTRAMUSCULAR; INTRAVENOUS ONCE
Status: COMPLETED | OUTPATIENT
Start: 2025-04-20 | End: 2025-04-20

## 2025-04-20 RX ORDER — ASPIRIN 81 MG/1
81 TABLET ORAL DAILY
Status: DISCONTINUED | OUTPATIENT
Start: 2025-04-21 | End: 2025-04-21 | Stop reason: HOSPADM

## 2025-04-20 RX ORDER — VIT C/E/ZN/COPPR/LUTEIN/ZEAXAN 60 MG-6 MG
1 CAPSULE ORAL 2 TIMES DAILY
Status: DISCONTINUED | OUTPATIENT
Start: 2025-04-20 | End: 2025-04-21 | Stop reason: HOSPADM

## 2025-04-20 RX ORDER — ACETAMINOPHEN 650 MG/1
650 SUPPOSITORY RECTAL EVERY 4 HOURS PRN
Status: DISCONTINUED | OUTPATIENT
Start: 2025-04-20 | End: 2025-04-21 | Stop reason: HOSPADM

## 2025-04-20 RX ORDER — AMLODIPINE BESYLATE 5 MG/1
5 TABLET ORAL DAILY
Status: DISCONTINUED | OUTPATIENT
Start: 2025-04-21 | End: 2025-04-21 | Stop reason: HOSPADM

## 2025-04-20 RX ORDER — GRANULES FOR ORAL 3 G/1
POWDER ORAL
COMMUNITY
Start: 2025-04-12 | End: 2025-04-20

## 2025-04-20 RX ORDER — PANTOPRAZOLE SODIUM 40 MG/1
40 TABLET, DELAYED RELEASE ORAL
Status: DISCONTINUED | OUTPATIENT
Start: 2025-04-21 | End: 2025-04-21 | Stop reason: HOSPADM

## 2025-04-20 RX ORDER — HYDROCHLOROTHIAZIDE 12.5 MG/1
12.5 TABLET ORAL DAILY
Status: DISCONTINUED | OUTPATIENT
Start: 2025-04-21 | End: 2025-04-21 | Stop reason: HOSPADM

## 2025-04-20 RX ORDER — HYDROMORPHONE HCL IN WATER/PF 6 MG/30 ML
0.2 PATIENT CONTROLLED ANALGESIA SYRINGE INTRAVENOUS ONCE
Status: COMPLETED | OUTPATIENT
Start: 2025-04-20 | End: 2025-04-20

## 2025-04-20 RX ORDER — METOPROLOL SUCCINATE 50 MG/1
150 TABLET, EXTENDED RELEASE ORAL DAILY
Status: DISCONTINUED | OUTPATIENT
Start: 2025-04-21 | End: 2025-04-21 | Stop reason: HOSPADM

## 2025-04-20 RX ORDER — FAMOTIDINE 20 MG/1
20 TABLET, FILM COATED ORAL 2 TIMES DAILY PRN
Status: DISCONTINUED | OUTPATIENT
Start: 2025-04-20 | End: 2025-04-21 | Stop reason: HOSPADM

## 2025-04-20 RX ADMIN — ACETAMINOPHEN 650 MG: 325 TABLET ORAL at 17:55

## 2025-04-20 RX ADMIN — Medication 1 CAPSULE: at 22:08

## 2025-04-20 RX ADMIN — SODIUM CHLORIDE, PRESERVATIVE FREE: 5 INJECTION INTRAVENOUS at 17:54

## 2025-04-20 RX ADMIN — CEFTRIAXONE SODIUM 2 G: 2 INJECTION, POWDER, FOR SOLUTION INTRAMUSCULAR; INTRAVENOUS at 12:30

## 2025-04-20 RX ADMIN — METHOCARBAMOL 500 MG: 500 TABLET ORAL at 17:55

## 2025-04-20 RX ADMIN — FAMOTIDINE 20 MG: 20 TABLET, FILM COATED ORAL at 22:08

## 2025-04-20 RX ADMIN — SODIUM CHLORIDE 1000 ML: 9 INJECTION, SOLUTION INTRAVENOUS at 10:23

## 2025-04-20 RX ADMIN — HYDROMORPHONE HYDROCHLORIDE 0.2 MG: 0.2 INJECTION, SOLUTION INTRAMUSCULAR; INTRAVENOUS; SUBCUTANEOUS at 12:17

## 2025-04-20 ASSESSMENT — ACTIVITIES OF DAILY LIVING (ADL)
ADLS_ACUITY_SCORE: 52
ADLS_ACUITY_SCORE: 52
ADLS_ACUITY_SCORE: 56
ADLS_ACUITY_SCORE: 63
ADLS_ACUITY_SCORE: 56
ADLS_ACUITY_SCORE: 52
ADLS_ACUITY_SCORE: 56
ADLS_ACUITY_SCORE: 69
ADLS_ACUITY_SCORE: 56
ADLS_ACUITY_SCORE: 56

## 2025-04-20 ASSESSMENT — COLUMBIA-SUICIDE SEVERITY RATING SCALE - C-SSRS
2. HAVE YOU ACTUALLY HAD ANY THOUGHTS OF KILLING YOURSELF IN THE PAST MONTH?: NO
6. HAVE YOU EVER DONE ANYTHING, STARTED TO DO ANYTHING, OR PREPARED TO DO ANYTHING TO END YOUR LIFE?: NO
1. IN THE PAST MONTH, HAVE YOU WISHED YOU WERE DEAD OR WISHED YOU COULD GO TO SLEEP AND NOT WAKE UP?: NO

## 2025-04-20 NOTE — ED PROVIDER NOTES
"    [facility] EMERGENCY DEPARTMENT ([facility])    4/20/25      ED PROVIDER NOTE   History     Chief Complaint   Patient presents with    UTI     BIBA ambulance. Pt was diagnosed with UTI a week ago. Pt was started on abx 6 days ago. Felt like her legs were going to give out today. Complaining of generalized weakness.     Generalized Weakness     The history is provided by the patient and medical records.     Fatou Brian is a 75 year old female with a history of hypertension, recent UTI, GERD, NSTEMI, hyperlipidemia, CKD stage III, and COPD who presents to the ED for loss of balance, generalized weakness and UTI evaluation. Patient says she has been dealing with her UTI for about 6 months. She notes that she wasn't able to get the medicine for it at the time of onset symptoms. She recently was prescribed fosfomycin for her UTI. Patient states that she also has issues with her \"equilibrium\" relating to balance. Patient notes feeling like she is going to keel over while standing up and has to tilt her head to fix her balance. She associates this to a fall that happened a year ago where she struck her head hard but didn't get evaluated by the ED at that time. Patient states that 2 days, she has had more pain in her hip that keeps her up at night. Patient reports having family history of stroke. Patient has had equilibrium concerns before UTI and current medication. Patient endorses her left ear is hot. Patient also has associated nausea due to equilibrium problems. Patient denies additional symptoms.         Past Medical History  Past Medical History:   Diagnosis Date    Arthritis     Atherosclerosis     diffuse    Chest pain, unspecified type 11/02/2021    CKD (chronic kidney disease) stage 3, GFR 30-59 ml/min (H) 09/27/2019    Coronary artery disease involving native coronary artery of native heart 10/16/2022    Emphysema of lung (H)     Former smoker     Quit 2009    GERD (gastroesophageal reflux " disease)     HLD (hyperlipidaemia)     Hypertension     Major depression in partial remission 07/03/2014    Morbid obesity (H) 09/24/2020    NSTEMI (non-ST elevated myocardial infarction) (H) 11/02/2021    S/P LIS to OM1    Sjogren's syndrome     Sjogren's syndrome with myopathy 04/04/2013    Spinal stenosis     Subclavian artery stenosis     Left     Past Surgical History:   Procedure Laterality Date    aneursym[  1983, 1991    Has metal in head    CHOLECYSTECTOMY      CLOSED RX PROX HUMERUS FRACTURE      10 pins placed    COLONOSCOPY      COLONOSCOPY  1/24/2014    Procedure: COMBINED COLONOSCOPY, SINGLE BIOPSY/POLYPECTOMY BY BIOPSY;  COLONOSCOPY;  Surgeon: Ranjit Pa MD;  Location:  GI    CV HEART CATHETERIZATION WITH POSSIBLE INTERVENTION N/A 11/2/2021    Procedure: Heart Catheterization with Possible Intervention;  Surgeon: Keeley Pérez MD;  Location:  HEART CARDIAC CATH LAB    CV PCI STENT DRUG ELUTING N/A 11/2/2021    Procedure: Percutaneous Coronary Intervention Stent Drug Eluting;  Surgeon: Keeley Pérez MD;  Location:  HEART CARDIAC CATH LAB    HC ECP WITH CATARACT SURGERY      both eyes    HYSTERECTOMY, JANET       fosfomycin (MONUROL) 3 g Packet  acetaminophen (TYLENOL) 650 MG CR tablet  amLODIPine (NORVASC) 5 MG tablet  aspirin 81 MG EC tablet  famotidine (PEPCID) 20 MG tablet  hydroCHLOROthiazide 12.5 MG tablet  Lidocaine (LIDOCARE) 4 % Patch  loratadine (CLARITIN) 10 MG tablet  losartan (COZAAR) 100 MG tablet  methocarbamol (ROBAXIN) 500 MG tablet  metoprolol succinate ER (TOPROL XL) 50 MG 24 hr tablet  Multiple Vitamins-Minerals (PRESERVISION AREDS 2 PO)  nitroGLYcerin (NITROSTAT) 0.4 MG sublingual tablet  ofloxacin (FLOXIN) 0.3 % otic solution  pantoprazole (PROTONIX) 40 MG EC tablet  senna-docusate (SENOKOT-S/PERICOLACE) 8.6-50 MG tablet      Allergies   Allergen Reactions    Blood Transfusion Related (Informational Only) Other (See Comments)     Patient has a  history of a clinically significant antibody against RBC antigens.  A delay in compatible RBCs may occur.    Chantix [Varenicline]      suicidal    Influenza Virus Vaccine H5n1      Muscle aches dizzy, nauseated  Light headed    Morphine Sulfate      Sensitivity when in hospital    Omeprazole Magnesium Nausea     Intolerance      Vioxx     Cetirizine-Pseudoephedrine Er Rash    Niacin Itching and Rash    Zetia [Ezetimibe] Other (See Comments)     Muscle pain     Family History  Family History   Problem Relation Age of Onset    Thyroid Disease Mother     Arthritis Mother         Rheumatoid    Osteoporosis Mother     Cancer Mother         stomach    Cerebrovascular Disease Mother     Heart Disease Father     Cancer Father     Heart Disease Brother         fibulation     Social History   Social History     Tobacco Use    Smoking status: Former     Current packs/day: 0.00     Average packs/day: 1.5 packs/day for 53.5 years (80.2 ttl pk-yrs)     Types: Cigarettes     Start date: 4/10/1956     Quit date: 10/1/2009     Years since quitting: 15.5    Smokeless tobacco: Never   Vaping Use    Vaping status: Never Used   Substance Use Topics    Alcohol use: Yes     Comment: occas.    Drug use: No      A complete review of systems was performed with pertinent positives and negatives noted in the HPI, and all other systems negative.    Physical Exam   BP: (!) 167/59  Pulse: 74  Temp: 97.6  F (36.4  C)  Resp: 18  SpO2: 98 %  Physical Exam  Vitals and nursing note reviewed.   Constitutional:       General: She is not in acute distress.     Appearance: Normal appearance. She is well-developed.   HENT:      Head: Normocephalic and atraumatic.   Eyes:      General: No scleral icterus.     Conjunctiva/sclera: Conjunctivae normal.   Cardiovascular:      Rate and Rhythm: Normal rate.   Pulmonary:      Effort: Pulmonary effort is normal. No respiratory distress.   Abdominal:      General: Abdomen is flat.   Musculoskeletal:      Cervical  back: Normal range of motion and neck supple.   Skin:     General: Skin is warm and dry.      Findings: No rash.   Neurological:      General: No focal deficit present.      Mental Status: She is alert and oriented to person, place, and time.             ED Course, Procedures, & Data      Procedures          Results for orders placed or performed during the hospital encounter of 04/20/25   [place] Draw *Canceled*     Status: None ()    Narrative    The following orders were created for panel order [place] Draw.  Procedure                               Abnormality         Status                     ---------                               -----------         ------                       Please view results for these tests on the individual orders.   CBC with platelets differential     Status: None ()    Narrative    The following orders were created for panel order CBC with platelets differential.  Procedure                               Abnormality         Status                     ---------                               -----------         ------                     CBC with platelets and ...[[id number]]                                                   Please view results for these tests on the individual orders.     Medications   sodium chloride 0.9% BOLUS 1,000 mL (has no administration in time range)     Labs Ordered and Resulted from Time of ED Arrival to Time of ED Departure - No data to display  No orders to display          Critical care was not performed.     Medical Decision Making  The patient's presentation was of high complexity (an acute health issue posing potential threat to life or bodily function).    The patient's evaluation involved:  ordering and/or review of 3+ test(s) in this encounter (see separate area of note for details)    The patient's management necessitated high risk (a decision regarding hospitalization).    Assessment & Plan      75 year old female with a history of hypertension,  recent UTI, GERD, NSTEMI, hyperlipidemia, CKD stage III, and COPD who presents to the ED for loss of balance, generalized weakness and UTI evaluation.  Vital signs notable for blood pressure ovation 167/59 with otherwise afebrile stable including normal pulse ox at 99% on room air.  IV established, labs sent which revealed stable CKD with BUN/creatinine of 23/1.1 otherwise normal electrolytes, stable CBC with hemoglobin 11.1, UA infected with WBCs 166.  Normal inflammatory markers with Pro-Valeriy 0.06 and normal lactic acid 1.6.  Patient given Dilaudid 0.2 mg IV for pain and after discussing with pharmacy reviewing urine culture sensitivities patient was given ceftriaxone 2 g IV.  Case discussed with medicine hospital service with agreement to admit to inpatient status for consideration for urology consult in the morning.    I have reviewed the nursing notes. I have reviewed the findings, diagnosis, plan and need for follow up with the patient.    New Prescriptions    No medications on file       Final diagnoses:   Complicated UTI (urinary tract infection)   I, James Dick Her, am serving as a trained medical scribe to document services personally performed by Piter Bird MD, based on the provider's statements to me.     IPiter MD, was physically present and have reviewed and verified the accuracy of this note documented by James Angelo.     Piter Bird MD  [facility] EMERGENCY DEPARTMENT  4/20/2025     Piter Bird MD  04/20/25 [id number]

## 2025-04-20 NOTE — H&P
"[facility]    History and Physical - Hospitalist Service, GOLD TEAM 17       Date of Admission:  4/20/2025    Assessment & Plan      Fatou Brian is a 75 year old woman with a history of HTN, CAD with NSTEMI, CKD III, HLD, GERD, COPD/emphysema, Sjogren's syndrome with myopathy, OA with chronic pain, chronic lower extremity edema, depression, prior brain aneurysm s/p clipping of left MCA, chronic urge incontinence, macular degeneration, and recent UTI who presented to ED with concern for generalized weakness, equilibrium concerns, and UTI. Registered to observation for further evaluation and management.    #UTI.   #Chronic Urinary Incontinence.    Patient reports longstanding nocturia and associated hygiene challenges, along with dysuria x 1 year seemingly worse over the past week. Abnormal UA on 4/8 w/ cx growing 50-100K E coli and klebsiella for which she received outpatient fosfomycin dose. UA here w/ large LE, 166 WBC. Afebrile, no leukocytosis, no CVA tenderness.   - Continue ceftriaxone started in ED and await urine cx.   - ED has ordered renal US.   - Recommend OP urology eval given chronicity of sx, discussion of possible therapies.    #Possible Presyncopal Event.   #Intermittent Lightheadedness.   Patient reports \"equilibrium\" concerns x 1 wk although also had similar sx ~1 yr ago after a fall which she did not have evaluated. Is feeling increasingly lightheaded when going from sitting to standing with what sounds like a presyncopal event prompting ED presentation. Reports high rather than low BPs during these events. No headaches - last head imaging 2017 w/ post op changes of left MCA clipping, chronic encephalomalacia. Last TTE 4/13/24 w/ normal LVEF, normal RV fxn, no valvular concerns. Timing of current sx seems to coincide w/ suspected UTI but differential to remain broad.   - Is s/p 1L fluid bolus, continue gentle mIVF overnight.   - Treat UTI as above. "   - Tele x 24 hrs to eval for potential arrhythmias (less likely).   - If not improving could consider head imaging given hx, consider BPPV therapy/med trial.   - PT consult, fall precautions.     #HTN. /59. Reports good readings of 130s-140s, but some excursions to 200s at home.   - Continue PTA metoprolol, amlodipine, losartan, hydrochlorothiazide.     #CKD III. Cr 1.1 within baseline range.    #Sjogren's Syndrome. With myopathy. Has been on hydroxychloroquine in past but stopped due to visual changes.    #GERD. Continue home PPI.    #Chronic Lymphedema. Has declined therapy for this in the past.          Diet: Regular   DVT Prophylaxis: Mechanical, consider pharmacologic if not ambulating   Shah Catheter: Not present  Lines: None     Cardiac Monitoring: Yes x 24 hours r/o arrhythmia   Code Status: DNR/DNI - discussed with patient and she elected for DNR/DNI status. She would like her emergency contact at this time to be her neighbor and HS friend Rakel Magallanes [phone]. She recently had an argument with her brother and did not mention the other neighbor currently on file.     Clinically Significant Risk Factors Present on Admission          # Hyperchloremia: Highest Cl = 108 mmol/L in last 2 days, will monitor as appropriate      # Hypocalcemia: Lowest Ca = 8.6 mg/dL in last 2 days, will monitor and replace as appropriate       # Drug Induced Platelet Defect: home medication list includes an antiplatelet medication                      Disposition Plan     Medically Ready for Discharge: Anticipated Tomorrow        Myrna eMlo PA-C  Hospitalist Service, GOLD TEAM 17  [facility]  Securely message with [employer] (more info)  Text page via [facility] Paging/Directory   See signed in provider for up to date coverage information    ______________________________________________________________________    Chief Complaint   UTI    History is obtained from the  "patient    History of Present Illness   Patient presented to ED via EMS. She has been experiencing bladder and urethral pain that is severe just prior to voiding and then gets relief after voiding. No abdominal or flank pain. No N/V/D. She reports this has been ongoing for the past year. Unclear when this worsened to prompt this presentation. She recently took fosfomycin after being diagnosed with a UTI by her PCP on 4/8. Unclear if this was helpful. She reports chronic incontinence, especially at night - voiding every 1-2 hours into polo pads and has trouble getting in her tub to clean up regularly. Has not tried medications for this or seen urology. Her other significant concern is issues with her \"equilibrium.\" She initially describes a fall over a year ago where it sounds like she hit her head but did not seek medical attention, and had symptoms of lightheadedness at that time. Those symptoms went away but now have returned over the past week. This typically occurs when she goes from sit to stand - feels like she is going to keel over and sitting or tilting her head helps. Today, this occurred and she had to lower herself on to her bed as she felt her legs were going to give out. No headaches. She is asking about a daily medication to prevent dizziness. She also reports her left ear is \"hot\" so suspects this is due to fever although has not felt feverish or chilled. The ear is not painful and no hearing changes although is chronically hard of hearing.       Past Medical History    Past Medical History:   Diagnosis Date    Arthritis     Atherosclerosis     diffuse    CKD (chronic kidney disease) stage 3, GFR 30-59 ml/min (H) 09/27/2019    Coronary artery disease involving native coronary artery of native heart 10/16/2022    Emphysema of lung (H)     Former smoker     Quit 2009    GERD (gastroesophageal reflux disease)     HLD (hyperlipidaemia)     Hypertension     Macular degeneration     Major depression in " partial remission 07/03/2014    Morbid obesity (H) 09/24/2020    NSTEMI (non-ST elevated myocardial infarction) (H) 11/02/2021    S/P LIS to OM1    Sjogren's syndrome with myopathy 04/04/2013    Spinal stenosis     Subclavian artery stenosis     Left       Past Surgical History   Past Surgical History:   Procedure Laterality Date    aneursym[  1983, 1991    Has metal in head    CHOLECYSTECTOMY      CLOSED RX PROX HUMERUS FRACTURE      10 pins placed    COLONOSCOPY  01/24/2014    Procedure: COMBINED COLONOSCOPY, SINGLE BIOPSY/POLYPECTOMY BY BIOPSY;  COLONOSCOPY;  Surgeon: Ranjit Pa MD;  Location:  GI    CV HEART CATHETERIZATION WITH POSSIBLE INTERVENTION N/A 11/02/2021    Procedure: Heart Catheterization with Possible Intervention;  Surgeon: Keeley Pérez MD;  Location:  HEART CARDIAC CATH LAB    CV PCI STENT DRUG ELUTING N/A 11/02/2021    Procedure: Percutaneous Coronary Intervention Stent Drug Eluting;  Surgeon: Keeley Pérez MD;  Location:  HEART CARDIAC CATH LAB    HC ECP WITH CATARACT SURGERY      both eyes    HYSTERECTOMY, JANET         Prior to Admission Medications   Prior to Admission Medications   Prescriptions Last Dose Informant Patient Reported? Taking?   Lidocaine (LIDOCARE) 4 % Patch   No No   Sig: Place 1 patch onto the skin every 24 hours To prevent lidocaine toxicity, patient should be patch free for 12 hrs daily.   Multiple Vitamins-Minerals (PRESERVISION AREDS 2 PO)   Yes No   Sig: Take 1 capsule by mouth 2 times daily.   acetaminophen (TYLENOL) 650 MG CR tablet   No No   Sig: Take 2 tablets (1,300 mg) by mouth every 8 hours as needed for mild pain or pain   amLODIPine (NORVASC) 5 MG tablet   No No   Sig: Take 1 tablet (5 mg) by mouth daily.   aspirin 81 MG EC tablet   No No   Sig: Take 1 tablet (81 mg) by mouth daily.   famotidine (PEPCID) 20 MG tablet   No No   Sig: TAKE ONE TABLET BY MOUTH TWICE DAILY AS NEEDED   fosfomycin (MONUROL) 3 g Packet   Yes Yes    hydroCHLOROthiazide 12.5 MG tablet   No No   Sig: Take 1 tablet (12.5 mg) by mouth daily.   loratadine (CLARITIN) 10 MG tablet   No No   Sig: Take one tab twice per day.   losartan (COZAAR) 100 MG tablet   No No   Sig: Take 1 tablet (100 mg) by mouth daily.   methocarbamol (ROBAXIN) 500 MG tablet   No No   Sig: Take 1 tablet (500 mg) by mouth 3 times daily as needed for muscle spasms   metoprolol succinate ER (TOPROL XL) 50 MG 24 hr tablet   No No   Sig: Take 3 tablets (150 mg) by mouth daily.   nitroGLYcerin (NITROSTAT) 0.4 MG sublingual tablet   No No   Sig: For chest pain place 1 tablet under the tongue every 5 minutes for 3 doses. If symptoms persist 5 minutes after 1st dose call 911.   ofloxacin (FLOXIN) 0.3 % otic solution   No No   Sig: Place 10 drops into the right ear daily   pantoprazole (PROTONIX) 40 MG EC tablet   No No   Sig: TAKE 1 TABLET DAILY   senna-docusate (SENOKOT-S/PERICOLACE) 8.6-50 MG tablet   Yes No   Sig: Take 2 tablets by mouth 2 times daily as needed for constipation      Facility-Administered Medications: None        Social History   I have reviewed this patient's social history and updated it with pertinent information if needed.  Social History     Tobacco Use    Smoking status: Former     Current packs/day: 0.00     Average packs/day: 1.5 packs/day for 53.5 years (80.2 ttl pk-yrs)     Types: Cigarettes     Start date: 4/10/1956     Quit date: 10/1/2009     Years since quitting: 15.5    Smokeless tobacco: Never   Vaping Use    Vaping status: Never Used   Substance Use Topics    Alcohol use: Yes     Comment: occas.    Drug use: No      Patient lives alone in a home. She has neighbors/friends that check in.     Physical Exam   Vital Signs: Temp: 97.6  F (36.4  C) Temp src: Oral BP: (!) 167/59 Pulse: 74   Resp: 18 SpO2: 98 % O2 Device: None (Room air)    Weight: 0 lbs 0 oz    GENERAL: Alert and oriented x 3. Sitting up in bed, appears comfortable except grimacing prior to voiding. [place].  Conversant.   HEENT: Anicteric sclera. Mucous membranes moist. Bilateral external auditory canals are clear and TMs are pearly gray.   CV: RRR. S1, S2. No murmurs appreciated.   RESPIRATORY: Effort normal on room air. Lungs CTAB with no wheezing, rales, rhonchi.   GI: Abdomen soft, non distended. Mild suprapubic tenderness.   NEUROLOGICAL: No focal deficits. Moves all extremities.   EXTREMITIES: BLE lymphedema and chronic skin changes.   SKIN: No jaundice. No rashes.       Medical Decision Making             Data   Reviewed CMP, CBC, procal, lactate, UA

## 2025-04-20 NOTE — ED NOTES
Bed: ED19  Expected date: 4/20/25  Expected time: 9:51 AM  Means of arrival:   Comments:  Hen [address] 74 y/o F UTI weakness

## 2025-04-20 NOTE — PHARMACY-ADMISSION MEDICATION HISTORY
Pharmacy Intern Admission Medication History    Admission medication history is complete. The information provided in this note is only as accurate as the sources available at the time of the update.    Information Source(s): Patient and CareEverywhere/SureScripts via in-person    Pertinent Information: Patient provided a list of medications. Throughout the conversation, the patient had difficulty hearing. She was unable to confirm last doses of PRNs.     Changes made to PTA medication list:  Added: None  Deleted:   Fosfomycin 3 mg packet: take 1 packet (3 g) by mouth  once   Dose completed last week per patient   Ofloxacin 0.3% otic solution: Place 10 drops into the right ear daily   Changed: None    Allergies reviewed with patient and updates made in EHR: yes    Medication History Completed By: Miri Schreiber 4/20/2025 4:37 PM    PTA Med List   Medication Sig Last Dose/Taking    acetaminophen (TYLENOL) 650 MG CR tablet Take 2 tablets (1,300 mg) by mouth every 8 hours as needed for mild pain or pain 4/19/2025 Evening    amLODIPine (NORVASC) 5 MG tablet Take 1 tablet (5 mg) by mouth daily. 4/20/2025 Morning    aspirin 81 MG EC tablet Take 1 tablet (81 mg) by mouth daily. 4/20/2025 Morning    famotidine (PEPCID) 20 MG tablet TAKE ONE TABLET BY MOUTH TWICE DAILY AS NEEDED 4/20/2025 Morning    hydroCHLOROthiazide 12.5 MG tablet Take 1 tablet (12.5 mg) by mouth daily. 4/20/2025 Morning    Lidocaine (LIDOCARE) 4 % Patch Place 1 patch onto the skin every 24 hours To prevent lidocaine toxicity, patient should be patch free for 12 hrs daily. More than a month    loratadine (CLARITIN) 10 MG tablet Take one tab twice per day. (Patient taking differently: Take 10 mg by mouth 2 times daily.) 4/20/2025 Morning    losartan (COZAAR) 100 MG tablet Take 1 tablet (100 mg) by mouth daily. 4/20/2025 Morning    methocarbamol (ROBAXIN) 500 MG tablet Take 1 tablet (500 mg) by mouth 3 times daily as needed for muscle spasms More than a  month    metoprolol succinate ER (TOPROL XL) 50 MG 24 hr tablet Take 3 tablets (150 mg) by mouth daily. 4/20/2025 Morning    Multiple Vitamins-Minerals (PRESERVISION AREDS 2 PO) Take 1 capsule by mouth 2 times daily. 4/20/2025    nitroGLYcerin (NITROSTAT) 0.4 MG sublingual tablet For chest pain place 1 tablet under the tongue every 5 minutes for 3 doses. If symptoms persist 5 minutes after 1st dose call 911. Unknown    pantoprazole (PROTONIX) 40 MG EC tablet TAKE 1 TABLET DAILY 4/20/2025 Morning    senna-docusate (SENOKOT-S/PERICOLACE) 8.6-50 MG tablet Take 2 tablets by mouth 2 times daily as needed for constipation Unknown

## 2025-04-20 NOTE — ED TRIAGE NOTES
BIBA ambulance. Pt was diagnosed with UTI a week ago. Pt was started on abx 6 days ago. Felt like her legs were going to give out today. Complaining of generalized weakness.      Triage Assessment (Adult)       Row Name 04/20/25 0957          Triage Assessment    Airway WDL WDL        Respiratory WDL    Respiratory WDL WDL        Skin Circulation/Temperature WDL    Skin Circulation/Temperature WDL WDL        Peripheral/Neurovascular WDL    Peripheral Neurovascular WDL WDL

## 2025-04-21 ENCOUNTER — APPOINTMENT (OUTPATIENT)
Dept: CT IMAGING | Facility: CLINIC | Age: 75
End: 2025-04-21
Attending: HOSPITALIST
Payer: COMMERCIAL

## 2025-04-21 VITALS
TEMPERATURE: 98.4 F | OXYGEN SATURATION: 97 % | DIASTOLIC BLOOD PRESSURE: 84 MMHG | HEART RATE: 86 BPM | RESPIRATION RATE: 17 BRPM | SYSTOLIC BLOOD PRESSURE: 150 MMHG

## 2025-04-21 LAB
ANION GAP SERPL CALCULATED.3IONS-SCNC: 12 MMOL/L (ref 7–15)
BACTERIA UR CULT: ABNORMAL
BACTERIA UR CULT: ABNORMAL
BUN SERPL-MCNC: 17.7 MG/DL (ref 8–23)
CALCIUM SERPL-MCNC: 9.1 MG/DL (ref 8.8–10.4)
CHLORIDE SERPL-SCNC: 109 MMOL/L (ref 98–107)
CREAT SERPL-MCNC: 0.93 MG/DL (ref 0.51–0.95)
EGFRCR SERPLBLD CKD-EPI 2021: 63 ML/MIN/1.73M2
GLUCOSE SERPL-MCNC: 103 MG/DL (ref 70–99)
HCO3 SERPL-SCNC: 23 MMOL/L (ref 22–29)
HOLD SPECIMEN: NORMAL
POTASSIUM SERPL-SCNC: 3.7 MMOL/L (ref 3.4–5.3)
SODIUM SERPL-SCNC: 144 MMOL/L (ref 135–145)

## 2025-04-21 PROCEDURE — 80048 BASIC METABOLIC PNL TOTAL CA: CPT | Performed by: PHYSICIAN ASSISTANT

## 2025-04-21 PROCEDURE — 82310 ASSAY OF CALCIUM: CPT | Performed by: PHYSICIAN ASSISTANT

## 2025-04-21 PROCEDURE — G0378 HOSPITAL OBSERVATION PER HR: HCPCS

## 2025-04-21 PROCEDURE — 97530 THERAPEUTIC ACTIVITIES: CPT | Mod: GP

## 2025-04-21 PROCEDURE — 96376 TX/PRO/DX INJ SAME DRUG ADON: CPT

## 2025-04-21 PROCEDURE — 250N000011 HC RX IP 250 OP 636: Performed by: PHYSICIAN ASSISTANT

## 2025-04-21 PROCEDURE — 36415 COLL VENOUS BLD VENIPUNCTURE: CPT | Performed by: PHYSICIAN ASSISTANT

## 2025-04-21 PROCEDURE — 250N000013 HC RX MED GY IP 250 OP 250 PS 637: Performed by: PHYSICIAN ASSISTANT

## 2025-04-21 PROCEDURE — 97161 PT EVAL LOW COMPLEX 20 MIN: CPT | Mod: GP

## 2025-04-21 PROCEDURE — 70450 CT HEAD/BRAIN W/O DYE: CPT | Mod: 26 | Performed by: RADIOLOGY

## 2025-04-21 PROCEDURE — 97110 THERAPEUTIC EXERCISES: CPT | Mod: GP

## 2025-04-21 PROCEDURE — 250N000013 HC RX MED GY IP 250 OP 250 PS 637: Performed by: INTERNAL MEDICINE

## 2025-04-21 PROCEDURE — 70450 CT HEAD/BRAIN W/O DYE: CPT

## 2025-04-21 PROCEDURE — 99239 HOSP IP/OBS DSCHRG MGMT >30: CPT | Performed by: HOSPITALIST

## 2025-04-21 PROCEDURE — 258N000003 HC RX IP 258 OP 636: Performed by: PHYSICIAN ASSISTANT

## 2025-04-21 RX ORDER — CEPHALEXIN 500 MG/1
500 CAPSULE ORAL 2 TIMES DAILY
Qty: 10 CAPSULE | Refills: 0 | Status: SHIPPED | OUTPATIENT
Start: 2025-04-21 | End: 2025-04-26

## 2025-04-21 RX ADMIN — ASPIRIN 81 MG: 81 TABLET ORAL at 08:42

## 2025-04-21 RX ADMIN — AMLODIPINE BESYLATE 5 MG: 5 TABLET ORAL at 08:42

## 2025-04-21 RX ADMIN — METOPROLOL SUCCINATE 150 MG: 50 TABLET, EXTENDED RELEASE ORAL at 08:42

## 2025-04-21 RX ADMIN — SODIUM CHLORIDE, PRESERVATIVE FREE: 5 INJECTION INTRAVENOUS at 06:10

## 2025-04-21 RX ADMIN — CEFTRIAXONE SODIUM 2 G: 2 INJECTION, POWDER, FOR SOLUTION INTRAMUSCULAR; INTRAVENOUS at 12:20

## 2025-04-21 RX ADMIN — Medication 1 CAPSULE: at 08:42

## 2025-04-21 RX ADMIN — ACETAMINOPHEN 650 MG: 325 TABLET ORAL at 02:15

## 2025-04-21 RX ADMIN — HYDROCHLOROTHIAZIDE 12.5 MG: 12.5 TABLET ORAL at 08:42

## 2025-04-21 RX ADMIN — PANTOPRAZOLE SODIUM 40 MG: 40 TABLET, DELAYED RELEASE ORAL at 08:42

## 2025-04-21 RX ADMIN — LOSARTAN POTASSIUM 100 MG: 100 TABLET, FILM COATED ORAL at 08:43

## 2025-04-21 ASSESSMENT — ACTIVITIES OF DAILY LIVING (ADL)
ADLS_ACUITY_SCORE: 66
ADLS_ACUITY_SCORE: 69
ADLS_ACUITY_SCORE: 66
ADLS_ACUITY_SCORE: 69
ADLS_ACUITY_SCORE: 66
ADLS_ACUITY_SCORE: 66
ADLS_ACUITY_SCORE: 69
ADLS_ACUITY_SCORE: 69
ADLS_ACUITY_SCORE: 66
ADLS_ACUITY_SCORE: 69
ADLS_ACUITY_SCORE: 66

## 2025-04-21 NOTE — PROGRESS NOTES
Patient admitted to floor this shift.   Patient is alert and oriented.  Patient is on room air.  Patient incontinent of bladder.  Patient has R-PIV.     Vital signs this shift T: 98.1, P: 80, R: 18   /58 on forearm. Patient tenses arm when checking so reading may not be accurate. Also refused recheck. Provider notified via message.     Patient is able to make needs known, uses the call light appropriately. Continue with the plan of care.

## 2025-04-21 NOTE — DISCHARGE SUMMARY
[facility]  Hospitalist Discharge Summary      Date of Admission:  4/20/2025  Date of Discharge:  4/21/2025  Discharging Provider: Onesimo Greene MD  Discharge Service: Hospitalist Service, GOLD TEAM 17    Discharge Diagnoses   UTI-Klebsiella oxytoca  Chronic Urinary Incontinence.  Possible Presyncopal Event.   Intermittent Lightheadedness.     Clinically Significant Risk Factors          Follow-ups Needed After Discharge   Follow-up Appointments       Follow Up ([facility])      Follow up with Urology, within 2-3 weeks  to evaluate treatment change and for hospital follow- up. The following labs/tests are recommended: work up for urinary incontinence and recurrent UTI.    Appointments on [place] and/or [facility] (with [facility] or [facility] provider or service). Call [phone] if you haven't heard regarding these appointments within 7 days of discharge.        Hospital Follow-up with Existing Primary Care Provider (PCP)          Schedule Primary Care visit within: 3-5 Days (Urgent)   Recommended labs and Imaging (to be ordered by Primary Care Provider): Urine culure           {Additional follow-up instructions/to-do's for PCP    :Urology    Unresulted Labs Ordered in the Past 30 Days of this Admission       Date and Time Order Name Status Description    4/20/2025 11:14 AM Urine Culture Preliminary         These results will be followed up by PCP    Discharge Disposition   Discharged to home  Condition at discharge: Stable    Hospital Course   Fatou Brian is a 75 year old woman with a history of HTN, CAD with NSTEMI, CKD III, HLD, GERD, COPD/emphysema, Sjogren's syndrome with myopathy, OA with chronic pain, chronic lower extremity edema, depression, prior brain aneurysm s/p clipping of left MCA, chronic urge incontinence, macular degeneration, and recent UTI who presented to ED with concern for generalized weakness, equilibrium concerns, and UTI. Registered to  "observation for further evaluation and management.She was started on Rocephin, remains afebrile and without leukocytosis.  Urine culture positive for Klebsiella oxytoca sensitivity pending, off note previous urine cultures were positive for pan-sensitive E coli and Klebsiella. Disequilibrium improved, worked with PT and able to ambulate ithout difficult although with generalized weakness, PT recommends home PT, she is currently uninterested in home PT. CT head without acute finding. Patient would like to be discharged and follow up with her PCP in 1-2 days, she will discuss pending culture with PCP she will also follow up with Urology clinic. Patient will be discharged home in stable condition with follow up with her PCP, Urology and outpatient PT.      Interval change 04/21/2025  Continue IV antibiotic with ceftriaxone, consider changing to p.o. pending sensitivity report  Follow-up pending cultures  Outpatient urology follow-up  PT OT eval and treatment  Possible discharge in a.m.  Discussed with the patient, nursing staff on care rounds    #UTI-Klebsiella oxytoca  #Chronic Urinary Incontinence.    Patient reports longstanding nocturia and associated hygiene challenges, along with dysuria x 1 year seemingly worse over the past week. Abnormal UA on 4/8 w/ cx growing 50-100K E coli and klebsiella for which she received outpatient fosfomycin dose. UA here w/ large LE, 166 WBC. Afebrile, no leukocytosis, no CVA tenderness.   Urine culture positive for Klebsiella oxytoca  - Continue ceftriaxone started in ED and await urine cx.   - Renal US shows no acute pathology, no evidence of hydronephrosis.  - Recommend OP urology eval given chronicity of sx, discussion of possible therapies.     #Possible Presyncopal Event.   #Intermittent Lightheadedness.   Patient reports \"equilibrium\" concerns x 1 wk although also had similar sx ~1 yr ago after a fall which she did not have evaluated. Is feeling increasingly lightheaded when " going from sitting to standing with what sounds like a presyncopal event prompting ED presentation. Reports high rather than low BPs during these events. No headaches - last head imaging 2017 w/ post op changes of left MCA clipping, chronic encephalomalacia. Last TTE 4/13/24 w/ normal LVEF, normal RV fxn, no valvular concerns. Timing of current sx seems to coincide w/ suspected UTI but differential to remain broad.   CT head without contrast shows no acute intracranial pathology, there is stable left frontotemporal encephalomalacia and left middle cerebral artery clipping.  - Is s/p 1L fluid bolus, and gentle mIVF overnight, discontinue IV fluid.  - Encourage p.o. intake  - Treat UTI as above.   - Tele x 24 hrs to eval for potential arrhythmias (less likely).   - If not improving could consider head imaging given hx, consider BPPV therapy/med trial.   - PT consult, fall precautions.      #HTN. /59. Reports good readings of 130s-140s, but some excursions to 200s at home.   - Continue PTA metoprolol, amlodipine, losartan, hydrochlorothiazide.      #CKD III. Cr 1.1 within baseline range.     #Sjogren's Syndrome. With myopathy. Has been on hydroxychloroquine in past but stopped due to visual changes.     #GERD. Continue home PPI.     #Chronic Lymphedema. Has declined therapy for this in the past.       Consultations This Hospital Stay   PHYSICAL THERAPY ADULT IP CONSULT    Code Status   No CPR- Do NOT Intubate    Time Spent on this Encounter   I, Onesimo Greene MD, personally saw the patient today and spent greater than 30 minutes discharging this patient.       Onesimo Greene MD  [facility] MED SURG  [address] [place] [id number]  Phone: [phone]  Fax: [phone]  ______________________________________________________________________    Physical Exam   Vital Signs: Temp: 98.4  F (36.9  C) Temp src: Oral BP: (!) 150/84 Pulse: 86   Resp: 17 SpO2: 97 % O2 Device: None (Room  air)    Weight: 0 lbs 0 oz    GENERAL: Alert and oriented x 3. Sitting up in bed, appears comfortable except grimacing prior to voiding. [place]. Conversant.   HEENT: Anicteric sclera. Mucous membranes moist. Bilateral external auditory canals are clear and TMs are pearly gray.   CV: RRR. S1, S2. No murmurs appreciated.   RESPIRATORY: Effort normal on room air. Lungs CTAB with no wheezing, rales, rhonchi.   GI: Abdomen soft, non distended. Mild suprapubic tenderness.   NEUROLOGICAL: No focal deficits. Moves all extremities.   EXTREMITIES: BLE lymphedema and chronic skin changes.   SKIN: No jaundice. No rashes.        Primary Care Physician   Ольга Houston    Discharge Orders      Occupational Therapy  Referral      Physical Therapy  Referral      Reason for your hospital stay    Fatou Brian is a 75 year old woman with a history of HTN, CAD with NSTEMI, CKD III, HLD, GERD, COPD/emphysema, Sjogren's syndrome with myopathy, OA with chronic pain, chronic lower extremity edema, depression, prior brain aneurysm s/p clipping of left MCA, chronic urge incontinence, macular degeneration, and recent UTI who presented to ED with concern for generalized weakness, equilibrium concerns, and UTI. Registered to observation for further evaluation and management.     Activity    Your activity upon discharge: activity as tolerated     Follow Up ([facility])    Follow up with Urology, within 2-3 weeks  to evaluate treatment change and for hospital follow- up. The following labs/tests are recommended: work up for urinary incontinence and recurrent UTI.    Appointments on [place] and/or [facility] (with [facility] or [facility] provider or service). Call [phone] if you haven't heard regarding these appointments within 7 days of discharge.     Diet    Follow this diet upon discharge: Current Diet:Orders Placed This Encounter      Regular Diet Adult     Hospital Follow-up with Existing Primary Care Provider (PCP)             Significant Results and Procedures   Most Recent 3 CBC's:  Recent Labs   Lab Test 04/20/25  1015 04/08/25  1145 04/13/24  1118 10/19/23  1259   WBC 6.7  --  5.6 8.0   HGB 11.1* 11.9 12.3 12.8   MCV 96  --  94 96     --  152 209     Most Recent 3 BMP's:  Recent Labs   Lab Test 04/21/25  0605 04/20/25  1015 04/08/25  1145    144 141   POTASSIUM 3.7 3.5 4.0   CHLORIDE 109* 108* 104   CO2 23 22 22   BUN 17.7 23.5* 27.1*   CR 0.93 1.14* 1.18*   ANIONGAP 12 14 15   HARIKA 9.1 8.6* 9.4   * 97 114*     Most Recent 2 LFT's:  Recent Labs   Lab Test 04/20/25  1015 04/13/24  1321   AST 17 40   ALT 9 29   ALKPHOS 105 114   BILITOTAL 0.3 0.5     Most Recent 3 INR's:No lab results found.,   Results for orders placed or performed during the hospital encounter of 04/20/25   US Renal Complete Non-Vascular    Narrative    EXAM: US RENAL COMPLETE NON-VASCULAR  LOCATION: [facility]  DATE: 4/20/2025    INDICATION: pain, ? pyelo  COMPARISON: 4/13/2024  TECHNIQUE: Routine Bilateral Renal and Bladder Ultrasound.    FINDINGS:    RIGHT KIDNEY: 9.2 cm. Normal without hydronephrosis or masses. Right renal cyst measuring up to 5.2 cm which are benign and follow-up.    LEFT KIDNEY: 5.6 cm. Atrophy of the left kidney with no evidence of hydronephrosis.    BLADDER: Normal.      Impression    IMPRESSION:  1.  No hydronephrosis. If concern for pyelonephritis, recommend CT of the abdomen with IV contrast.     CT Head w/o Contrast    Narrative    EXAM: Head CT without contrast 4/21/2025 10:38 AM    HISTORY: lightheadedness, near syncope    COMPARISON: CT angiogram 8/23/2017.    TECHNIQUE: Helical CT images from the skull base to the vertex were  obtained without the intravenous administration of iodinated contrast  media.     FINDINGS:  Postoperative changes of left frontotemporal craniotomy and underlying  clipping of a middle cerebral artery aneurysm. Left  frontotemporal  encephalomalacia. No acute loss of gray-white differentiation. No  intracranial hemorrhage, mass effect, midline shift, or abnormal  extra-axial fluid collection. Ventricles are proportionate to the  cerebral sulci. Basal cisterns are clear. Vascular calcifications.  Basal ganglia mineralization.    Atraumatic calvarium and skull base. Bilateral pseudophakia.  Visualized portions of the paranasal sinuses are clear. Mastoid air  cells are clear. No soft tissue swelling.       Impression    IMPRESSION:   1. No acute intracranial pathology.   2. Stable left frontotemporal encephalomalacia and left middle  cerebral artery clipping.    I have personally reviewed the examination and initial interpretation  and I agree with the findings.    SUSAN DEGROOT MD         SYSTEM ID:  [id number]       Discharge Medications   Current Discharge Medication List        START taking these medications    Details   cephALEXin (KEFLEX) 500 MG capsule Take 1 capsule (500 mg) by mouth 2 times daily for 5 days.  Qty: 10 capsule, Refills: 0    Associated Diagnoses: Complicated UTI (urinary tract infection)           CONTINUE these medications which have NOT CHANGED    Details   acetaminophen (TYLENOL) 650 MG CR tablet Take 2 tablets (1,300 mg) by mouth every 8 hours as needed for mild pain or pain  Qty: 270 tablet, Refills: 3    Comments: PHARMACY - PLEASE CANCEL SCRIPT FOR HCTZ THAT I JUST SENT.  Associated Diagnoses: Bilateral low back pain with sciatica, sciatica laterality unspecified, unspecified chronicity; Primary osteoarthritis of both knees      amLODIPine (NORVASC) 5 MG tablet Take 1 tablet (5 mg) by mouth daily.  Qty: 90 tablet, Refills: 3    Associated Diagnoses: Hypertension goal BP (blood pressure) < 130/80      aspirin 81 MG EC tablet Take 1 tablet (81 mg) by mouth daily.  Qty: 90 tablet, Refills: 3    Associated Diagnoses: ASCVD (arteriosclerotic cardiovascular disease)      famotidine (PEPCID) 20 MG tablet  TAKE ONE TABLET BY MOUTH TWICE DAILY AS NEEDED  Qty: 180 tablet, Refills: 1    Associated Diagnoses: Gastroesophageal reflux disease without esophagitis      hydroCHLOROthiazide 12.5 MG tablet Take 1 tablet (12.5 mg) by mouth daily.  Qty: 90 tablet, Refills: 3    Associated Diagnoses: Hypertension goal BP (blood pressure) < 130/80      Lidocaine (LIDOCARE) 4 % Patch Place 1 patch onto the skin every 24 hours To prevent lidocaine toxicity, patient should be patch free for 12 hrs daily.  Qty: 14 patch, Refills: 0    Associated Diagnoses: Pain of left upper extremity      loratadine (CLARITIN) 10 MG tablet Take one tab twice per day.  Qty: 180 tablet, Refills: 3    Comments: PHARMACY - PLEASE CANCEL SCRIPT FOR HCTZ THAT I JUST SENT.  Associated Diagnoses: Allergic rhinitis, unspecified seasonality, unspecified trigger      losartan (COZAAR) 100 MG tablet Take 1 tablet (100 mg) by mouth daily.  Qty: 90 tablet, Refills: 0    Associated Diagnoses: Hypertension goal BP (blood pressure) < 130/80      methocarbamol (ROBAXIN) 500 MG tablet Take 1 tablet (500 mg) by mouth 3 times daily as needed for muscle spasms  Qty: 15 tablet, Refills: 0    Associated Diagnoses: Low back pain with sciatica, sciatica laterality unspecified, unspecified back pain laterality, unspecified chronicity      metoprolol succinate ER (TOPROL XL) 50 MG 24 hr tablet Take 3 tablets (150 mg) by mouth daily.  Qty: 270 tablet, Refills: 3    Associated Diagnoses: Hypertension goal BP (blood pressure) < 130/80      Multiple Vitamins-Minerals (PRESERVISION AREDS 2 PO) Take 1 capsule by mouth 2 times daily.      nitroGLYcerin (NITROSTAT) 0.4 MG sublingual tablet For chest pain place 1 tablet under the tongue every 5 minutes for 3 doses. If symptoms persist 5 minutes after 1st dose call 911.  Qty: 12 tablet, Refills: 0    Comments: PHARMACY - PLEASE CANCEL SCRIPT FOR HCTZ THAT I JUST SENT.  Associated Diagnoses: Coronary artery disease involving native coronary  artery of native heart, unspecified whether angina present      pantoprazole (PROTONIX) 40 MG EC tablet TAKE 1 TABLET DAILY  Qty: 90 tablet, Refills: 0    Associated Diagnoses: Gastroesophageal reflux disease without esophagitis      senna-docusate (SENOKOT-S/PERICOLACE) 8.6-50 MG tablet Take 2 tablets by mouth 2 times daily as needed for constipation           Allergies   Allergies   Allergen Reactions    Blood Transfusion Related (Informational Only) Other (See Comments)     Patient has a history of a clinically significant antibody against RBC antigens.  A delay in compatible RBCs may occur.    Chantix [Varenicline]      suicidal    Influenza Virus Vaccine H5n1      Muscle aches dizzy, nauseated  Light headed    Morphine Sulfate      Sensitivity when in hospital    Omeprazole Magnesium Nausea     Intolerance      Vioxx     Cetirizine-Pseudoephedrine Er Rash    Niacin Itching and Rash    Zetia [Ezetimibe] Other (See Comments)     Muscle pain

## 2025-04-21 NOTE — PROGRESS NOTES
"[facility]    Medicine Progress Note - Hospitalist Service, GOLD TEAM 17    Date of Admission:  4/20/2025    Assessment & Plan     Fatou Brian is a 75 year old woman with a history of HTN, CAD with NSTEMI, CKD III, HLD, GERD, COPD/emphysema, Sjogren's syndrome with myopathy, OA with chronic pain, chronic lower extremity edema, depression, prior brain aneurysm s/p clipping of left MCA, chronic urge incontinence, macular degeneration, and recent UTI who presented to ED with concern for generalized weakness, equilibrium concerns, and UTI. Registered to observation for further evaluation and management.     Interval change 04/21/2025  Continue IV antibiotic with ceftriaxone, consider changing to p.o. pending sensitivity report  Follow-up pending cultures  Outpatient urology follow-up  PT OT eval and treatment  Possible discharge in a.m.  Discussed with the patient, nursing staff on care rounds    #UTI-Klebsiella oxytoca  #Chronic Urinary Incontinence.    Patient reports longstanding nocturia and associated hygiene challenges, along with dysuria x 1 year seemingly worse over the past week. Abnormal UA on 4/8 w/ cx growing 50-100K E coli and klebsiella for which she received outpatient fosfomycin dose. UA here w/ large LE, 166 WBC. Afebrile, no leukocytosis, no CVA tenderness.   Urine culture positive for Klebsiella oxytoca  - Continue ceftriaxone started in ED and await urine cx.   - Renal US shows no acute pathology, no evidence of hydronephrosis.  - Recommend OP urology eval given chronicity of sx, discussion of possible therapies.     #Possible Presyncopal Event.   #Intermittent Lightheadedness.   Patient reports \"equilibrium\" concerns x 1 wk although also had similar sx ~1 yr ago after a fall which she did not have evaluated. Is feeling increasingly lightheaded when going from sitting to standing with what sounds like a presyncopal event prompting ED presentation. " Reports high rather than low BPs during these events. No headaches - last head imaging 2017 w/ post op changes of left MCA clipping, chronic encephalomalacia. Last TTE 4/13/24 w/ normal LVEF, normal RV fxn, no valvular concerns. Timing of current sx seems to coincide w/ suspected UTI but differential to remain broad.   CT head without contrast shows no acute intracranial pathology, there is stable left frontotemporal encephalomalacia and left middle cerebral artery clipping.  - Is s/p 1L fluid bolus, and gentle mIVF overnight, discontinue IV fluid.  - Encourage p.o. intake  - Treat UTI as above.   - Tele x 24 hrs to eval for potential arrhythmias (less likely).   - If not improving could consider head imaging given hx, consider BPPV therapy/med trial.   - PT consult, fall precautions.      #HTN. /59. Reports good readings of 130s-140s, but some excursions to 200s at home.   - Continue PTA metoprolol, amlodipine, losartan, hydrochlorothiazide.      #CKD III. Cr 1.1 within baseline range.     #Sjogren's Syndrome. With myopathy. Has been on hydroxychloroquine in past but stopped due to visual changes.     #GERD. Continue home PPI.     #Chronic Lymphedema. Has declined therapy for this in the past.        Observation Goals: -diagnostic tests and consults completed and resulted, -vital signs normal or at patient baseline, -adequate pain control on oral analgesics, -tolerating oral antibiotics or has plans for home infusion setup, -infection is improving, -returns to baseline functional status, -safe disposition plan has been identified, Nurse to notify provider when observation goals have been met and patient is ready for discharge.  Diet: Regular Diet Adult    DVT Prophylaxis: Pneumatic Compression Devices and Ambulate every shift  Shah Catheter: Not present  Lines: None     Cardiac Monitoring: ACTIVE order. Indication: Syncope- low cardiac risk (24 hours)  Code Status: No CPR- Do NOT Intubate      Clinically  Significant Risk Factors Present on Admission          # Hyperchloremia: Highest Cl = 109 mmol/L in last 2 days, will monitor as appropriate      # Hypocalcemia: Lowest Ca = 8.6 mg/dL in last 2 days, will monitor and replace as appropriate       # Drug Induced Platelet Defect: home medication list includes an antiplatelet medication   # Hypertension: Noted on problem list                    Social Drivers of Health    Food Insecurity: Unknown (9/3/2024)    Food Insecurity     Within the past 12 months, did you worry that your food would run out before you got money to buy more?: Patient declined     Within the past 12 months, did the food you bought just not last and you didn t have money to get more?: Patient declined   Depression: At risk (9/3/2024)    PHQ-2     PHQ-2 Score: 3   Housing Stability: Unknown (9/3/2024)    Housing Stability     Do you have housing? : Patient declined     Are you worried about losing your housing?: Patient declined   Tobacco Use: Medium Risk (4/8/2025)    Patient History     Smoking Tobacco Use: Former     Smokeless Tobacco Use: Never   Financial Resource Strain: Unknown (9/3/2024)    Financial Resource Strain     Within the past 12 months, have you or your family members you live with been unable to get utilities (heat, electricity) when it was really needed?: Patient declined   Transportation Needs: Unknown (9/3/2024)    Transportation Needs     Within the past 12 months, has lack of transportation kept you from medical appointments, getting your medicines, non-medical meetings or appointments, work, or from getting things that you need?: Patient declined   Physical Activity: Inactive (9/3/2024)    Exercise Vital Sign     Days of Exercise per Week: 0 days     Minutes of Exercise per Session: 0 min   Social Connections: Unknown (9/3/2024)    Social Connection and Isolation Panel [NHANES]     Frequency of Social Gatherings with Friends and Family: Never          Disposition Plan      Medically Ready for Discharge: Anticipated Tomorrow     Onesimo Greene MD  Hospitalist Service, GOLD TEAM 17  [facility]  Securely message with [employer] (more info)  Text page via [employer] Paging/Directory   See signed in provider for up to date coverage information  ______________________________________________________________________    Interval History   Patient seen and examined this a.m., reports improvement with the disequilibrium, complains of pain while urinating, denies fever or chills, denies shortness of breath, chest pain, denies nausea or vomiting, tolerating p.o.  She worked with the physical therapy and was able to ambulate without difficulty, plan to go for stairs.  Labs and imaging findings reviewed and discussed with the patient, no leukocytosis, urine culture positive for Klebsiella oxytoca sensitivity pending, renal ultrasound shows no evidence of hydronephrosis, CT head without contrast shows no acute intracranial pathology, there is stable left frontotemporal encephalomalacia and left middle cerebral artery clipping.    Physical Exam   Vital Signs: Temp: 98.4  F (36.9  C) Temp src: Oral BP: (!) 150/84 Pulse: 86   Resp: 17 SpO2: 97 % O2 Device: None (Room air)    Weight: 0 lbs 0 oz    GENERAL: Alert and oriented x 3. Sitting up in bed, appears comfortable except grimacing prior to voiding. [place]. Conversant.   HEENT: Anicteric sclera. Mucous membranes moist. Bilateral external auditory canals are clear and TMs are pearly gray.   CV: RRR. S1, S2. No murmurs appreciated.   RESPIRATORY: Effort normal on room air. Lungs CTAB with no wheezing, rales, rhonchi.   GI: Abdomen soft, non distended. Mild suprapubic tenderness.   NEUROLOGICAL: No focal deficits. Moves all extremities.   EXTREMITIES: BLE lymphedema and chronic skin changes.   SKIN: No jaundice. No rashes.     Medical Decision Making       45 MINUTES SPENT BY ME on the date of service doing chart  review, history, exam, documentation & further activities per the note.      Data     I have personally reviewed the following data over the past 24 hrs:    N/A  \   N/A   / N/A     144 109 (H) 17.7 /  103 (H)   3.7 23 0.93 \     TSH: N/A T4: N/A A1C: N/A       Imaging results reviewed over the past 24 hrs:   Recent Results (from the past 24 hours)   US Renal Complete Non-Vascular    Narrative    EXAM: US RENAL COMPLETE NON-VASCULAR  LOCATION: [facility]  DATE: 4/20/2025    INDICATION: pain, ? pyelo  COMPARISON: 4/13/2024  TECHNIQUE: Routine Bilateral Renal and Bladder Ultrasound.    FINDINGS:    RIGHT KIDNEY: 9.2 cm. Normal without hydronephrosis or masses. Right renal cyst measuring up to 5.2 cm which are benign and follow-up.    LEFT KIDNEY: 5.6 cm. Atrophy of the left kidney with no evidence of hydronephrosis.    BLADDER: Normal.      Impression    IMPRESSION:  1.  No hydronephrosis. If concern for pyelonephritis, recommend CT of the abdomen with IV contrast.     CT Head w/o Contrast    Narrative    EXAM: Head CT without contrast 4/21/2025 10:38 AM    HISTORY: lightheadedness, near syncope    COMPARISON: CT angiogram 8/23/2017.    TECHNIQUE: Helical CT images from the skull base to the vertex were  obtained without the intravenous administration of iodinated contrast  media.     FINDINGS:  Postoperative changes of left frontotemporal craniotomy and underlying  clipping of a middle cerebral artery aneurysm. Left frontotemporal  encephalomalacia. No acute loss of gray-white differentiation. No  intracranial hemorrhage, mass effect, midline shift, or abnormal  extra-axial fluid collection. Ventricles are proportionate to the  cerebral sulci. Basal cisterns are clear. Vascular calcifications.  Basal ganglia mineralization.    Atraumatic calvarium and skull base. Bilateral pseudophakia.  Visualized portions of the paranasal sinuses are clear. Mastoid air  cells are clear.  No soft tissue swelling.       Impression    IMPRESSION:   1. No acute intracranial pathology.   2. Stable left frontotemporal encephalomalacia and left middle  cerebral artery clipping.    I have personally reviewed the examination and initial interpretation  and I agree with the findings.    SUSAN DEGROOT MD         SYSTEM ID:  [id number]

## 2025-04-21 NOTE — PLAN OF CARE
VS: BP (!) 159/59 (BP Location: Other (Comment), Patient Position: Fowlers)   Pulse 80   Temp 97.8  F (36.6  C) (Oral)   Resp 17   SpO2 96%     O2: SpO2  stable on RA. Denies chest pain and SOB.    Output: Bowel mixed continence, bladder incontinent  1x loose stool within the shift.    Last BM: 4/20   Activity: Assist of 1 or 2 persons w/ gait belt for transfer  Unsteady gait, walker available for support on bedside in case needed, pt refused when offered.    Skin: Redness and edema on BLE, blanchable redness on bottom area.    Pain: Complained of episode of bladder pain but goes away after urination per pt. Arthritic Pain managed with prn tylenol.   Still complained of some pain, Prn meds available but refused when offered to pt. Preferred to take the tylenol Q8H instead of Q4H.   Pt was informed to let writer know in case pain is progressing so that writer will inform the provider, but pt verbalized not wanting to take more and stronger pill.   CMS: AOx4. Intermittent confusion to time.   Hard of hearing. Not using hearing aids.   Verbalized frustration of not wanting to be in the hospital.   Gets easily agitated and angry.   Yells and using foul words when angry.   Dressing: N/A   Diet: Regular diet. Denies nausea/vomiting.    LDA: R PIV- 0.9 NaCl @ 75ml/hr   Equipment: IV pole, personal belongings,    Plan: Standard precautions maintained / Continue with plan of care. Call light within reach, pt able to make needs known.    Additional Info: On tele and continuous pulse ox.   VS monitored Q4H.   High BP-  denies headache, SOB, dizziness, chest pain.  > more elevated when pt is tense and irritable.   Pt wanted all the 4 side rails up at all times.

## 2025-04-21 NOTE — PROGRESS NOTES
04/21/25 1300   Appointment Info   Signing Clinician's Name / Credentials (PT) Dian Johnson, PT, DPT   Rehab Comments (PT) VERY [place] and poor vision   Living Environment   People in Home alone   Current Living Arrangements house   Home Accessibility stairs to enter home;stairs within home   Number of Stairs, Main Entrance 5   Stair Railings, Main Entrance railing on left side (ascending)   Number of Stairs, Within Home, Primary greater than 10 stairs   Stair Railings, Within Home, Primary railings safe and in good condition   Transportation Anticipated family or friend will provide   Living Environment Comments Pt lives at home alone with 5 VERNA, all living on main level except laundry is in the basement.   Self-Care   Usual Activity Tolerance moderate   Current Activity Tolerance fair   Equipment Currently Used at Home walker, rolling;shower chair   Fall history within last six months yes   Activity/Exercise/Self-Care Comment Per pt she is mostly IND with ADL's, showers using a shower chair, does not change for clother very frequwntly.  Has family come over to help wtih laundry in basement.  Pt reprots she has lots of equipment, has all kinsd of walkers, a motor scooter she cant get in the hosue and a wheelchair but the wheelcahair is bigger and doesnt move in her house well.   General Information   Onset of Illness/Injury or Date of Surgery 04/20/25   Patient/Family Therapy Goals Statement (PT) Pt wants to be stronger and go home   Pertinent History of Current Problem (include personal factors and/or comorbidities that impact the POC) 75 year old woman with a history of HTN, CAD with NSTEMI, CKD III, HLD, GERD, COPD/emphysema, Sjogren's syndrome with myopathy, OA with chronic pain, chronic lower extremity edema, depression, prior brain aneurysm s/p clipping of left MCA, chronic urge incontinence, macular degeneration, and recent UTI who presented to ED with concern for generalized weakness, equilibrium  concerns, and UTI. Registered to observation for further evaluation and management.   Existing Precautions/Restrictions fall   Weight-Bearing Status - LUE full weight-bearing   Weight-Bearing Status - RUE full weight-bearing   Weight-Bearing Status - LLE full weight-bearing   Weight-Bearing Status - RLE full weight-bearing   General Observations Activity: up with assist   Cognition   Affect/Mental Status (Cognition) WFL   Orientation Status (Cognition) oriented x 3   Follows Commands (Cognition) WFL   Pain Assessment   Patient Currently in Pain Yes, see Vital Sign flowsheet   Integumentary/Edema   Integumentary/Edema Comments B LE edema (reports this is baseline)   Posture    Posture Protracted shoulders;Forward head position   Range of Motion (ROM)   Range of Motion ROM is WFL   Strength (Manual Muscle Testing)   Strength Comments B LE's very weak (L>R), unable to do SLR on L side, grossly 3+/5 on R and 2+/5 on L side   Bed Mobility   Comment, (Bed Mobility) Luis at LEs, usually uses a leg    Transfers   Comment, (Transfers) SBA with walker   Gait/Stairs (Locomotion)   Comment, (Gait/Stairs) SBA with walekr   Balance   Balance Comments Impaired stnading balance due to LE weakness   Sensory Examination   Sensory Perception Comments Pt very [place] and with poor vision   Clinical Impression   Criteria for Skilled Therapeutic Intervention Yes, treatment indicated   PT Diagnosis (PT) Impaired functional mobility   Influenced by the following impairments Decreased LE strength, poor activity tolerance, LE edema   Functional limitations due to impairments Inability to complete functional mobility at baseline level of functioning   Clinical Presentation (PT Evaluation Complexity) stable   Clinical Presentation Rationale Clinical judgement   Clinical Decision Making (Complexity) low complexity   Planned Therapy Interventions (PT) balance training;bed mobility training;gait training;home exercise program;stair  training;strengthening;transfer training   Risk & Benefits of therapy have been explained evaluation/treatment results reviewed;care plan/treatment goals reviewed;risks/benefits reviewed;current/potential barriers reviewed;participants voiced agreement with care plan;patient;participants included   Clinical Impression Comments Pt would benefit from IP PT to progress safety with mobility for d/c home.   PT Total Evaluation Time   PT Eval, Low Complexity Minutes (80690) 10   Physical Therapy Goals   PT Frequency 5x/week   PT Predicted Duration/Target Date for Goal Attainment 04/24/25   PT Goals Bed Mobility;Transfers;Gait;Stairs   PT: Bed Mobility Modified independent   PT: Transfers Modified independent   PT: Gait Modified independent;50 feet   PT: Stairs Supervision/stand-by assist;5 stairs;Rail on left;Assistive device   Interventions   Interventions Quick Adds Therapeutic Activity;Therapeutic Procedure   Therapeutic Procedure/Exercise   Ther. Procedure: strength, endurance, ROM, flexibillity Minutes (57641) 10   Treatment Detail/Skilled Intervention PT: Discusion and completion of HEP for LE strength.  Pt educated on seated LAQ and marching as well as partial squat from stand with chair behind her for safety.  Pt able to complete all but hard to do, educated on daily comlpetion to help with her LE strength.   Therapeutic Activity   Therapeutic Activities: dynamic activities to improve functional performance Minutes (33863) 40   Treatment Detail/Skilled Intervention PT: Focus on IND with functional mobility for molly with d/c home.  Lots of extra time spent trouble shooting mobility at home with her current equipment as she feels more unsafe with mobility due to LE waekness.  Pt reports she uses her leg  for bed mobility to get legs into bed.  Pt SBA wtih use of walker for transfers, heavy UE use.  Pt able to ambualte short bouts ion room with use of 2WW and SBA, pt with increased UE use on walker but safe.   Discussion about use of 2WW  instead of 3WW for more stability but pt likes to use the handles and seat as a tray for her food/items.  Discussion about use of brakes on 3WW for safety to slow it down.  Use of w/c to get to stairs for safety with stairs.  Pt uses leg  to lift L LE up onto step then is able to advnce up, 1 rail and 1 SEC, SBA from PT, cues for up with good down with bad for mor ease.  Once back in room pt with qwuestions about tub trasnfer bench, tranpsort chair vs w/c and hmoe services.  Pt not wanting home services right now, will try to do her HEP more.   PT Discharge Planning   PT Plan PT: 1 more session for safety at home? check in IND with bed mob with leg  (in room), short ambulation bouts with walker, LE ex   PT Discharge Recommendation (DC Rec) home with assist;home with home care physical therapy   PT Rationale for DC Rec Pt demonstrating generlized weakness and impaired ability to complete functional tasks.  Would benefit from home PT to work on functional strength and saafety at home.   PT Brief overview of current status Luis for bed mob, SBA with walker   PT Total Distance Amb During Session (feet) 70   Physical Therapy Time and Intention   Timed Code Treatment Minutes 50   Total Session Time (sum of timed and untimed services) 60

## 2025-04-21 NOTE — PROGRESS NOTES
6MS DISCHARGE    D: Patient discharged to home at 1350. Patient accompanied by pt transport via w/c.    I: Discharge prescriptions given to patient. All discharge medications and instructions reviewed with pt. Patient instructed to seek care if experiencing worsening symptoms, such as sob/chest. Unable to urinate. Other phone numbers to call with questions or concerns after discharge reviewed. piv removed. Education completed.    A: pt verbalized understanding of discharge medications and instructions. Prescribed home medications given to patient.  Belongings returned to patient.    P: Patient to follow-up with PCP within 2-3 days. Urology within 2-3 weeks.

## 2025-04-22 RX ORDER — SULFAMETHOXAZOLE AND TRIMETHOPRIM 800; 160 MG/1; MG/1
1 TABLET ORAL 2 TIMES DAILY
Qty: 6 TABLET | Refills: 0 | Status: SHIPPED | OUTPATIENT
Start: 2025-04-22

## 2025-04-22 NOTE — PROGRESS NOTES
Brief Internal Medicine Progress Note    Writer performed inpatient inbasket result review.  Patient was recently treated for UTI during a recent hospitalization from 4/20- 4/21.  Patient's urine culture from this hospitalization finalized, grew Klebsiella that showed resistance to Ancef.  Patient was discharged on cephalexin.  Concerned that  Keflex is inadequate to treat patient's Klebsiella infection.  Called to discuss this with patient, and sent 3 days of Bactrim DS to patient's preferred pharmacy.  Patient is in agreement with this plan.      Yomaira Moss PA-C  [facility] [place]  4/22/2025 12:42 PM

## 2025-04-22 NOTE — PLAN OF CARE
Physical Therapy Discharge Summary    Reason for therapy discharge:    Discharged to home with home therapy.    Progress towards therapy goal(s). See goals on Care Plan in [facility] electronic health record for goal details.  Goals partially met.  Barriers to achieving goals:   discharge from facility.    Therapy recommendation(s):    Continued therapy is recommended.  Rationale/Recommendations:  Home care PT.

## 2025-04-23 ENCOUNTER — TELEPHONE (OUTPATIENT)
Dept: FAMILY MEDICINE | Facility: CLINIC | Age: 75
End: 2025-04-23
Payer: COMMERCIAL

## 2025-04-23 NOTE — TELEPHONE ENCOUNTER
Medication Question    What medication are you calling about (include dose and sig)?: sulfamethoxazole-trimethoprim (BACTRIM DS) 800-160 MG tablet     Preferred Pharmacy:  [id number] [address] AT [address] [address]  Phone: [phone] Fax: [phone]    Who prescribed the medication?: Yomaira Moss PA-C    Do you need a refill? No    Do you have any questions or concerns?  Yes: My brother picked up the medication and was told that I have a morphine sulfate allergy. I would like know Dr. Houston thinks this med is safe for me to take. Please call me and let me know.    Could [facility] or would you prefer to receive a phone call?:   Patient would prefer a phone call   Okay to leave a detailed message?: No at Home number on file [phone] (home)     Dr. Houston,  Please review and advise    Rafael SMITH RN  [facility]

## 2025-04-23 NOTE — TELEPHONE ENCOUNTER
Yes it is ok.  Morphine sulfate is not a sulfa allergy.    Ольга Houston MD  [facility]

## 2025-04-24 ENCOUNTER — NURSE TRIAGE (OUTPATIENT)
Dept: NURSING | Facility: CLINIC | Age: 75
End: 2025-04-24
Payer: COMMERCIAL

## 2025-04-25 NOTE — TELEPHONE ENCOUNTER
"Nurse Triage SBAR    Is this a 2nd Level Triage? NO    Situation: Hypotension    Background: Patient takes metoprolol, amlodipine, hydrochlorothizide, and losartan. Normal SBP is 110-120 per patient.    Assessment: Patient reports blood pressure was 97/44 and 96/44,but is currently 129/59. She reports taking all medications as prescribed this morning. Denies severe weakness, confusion, fainting, dizzy, weakness, chest pain, bleeding, abdominal pain, fever, recent surgery, or allergic reaction.    Recommendation: According to the protocol, patient should continue home care.  Care advice given, monitor blood pressure overnight. If feeling dizzy or weak or SBP<90 or DBP<50,  should call back. Patient verbalizes understanding and agrees with plan of care.     Angella Huber RN  04/24/25 7:38 PM  [facility] Nurse Advisor      Additional Information   Negative: Started suddenly after an allergic medicine, an allergic food, or bee sting   Negative: Shock suspected (e.g., cold/pale/clammy skin, too weak to stand, low BP, rapid pulse)   Negative: Difficult to awaken or acting confused (e.g., disoriented, slurred speech)   Negative: Fainted   Negative: [1] Systolic BP < 90 AND [2] dizzy, lightheaded, or weak   Negative: Chest pain   Negative: Bleeding (e.g., vomiting blood, rectal bleeding or tarry stools, severe vaginal bleeding)(Exception: Fainted from sight of small amount of blood; small cut or abrasion.)   Negative: Extra heartbeats, irregular heart beating, or heart is beating very fast  (i.e., \"palpitations\")   Negative: Sounds like a life-threatening emergency to the triager   Negative: [1] Systolic BP < 80 AND [2] NOT dizzy, lightheaded or weak   Negative: Abdominal pain   Negative: [1] Systolic BP < 90 AND [2] NOT dizzy, lightheaded or weak   Negative: [1] Systolic BP  AND [2] taking blood pressure medications AND [3] dizzy, lightheaded or weak   Negative: [1] Fall in systolic BP > 20 mm Hg from " normal AND [2] dizzy, lightheaded, or weak   Negative: Patient sounds very sick or weak to the triager   Negative: Fever > 100.4 F (38.0 C)   Negative: Major surgery in the past month   Negative: [1] Drinking very little AND [2] dehydration suspected (e.g., no urine > 12 hours, very dry mouth, very lightheaded)   Negative: [1] Systolic BP  AND [2] taking blood pressure medications AND [3] NOT dizzy, lightheaded or weak   Negative: [1] Fall in systolic BP > 20 mm Hg from normal AND [2] NOT dizzy, lightheaded, or weak   Negative: Fall in systolic BP > 20 mmHg after standing up   Negative: Fall in systolic BP > 20 mmHg after eating a meal   Negative: Diastolic BP < 50 mm Hg   Negative: Brief (now gone) dizziness or lightheadedness after standing up or eating   Negative: Wants doctor to measure BP   Health Information question, no triage required and triager able to answer question   Negative: Requesting regular office appointment   Negative: [1] Caller requesting NON-URGENT health information AND [2] PCP's office is the best resource   Negative: RN needs further essential information from caller in order to complete triage   Negative: [1] Caller is not with the adult (patient) AND [2] reporting urgent symptoms   Negative: Lab result questions   Negative: Medication questions   Negative: Caller can't be reached by phone   Negative: Caller has already spoken to PCP or another triager    Protocols used: Blood Pressure - Low-A-, Information Only Call-A-

## 2025-05-04 ENCOUNTER — APPOINTMENT (OUTPATIENT)
Dept: GENERAL RADIOLOGY | Facility: CLINIC | Age: 75
End: 2025-05-04
Payer: COMMERCIAL

## 2025-05-04 ENCOUNTER — HOSPITAL ENCOUNTER (EMERGENCY)
Facility: CLINIC | Age: 75
Discharge: HOME | End: 2025-05-04
Attending: EMERGENCY MEDICINE | Admitting: EMERGENCY MEDICINE
Payer: COMMERCIAL

## 2025-05-04 VITALS
HEIGHT: 66 IN | DIASTOLIC BLOOD PRESSURE: 48 MMHG | OXYGEN SATURATION: 98 % | WEIGHT: 215 LBS | BODY MASS INDEX: 34.55 KG/M2 | SYSTOLIC BLOOD PRESSURE: 178 MMHG | HEART RATE: 66 BPM | TEMPERATURE: 98.5 F | RESPIRATION RATE: 15 BRPM

## 2025-05-04 DIAGNOSIS — S61.012A THUMB LACERATION, LEFT, INITIAL ENCOUNTER: ICD-10-CM

## 2025-05-04 DIAGNOSIS — S62.522B OPEN FRACTURE OF TUFT OF DISTAL PHALANX OF LEFT THUMB: ICD-10-CM

## 2025-05-04 PROCEDURE — 99284 EMERGENCY DEPT VISIT MOD MDM: CPT | Mod: 25 | Performed by: EMERGENCY MEDICINE

## 2025-05-04 PROCEDURE — 250N000011 HC RX IP 250 OP 636

## 2025-05-04 PROCEDURE — 12002 RPR S/N/AX/GEN/TRNK2.6-7.5CM: CPT | Mod: FA

## 2025-05-04 PROCEDURE — 73140 X-RAY EXAM OF FINGER(S): CPT | Mod: LT

## 2025-05-04 PROCEDURE — 90471 IMMUNIZATION ADMIN: CPT

## 2025-05-04 PROCEDURE — 12002 RPR S/N/AX/GEN/TRNK2.6-7.5CM: CPT | Mod: FA | Performed by: EMERGENCY MEDICINE

## 2025-05-04 PROCEDURE — 90715 TDAP VACCINE 7 YRS/> IM: CPT

## 2025-05-04 RX ORDER — BUPIVACAINE HYDROCHLORIDE 2.5 MG/ML
5 INJECTION, SOLUTION INFILTRATION; PERINEURAL ONCE
Status: COMPLETED | OUTPATIENT
Start: 2025-05-04 | End: 2025-05-04

## 2025-05-04 RX ORDER — OXYCODONE HYDROCHLORIDE 5 MG/1
5 TABLET ORAL EVERY 6 HOURS PRN
Qty: 6 TABLET | Refills: 0 | Status: SHIPPED | OUTPATIENT
Start: 2025-05-04

## 2025-05-04 RX ORDER — CEPHALEXIN 500 MG/1
500 CAPSULE ORAL 2 TIMES DAILY
Qty: 14 CAPSULE | Refills: 0 | Status: SHIPPED | OUTPATIENT
Start: 2025-05-04 | End: 2025-05-11

## 2025-05-04 RX ADMIN — CLOSTRIDIUM TETANI TOXOID ANTIGEN (FORMALDEHYDE INACTIVATED), CORYNEBACTERIUM DIPHTHERIAE TOXOID ANTIGEN (FORMALDEHYDE INACTIVATED), BORDETELLA PERTUSSIS TOXOID ANTIGEN (GLUTARALDEHYDE INACTIVATED), BORDETELLA PERTUSSIS FILAMENTOUS HEMAGGLUTININ ANTIGEN (FORMALDEHYDE INACTIVATED), BORDETELLA PERTUSSIS PERTACTIN ANTIGEN, AND BORDETELLA PERTUSSIS FIMBRIAE 2/3 ANTIGEN 0.5 ML: 5; 2; 2.5; 5; 3; 5 INJECTION, SUSPENSION INTRAMUSCULAR at 15:45

## 2025-05-04 RX ADMIN — BUPIVACAINE HYDROCHLORIDE 12.5 MG: 2.5 INJECTION, SOLUTION INFILTRATION; PERINEURAL at 17:54

## 2025-05-04 ASSESSMENT — COLUMBIA-SUICIDE SEVERITY RATING SCALE - C-SSRS
6. HAVE YOU EVER DONE ANYTHING, STARTED TO DO ANYTHING, OR PREPARED TO DO ANYTHING TO END YOUR LIFE?: NO
1. IN THE PAST MONTH, HAVE YOU WISHED YOU WERE DEAD OR WISHED YOU COULD GO TO SLEEP AND NOT WAKE UP?: NO
2. HAVE YOU ACTUALLY HAD ANY THOUGHTS OF KILLING YOURSELF IN THE PAST MONTH?: NO

## 2025-05-04 ASSESSMENT — ACTIVITIES OF DAILY LIVING (ADL)
ADLS_ACUITY_SCORE: 58

## 2025-05-04 NOTE — ED TRIAGE NOTES
Pt was gardening when she cut the top of there thumb on her saw. Bleeding controled at this time.     Triage Assessment (Adult)       Row Name 05/04/25 1446          Triage Assessment    Airway WDL WDL        Respiratory WDL    Respiratory WDL WDL        Skin Circulation/Temperature WDL    Skin Circulation/Temperature WDL X  laceration to the left thumb        Cardiac WDL    Cardiac WDL WDL        Peripheral/Neurovascular WDL    Peripheral Neurovascular WDL WDL        Cognitive/Neuro/Behavioral WDL    Cognitive/Neuro/Behavioral WDL WDL        [place] Coma Scale    Best Eye Response 4-->(E4) spontaneous     Best Motor Response 6-->(M6) obeys commands     Best Verbal Response 5-->(V5) oriented     Randolph Coma Scale Score 15

## 2025-05-04 NOTE — DISCHARGE INSTRUCTIONS
Begin your antibiotic as soon as you are able to pick it up and continue for full course unless otherwise changed by the orthopedic hand specialist at your recheck.  DO NOT submerge the wound area in water until the wound is completely healed.  Keep the current bandage on until tomorrow morning and after that, keep the wound area clean and covered at least once daily with warm water.  Utilize Tylenol for pain.  Utilize oxycodone only as needed for severe, breakthrough pain.  Do not drink alcohol with this medication.  When changing the dressing, make sure to place the splint on to help protect the fracture in your finger.  A referral was sent for orthopedic hand specialist follow-up so they will call you to set up this outpatient appointment for reevaluation and recheck of your fracture and wound.  Otherwise, do not hesitate to seek urgent or emergent reevaluation if you have any worsening or concerning signs or symptoms.

## 2025-05-04 NOTE — ED PROVIDER NOTES
ED Provider Note  [facility]      History     Chief Complaint   Patient presents with    Laceration     Pt coming from home. Trimming tree with saw. Pt has balance issues  and issues seeing. Pts left thumb got too close to the saw and cut the thumb across the top.      The history is provided by the patient and medical records. No  was used.     Fatou Brian is a 75 year old female who presents to the ED with concern with left thumb injury and laceration.  Past medical history notable for bilateral exudative age-related macular degeneration, bilateral hearing loss, CAD.  She states that just prior to arrival, she was using a small electric chainsaw to cut some branches off a small tree when the saw gave way through a branch and hit her distal tip of her left thumb.  She reports pain at the site.  She denies any numbness or tingling sensation.  She denies any other injuries.  She is not on any anticoagulation or antiplatelet therapy.    Past Medical History  Past Medical History:   Diagnosis Date    Arthritis     Atherosclerosis     diffuse    CKD (chronic kidney disease) stage 3, GFR 30-59 ml/min (H) 09/27/2019    Coronary artery disease involving native coronary artery of native heart 10/16/2022    Emphysema of lung (H)     Former smoker     Quit 2009    GERD (gastroesophageal reflux disease)     HLD (hyperlipidaemia)     Hypertension     Macular degeneration     Major depression in partial remission 07/03/2014    Morbid obesity (H) 09/24/2020    NSTEMI (non-ST elevated myocardial infarction) (H) 11/02/2021    S/P LIS to OM1    Sjogren's syndrome with myopathy 04/04/2013    Spinal stenosis     Subclavian artery stenosis     Left     Past Surgical History:   Procedure Laterality Date    aneursym[  1983, 1991    Has metal in head    CHOLECYSTECTOMY      CLOSED RX PROX HUMERUS FRACTURE      10 pins placed    COLONOSCOPY  01/24/2014    Procedure: COMBINED  COLONOSCOPY, SINGLE BIOPSY/POLYPECTOMY BY BIOPSY;  COLONOSCOPY;  Surgeon: Ranjit Pa MD;  Location:  GI    CV HEART CATHETERIZATION WITH POSSIBLE INTERVENTION N/A 11/02/2021    Procedure: Heart Catheterization with Possible Intervention;  Surgeon: Keeley Pérez MD;  Location:  HEART CARDIAC CATH LAB    CV PCI STENT DRUG ELUTING N/A 11/02/2021    Procedure: Percutaneous Coronary Intervention Stent Drug Eluting;  Surgeon: Keeley Pérez MD;  Location:  HEART CARDIAC CATH LAB    HC ECP WITH CATARACT SURGERY      both eyes    HYSTERECTOMY, JANET       cephALEXin (KEFLEX) 500 MG capsule  oxyCODONE (ROXICODONE) 5 MG tablet  acetaminophen (TYLENOL) 650 MG CR tablet  amLODIPine (NORVASC) 5 MG tablet  aspirin 81 MG EC tablet  famotidine (PEPCID) 20 MG tablet  hydroCHLOROthiazide 12.5 MG tablet  Lidocaine (LIDOCARE) 4 % Patch  loratadine (CLARITIN) 10 MG tablet  losartan (COZAAR) 100 MG tablet  methocarbamol (ROBAXIN) 500 MG tablet  metoprolol succinate ER (TOPROL XL) 50 MG 24 hr tablet  Multiple Vitamins-Minerals (PRESERVISION AREDS 2 PO)  nitroGLYcerin (NITROSTAT) 0.4 MG sublingual tablet  pantoprazole (PROTONIX) 40 MG EC tablet  senna-docusate (SENOKOT-S/PERICOLACE) 8.6-50 MG tablet  sulfamethoxazole-trimethoprim (BACTRIM DS) 800-160 MG tablet      Allergies   Allergen Reactions    Blood Transfusion Related (Informational Only) Other (See Comments)     Patient has a history of a clinically significant antibody against RBC antigens.  A delay in compatible RBCs may occur.    Chantix [Varenicline]      suicidal    Influenza Virus Vaccine H5n1      Muscle aches dizzy, nauseated  Light headed    Morphine Sulfate      Sensitivity when in hospital    Omeprazole Magnesium Nausea     Intolerance      Vioxx     Cetirizine-Pseudoephedrine Er Rash    Niacin Itching and Rash    Zetia [Ezetimibe] Other (See Comments)     Muscle pain     Family History  Family History   Problem Relation Age of Onset     "Thyroid Disease Mother     Arthritis Mother         Rheumatoid    Osteoporosis Mother     Cancer Mother         stomach    Cerebrovascular Disease Mother     Heart Disease Father     Cancer Father     Heart Disease Brother         fibulation     Social History   Social History     Tobacco Use    Smoking status: Former     Current packs/day: 0.00     Average packs/day: 1.5 packs/day for 53.5 years (80.2 ttl pk-yrs)     Types: Cigarettes     Start date: 4/10/1956     Quit date: 10/1/2009     Years since quitting: 15.6    Smokeless tobacco: Never   Vaping Use    Vaping status: Never Used   Substance Use Topics    Alcohol use: Yes     Comment: occas.    Drug use: No      Past medical history, past surgical history, medications, allergies, family history, and social history were reviewed with the patient. No additional pertinent items.     A medically appropriate review of systems was performed with pertinent positives and negatives noted in the HPI, and all other systems negative.    Physical Exam   BP: (!) 152/55  Pulse: 66  Temp: 98.5  F (36.9  C)  Resp: 15  Height: 167.6 cm (5' 6\")  Weight: 97.5 kg (215 lb)  SpO2: 98 %    Vitals:    05/04/25 1445 05/04/25 1730   BP: (!) 152/55 (!) 178/48   Pulse: 66    Resp: 15    Temp: 98.5  F (36.9  C)    TempSrc: Oral    SpO2: 98%    Weight: 97.5 kg (215 lb)    Height: 1.676 m (5' 6\")       Physical Exam  Constitutional:       General: She is not in acute distress.     Appearance: Normal appearance. She is normal weight. She is not ill-appearing, toxic-appearing or diaphoretic.   Cardiovascular:      Rate and Rhythm: Normal rate and regular rhythm.      Pulses: Normal pulses.   Pulmonary:      Effort: Pulmonary effort is normal.      Breath sounds: Normal breath sounds.   Musculoskeletal:      Right hand: Normal.      Left hand: Laceration (distal thumb) and tenderness present. No deformity. Normal range of motion. Normal strength. Normal sensation.      Comments: Sensation " appears intact on initial evaluation   Skin:     General: Skin is warm.   Neurological:      Mental Status: She is alert. Mental status is at baseline.   Psychiatric:         Mood and Affect: Mood normal.         Behavior: Behavior normal.                             ED Course, Procedures, & Data     ED Course as of 05/04/25 1801   Sun May 04, 2025   1714 Ortho paged     [facility]    -Laceration Repair    Date/Time: 5/4/2025 7:11 PM    Performed by: Christina Feliciano PA-C  Authorized by: Christina Feliciano PA-C    Risks, benefits and alternatives discussed.      ANESTHESIA (see MAR for exact dosages):     Anesthesia method:  Nerve block    Block anesthetic:  Bupivacaine 0.25% w/o epi    Block technique:  Digital block    Block injection procedure:  Anatomic landmarks identified, introduced needle, negative aspiration for blood, anatomic landmarks palpated and incremental injection    Block outcome:  Anesthesia achieved    LACERATION DETAILS     Location:  Finger    Finger location:  L thumb    Length (cm):  3    REPAIR TYPE:     Repair type:  Simple    EXPLORATION:     Hemostasis achieved with:  Direct pressure    Wound exploration: wound explored through full range of motion      Wound extent: underlying fracture      Contaminated: no      TREATMENT:     Area cleansed with:  Saline    Amount of cleaning:  Extensive    Irrigation solution:  Sterile saline    Irrigation volume:  1000    Irrigation method:  Pressure wash    Visualized foreign bodies/material removed: no      SKIN REPAIR     Repair method:  Sutures    Suture size:  5-0    Suture material:  Nylon (Vicryl)    Suture technique:  Simple interrupted    Number of sutures:  6 (3 nylon sutures and 3 Vicryl sutures)    APPROXIMATION     Approximation:  Close    POST-PROCEDURE DETAILS     Dressing:  Antibiotic ointment, adhesive bandage and splint for protection      PROCEDURE    Patient Tolerance:  Patient  tolerated the procedure well with no immediate complications                  Results for orders placed or performed during the hospital encounter of 05/04/25   Fingers XR, 2-3 views, left     Status: None    Narrative    EXAM: XR FINGER LEFT G/E 2 VIEWS  LOCATION: [facility]  DATE: 5/4/2025    INDICATION: deep, distal laceration, finger vs hand electric saw; evaluate for underlying fracture vs other injury  COMPARISON: None.      Impression    IMPRESSION: Slight irregularity to the tuft of the distal phalanx of the thumb is worrisome for a small fracture and there is associated soft tissue defect raising the possibility of open fracture. Degenerative change at the IP joint. No dislocation.       Medications   Tdap (tetanus-diphtheria-acell pertussis) (ADACEL) injection 0.5 mL (0.5 mLs Intramuscular $Given 5/4/25 3738)   BUPivacaine (MARCAINE) 0.25 % injection 12.5 mg (12.5 mg Intradermal $Given by Other Clinician 5/4/25 4973)     Labs Ordered and Resulted from Time of ED Arrival to Time of ED Departure - No data to display  Fingers XR, 2-3 views, left   Final Result   IMPRESSION: Slight irregularity to the tuft of the distal phalanx of the thumb is worrisome for a small fracture and there is associated soft tissue defect raising the possibility of open fracture. Degenerative change at the IP joint. No dislocation.                Critical care was not performed.     Medical Decision Making  The patient's presentation was of moderate complexity (an acute complicated injury).    The patient's evaluation involved:  review of external note(s) from 1 sources (Previous admission encounters)  ordering and/or review of 1 test(s) in this encounter (see separate area of note for details)  discussion of management or test interpretation with another health professional (Orthopedic surgery via phone)    The patient's management necessitated moderate risk (a decision regarding minor  procedure (fracture care without reduction and laceration repair) with risk factors of none).    Assessment & Plan    Fatou is a 75-year-old female that presented to the ED after a left thumb injury.  Acceptable vital signs on presentation.  Patient in no acute distress nontoxic-appearing.  Patient appears neurovascularly intact on exam.  Bleeding controlled on evaluation.  X-ray left thumb notable for tuft fracture.  Laceration was repaired successfully and to the best of ED provider ability.  Tetanus vaccination was administered for booster here in the ED.  Discussed patient case briefly over the phone with orthopedic surgery who was agreeable to the repair, splint placement, and follow-up with hand surgery in the next week.  Otherwise, patient stable for discharge home at this time.  Rx oxycodone and Keflex.  Strict return precautions discussed at length.  Referral for hand surgery was sent from the ED today for close follow-up.  Recommend suture removal in 7 to 10 days.  Strict wound care instructions were discussed as well as splint management.  Patient advised that she should follow-up with hand surgery for further monitoring of her fracture and wound.  Advised patient not to be operating heavy machinery with her significant eye disease.  Patient was ultimately agreeable to the discharge treatment plan, voiced understanding, and all questions answered prior to discharge.    I have reviewed the nursing notes. I have reviewed the findings, diagnosis, plan and need for follow up with the patient.    Discharge Medication List as of 5/4/2025  5:35 PM        START taking these medications    Details   cephALEXin (KEFLEX) 500 MG capsule Take 1 capsule (500 mg) by mouth 2 times daily for 7 days., Disp-14 capsule, R-0, E-Prescribe      oxyCODONE (ROXICODONE) 5 MG tablet Take 1 tablet (5 mg) by mouth every 6 hours as needed for severe pain or breakthrough pain., Disp-6 tablet, R-0, E-Prescribe             Final  diagnoses:   Open fracture of tuft of distal phalanx of left thumb   Thumb laceration, left, initial encounter       Christina Feliciano PA-C    [facility] EMERGENCY DEPARTMENT  5/4/2025     Christina Feliciano PA-C  05/04/25 1916  ---------------------------------------------------------------------------------------------  --    ED Attending Physician Attestation    I Ashley Lewis MD, cared for this patient with the Advanced Practice Provider ([facility]). I personally provided a substantive portion of the care for this patient, including approving the care plan for the number and complexity of problems addressed and taking responsibility related to the risk of complications and/or morbidity or mortality of patient management. Please see the [facility]'s documentation for full details.    Summary of HPI, PE, ED Course   Patient is a 75 year old female evaluated in the emergency department for a laceration to the left thumb.  This was sustained when she was using a power saw.  Patient has significant visual deficits at baseline.  She has a tuft fracture evident on x-ray.  Patient was given a dose of antibiotics here in the emergency department and her wound was repaired, she was told to follow-up with an orthopedic/hand clinic.  Wound care discussed.  We did tell her that there is some concern that the flap may not be viable if it loses vascularity.  This is the reason why we want her to follow-up with Ortho/hand.  Patient verbalizes understanding. After the completion of care in the emergency department, the patient was discharged.      Ashley Lewis MD  Emergency Medicine          Debbie, Ashley Orellana MD  05/04/25 5876

## 2025-05-06 ENCOUNTER — TELEPHONE (OUTPATIENT)
Dept: ORTHOPEDICS | Facility: CLINIC | Age: 75
End: 2025-05-06
Payer: COMMERCIAL

## 2025-05-06 NOTE — TELEPHONE ENCOUNTER
----- Message from Norberto KIDD sent at 5/5/2025  8:44 AM CDT -----  Please reach out to patient and help them get scheduled Next week Monday 5/12/25 for Open fracture of tuft of distal phalanx of left thumb  DOI: 5/4/25 with Christina Blair PA-C  or Dr Villasenor    Thank you    NORBERTO TAYLOR, JONA  ----- Message -----  From: Addy Yap MA  Sent: 5/5/2025   8:38 AM CDT  To: Danay Martinez, ATC; Norberto Taylor ATC    Which hand provider would be best  ----- Message -----  From: Rosaline Linares MD  Sent: 5/4/2025   5:24 PM CDT  To: [facility]    Hello,      Please schedule with non-op hand provider for thumb laceration wound check in 1 week..    rosaline Diane

## 2025-05-06 NOTE — TELEPHONE ENCOUNTER
DIAGNOSIS:   Open fracture of tuft of distal phalanx of left thumb [S62.522B]  Thumb laceration, left, initial encounter [S61.012A]   APPOINTMENT DATE: 05/12/2025   NOTES STATUS DETAILS   DISCHARGE REPORT from the ER Internal 5/4/2025 -  [facility] Emergency Department     XRAYS (IMAGES & REPORTS) Internal 5/4/2025 - Left Finger

## 2025-05-06 NOTE — TELEPHONE ENCOUNTER
Patient calling back. She only can go to [place]. She will not go to [place] or \[facility]\"".

## 2025-05-06 NOTE — TELEPHONE ENCOUNTER
Left Voicemail (1st Attempt) and Sent Mychart (1st Attempt) for the patient to call back and schedule the following:    Appointment type: New Hand/Wrist  Provider: Dr. Villasenor or Christina Blair  Return date: 5/12/25  Specialty phone number: [phone]

## 2025-05-12 ENCOUNTER — OFFICE VISIT (OUTPATIENT)
Dept: ORTHOPEDICS | Facility: CLINIC | Age: 75
End: 2025-05-12
Payer: COMMERCIAL

## 2025-05-12 ENCOUNTER — PRE VISIT (OUTPATIENT)
Dept: ORTHOPEDICS | Facility: CLINIC | Age: 75
End: 2025-05-12

## 2025-05-12 VITALS — BODY MASS INDEX: 34.55 KG/M2 | WEIGHT: 215 LBS | HEIGHT: 66 IN

## 2025-05-12 DIAGNOSIS — S62.522B OPEN FRACTURE OF TUFT OF DISTAL PHALANX OF LEFT THUMB: ICD-10-CM

## 2025-05-12 DIAGNOSIS — S61.012A THUMB LACERATION, LEFT, INITIAL ENCOUNTER: ICD-10-CM

## 2025-05-12 NOTE — PROGRESS NOTES
[facility] H&P Note:  Date: May 12, 2025     Patient Name: Fatou Brian  MRN: [id number]    Consult requested by: Christina Feliciano    CHIEF COMPLAINT: Left thumb laceration    Dominant Hand: Right  Occupation: Retired      HPI:  Ms. Fatou Brian is a 75 year old  female right hand dominant who presents with laceration to the left thumb tip.  Sustained 5/4/2025 when she was trimming a tree with a hand saw when it came through a branch and hit the distal tip of her thumb.  The patient was seen in the emergency department where it was irrigated and closed.  She reports no numbness or pain.  She has been rinsing the thumb under water.  Patient uses a walker and canes to ambulate      PAST MEDICAL HISTORY:  Past Medical History:   Diagnosis Date    Arthritis     Atherosclerosis     diffuse    CKD (chronic kidney disease) stage 3, GFR 30-59 ml/min (H) 09/27/2019    Coronary artery disease involving native coronary artery of native heart 10/16/2022    Emphysema of lung (H)     Former smoker     Quit 2009    GERD (gastroesophageal reflux disease)     HLD (hyperlipidaemia)     Hypertension     Macular degeneration     Major depression in partial remission 07/03/2014    Morbid obesity (H) 09/24/2020    NSTEMI (non-ST elevated myocardial infarction) (H) 11/02/2021    S/P LIS to OM1    Sjogren's syndrome with myopathy 04/04/2013    Spinal stenosis     Subclavian artery stenosis     Left       PAST SURGICAL HISTORY:  Past Surgical History:   Procedure Laterality Date    aneursym[  1983, 1991    Has metal in head    CHOLECYSTECTOMY      CLOSED RX PROX HUMERUS FRACTURE      10 pins placed    COLONOSCOPY  01/24/2014    Procedure: COMBINED COLONOSCOPY, SINGLE BIOPSY/POLYPECTOMY BY BIOPSY;  COLONOSCOPY;  Surgeon: Ranjit Pa MD;  Location:  GI    CV HEART CATHETERIZATION WITH POSSIBLE INTERVENTION N/A 11/02/2021    Procedure: Heart Catheterization with Possible  Intervention;  Surgeon: Keeley Pérez MD;  Location:  HEART CARDIAC CATH LAB    CV PCI STENT DRUG ELUTING N/A 11/02/2021    Procedure: Percutaneous Coronary Intervention Stent Drug Eluting;  Surgeon: Keeley Pérez MD;  Location:  HEART CARDIAC CATH LAB    HC ECP WITH CATARACT SURGERY      both eyes    HYSTERECTOMY, JANET         MEDICATIONS:  Current Outpatient Medications   Medication Sig Dispense Refill    acetaminophen (TYLENOL) 650 MG CR tablet Take 2 tablets (1,300 mg) by mouth every 8 hours as needed for mild pain or pain 270 tablet 3    amLODIPine (NORVASC) 5 MG tablet Take 1 tablet (5 mg) by mouth daily. 90 tablet 3    aspirin 81 MG EC tablet Take 1 tablet (81 mg) by mouth daily. 90 tablet 3    famotidine (PEPCID) 20 MG tablet TAKE ONE TABLET BY MOUTH TWICE DAILY AS NEEDED 180 tablet 1    hydroCHLOROthiazide 12.5 MG tablet Take 1 tablet (12.5 mg) by mouth daily. 90 tablet 3    Lidocaine (LIDOCARE) 4 % Patch Place 1 patch onto the skin every 24 hours To prevent lidocaine toxicity, patient should be patch free for 12 hrs daily. 14 patch 0    loratadine (CLARITIN) 10 MG tablet Take one tab twice per day. (Patient taking differently: Take 10 mg by mouth 2 times daily.) 180 tablet 3    losartan (COZAAR) 100 MG tablet Take 1 tablet (100 mg) by mouth daily. 90 tablet 0    methocarbamol (ROBAXIN) 500 MG tablet Take 1 tablet (500 mg) by mouth 3 times daily as needed for muscle spasms 15 tablet 0    metoprolol succinate ER (TOPROL XL) 50 MG 24 hr tablet Take 3 tablets (150 mg) by mouth daily. 270 tablet 3    Multiple Vitamins-Minerals (PRESERVISION AREDS 2 PO) Take 1 capsule by mouth 2 times daily.      nitroGLYcerin (NITROSTAT) 0.4 MG sublingual tablet For chest pain place 1 tablet under the tongue every 5 minutes for 3 doses. If symptoms persist 5 minutes after 1st dose call 911. 12 tablet 0    oxyCODONE (ROXICODONE) 5 MG tablet Take 1 tablet (5 mg) by mouth every 6 hours as needed for severe  pain or breakthrough pain. 6 tablet 0    pantoprazole (PROTONIX) 40 MG EC tablet TAKE 1 TABLET DAILY 90 tablet 0    senna-docusate (SENOKOT-S/PERICOLACE) 8.6-50 MG tablet Take 2 tablets by mouth 2 times daily as needed for constipation      sulfamethoxazole-trimethoprim (BACTRIM DS) 800-160 MG tablet Take 1 tablet by mouth 2 times daily. 6 tablet 0     No current facility-administered medications for this visit.       ALLERGIES:     Allergies   Allergen Reactions    Blood Transfusion Related (Informational Only) Other (See Comments)     Patient has a history of a clinically significant antibody against RBC antigens.  A delay in compatible RBCs may occur.    Chantix [Varenicline]      suicidal    Influenza Virus Vaccine H5n1      Muscle aches dizzy, nauseated  Light headed    Morphine Sulfate      Sensitivity when in hospital    Omeprazole Magnesium Nausea     Intolerance      Vioxx     Cetirizine-Pseudoephedrine Er Rash    Niacin Itching and Rash    Zetia [Ezetimibe] Other (See Comments)     Muscle pain       FAMILY HISTORY:  No pertinent family history    SOCIAL HISTORY:  Social History     Tobacco Use    Smoking status: Former     Current packs/day: 0.00     Average packs/day: 1.5 packs/day for 53.5 years (80.2 ttl pk-yrs)     Types: Cigarettes     Start date: 4/10/1956     Quit date: 10/1/2009     Years since quitting: 15.6    Smokeless tobacco: Never   Vaping Use    Vaping status: Never Used   Substance Use Topics    Alcohol use: Yes     Comment: occas.    Drug use: No       The patient's past medical, family, and social history was reviewed and confirmed.    REVIEW OF SYMPTOMS:      General: Negative   Eyes: Negative   Ear, Nose and Throat: Negative   Respiratory: Negative   Cardiovascular: Negative   Gastrointestinal: Negative   Genito-urinary: Negative   Musculoskeletal: see HPI  Neurological: Negative   Psychological: Negative  HEME: Negative   ENDO: Negative   SKIN: Negative    VITALS:  Vitals:    05/12/25  "1223   Weight: 97.5 kg (215 lb)   Height: 1.676 m (5' 6\")       EXAM:  General: NAD, A&Ox3  HEENT: NC/AT  CV: RRR by peripheral pulse  Pulmonary: Non-labored breathing on RA  LUE:  Laceration at the tip of the thumb extending to the tip of the nail, well-approximated with nylon as well as Vicryl sutures.  There is no erythema, drainage or evidence of infection  Sensation is intact to light touch at the tip of the thumb  Intact EPL, FL, intrinsics  WWP, cap refill less than 2 seconds       IMAGING:    X-rays of the left thumb 5/4/2025 demonstrates no obvious fracture or acute bony abnormality.  Mild degenerative changes at the IP joint    I have personally reviewed the above images and labs.         IMPRESSION AND RECOMMENDATIONS:  Ms. Fatou Brian is a 75 year old female right hand dominant with left thumb tip laceration status post irrigation and closure in the emergency department.    Laceration is healing appropriately.  Sutures were removed today.  Band-Aid applied.  Okay to leave open to air.  Okay to shower and wash hands with soap and water.  Okay to do dishes so long as she can keep it clean and avoids soaking or prolonged water exposure for additional 2 weeks.    All questions answered, the patient voiced understanding and agreement.  Follow-up with me as needed.    Tico Villasenor MD    Hand, Upper Extremity & Microvascular Surgery  Department of Orthopaedic Surgery  [facility]        "

## 2025-05-12 NOTE — LETTER
5/12/2025      Fatou Brian  [address]  [facility] [address]      Dear Colleague,    Thank you for referring your patient, Fatou Brian, to the [facility] [place]. Please see a copy of my visit note below.    Orthopaedic Surgery [facility]remity Clinic H&P Note:  Date: May 12, 2025     Patient Name: Fatou Brian  MRN: [id number]    Consult requested by: Christina Feliciano    CHIEF COMPLAINT: Left thumb laceration    Dominant Hand: Right  Occupation: Retired      HPI:  Ms. Fatou Brian is a 75 year old  female right hand dominant who presents with laceration to the left thumb tip.  Sustained 5/4/2025 when she was trimming a tree with a hand saw when it came through a branch and hit the distal tip of her thumb.  The patient was seen in the emergency department where it was irrigated and closed.  She reports no numbness or pain.  She has been rinsing the thumb under water.  Patient uses a walker and canes to ambulate      PAST MEDICAL HISTORY:  Past Medical History:   Diagnosis Date     Arthritis      Atherosclerosis     diffuse     CKD (chronic kidney disease) stage 3, GFR 30-59 ml/min (H) 09/27/2019     Coronary artery disease involving native coronary artery of native heart 10/16/2022     Emphysema of lung (H)      Former smoker     Quit 2009     GERD (gastroesophageal reflux disease)      HLD (hyperlipidaemia)      Hypertension      Macular degeneration      Major depression in partial remission 07/03/2014     Morbid obesity (H) 09/24/2020     NSTEMI (non-ST elevated myocardial infarction) (H) 11/02/2021    S/P LIS to OM1     Sjogren's syndrome with myopathy 04/04/2013     Spinal stenosis      Subclavian artery stenosis     Left       PAST SURGICAL HISTORY:  Past Surgical History:   Procedure Laterality Date     aneursym[  1983, 1991    Has metal in head     CHOLECYSTECTOMY       CLOSED RX PROX HUMERUS FRACTURE      10 pins placed      COLONOSCOPY  01/24/2014    Procedure: COMBINED COLONOSCOPY, SINGLE BIOPSY/POLYPECTOMY BY BIOPSY;  COLONOSCOPY;  Surgeon: Ranjit Pa MD;  Location:  GI     CV HEART CATHETERIZATION WITH POSSIBLE INTERVENTION N/A 11/02/2021    Procedure: Heart Catheterization with Possible Intervention;  Surgeon: Keeley Pérez MD;  Location:  HEART CARDIAC CATH LAB     CV PCI STENT DRUG ELUTING N/A 11/02/2021    Procedure: Percutaneous Coronary Intervention Stent Drug Eluting;  Surgeon: Keeley Pérez MD;  Location:  HEART CARDIAC CATH LAB     HC ECP WITH CATARACT SURGERY      both eyes     HYSTERECTOMY, JANET         MEDICATIONS:  Current Outpatient Medications   Medication Sig Dispense Refill     acetaminophen (TYLENOL) 650 MG CR tablet Take 2 tablets (1,300 mg) by mouth every 8 hours as needed for mild pain or pain 270 tablet 3     amLODIPine (NORVASC) 5 MG tablet Take 1 tablet (5 mg) by mouth daily. 90 tablet 3     aspirin 81 MG EC tablet Take 1 tablet (81 mg) by mouth daily. 90 tablet 3     famotidine (PEPCID) 20 MG tablet TAKE ONE TABLET BY MOUTH TWICE DAILY AS NEEDED 180 tablet 1     hydroCHLOROthiazide 12.5 MG tablet Take 1 tablet (12.5 mg) by mouth daily. 90 tablet 3     Lidocaine (LIDOCARE) 4 % Patch Place 1 patch onto the skin every 24 hours To prevent lidocaine toxicity, patient should be patch free for 12 hrs daily. 14 patch 0     loratadine (CLARITIN) 10 MG tablet Take one tab twice per day. (Patient taking differently: Take 10 mg by mouth 2 times daily.) 180 tablet 3     losartan (COZAAR) 100 MG tablet Take 1 tablet (100 mg) by mouth daily. 90 tablet 0     methocarbamol (ROBAXIN) 500 MG tablet Take 1 tablet (500 mg) by mouth 3 times daily as needed for muscle spasms 15 tablet 0     metoprolol succinate ER (TOPROL XL) 50 MG 24 hr tablet Take 3 tablets (150 mg) by mouth daily. 270 tablet 3     Multiple Vitamins-Minerals (PRESERVISION AREDS 2 PO) Take 1 capsule by mouth 2 times daily.        nitroGLYcerin (NITROSTAT) 0.4 MG sublingual tablet For chest pain place 1 tablet under the tongue every 5 minutes for 3 doses. If symptoms persist 5 minutes after 1st dose call 911. 12 tablet 0     oxyCODONE (ROXICODONE) 5 MG tablet Take 1 tablet (5 mg) by mouth every 6 hours as needed for severe pain or breakthrough pain. 6 tablet 0     pantoprazole (PROTONIX) 40 MG EC tablet TAKE 1 TABLET DAILY 90 tablet 0     senna-docusate (SENOKOT-S/PERICOLACE) 8.6-50 MG tablet Take 2 tablets by mouth 2 times daily as needed for constipation       sulfamethoxazole-trimethoprim (BACTRIM DS) 800-160 MG tablet Take 1 tablet by mouth 2 times daily. 6 tablet 0     No current facility-administered medications for this visit.       ALLERGIES:     Allergies   Allergen Reactions     Blood Transfusion Related (Informational Only) Other (See Comments)     Patient has a history of a clinically significant antibody against RBC antigens.  A delay in compatible RBCs may occur.     Chantix [Varenicline]      suicidal     Influenza Virus Vaccine H5n1      Muscle aches dizzy, nauseated  Light headed     Morphine Sulfate      Sensitivity when in hospital     Omeprazole Magnesium Nausea     Intolerance       Vioxx      Cetirizine-Pseudoephedrine Er Rash     Niacin Itching and Rash     Zetia [Ezetimibe] Other (See Comments)     Muscle pain       FAMILY HISTORY:  No pertinent family history    SOCIAL HISTORY:  Social History     Tobacco Use     Smoking status: Former     Current packs/day: 0.00     Average packs/day: 1.5 packs/day for 53.5 years (80.2 ttl pk-yrs)     Types: Cigarettes     Start date: 4/10/1956     Quit date: 10/1/2009     Years since quitting: 15.6     Smokeless tobacco: Never   Vaping Use     Vaping status: Never Used   Substance Use Topics     Alcohol use: Yes     Comment: occas.     Drug use: No       The patient's past medical, family, and social history was reviewed and confirmed.    REVIEW OF SYMPTOMS:      General:  "Negative   Eyes: Negative   Ear, Nose and Throat: Negative   Respiratory: Negative   Cardiovascular: Negative   Gastrointestinal: Negative   Genito-urinary: Negative   Musculoskeletal: see HPI  Neurological: Negative   Psychological: Negative  HEME: Negative   ENDO: Negative   SKIN: Negative    VITALS:  Vitals:    05/12/25 1223   Weight: 97.5 kg (215 lb)   Height: 1.676 m (5' 6\")       EXAM:  General: NAD, A&Ox3  HEENT: NC/AT  CV: RRR by peripheral pulse  Pulmonary: Non-labored breathing on RA  LUE:  Laceration at the tip of the thumb extending to the tip of the nail, well-approximated with nylon as well as Vicryl sutures.  There is no erythema, drainage or evidence of infection  Sensation is intact to light touch at the tip of the thumb  Intact EPL, FL, intrinsics  WWP, cap refill less than 2 seconds       IMAGING:    X-rays of the left thumb 5/4/2025 demonstrates no obvious fracture or acute bony abnormality.  Mild degenerative changes at the IP joint    I have personally reviewed the above images and labs.         IMPRESSION AND RECOMMENDATIONS:  Ms. Fatou Brian is a 75 year old female right hand dominant with left thumb tip laceration status post irrigation and closure in the emergency department.    Laceration is healing appropriately.  Sutures were removed today.  Band-Aid applied.  Okay to leave open to air.  Okay to shower and wash hands with soap and water.  Okay to do dishes so long as she can keep it clean and avoids soaking or prolonged water exposure for additional 2 weeks.    All questions answered, the patient voiced understanding and agreement.  Follow-up with me as needed.    Tico Villasenor MD    Hand, Upper Extremity & Microvascular Surgery  Department of Orthopaedic Surgery  [facility]          Again, thank you for allowing me to participate in the care of your patient.        Sincerely,        Tico Villasenor MD    Electronically signed"

## 2025-05-27 DIAGNOSIS — K21.9 GASTROESOPHAGEAL REFLUX DISEASE WITHOUT ESOPHAGITIS: Chronic | ICD-10-CM

## 2025-05-27 RX ORDER — PANTOPRAZOLE SODIUM 40 MG/1
TABLET, DELAYED RELEASE ORAL
Qty: 90 TABLET | Refills: 0 | Status: SHIPPED | OUTPATIENT
Start: 2025-05-27

## 2025-05-27 RX ORDER — PANTOPRAZOLE SODIUM 40 MG/1
40 TABLET, DELAYED RELEASE ORAL
Qty: 90 TABLET | Refills: 0 | OUTPATIENT
Start: 2025-05-27

## 2025-05-27 NOTE — TELEPHONE ENCOUNTER
Medication Question or Refill        What medication are you calling about (include dose and sig)?: pantoprazole 40 mg    Preferred Pharmacy:   [facility] [place] [id number] - [place], [place] - [address] [place] [address]  [address] [place] [address]  [facility] [address]  Phone: [phone] Fax: [phone]  Controlled Substance Agreement on file:   CSA -- Patient Level:    CSA: None found at the patient level.       Who prescribed the medication?: Dr. Ольга Houston    Do you need a refill? Yes    Per pharmacy contact, pt requested refill about a week ago, but the pharmacy missed it and did not act on the request until now.

## 2025-08-09 DIAGNOSIS — I10 HYPERTENSION GOAL BP (BLOOD PRESSURE) < 130/80: ICD-10-CM

## 2025-08-09 DIAGNOSIS — K21.9 GASTROESOPHAGEAL REFLUX DISEASE WITHOUT ESOPHAGITIS: Chronic | ICD-10-CM

## 2025-08-09 RX ORDER — HYDROCHLOROTHIAZIDE 12.5 MG/1
12.5 TABLET ORAL DAILY
Qty: 90 TABLET | Refills: 0 | OUTPATIENT
Start: 2025-08-09

## 2025-08-11 RX ORDER — PANTOPRAZOLE SODIUM 40 MG/1
40 TABLET, DELAYED RELEASE ORAL DAILY
Qty: 90 TABLET | Refills: 1 | Status: SHIPPED | OUTPATIENT
Start: 2025-08-11

## 2025-09-04 ENCOUNTER — OFFICE VISIT (OUTPATIENT)
Dept: FAMILY MEDICINE | Facility: CLINIC | Age: 75
End: 2025-09-04
Payer: COMMERCIAL

## 2025-09-04 VITALS
RESPIRATION RATE: 16 BRPM | SYSTOLIC BLOOD PRESSURE: 138 MMHG | OXYGEN SATURATION: 96 % | TEMPERATURE: 97.5 F | BODY MASS INDEX: 32.77 KG/M2 | DIASTOLIC BLOOD PRESSURE: 70 MMHG | WEIGHT: 203 LBS | HEART RATE: 83 BPM

## 2025-09-04 DIAGNOSIS — D64.9 NORMOCYTIC ANEMIA: ICD-10-CM

## 2025-09-04 DIAGNOSIS — Z23 NEED FOR VACCINATION: ICD-10-CM

## 2025-09-04 DIAGNOSIS — E21.3 HYPERPARATHYROIDISM: ICD-10-CM

## 2025-09-04 DIAGNOSIS — I10 HYPERTENSION GOAL BP (BLOOD PRESSURE) < 130/80: Primary | ICD-10-CM

## 2025-09-04 DIAGNOSIS — I25.10 CORONARY ARTERY DISEASE INVOLVING NATIVE CORONARY ARTERY OF NATIVE HEART, UNSPECIFIED WHETHER ANGINA PRESENT: ICD-10-CM

## 2025-09-04 DIAGNOSIS — R53.83 FATIGUE, UNSPECIFIED TYPE: ICD-10-CM

## 2025-09-04 DIAGNOSIS — K21.9 GASTROESOPHAGEAL REFLUX DISEASE WITHOUT ESOPHAGITIS: Chronic | ICD-10-CM

## 2025-09-04 PROBLEM — H90.5 SENSORINEURAL HEARING LOSS: Status: ACTIVE | Noted: 2025-09-04

## 2025-09-04 LAB
BASOPHILS # BLD AUTO: 0.05 10E3/UL (ref 0–0.2)
BASOPHILS NFR BLD AUTO: 0.6 %
CA-I BLD-MCNC: 4.7 MG/DL (ref 4.4–5.2)
CREAT UR-MCNC: 34.7 MG/DL
EOSINOPHIL # BLD AUTO: 0.05 10E3/UL (ref 0–0.7)
EOSINOPHIL NFR BLD AUTO: 0.6 %
ERYTHROCYTE [DISTWIDTH] IN BLOOD BY AUTOMATED COUNT: 13.4 % (ref 10–15)
FOLATE SERPL-MCNC: 10.9 NG/ML (ref 4.6–34.8)
HCT VFR BLD AUTO: 38.1 % (ref 35–47)
HGB BLD-MCNC: 12.2 G/DL (ref 11.7–15.7)
IMM GRANULOCYTES # BLD: <0.04 10E3/UL
IMM GRANULOCYTES NFR BLD: 0.3 %
LYMPHOCYTES # BLD AUTO: 1.87 10E3/UL (ref 0.8–5.3)
LYMPHOCYTES NFR BLD AUTO: 24.3 %
MCH RBC QN AUTO: 29.4 PG (ref 26.5–33)
MCHC RBC AUTO-ENTMCNC: 32 G/DL (ref 31.5–36.5)
MCV RBC AUTO: 91.8 FL (ref 78–100)
MICROALBUMIN UR-MCNC: 138 MG/L
MICROALBUMIN/CREAT UR: 397.69 MG/G CR (ref 0–25)
MONOCYTES # BLD AUTO: 0.54 10E3/UL (ref 0–1.3)
MONOCYTES NFR BLD AUTO: 7 %
NEUTROPHILS # BLD AUTO: 5.18 10E3/UL (ref 1.6–8.3)
NEUTROPHILS NFR BLD AUTO: 67.2 %
PLATELET # BLD AUTO: 216 10E3/UL (ref 150–450)
RBC # BLD AUTO: 4.15 10E6/UL (ref 3.8–5.2)
WBC # BLD AUTO: 7.71 10E3/UL (ref 4–11)

## 2025-09-04 RX ORDER — PANTOPRAZOLE SODIUM 40 MG/1
40 TABLET, DELAYED RELEASE ORAL DAILY
Qty: 90 TABLET | Refills: 3 | Status: SHIPPED | OUTPATIENT
Start: 2025-09-04

## 2025-09-04 RX ORDER — METOPROLOL SUCCINATE 50 MG/1
150 TABLET, EXTENDED RELEASE ORAL DAILY
Qty: 270 TABLET | Refills: 3 | Status: SHIPPED | OUTPATIENT
Start: 2025-09-04

## 2025-09-04 RX ORDER — AMLODIPINE BESYLATE 5 MG/1
5 TABLET ORAL DAILY
Qty: 90 TABLET | Refills: 3 | Status: SHIPPED | OUTPATIENT
Start: 2025-09-04

## 2025-09-04 RX ORDER — LOSARTAN POTASSIUM 100 MG/1
100 TABLET ORAL DAILY
Qty: 90 TABLET | Refills: 3 | Status: SHIPPED | OUTPATIENT
Start: 2025-09-04

## 2025-09-04 RX ORDER — NITROGLYCERIN 0.4 MG/1
TABLET SUBLINGUAL
Qty: 12 TABLET | Refills: 0 | Status: SHIPPED | OUTPATIENT
Start: 2025-09-04

## 2025-09-04 RX ORDER — HYDROCHLOROTHIAZIDE 12.5 MG/1
12.5 TABLET ORAL DAILY
Qty: 90 TABLET | Refills: 3 | Status: SHIPPED | OUTPATIENT
Start: 2025-09-04

## 2025-09-04 RX ORDER — FAMOTIDINE 20 MG/1
TABLET, FILM COATED ORAL
Qty: 180 TABLET | Refills: 3 | Status: SHIPPED | OUTPATIENT
Start: 2025-09-04

## (undated) DEVICE — INTRO SHEATH 6FRX10CM PINNACLE RSS602

## (undated) DEVICE — CATH GUIDING  6F MACH 1 GUIDING CLS3.5

## (undated) DEVICE — CATH ANGIO JUDKINS JL4 6FRX100CM INFINITI 534620T

## (undated) DEVICE — KIT HAND CONTROL ANGIOTOUCH ACIST 65CM AT-P65

## (undated) DEVICE — DEFIB PRO-PADZ LVP LQD GEL ADULT 8900-2105-01

## (undated) DEVICE — VALVE HEMOSTASIS .096" COPILOT MECH 1003331

## (undated) DEVICE — GUIDEWIRE VASC 0.014INX180CM RUNTHROUGH 25-1011

## (undated) DEVICE — TOTE ANGIO CORP PC15AT SAN32CC83O

## (undated) DEVICE — INTRO SHEATH 4FRX10CM PINNACLE RSS402

## (undated) DEVICE — INFL DVC KIT W/10CC NITRO IN4530

## (undated) DEVICE — CATH BALLOON EMERGE 2.5X12MM H7493918912250

## (undated) DEVICE — RAD INTRODUCER KIT MICRO 5FRX10CM .018 NITINOL G/W

## (undated) DEVICE — CLOSURE ANGIOSEAL 6FR 610130

## (undated) DEVICE — MANIFOLD KIT ANGIO AUTOMATED 014613

## (undated) DEVICE — CATH ANGIO INFINITI 3DRC 6FRX100CM 534676T

## (undated) DEVICE — CATH BALLOON NC EMERGE 3.00X12MM H7493926712300

## (undated) DEVICE — DEVICE FEMOSTOP COMPRESSION 11165

## (undated) RX ORDER — HEPARIN SODIUM 1000 [USP'U]/ML
INJECTION, SOLUTION INTRAVENOUS; SUBCUTANEOUS
Status: DISPENSED
Start: 2021-11-02

## (undated) RX ORDER — HEPARIN SODIUM 200 [USP'U]/100ML
INJECTION, SOLUTION INTRAVENOUS
Status: DISPENSED
Start: 2021-11-02

## (undated) RX ORDER — FENTANYL CITRATE 50 UG/ML
INJECTION, SOLUTION INTRAMUSCULAR; INTRAVENOUS
Status: DISPENSED
Start: 2021-11-02

## (undated) RX ORDER — NITROGLYCERIN 5 MG/ML
VIAL (ML) INTRAVENOUS
Status: DISPENSED
Start: 2021-11-02

## (undated) RX ORDER — LIDOCAINE HYDROCHLORIDE 10 MG/ML
INJECTION, SOLUTION EPIDURAL; INFILTRATION; INTRACAUDAL; PERINEURAL
Status: DISPENSED
Start: 2021-11-02